# Patient Record
Sex: FEMALE | Race: ASIAN | Employment: OTHER | URBAN - METROPOLITAN AREA
[De-identification: names, ages, dates, MRNs, and addresses within clinical notes are randomized per-mention and may not be internally consistent; named-entity substitution may affect disease eponyms.]

---

## 2018-01-02 ENCOUNTER — TELEPHONE (OUTPATIENT)
Dept: RHEUMATOLOGY | Facility: CLINIC | Age: 68
End: 2018-01-02

## 2018-01-02 DIAGNOSIS — D72.819 LEUKOPENIA, UNSPECIFIED TYPE: Primary | ICD-10-CM

## 2020-01-10 ENCOUNTER — OFFICE VISIT (OUTPATIENT)
Dept: INTERNAL MEDICINE CLINIC | Facility: CLINIC | Age: 70
End: 2020-01-10
Payer: MEDICARE

## 2020-01-10 VITALS
DIASTOLIC BLOOD PRESSURE: 84 MMHG | HEIGHT: 63 IN | HEART RATE: 87 BPM | OXYGEN SATURATION: 98 % | BODY MASS INDEX: 29.91 KG/M2 | WEIGHT: 168.81 LBS | SYSTOLIC BLOOD PRESSURE: 134 MMHG

## 2020-01-10 DIAGNOSIS — K62.5 BRIGHT RED RECTAL BLEEDING: Primary | ICD-10-CM

## 2020-01-10 DIAGNOSIS — R73.03 PREDIABETES: ICD-10-CM

## 2020-01-10 DIAGNOSIS — E78.2 MIXED HYPERLIPIDEMIA: ICD-10-CM

## 2020-01-10 DIAGNOSIS — Z12.31 SCREENING MAMMOGRAM, ENCOUNTER FOR: ICD-10-CM

## 2020-01-10 PROCEDURE — 99204 OFFICE O/P NEW MOD 45 MIN: CPT | Performed by: INTERNAL MEDICINE

## 2020-01-10 NOTE — PROGRESS NOTES
HPI:    Patient ID: Courtney Carlos is a 71year old female. HPI pt here to get established as a new patient. Has no chronic health issues except for some elevated chol;esterol treated transiently treated with atorvasatin and had borderline blood sugar. (primary encounter diagnosis) gastro referral  Re colonoscopy  Prediabetes check labs  Mixed hyperlipidemia off of statin     No orders of the defined types were placed in this encounter.       Meds This Visit:  Requested Prescriptions      No prescriptions

## 2020-01-11 LAB
ALBUMIN/GLOBULIN RATIO: 1.6 (CALC) (ref 1–2.5)
ALBUMIN: 4.4 G/DL (ref 3.6–5.1)
ALKALINE PHOSPHATASE: 64 U/L (ref 33–130)
ALT: 11 U/L (ref 6–29)
AST: 15 U/L (ref 10–35)
BILIRUBIN, TOTAL: 0.6 MG/DL (ref 0.2–1.2)
BUN: 16 MG/DL (ref 7–25)
CALCIUM: 9.7 MG/DL (ref 8.6–10.4)
CARBON DIOXIDE: 29 MMOL/L (ref 20–32)
CHLORIDE: 104 MMOL/L (ref 98–110)
CHOL/HDLC RATIO: 3.5 (CALC)
CHOLESTEROL, TOTAL: 259 MG/DL
CREATININE: 0.68 MG/DL (ref 0.5–0.99)
EGFR IF AFRICN AM: 103 ML/MIN/1.73M2
EGFR IF NONAFRICN AM: 89 ML/MIN/1.73M2
GLOBULIN: 2.7 G/DL (CALC) (ref 1.9–3.7)
GLUCOSE: 102 MG/DL (ref 65–99)
HDL CHOLESTEROL: 73 MG/DL
HEMOGLOBIN A1C: 6 % OF TOTAL HGB
LDL-CHOLESTEROL: 161 MG/DL (CALC)
NON-HDL CHOLESTEROL: 186 MG/DL (CALC)
POTASSIUM: 4.8 MMOL/L (ref 3.5–5.3)
PROTEIN, TOTAL: 7.1 G/DL (ref 6.1–8.1)
SODIUM: 141 MMOL/L (ref 135–146)
TRIGLYCERIDES: 125 MG/DL

## 2020-01-14 ENCOUNTER — TELEPHONE (OUTPATIENT)
Dept: INTERNAL MEDICINE CLINIC | Facility: CLINIC | Age: 70
End: 2020-01-14

## 2020-01-14 DIAGNOSIS — Z12.11 COLON CANCER SCREENING: ICD-10-CM

## 2020-01-14 DIAGNOSIS — Z01.419 VISIT FOR GYNECOLOGIC EXAMINATION: Primary | ICD-10-CM

## 2020-01-14 NOTE — TELEPHONE ENCOUNTER
Pt walked into office requesting a referral to see Dr. Yael Lowe because her insurance told her that Dr. Prasad Torres is not in network. Pt also needs referral for  OB/ GYN-Dr. Dalia Bullock @ [address] [place] [address], [address] in [facility].  Ph: [phone]

## 2020-01-21 ENCOUNTER — HOSPITAL ENCOUNTER (OUTPATIENT)
Dept: MAMMOGRAPHY | Facility: HOSPITAL | Age: 70
Discharge: HOME | End: 2020-01-21
Attending: INTERNAL MEDICINE
Payer: MEDICARE

## 2020-01-21 DIAGNOSIS — Z12.31 SCREENING MAMMOGRAM, ENCOUNTER FOR: ICD-10-CM

## 2020-01-21 PROCEDURE — 77067 SCR MAMMO BI INCL CAD: CPT | Performed by: INTERNAL MEDICINE

## 2020-01-21 PROCEDURE — 77063 BREAST TOMOSYNTHESIS BI: CPT | Performed by: INTERNAL MEDICINE

## 2020-02-06 ENCOUNTER — HOSPITAL ENCOUNTER (EMERGENCY)
Facility: HOSPITAL | Age: 70
Discharge: HOME | End: 2020-02-06
Attending: EMERGENCY MEDICINE
Payer: MEDICARE

## 2020-02-06 VITALS
HEART RATE: 82 BPM | SYSTOLIC BLOOD PRESSURE: 122 MMHG | RESPIRATION RATE: 16 BRPM | BODY MASS INDEX: 30.12 KG/M2 | TEMPERATURE: 98 F | WEIGHT: 170 LBS | HEIGHT: 63 IN | DIASTOLIC BLOOD PRESSURE: 81 MMHG | OXYGEN SATURATION: 99 %

## 2020-02-06 DIAGNOSIS — K92.1 BLOODY STOOL: Primary | ICD-10-CM

## 2020-02-06 LAB
ANION GAP SERPL CALC-SCNC: 2 MMOL/L (ref 0–18)
ANTIBODY SCREEN: NEGATIVE
BASOPHILS # BLD AUTO: 0.03 X10(3) UL (ref 0–0.2)
BASOPHILS NFR BLD AUTO: 0.8 %
BUN BLD-MCNC: 18 MG/DL (ref 7–18)
BUN/CREAT SERPL: 19.8 (ref 10–20)
CALCIUM BLD-MCNC: 9.3 MG/DL (ref 8.5–10.1)
CHLORIDE SERPL-SCNC: 111 MMOL/L (ref 98–112)
CO2 SERPL-SCNC: 29 MMOL/L (ref 21–32)
CREAT BLD-MCNC: 0.91 MG/DL (ref 0.55–1.02)
CRP SERPL-MCNC: <0.29 MG/DL (ref ?–0.3)
DEPRECATED RDW RBC AUTO: 42.5 FL (ref 35.1–46.3)
EOSINOPHIL # BLD AUTO: 0.13 X10(3) UL (ref 0–0.7)
EOSINOPHIL NFR BLD AUTO: 3.3 %
ERYTHROCYTE [DISTWIDTH] IN BLOOD BY AUTOMATED COUNT: 12.5 % (ref 11–15)
GLUCOSE BLD-MCNC: 113 MG/DL (ref 70–99)
HCT VFR BLD AUTO: 44.5 % (ref 35–48)
HGB BLD-MCNC: 14.4 G/DL (ref 12–16)
IMM GRANULOCYTES # BLD AUTO: 0 X10(3) UL (ref 0–1)
IMM GRANULOCYTES NFR BLD: 0 %
LYMPHOCYTES # BLD AUTO: 1.62 X10(3) UL (ref 1–4)
LYMPHOCYTES NFR BLD AUTO: 40.5 %
MCH RBC QN AUTO: 30.1 PG (ref 26–34)
MCHC RBC AUTO-ENTMCNC: 32.4 G/DL (ref 31–37)
MCV RBC AUTO: 93.1 FL (ref 80–100)
MONOCYTES # BLD AUTO: 0.28 X10(3) UL (ref 0.1–1)
MONOCYTES NFR BLD AUTO: 7 %
NEUTROPHILS # BLD AUTO: 1.94 X10 (3) UL (ref 1.5–7.7)
NEUTROPHILS # BLD AUTO: 1.94 X10(3) UL (ref 1.5–7.7)
NEUTROPHILS NFR BLD AUTO: 48.4 %
OSMOLALITY SERPL CALC.SUM OF ELEC: 297 MOSM/KG (ref 275–295)
PLATELET # BLD AUTO: 161 10(3)UL (ref 150–450)
POTASSIUM SERPL-SCNC: 4.9 MMOL/L (ref 3.5–5.1)
RBC # BLD AUTO: 4.78 X10(6)UL (ref 3.8–5.3)
RH BLOOD TYPE: POSITIVE
SODIUM SERPL-SCNC: 142 MMOL/L (ref 136–145)
WBC # BLD AUTO: 4 X10(3) UL (ref 4–11)

## 2020-02-06 PROCEDURE — 99283 EMERGENCY DEPT VISIT LOW MDM: CPT

## 2020-02-06 PROCEDURE — 80048 BASIC METABOLIC PNL TOTAL CA: CPT

## 2020-02-06 PROCEDURE — 86901 BLOOD TYPING SEROLOGIC RH(D): CPT

## 2020-02-06 PROCEDURE — 86140 C-REACTIVE PROTEIN: CPT | Performed by: EMERGENCY MEDICINE

## 2020-02-06 PROCEDURE — 86900 BLOOD TYPING SEROLOGIC ABO: CPT | Performed by: EMERGENCY MEDICINE

## 2020-02-06 PROCEDURE — 80048 BASIC METABOLIC PNL TOTAL CA: CPT | Performed by: EMERGENCY MEDICINE

## 2020-02-06 PROCEDURE — 82272 OCCULT BLD FECES 1-3 TESTS: CPT

## 2020-02-06 PROCEDURE — 85025 COMPLETE CBC W/AUTO DIFF WBC: CPT | Performed by: EMERGENCY MEDICINE

## 2020-02-06 PROCEDURE — 86901 BLOOD TYPING SEROLOGIC RH(D): CPT | Performed by: EMERGENCY MEDICINE

## 2020-02-06 PROCEDURE — 36415 COLL VENOUS BLD VENIPUNCTURE: CPT

## 2020-02-06 PROCEDURE — 86850 RBC ANTIBODY SCREEN: CPT | Performed by: EMERGENCY MEDICINE

## 2020-02-06 PROCEDURE — 85025 COMPLETE CBC W/AUTO DIFF WBC: CPT

## 2020-02-06 PROCEDURE — 86900 BLOOD TYPING SEROLOGIC ABO: CPT

## 2020-02-06 PROCEDURE — 86850 RBC ANTIBODY SCREEN: CPT

## 2020-02-06 RX ORDER — MELATONIN
325 ONCE
Status: COMPLETED | OUTPATIENT
Start: 2020-02-06 | End: 2020-02-06

## 2020-02-06 RX ORDER — FERROUS SULFATE 325(65) MG
325 TABLET ORAL
Qty: 30 TABLET | Refills: 0 | Status: SHIPPED | OUTPATIENT
Start: 2020-02-06 | End: 2020-03-07

## 2020-02-06 NOTE — ED INITIAL ASSESSMENT (HPI)
Patient presents to ED with c/o of rectal bleeding since September/October, saw PCP in January. Patient states she is supposed to see a GI MD on 2/27/20, however she states she has seen blood in her stool moer frequently, especially in the morning.  Patient

## 2020-02-07 ENCOUNTER — TELEPHONE (OUTPATIENT)
Dept: INTERNAL MEDICINE CLINIC | Facility: CLINIC | Age: 70
End: 2020-02-07

## 2020-02-07 NOTE — CM/SW NOTE
Called Dr. Bertram Mcgrath office at Ext: [phone] to attempt to get an earlier appt for patient.   Original appt 2/26/20 at 1pm.    JANUARYM spoke with Fermin Sumner at Dr. Heidi Rey office, and she was able to get an earlier appt with Dc SUTHERLAND for 2/20/20 at 1130am at

## 2020-02-07 NOTE — TELEPHONE ENCOUNTER
Pt called would like to speak to Dr. Soham Berumen, did not want to give any other information.      Please call [phone]

## 2020-02-07 NOTE — ED PROVIDER NOTES
Patient Seen in: [facility] AND [facility] Emergency Department    History   Patient presents with:  GI Bleeding    Stated Complaint: rectal bleeding since october HPI    59-year-old female with past medical history dyslipidemia presenting for evaluation of se noted above. PSFH elements reviewed from today and agreed except as otherwise stated in HPI.     Physical Exam     ED Triage Vitals [02/06/20 1542]   /87   Pulse 77   Resp 18   Temp 98.2 °F (36.8 °C)   Temp src Oral   SpO2 97 %   O2 Device None (Ro Final result               ANTIBODY SCREEN[[id number]]                                  Final result                 Please view results for these tests on the individual orders.    ABORH (BLOOD TYPE)   ANTIBODY SCREEN   RAINBOW DRAW BLUE   RAINBOW DRAW LAV

## 2020-02-07 NOTE — CM/SW NOTE
Pt called and made aware that her new appointment is on Feb 20th at 1130am. She is also aware that she is on a wait list for a sooner appt as well.

## 2020-02-13 NOTE — H&P (VIEW-ONLY)
[address] [facility] [address] Gastroenterology                                                                                                  Clinic History and Physical     Pa Procedure Laterality Date   • COLONOSCOPY     • D & C  3 years ago      Family Hx:   Family History   Problem Relation Age of Onset   • Hypertension Father [de-identified]   • Diabetes Mother [de-identified]   • Other (carpal tunnel) Sister       Social History: Social History extremities are warm and well perfused   Lung: effort normal and breath sounds normal, no respiratory distress, wheezes or rales  GI: soft, non-tender exam in all quadrants without rigidity or guarding, non-distended, no abnormal bowel sounds noted, no mas r/t no sedation per pt preference  -Prep: Split dose Colyte/TriLyte or equivalent  -Anti-platelets and anti-coagulants: None  -Diabetes meds: None  -Hold iron 5 days prior to procedure    ** If MAC @ [facility]:    - HOLD ACE/ARBs the night before and/or the d

## 2020-02-13 NOTE — H&P
[address] [facility] [address] Gastroenterology                                                                                                  Clinic History and Physical     Pa Procedure Laterality Date   • COLONOSCOPY     • D & C  3 years ago      Family Hx:   Family History   Problem Relation Age of Onset   • Hypertension Father [de-identified]   • Diabetes Mother [de-identified]   • Other (carpal tunnel) Sister       Social History: Social History extremities are warm and well perfused   Lung: effort normal and breath sounds normal, no respiratory distress, wheezes or rales  GI: soft, non-tender exam in all quadrants without rigidity or guarding, non-distended, no abnormal bowel sounds noted, no mas r/t no sedation per pt preference  -Prep: Split dose Colyte/TriLyte or equivalent  -Anti-platelets and anti-coagulants: None  -Diabetes meds: None  -Hold iron 5 days prior to procedure    ** If MAC @ [facility]:    - HOLD ACE/ARBs the night before and/or the d

## 2020-02-20 ENCOUNTER — OFFICE VISIT (OUTPATIENT)
Dept: GASTROENTEROLOGY | Facility: CLINIC | Age: 70
End: 2020-02-20
Payer: MEDICARE

## 2020-02-20 ENCOUNTER — TELEPHONE (OUTPATIENT)
Dept: GASTROENTEROLOGY | Facility: CLINIC | Age: 70
End: 2020-02-20

## 2020-02-20 VITALS
HEART RATE: 76 BPM | WEIGHT: 170 LBS | HEIGHT: 63 IN | BODY MASS INDEX: 30.12 KG/M2 | SYSTOLIC BLOOD PRESSURE: 124 MMHG | DIASTOLIC BLOOD PRESSURE: 79 MMHG

## 2020-02-20 DIAGNOSIS — K92.1 HEMATOCHEZIA: ICD-10-CM

## 2020-02-20 DIAGNOSIS — Z86.010 HISTORY OF COLON POLYPS: Primary | ICD-10-CM

## 2020-02-20 PROCEDURE — 99203 OFFICE O/P NEW LOW 30 MIN: CPT | Performed by: NURSE PRACTITIONER

## 2020-02-20 NOTE — PATIENT INSTRUCTIONS
Recommend:  -Schedule colonoscopy w/ Dr. Juliocesar Dunn w/o sedation per pt preference  Dx: hx colon polyps, hematochezia   -Eligible for NE: No r/t no sedation per pt preference  -Prep: Split dose Colyte/TriLyte or equivalent  -Anti-platelets and anti-coagulants:

## 2020-02-20 NOTE — TELEPHONE ENCOUNTER
Scheduled for:  Colonoscopy 88311  Provider Name:  Dr. Luis Leos  Date:  5/1/2020  Location:  [facility]  Sedation:  No sedation per patient  Time:  7:30 am, arrival 6:30 am  Prep: Colyte  Meds/Allergies Reconciled?:  Carol Ann/APN reviewed.   Diagnosis with codes:  Hx o

## 2020-02-26 ENCOUNTER — TELEPHONE (OUTPATIENT)
Dept: INTERNAL MEDICINE CLINIC | Facility: CLINIC | Age: 70
End: 2020-02-26

## 2020-02-26 NOTE — TELEPHONE ENCOUNTER
Patient called PCP and stated that she is having blood in the stool daily and it is very concerning. She cannot get a Colonoscopy until May and she would like to know if there is anything that she can do to be scheduled sooner.  I informed patient that due

## 2020-02-27 ENCOUNTER — TELEPHONE (OUTPATIENT)
Dept: GASTROENTEROLOGY | Facility: CLINIC | Age: 70
End: 2020-02-27

## 2020-02-27 DIAGNOSIS — K92.1 HEMATOCHEZIA: ICD-10-CM

## 2020-02-27 DIAGNOSIS — Z86.010 HISTORY OF COLON POLYPS: Primary | ICD-10-CM

## 2020-02-27 NOTE — TELEPHONE ENCOUNTER
Closing this TE and opening a new TE under Dr. Tere Lawrence    Please see TE 2/27/20 under Dr. Teer Lawrence for rescheduling information    Closing TE

## 2020-02-27 NOTE — TELEPHONE ENCOUNTER
Rescheduled for:  Colonoscopy 15090  Provider Name: Dr. Kermit Goldberg  Date:    From-5/1/20  To-4/8/20  [facility]  Sedation:  No sedation  Time:   0730 (pt is aware to arrive at 0630)   Prep:  Jude, mailed 2/28/20  Meds/Allergies Reconciled?:

## 2020-02-27 NOTE — TELEPHONE ENCOUNTER
Dr. Holly Yeboah--    I rescheduled this patient to an earlier date at [facility] since she is still having off and on rectal bleeding and is very concerned, but Emmy in Endo stated that a no sedation can not be scheduled at [facility] only MAC.      Please advise if you would

## 2020-02-27 NOTE — TELEPHONE ENCOUNTER
Curtis Holloway, [facility]           12:03 PM   Note      Patient called PCP and stated that she is having blood in the stool daily and it is very concerning.  She cannot get a Colonoscopy until May and she would like to know if there is anything that she can do t

## 2020-02-28 NOTE — TELEPHONE ENCOUNTER
Dr Cheng Ferguson - Per Sayra Pereira 3/19/2020 at 7:30am might only be IV, is it on for patient to have IV sedation? Or can I schedule for 3/19/2020 at 12:30pm with MAC.

## 2020-02-28 NOTE — TELEPHONE ENCOUNTER
Left message to call back and schedule. If patient calls back please forward call to GI Schedulers.  Hosea Grounds today until 4:30pm.

## 2020-03-02 NOTE — TELEPHONE ENCOUNTER
Dr Sue Carolina 3/19/2020 at 7:30am might only be IV, is it on for patient to have IV sedation? Or can I schedule for 3/19/2020 at 12:30pm with MAC.

## 2020-03-03 NOTE — TELEPHONE ENCOUNTER
Dr Holly Yeboah - Spoke to patient, she stated her rectal bleeding has been getting worse, is it ok if we add patient on for 3/5/20 or 3/6/20? GI RNs - Please triage this patient as she called today regarding her rectal bleeding getting worse by day.      Thank

## 2020-03-03 NOTE — TELEPHONE ENCOUNTER
Please find out why I do not have MAC on Thurs. I would like to do with MAC sedation. Prefer 730AM but if not available then 1230pm ok (but please let me know why I do not have MAC)-- it is my MAC morning.      Yessica Cottrell MD

## 2020-03-03 NOTE — TELEPHONE ENCOUNTER
I spoke to the pt. She states she notices bright red bleeding every morning when she wipes. This does not continue throughout the day. She only notices it in the morning and when she passes a stool.     I let her know the surgery scheduler will be calling h

## 2020-03-03 NOTE — TELEPHONE ENCOUNTER
Scheduled for:  Colonoscopy 42048  Provider Name: Dr. Patricia Darden  Date:  03/06/2020  Location:      [facility]  Sedation:  No sedation  Time:  0815   Prep:  Colyte  Meds/Allergies Reconciled?:  Physician reviewed   Diagnosis with codes:  History of colon polyps Z86.01

## 2020-03-06 ENCOUNTER — TELEPHONE (OUTPATIENT)
Dept: GASTROENTEROLOGY | Facility: CLINIC | Age: 70
End: 2020-03-06

## 2020-03-06 ENCOUNTER — HOSPITAL ENCOUNTER (OUTPATIENT)
Dept: CT IMAGING | Facility: HOSPITAL | Age: 70
Discharge: HOME | End: 2020-03-06
Attending: INTERNAL MEDICINE
Payer: MEDICARE

## 2020-03-06 ENCOUNTER — HOSPITAL ENCOUNTER (OUTPATIENT)
Facility: HOSPITAL | Age: 70
Setting detail: HOSPITAL OUTPATIENT SURGERY
Discharge: HOME | End: 2020-03-06
Attending: INTERNAL MEDICINE | Admitting: INTERNAL MEDICINE
Payer: MEDICARE

## 2020-03-06 VITALS
DIASTOLIC BLOOD PRESSURE: 86 MMHG | OXYGEN SATURATION: 100 % | HEIGHT: 62 IN | WEIGHT: 168 LBS | HEART RATE: 75 BPM | SYSTOLIC BLOOD PRESSURE: 140 MMHG | RESPIRATION RATE: 14 BRPM | BODY MASS INDEX: 30.91 KG/M2

## 2020-03-06 DIAGNOSIS — K92.1 HEMATOCHEZIA: ICD-10-CM

## 2020-03-06 DIAGNOSIS — C18.7 CANCER OF SIGMOID COLON (HCC): Primary | ICD-10-CM

## 2020-03-06 DIAGNOSIS — C18.7 CANCER OF SIGMOID COLON (HCC): ICD-10-CM

## 2020-03-06 DIAGNOSIS — Z86.010 HISTORY OF COLON POLYPS: ICD-10-CM

## 2020-03-06 PROCEDURE — 45381 COLONOSCOPY SUBMUCOUS NJX: CPT | Performed by: INTERNAL MEDICINE

## 2020-03-06 PROCEDURE — 0DBN8ZX EXCISION OF SIGMOID COLON, VIA NATURAL OR ARTIFICIAL OPENING ENDOSCOPIC, DIAGNOSTIC: ICD-10-PCS | Performed by: INTERNAL MEDICINE

## 2020-03-06 PROCEDURE — 74177 CT ABD & PELVIS W/CONTRAST: CPT | Performed by: INTERNAL MEDICINE

## 2020-03-06 PROCEDURE — 45380 COLONOSCOPY AND BIOPSY: CPT | Performed by: INTERNAL MEDICINE

## 2020-03-06 PROCEDURE — 0DBK8ZX EXCISION OF ASCENDING COLON, VIA NATURAL OR ARTIFICIAL OPENING ENDOSCOPIC, DIAGNOSTIC: ICD-10-PCS | Performed by: INTERNAL MEDICINE

## 2020-03-06 PROCEDURE — 0DBH8ZX EXCISION OF CECUM, VIA NATURAL OR ARTIFICIAL OPENING ENDOSCOPIC, DIAGNOSTIC: ICD-10-PCS | Performed by: INTERNAL MEDICINE

## 2020-03-06 PROCEDURE — 45385 COLONOSCOPY W/LESION REMOVAL: CPT | Performed by: INTERNAL MEDICINE

## 2020-03-06 PROCEDURE — 71260 CT THORAX DX C+: CPT | Performed by: INTERNAL MEDICINE

## 2020-03-06 RX ORDER — SODIUM CHLORIDE 0.9 % (FLUSH) 0.9 %
10 SYRINGE (ML) INJECTION AS NEEDED
Status: DISCONTINUED | OUTPATIENT
Start: 2020-03-06 | End: 2020-03-06

## 2020-03-06 RX ORDER — SODIUM CHLORIDE, SODIUM LACTATE, POTASSIUM CHLORIDE, CALCIUM CHLORIDE 600; 310; 30; 20 MG/100ML; MG/100ML; MG/100ML; MG/100ML
INJECTION, SOLUTION INTRAVENOUS CONTINUOUS
Status: DISCONTINUED | OUTPATIENT
Start: 2020-03-06 | End: 2020-03-06

## 2020-03-06 NOTE — TELEPHONE ENCOUNTER
Spoke with Richard Alston at [facility] [employer] and got approval for cpt code for 04921 and 05192. Authorization numbers are [id number] and [id number]. She will fax us a confirmation. 54

## 2020-03-06 NOTE — TELEPHONE ENCOUNTER
Patient with sigmoid cancer on colonoscopy today  Tattooed distal and proximal aspect  Ordered CEA, ct chest/abd/pelvis  Consult placed for Dr. Mavis Wells and Dr. Merrick Olszewski    Thank you

## 2020-03-06 NOTE — TELEPHONE ENCOUNTER
Need prior auth for CT Chest, abdomen and Pelvis, proc codes 65520 and 63060, which is sched right now. Pt. is getting the test done now, need STAT Auth. Please call [employer] - phone # [phone].

## 2020-03-06 NOTE — TELEPHONE ENCOUNTER
Called by radiologist, focus of air in sigmoid likely from biopsy site (contained perforation). No free air. No metastatic disease. Called patient and told to do CLD today and then if no pain can eat normal tomorrow.    MILAGROS Cole and Dr. Luisa Wisdom

## 2020-03-06 NOTE — INTERVAL H&P NOTE
Pre-op Diagnosis: Hematochezia, History of colon polyps    The above referenced H&P was reviewed by London Chowdhury MD on 3/6/2020, the patient was examined and no significant changes have occurred in the patient's condition since the H&P was performed.   I di

## 2020-03-06 NOTE — OPERATIVE REPORT
COLONOSCOPY REPORT    Romi Ghotraenoc     1950 Age 71year old   PCP Lolis Juarez MD Endoscopist Jeffry Roche MD     Date of procedure: 20    Procedure: Colonoscopy w/ cold biopsy polypectomy, cold snare polypectomy and mass biopsy.  Tattoe C. 6 mm polyp in the sigmoid colon; flat morphology; cold snare polypectomy and retrieved. 2. Terminal ileum: the visualized mucosa appeared normal.    3. A retroflexed view of the rectum revealed hemorrhoids.     4. There was a 5 cm mass from 15 cm to

## 2020-03-06 NOTE — TELEPHONE ENCOUNTER
Spoke with Estephanie Saavedra at [facility] [employer] and got approval for cpt code for 27122 and 77657. Authorization numbers are [id number]. She will fax us a confirmation.

## 2020-03-09 ENCOUNTER — OFFICE VISIT (OUTPATIENT)
Dept: SURGERY | Facility: CLINIC | Age: 70
End: 2020-03-09
Payer: MEDICARE

## 2020-03-09 VITALS — BODY MASS INDEX: 30.91 KG/M2 | WEIGHT: 168 LBS | HEIGHT: 62 IN

## 2020-03-09 DIAGNOSIS — C18.7 MALIGNANT NEOPLASM OF SIGMOID COLON (HCC): Primary | ICD-10-CM

## 2020-03-09 PROCEDURE — 99204 OFFICE O/P NEW MOD 45 MIN: CPT | Performed by: SURGERY

## 2020-03-09 NOTE — TELEPHONE ENCOUNTER
Future Appointments   Date Time Provider Carol Ferrera   3/10/2020 10:00 AM Laci Waite  [facility] HEM ONC EMO

## 2020-03-10 ENCOUNTER — TELEPHONE (OUTPATIENT)
Dept: SURGERY | Facility: CLINIC | Age: 70
End: 2020-03-10

## 2020-03-10 ENCOUNTER — OFFICE VISIT (OUTPATIENT)
Dept: HEMATOLOGY/ONCOLOGY | Facility: HOSPITAL | Age: 70
End: 2020-03-10
Attending: INTERNAL MEDICINE
Payer: MEDICARE

## 2020-03-10 VITALS
BODY MASS INDEX: 30.73 KG/M2 | HEIGHT: 62 IN | OXYGEN SATURATION: 98 % | SYSTOLIC BLOOD PRESSURE: 135 MMHG | WEIGHT: 167 LBS | RESPIRATION RATE: 16 BRPM | TEMPERATURE: 98 F | HEART RATE: 75 BPM | DIASTOLIC BLOOD PRESSURE: 73 MMHG

## 2020-03-10 DIAGNOSIS — K63.1 PERFORATED SIGMOID COLON (HCC): ICD-10-CM

## 2020-03-10 DIAGNOSIS — R73.9 HYPERGLYCEMIA: ICD-10-CM

## 2020-03-10 DIAGNOSIS — C18.7 CANCER OF SIGMOID COLON (HCC): Primary | ICD-10-CM

## 2020-03-10 PROCEDURE — 99204 OFFICE O/P NEW MOD 45 MIN: CPT | Performed by: INTERNAL MEDICINE

## 2020-03-10 RX ORDER — METRONIDAZOLE 500 MG/1
TABLET ORAL
Qty: 6 TABLET | Refills: 0 | Status: SHIPPED | OUTPATIENT
Start: 2020-03-15 | End: 2020-03-15

## 2020-03-10 RX ORDER — NEOMYCIN SULFATE 500 MG/1
TABLET ORAL
Qty: 6 TABLET | Refills: 0 | Status: SHIPPED | OUTPATIENT
Start: 2020-03-15 | End: 2020-03-15

## 2020-03-10 NOTE — CONSULTS
[facility] History and Physical    Patient Name: Giselle Gonzalez   YOB: 1950   Medical Record Number: [id number]   CSN: [id number]   Attending Physician:  Shivani Miranda MD       Date of Visit: 3/10/2020     Chief Complaint:  Colon cancer other associated modifying symptoms.     Past Medical History:  Past Medical History:   Diagnosis Date   • High cholesterol    • Other and unspecified hyperlipidemia     borderline       Past Surgical History:  Past Surgical History:   Procedure Laterality intact. Oropharynx is clear. Neck:  No palpable lymphadenopathy. Neck is supple. Chest: Clear to auscultation. Symmetric expansion. No wheezing. Heart: Regular rate and rhythm. No audible murmur  Abdomen: Soft, non tender with good bowel sounds.  No re scheduled for sigmoid resection on 3/16/2020. Instructed the patient to return and see us 2 weeks after surgery to discuss final staging as well as rationale for chemotherapy in the setting of high risk stage II hours or stage III disease.   All her questi

## 2020-03-10 NOTE — TELEPHONE ENCOUNTER
[facility]. Had to leave a voicemail. Waiting for a call back.

## 2020-03-11 ENCOUNTER — LAB ENCOUNTER (OUTPATIENT)
Dept: LAB | Facility: HOSPITAL | Age: 70
End: 2020-03-11
Attending: SURGERY
Payer: MEDICARE

## 2020-03-11 DIAGNOSIS — Z01.818 PRE-OP TESTING: ICD-10-CM

## 2020-03-11 LAB
ANTIBODY SCREEN: NEGATIVE
RH BLOOD TYPE: POSITIVE

## 2020-03-11 PROCEDURE — 86901 BLOOD TYPING SEROLOGIC RH(D): CPT

## 2020-03-11 PROCEDURE — 86850 RBC ANTIBODY SCREEN: CPT

## 2020-03-11 PROCEDURE — 86900 BLOOD TYPING SEROLOGIC ABO: CPT

## 2020-03-11 PROCEDURE — 36415 COLL VENOUS BLD VENIPUNCTURE: CPT

## 2020-03-11 RX ORDER — FERROUS SULFATE 325(65) MG
325 TABLET ORAL
COMMUNITY
End: 2021-01-05 | Stop reason: ALTCHOICE

## 2020-03-12 ENCOUNTER — TELEPHONE (OUTPATIENT)
Dept: SURGERY | Facility: CLINIC | Age: 70
End: 2020-03-12

## 2020-03-12 NOTE — TELEPHONE ENCOUNTER
Paper work was fax'ed to office to be complete. Sent it to Forms and placed in  [facility]

## 2020-03-15 ENCOUNTER — ANESTHESIA EVENT (OUTPATIENT)
Dept: SURGERY | Facility: HOSPITAL | Age: 70
DRG: 331 | End: 2020-03-15
Payer: MEDICARE

## 2020-03-16 ENCOUNTER — ANESTHESIA (OUTPATIENT)
Dept: SURGERY | Facility: HOSPITAL | Age: 70
DRG: 331 | End: 2020-03-16
Payer: MEDICARE

## 2020-03-16 ENCOUNTER — HOSPITAL ENCOUNTER (INPATIENT)
Facility: HOSPITAL | Age: 70
LOS: 4 days | Discharge: HOME | DRG: 331 | End: 2020-03-20
Attending: SURGERY | Admitting: SURGERY
Payer: MEDICARE

## 2020-03-16 DIAGNOSIS — Z01.818 PRE-OP TESTING: Primary | ICD-10-CM

## 2020-03-16 DIAGNOSIS — C18.7 MALIGNANT NEOPLASM OF SIGMOID COLON (HCC): ICD-10-CM

## 2020-03-16 LAB
GLUCOSE BLDC GLUCOMTR-MCNC: 123 MG/DL (ref 70–99)
GLUCOSE BLDC GLUCOMTR-MCNC: 178 MG/DL (ref 70–99)

## 2020-03-16 PROCEDURE — 99232 SBSQ HOSP IP/OBS MODERATE 35: CPT | Performed by: HOSPITALIST

## 2020-03-16 PROCEDURE — 44204 LAPARO PARTIAL COLECTOMY: CPT | Performed by: SURGERY

## 2020-03-16 PROCEDURE — 44213 LAP MOBIL SPLENIC FL ADD-ON: CPT | Performed by: SURGERY

## 2020-03-16 PROCEDURE — 0DTN4ZZ RESECTION OF SIGMOID COLON, PERCUTANEOUS ENDOSCOPIC APPROACH: ICD-10-PCS | Performed by: SURGERY

## 2020-03-16 RX ORDER — DOCUSATE SODIUM 100 MG/1
100 CAPSULE, LIQUID FILLED ORAL 2 TIMES DAILY
Status: DISCONTINUED | OUTPATIENT
Start: 2020-03-16 | End: 2020-03-20

## 2020-03-16 RX ORDER — ACETAMINOPHEN 500 MG
1000 TABLET ORAL ONCE
Status: COMPLETED | OUTPATIENT
Start: 2020-03-16 | End: 2020-03-16

## 2020-03-16 RX ORDER — ACETAMINOPHEN 500 MG
1000 TABLET ORAL EVERY 8 HOURS
Status: DISCONTINUED | OUTPATIENT
Start: 2020-03-16 | End: 2020-03-20

## 2020-03-16 RX ORDER — OXYCODONE HYDROCHLORIDE 5 MG/1
10 TABLET ORAL EVERY 4 HOURS PRN
Status: DISCONTINUED | OUTPATIENT
Start: 2020-03-16 | End: 2020-03-20

## 2020-03-16 RX ORDER — CEFAZOLIN SODIUM/WATER 2 G/20 ML
2 SYRINGE (ML) INTRAVENOUS EVERY 8 HOURS
Status: DISCONTINUED | OUTPATIENT
Start: 2020-03-16 | End: 2020-03-16

## 2020-03-16 RX ORDER — GABAPENTIN 100 MG/1
200 CAPSULE ORAL NIGHTLY
Status: DISCONTINUED | OUTPATIENT
Start: 2020-03-16 | End: 2020-03-16

## 2020-03-16 RX ORDER — ALVIMOPAN 12 MG/1
12 CAPSULE ORAL 2 TIMES DAILY
Status: DISCONTINUED | OUTPATIENT
Start: 2020-03-16 | End: 2020-03-20

## 2020-03-16 RX ORDER — OXYCODONE HYDROCHLORIDE 5 MG/1
5 TABLET ORAL EVERY 4 HOURS PRN
Status: DISCONTINUED | OUTPATIENT
Start: 2020-03-16 | End: 2020-03-20

## 2020-03-16 RX ORDER — BUPIVACAINE HYDROCHLORIDE 2.5 MG/ML
INJECTION, SOLUTION EPIDURAL; INFILTRATION; INTRACAUDAL AS NEEDED
Status: DISCONTINUED | OUTPATIENT
Start: 2020-03-16 | End: 2020-03-16 | Stop reason: HOSPADM

## 2020-03-16 RX ORDER — ACETAMINOPHEN 500 MG
1000 TABLET ORAL EVERY 8 HOURS
Status: DISCONTINUED | OUTPATIENT
Start: 2020-03-16 | End: 2020-03-16

## 2020-03-16 RX ORDER — METRONIDAZOLE 500 MG/100ML
500 INJECTION, SOLUTION INTRAVENOUS EVERY 8 HOURS
Status: DISCONTINUED | OUTPATIENT
Start: 2020-03-16 | End: 2020-03-16

## 2020-03-16 RX ORDER — ONDANSETRON 2 MG/ML
4 INJECTION INTRAMUSCULAR; INTRAVENOUS ONCE AS NEEDED
Status: DISCONTINUED | OUTPATIENT
Start: 2020-03-16 | End: 2020-03-16 | Stop reason: HOSPADM

## 2020-03-16 RX ORDER — METRONIDAZOLE 500 MG/100ML
500 INJECTION, SOLUTION INTRAVENOUS EVERY 8 HOURS
Status: COMPLETED | OUTPATIENT
Start: 2020-03-16 | End: 2020-03-17

## 2020-03-16 RX ORDER — ONDANSETRON 2 MG/ML
INJECTION INTRAMUSCULAR; INTRAVENOUS AS NEEDED
Status: DISCONTINUED | OUTPATIENT
Start: 2020-03-16 | End: 2020-03-16 | Stop reason: SURG

## 2020-03-16 RX ORDER — LIDOCAINE HYDROCHLORIDE 10 MG/ML
INJECTION, SOLUTION EPIDURAL; INFILTRATION; INTRACAUDAL; PERINEURAL AS NEEDED
Status: DISCONTINUED | OUTPATIENT
Start: 2020-03-16 | End: 2020-03-16 | Stop reason: SURG

## 2020-03-16 RX ORDER — SODIUM CHLORIDE, SODIUM LACTATE, POTASSIUM CHLORIDE, CALCIUM CHLORIDE 600; 310; 30; 20 MG/100ML; MG/100ML; MG/100ML; MG/100ML
2 INJECTION, SOLUTION INTRAVENOUS CONTINUOUS
Status: DISCONTINUED | OUTPATIENT
Start: 2020-03-16 | End: 2020-03-20

## 2020-03-16 RX ORDER — HYDROMORPHONE HYDROCHLORIDE 1 MG/ML
0.2 INJECTION, SOLUTION INTRAMUSCULAR; INTRAVENOUS; SUBCUTANEOUS EVERY 5 MIN PRN
Status: DISCONTINUED | OUTPATIENT
Start: 2020-03-16 | End: 2020-03-16 | Stop reason: HOSPADM

## 2020-03-16 RX ORDER — SODIUM CHLORIDE, SODIUM LACTATE, POTASSIUM CHLORIDE, CALCIUM CHLORIDE 600; 310; 30; 20 MG/100ML; MG/100ML; MG/100ML; MG/100ML
INJECTION, SOLUTION INTRAVENOUS CONTINUOUS
Status: DISCONTINUED | OUTPATIENT
Start: 2020-03-16 | End: 2020-03-20

## 2020-03-16 RX ORDER — MORPHINE SULFATE 4 MG/ML
2 INJECTION, SOLUTION INTRAMUSCULAR; INTRAVENOUS EVERY 10 MIN PRN
Status: DISCONTINUED | OUTPATIENT
Start: 2020-03-16 | End: 2020-03-16 | Stop reason: HOSPADM

## 2020-03-16 RX ORDER — NALOXONE HYDROCHLORIDE 0.4 MG/ML
80 INJECTION, SOLUTION INTRAMUSCULAR; INTRAVENOUS; SUBCUTANEOUS AS NEEDED
Status: DISCONTINUED | OUTPATIENT
Start: 2020-03-16 | End: 2020-03-16 | Stop reason: HOSPADM

## 2020-03-16 RX ORDER — HYDROMORPHONE HYDROCHLORIDE 1 MG/ML
0.4 INJECTION, SOLUTION INTRAMUSCULAR; INTRAVENOUS; SUBCUTANEOUS EVERY 2 HOUR PRN
Status: DISCONTINUED | OUTPATIENT
Start: 2020-03-16 | End: 2020-03-20

## 2020-03-16 RX ORDER — METOCLOPRAMIDE 10 MG/1
10 TABLET ORAL ONCE
Status: DISCONTINUED | OUTPATIENT
Start: 2020-03-16 | End: 2020-03-16 | Stop reason: HOSPADM

## 2020-03-16 RX ORDER — HYDROCODONE BITARTRATE AND ACETAMINOPHEN 5; 325 MG/1; MG/1
2 TABLET ORAL AS NEEDED
Status: DISCONTINUED | OUTPATIENT
Start: 2020-03-16 | End: 2020-03-16 | Stop reason: HOSPADM

## 2020-03-16 RX ORDER — FAMOTIDINE 20 MG/1
20 TABLET ORAL ONCE
Status: DISCONTINUED | OUTPATIENT
Start: 2020-03-16 | End: 2020-03-16 | Stop reason: HOSPADM

## 2020-03-16 RX ORDER — SODIUM CHLORIDE, SODIUM LACTATE, POTASSIUM CHLORIDE, CALCIUM CHLORIDE 600; 310; 30; 20 MG/100ML; MG/100ML; MG/100ML; MG/100ML
INJECTION, SOLUTION INTRAVENOUS CONTINUOUS
Status: DISCONTINUED | OUTPATIENT
Start: 2020-03-16 | End: 2020-03-16 | Stop reason: HOSPADM

## 2020-03-16 RX ORDER — MORPHINE SULFATE 10 MG/ML
6 INJECTION, SOLUTION INTRAMUSCULAR; INTRAVENOUS EVERY 10 MIN PRN
Status: DISCONTINUED | OUTPATIENT
Start: 2020-03-16 | End: 2020-03-16 | Stop reason: HOSPADM

## 2020-03-16 RX ORDER — CEFAZOLIN SODIUM/WATER 2 G/20 ML
2 SYRINGE (ML) INTRAVENOUS EVERY 8 HOURS
Status: COMPLETED | OUTPATIENT
Start: 2020-03-16 | End: 2020-03-17

## 2020-03-16 RX ORDER — PHENYLEPHRINE HCL 10 MG/ML
VIAL (ML) INJECTION AS NEEDED
Status: DISCONTINUED | OUTPATIENT
Start: 2020-03-16 | End: 2020-03-16 | Stop reason: SURG

## 2020-03-16 RX ORDER — MIDAZOLAM HYDROCHLORIDE 1 MG/ML
INJECTION INTRAMUSCULAR; INTRAVENOUS AS NEEDED
Status: DISCONTINUED | OUTPATIENT
Start: 2020-03-16 | End: 2020-03-16 | Stop reason: SURG

## 2020-03-16 RX ORDER — POLYETHYLENE GLYCOL 3350 17 G/17G
17 POWDER, FOR SOLUTION ORAL DAILY PRN
Status: DISCONTINUED | OUTPATIENT
Start: 2020-03-16 | End: 2020-03-20

## 2020-03-16 RX ORDER — HALOPERIDOL 5 MG/ML
0.25 INJECTION INTRAMUSCULAR ONCE AS NEEDED
Status: DISCONTINUED | OUTPATIENT
Start: 2020-03-16 | End: 2020-03-16 | Stop reason: HOSPADM

## 2020-03-16 RX ORDER — HYDROMORPHONE HYDROCHLORIDE 1 MG/ML
0.6 INJECTION, SOLUTION INTRAMUSCULAR; INTRAVENOUS; SUBCUTANEOUS EVERY 5 MIN PRN
Status: DISCONTINUED | OUTPATIENT
Start: 2020-03-16 | End: 2020-03-16 | Stop reason: HOSPADM

## 2020-03-16 RX ORDER — HYDROMORPHONE HYDROCHLORIDE 1 MG/ML
0.2 INJECTION, SOLUTION INTRAMUSCULAR; INTRAVENOUS; SUBCUTANEOUS EVERY 2 HOUR PRN
Status: DISCONTINUED | OUTPATIENT
Start: 2020-03-16 | End: 2020-03-20

## 2020-03-16 RX ORDER — DEXAMETHASONE SODIUM PHOSPHATE 4 MG/ML
VIAL (ML) INJECTION AS NEEDED
Status: DISCONTINUED | OUTPATIENT
Start: 2020-03-16 | End: 2020-03-16 | Stop reason: SURG

## 2020-03-16 RX ORDER — ROCURONIUM BROMIDE 10 MG/ML
INJECTION, SOLUTION INTRAVENOUS AS NEEDED
Status: DISCONTINUED | OUTPATIENT
Start: 2020-03-16 | End: 2020-03-16 | Stop reason: SURG

## 2020-03-16 RX ORDER — NEOSTIGMINE METHYLSULFATE 1 MG/ML
INJECTION INTRAVENOUS AS NEEDED
Status: DISCONTINUED | OUTPATIENT
Start: 2020-03-16 | End: 2020-03-16 | Stop reason: SURG

## 2020-03-16 RX ORDER — HYDROCODONE BITARTRATE AND ACETAMINOPHEN 5; 325 MG/1; MG/1
1 TABLET ORAL AS NEEDED
Status: DISCONTINUED | OUTPATIENT
Start: 2020-03-16 | End: 2020-03-16 | Stop reason: HOSPADM

## 2020-03-16 RX ORDER — HYDROMORPHONE HYDROCHLORIDE 1 MG/ML
0.4 INJECTION, SOLUTION INTRAMUSCULAR; INTRAVENOUS; SUBCUTANEOUS EVERY 5 MIN PRN
Status: DISCONTINUED | OUTPATIENT
Start: 2020-03-16 | End: 2020-03-16 | Stop reason: HOSPADM

## 2020-03-16 RX ORDER — ONDANSETRON 2 MG/ML
4 INJECTION INTRAMUSCULAR; INTRAVENOUS EVERY 4 HOURS PRN
Status: DISCONTINUED | OUTPATIENT
Start: 2020-03-16 | End: 2020-03-20

## 2020-03-16 RX ORDER — GLYCOPYRROLATE 0.2 MG/ML
INJECTION, SOLUTION INTRAMUSCULAR; INTRAVENOUS AS NEEDED
Status: DISCONTINUED | OUTPATIENT
Start: 2020-03-16 | End: 2020-03-16 | Stop reason: SURG

## 2020-03-16 RX ORDER — MORPHINE SULFATE 4 MG/ML
4 INJECTION, SOLUTION INTRAMUSCULAR; INTRAVENOUS EVERY 10 MIN PRN
Status: DISCONTINUED | OUTPATIENT
Start: 2020-03-16 | End: 2020-03-16 | Stop reason: HOSPADM

## 2020-03-16 RX ORDER — CEFAZOLIN SODIUM/WATER 2 G/20 ML
2 SYRINGE (ML) INTRAVENOUS ONCE
Status: COMPLETED | OUTPATIENT
Start: 2020-03-16 | End: 2020-03-16

## 2020-03-16 RX ORDER — SODIUM CHLORIDE 0.9 % (FLUSH) 0.9 %
10 SYRINGE (ML) INJECTION AS NEEDED
Status: DISCONTINUED | OUTPATIENT
Start: 2020-03-16 | End: 2020-03-20

## 2020-03-16 RX ORDER — METRONIDAZOLE 500 MG/100ML
500 INJECTION, SOLUTION INTRAVENOUS ONCE
Status: COMPLETED | OUTPATIENT
Start: 2020-03-16 | End: 2020-03-16

## 2020-03-16 RX ORDER — MAGNESIUM OXIDE 400 MG (241.3 MG MAGNESIUM) TABLET
400 TABLET DAILY
Status: DISCONTINUED | OUTPATIENT
Start: 2020-03-16 | End: 2020-03-20

## 2020-03-16 RX ORDER — OXYCODONE HYDROCHLORIDE 5 MG/1
15 TABLET ORAL EVERY 4 HOURS PRN
Status: DISCONTINUED | OUTPATIENT
Start: 2020-03-16 | End: 2020-03-20

## 2020-03-16 RX ORDER — HEPARIN SODIUM 5000 [USP'U]/ML
5000 INJECTION, SOLUTION INTRAVENOUS; SUBCUTANEOUS EVERY 8 HOURS SCHEDULED
Status: DISCONTINUED | OUTPATIENT
Start: 2020-03-17 | End: 2020-03-20

## 2020-03-16 RX ORDER — PROCHLORPERAZINE EDISYLATE 5 MG/ML
5 INJECTION INTRAMUSCULAR; INTRAVENOUS ONCE AS NEEDED
Status: DISCONTINUED | OUTPATIENT
Start: 2020-03-16 | End: 2020-03-16 | Stop reason: HOSPADM

## 2020-03-16 RX ORDER — HYDROMORPHONE HYDROCHLORIDE 1 MG/ML
0.8 INJECTION, SOLUTION INTRAMUSCULAR; INTRAVENOUS; SUBCUTANEOUS EVERY 2 HOUR PRN
Status: DISCONTINUED | OUTPATIENT
Start: 2020-03-16 | End: 2020-03-20

## 2020-03-16 RX ADMIN — MIDAZOLAM HYDROCHLORIDE 2 MG: 1 INJECTION INTRAMUSCULAR; INTRAVENOUS at 10:05:00

## 2020-03-16 RX ADMIN — DEXAMETHASONE SODIUM PHOSPHATE 4 MG: 4 MG/ML VIAL (ML) INJECTION at 10:05:00

## 2020-03-16 RX ADMIN — LIDOCAINE HYDROCHLORIDE 50 MG: 10 INJECTION, SOLUTION EPIDURAL; INFILTRATION; INTRACAUDAL; PERINEURAL at 10:05:00

## 2020-03-16 RX ADMIN — METRONIDAZOLE 500 MG: 500 INJECTION, SOLUTION INTRAVENOUS at 10:20:00

## 2020-03-16 RX ADMIN — SODIUM CHLORIDE, SODIUM LACTATE, POTASSIUM CHLORIDE, CALCIUM CHLORIDE: 600; 310; 30; 20 INJECTION, SOLUTION INTRAVENOUS at 10:41:00

## 2020-03-16 RX ADMIN — NEOSTIGMINE METHYLSULFATE 4 MG: 1 INJECTION INTRAVENOUS at 12:35:00

## 2020-03-16 RX ADMIN — ROCURONIUM BROMIDE 50 MG: 10 INJECTION, SOLUTION INTRAVENOUS at 10:05:00

## 2020-03-16 RX ADMIN — ONDANSETRON 4 MG: 2 INJECTION INTRAMUSCULAR; INTRAVENOUS at 10:05:00

## 2020-03-16 RX ADMIN — PHENYLEPHRINE HCL 100 MCG: 10 MG/ML VIAL (ML) INJECTION at 10:29:00

## 2020-03-16 RX ADMIN — ROCURONIUM BROMIDE 20 MG: 10 INJECTION, SOLUTION INTRAVENOUS at 11:20:00

## 2020-03-16 RX ADMIN — CEFAZOLIN SODIUM/WATER 2 G: 2 G/20 ML SYRINGE (ML) INTRAVENOUS at 10:20:00

## 2020-03-16 RX ADMIN — GLYCOPYRROLATE 0.6 MG: 0.2 INJECTION, SOLUTION INTRAMUSCULAR; INTRAVENOUS at 12:35:00

## 2020-03-16 RX ADMIN — PHENYLEPHRINE HCL 100 MCG: 10 MG/ML VIAL (ML) INJECTION at 10:22:00

## 2020-03-16 NOTE — ANESTHESIA PREPROCEDURE EVALUATION
Anesthesia PreOp Note    HPI:     Isis Shook is a 71year old female who presents for preoperative consultation requested by: Reina Pate MD    Date of Surgery: 3/16/2020    Procedure(s):  LAPAROSCOPIC COLON RESECTION - LEFT  Indication: Andre Juarez (CO Q 10 OR), Take  by mouth daily. , Disp: , Rfl: , 3/2/2020      lactated ringers infusion, , Intravenous, Continuous, Brandy Fan MD  acetaminophen (TYLENOL EXTRA STRENGTH) tab 1,000 mg, 1,000 mg, Oral, Once, Devora Lieberman MD  famoTIDine (PEP meetings of clubs or organizations: Not on file        Relationship status: Not on file      Intimate partner violence:        Fear of current or ex partner: Not on file        Emotionally abused: Not on file        Physically abused: Not on file        Fo Adenocarcinoma sigmoid colon    Endo/Other    (+) blood dyscrasia,     Comments: anemia  Abdominal  - normal exam             Anesthesia Plan:   ASA:  2  Plan:   General  Airway:  ETT  Post-op Pain Management: IV analgesics  Informed Consent Plan and Risks

## 2020-03-16 NOTE — INTERVAL H&P NOTE
Pre-op Diagnosis: Malignant neoplasm of sigmoid colon ([facility] [address] [C18.7]    The above referenced H&P was reviewed by Merry Beltran MD on 3/16/2020, the patient was examined and no significant changes have occurred in the patient's condition since the H&P was

## 2020-03-16 NOTE — ANESTHESIA POSTPROCEDURE EVALUATION
Patient: Ondina Fitzegrald    Procedure Summary     Date:  03/16/20 Room / Location:  [address] [place] [address] MAIN OR 03 / [address] [facility] MAIN OR    Anesthesia Start:  1004 Anesthesia Stop:  1594    Procedure:  LAPAROSCOPIC COLON RESECTION - LEFT (Left ) Diagnosis:       Malignant neoplas

## 2020-03-16 NOTE — PROGRESS NOTES
[facility] HOSP - [facility]    Progress Note    Taniya Donnelly Patient Status:  Surgery Admit - Inpt    1950 MRN [id number]   Location [address] [facility] Attending Tyson Decker MD   Hosp Day # 0 PCP Martine Emerson MD Results   Component Value Date    WBC 4.0 02/06/2020    HGB 14.4 02/06/2020    HCT 44.5 02/06/2020    .0 02/06/2020    CREATSERUM 0.91 02/06/2020    BUN 18 02/06/2020     02/06/2020    K 4.9 02/06/2020     02/06/2020    CO2 29.0 02/06/20

## 2020-03-16 NOTE — PLAN OF CARE
Received patient from PACU- patient is drowsy but easy to arouse. VSS. C/o abdominal cramping. 2L NC for comfort. IVF infusing. Abdominal incision c/d/I. ERAS protocol, IV dilaudid and PO oxy given for pain management. Tolerating clears.  Quiroz draining gail Modify environment to reduce risk of injury  - Provide assistive devices as appropriate  - Consider OT/PT consult to assist with strengthening/mobility  - Encourage toileting schedule  Outcome: Progressing     Problem: DISCHARGE PLANNING  Goal: Discharge t

## 2020-03-16 NOTE — BRIEF OP NOTE
Pre-Operative Diagnosis: Malignant neoplasm of sigmoid colon (HCC) [C18.7]     Post-Operative Diagnosis: Malignant neoplasm of sigmoid colon ([address] [C18.7]      Procedure Performed:   Procedure(s):  hand assisted laparoscopic sigmoid colon resection, possib intestine. Will over 30 minutes was spent lysing adhesions in order to mobilize the tumor and the bowel. The liver was inspected and appeared normal without signs of metastasis. The MICHELLE staplers were utilized to divide the colon proximally and distally.

## 2020-03-16 NOTE — ANESTHESIA PROCEDURE NOTES
Airway  Date/Time: 3/16/2020 10:07 AM  Urgency: Elective    Airway not difficult    General Information and Staff    Patient location during procedure: OR  Anesthesiologist: Tom Corley DO  Performed: anesthesiologist     Indications and Patient Co

## 2020-03-17 LAB
BASOPHILS # BLD AUTO: 0.02 X10(3) UL (ref 0–0.2)
BASOPHILS NFR BLD AUTO: 0.3 %
DEPRECATED RDW RBC AUTO: 41.1 FL (ref 35.1–46.3)
EOSINOPHIL # BLD AUTO: 0 X10(3) UL (ref 0–0.7)
EOSINOPHIL NFR BLD AUTO: 0 %
ERYTHROCYTE [DISTWIDTH] IN BLOOD BY AUTOMATED COUNT: 12.4 % (ref 11–15)
HAV IGM SER QL: 1.9 MG/DL (ref 1.6–2.6)
HCT VFR BLD AUTO: 35.3 % (ref 35–48)
HGB BLD-MCNC: 12.1 G/DL (ref 12–16)
IMM GRANULOCYTES # BLD AUTO: 0.03 X10(3) UL (ref 0–1)
IMM GRANULOCYTES NFR BLD: 0.4 %
LYMPHOCYTES # BLD AUTO: 1.67 X10(3) UL (ref 1–4)
LYMPHOCYTES NFR BLD AUTO: 21 %
MCH RBC QN AUTO: 31.4 PG (ref 26–34)
MCHC RBC AUTO-ENTMCNC: 34.3 G/DL (ref 31–37)
MCV RBC AUTO: 91.7 FL (ref 80–100)
MONOCYTES # BLD AUTO: 0.56 X10(3) UL (ref 0.1–1)
MONOCYTES NFR BLD AUTO: 7 %
NEUTROPHILS # BLD AUTO: 5.67 X10 (3) UL (ref 1.5–7.7)
NEUTROPHILS # BLD AUTO: 5.67 X10(3) UL (ref 1.5–7.7)
NEUTROPHILS NFR BLD AUTO: 71.3 %
PHOSPHATE SERPL-MCNC: 2.1 MG/DL (ref 2.5–4.9)
PLATELET # BLD AUTO: 125 10(3)UL (ref 150–450)
RBC # BLD AUTO: 3.85 X10(6)UL (ref 3.8–5.3)
WBC # BLD AUTO: 8 X10(3) UL (ref 4–11)

## 2020-03-17 PROCEDURE — 99024 POSTOP FOLLOW-UP VISIT: CPT | Performed by: SURGERY

## 2020-03-17 PROCEDURE — 99232 SBSQ HOSP IP/OBS MODERATE 35: CPT | Performed by: HOSPITALIST

## 2020-03-17 RX ORDER — SIMETHICONE 80 MG
80 TABLET,CHEWABLE ORAL 4 TIMES DAILY PRN
Status: DISCONTINUED | OUTPATIENT
Start: 2020-03-17 | End: 2020-03-20

## 2020-03-17 RX ORDER — SIMETHICONE 80 MG
80 TABLET,CHEWABLE ORAL
Status: DISCONTINUED | OUTPATIENT
Start: 2020-03-17 | End: 2020-03-17

## 2020-03-17 NOTE — PROGRESS NOTES
[facility]    Progress Note    Ita Juares Patient Status:  Inpatient    1950 MRN [id number]   Location [facility] 4W/SW/SE Attending Lissette Larios MD   Hosp Day # 1 PCP Monae Marcos MD       Subjective:   Caitlin Zhang HYDROmorphone HCl **OR** HYDROmorphone HCl **OR** HYDROmorphone HCl, PEG 3350, magnesium hydroxide, ondansetron HCl    Results:     Recent Labs   Lab 03/17/20  0527   RBC 3.85   HGB 12.1   HCT 35.3   MCV 91.7   MCH 31.4   MCHC 34.3   RDW 12.4   NEPRELIM 5.

## 2020-03-17 NOTE — PLAN OF CARE
Problem: Patient/Family Goals  Goal: Patient/Family Long Term Goal  Description  Patient's Long Term Goal:     Interventions:  -   - See additional Care Plan goals for specific interventions  Outcome: Progressing  Goal: Patient/Family Short Term Goal  Kisha Hernandez guidelines  Outcome: Progressing     Problem: SAFETY ADULT - FALL  Goal: Free from fall injury  Description  INTERVENTIONS:  - Assess pt frequently for physical needs  - Identify cognitive and physical deficits and behaviors that affect risk of falls.   - I monitor.

## 2020-03-17 NOTE — OCCUPATIONAL THERAPY NOTE
OCCUPATIONAL THERAPY EVALUATION - INPATIENT     Room Number: 453/453-A  Evaluation Date: 3/17/2020  Type of Evaluation: Initial       Physician Order: IP Consult to Occupational Therapy  Reason for Therapy: ADL/IADL Dysfunction and Discharge Planning    OC LHAE. Will continue to assess. Dr. Paris Leggett present at the end of session to speak with pt who was left seated up in chair with call light in reach.      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

## 2020-03-17 NOTE — PHYSICAL THERAPY NOTE
PHYSICAL THERAPY EVALUATION - INPATIENT     Room Number: 453/453-A  Evaluation Date: 3/17/2020  Type of Evaluation: Initial   Physician Order: PT Eval and Treat    Presenting Problem: laparoscopic sigmoid resection 3/16/2020  Reason for Therapy: Mobility dtr.    Patient will benefit from continued IP PT services to address these deficits in preparation for discharge. DISCHARGE RECOMMENDATIONS  PT Discharge Recommendations: Home    PLAN  PT Treatment Plan: Bed mobility; Body mechanics; Endurance; Patient ed mechanics;Repositioning    COGNITION  · Overall Cognitive Status:  WFL - within functional limits    RANGE OF MOTION AND STRENGTH ASSESSMENT  Upper extremity ROM and strength are within functional limits    Lower extremity ROM is within functional limits Patient is able to demonstrate transfers Sit to/from Stand at assistance level: independent with none     Goal #2  Current Status    Goal #3 Patient is able to ambulate 300 feet with assist device: none at assistance level: supervision   Goal #3   Current

## 2020-03-17 NOTE — PROGRESS NOTES
[facility]    Progress Note    Taniya Donnelly Patient Status:  Inpatient    1950 MRN [id number]   Location [facility] 4W/SW/SE Attending Carlos Arreola MD   Hosp Day # 1 PCP Martine Emerson MD       Subjective:   Annika Chni 01/10/2020    ALKPHO 64 01/10/2020    BILT 0.6 01/10/2020    TP 7.1 01/10/2020    AST 15 01/10/2020    ALT 11 01/10/2020    TSH 1.18 08/06/2015    CRP <0.29 02/06/2020    MG 1.9 03/17/2020    PHOS 2.1 (L) 03/17/2020                         Jany Duran,

## 2020-03-18 LAB
ANION GAP SERPL CALC-SCNC: 4 MMOL/L (ref 0–18)
BUN BLD-MCNC: 11 MG/DL (ref 7–18)
BUN/CREAT SERPL: 17.5 (ref 10–20)
CALCIUM BLD-MCNC: 7.9 MG/DL (ref 8.5–10.1)
CHLORIDE SERPL-SCNC: 111 MMOL/L (ref 98–112)
CO2 SERPL-SCNC: 29 MMOL/L (ref 21–32)
CREAT BLD-MCNC: 0.63 MG/DL (ref 0.55–1.02)
DEPRECATED RDW RBC AUTO: 43.4 FL (ref 35.1–46.3)
ERYTHROCYTE [DISTWIDTH] IN BLOOD BY AUTOMATED COUNT: 12.3 % (ref 11–15)
GLUCOSE BLD-MCNC: 86 MG/DL (ref 70–99)
HCT VFR BLD AUTO: 36 % (ref 35–48)
HGB BLD-MCNC: 11.8 G/DL (ref 12–16)
MCH RBC QN AUTO: 31.1 PG (ref 26–34)
MCHC RBC AUTO-ENTMCNC: 32.8 G/DL (ref 31–37)
MCV RBC AUTO: 94.7 FL (ref 80–100)
OSMOLALITY SERPL CALC.SUM OF ELEC: 297 MOSM/KG (ref 275–295)
PLATELET # BLD AUTO: 124 10(3)UL (ref 150–450)
POTASSIUM SERPL-SCNC: 3.5 MMOL/L (ref 3.5–5.1)
POTASSIUM SERPL-SCNC: 4 MMOL/L (ref 3.5–5.1)
RBC # BLD AUTO: 3.8 X10(6)UL (ref 3.8–5.3)
SODIUM SERPL-SCNC: 144 MMOL/L (ref 136–145)
WBC # BLD AUTO: 4.7 X10(3) UL (ref 4–11)

## 2020-03-18 PROCEDURE — 99233 SBSQ HOSP IP/OBS HIGH 50: CPT | Performed by: HOSPITALIST

## 2020-03-18 PROCEDURE — 99024 POSTOP FOLLOW-UP VISIT: CPT | Performed by: SURGERY

## 2020-03-18 RX ORDER — SODIUM CHLORIDE, SODIUM LACTATE, POTASSIUM CHLORIDE, CALCIUM CHLORIDE 600; 310; 30; 20 MG/100ML; MG/100ML; MG/100ML; MG/100ML
INJECTION, SOLUTION INTRAVENOUS CONTINUOUS
Status: DISCONTINUED | OUTPATIENT
Start: 2020-03-18 | End: 2020-03-19

## 2020-03-18 RX ORDER — POTASSIUM CHLORIDE 1.5 G/1.77G
40 POWDER, FOR SOLUTION ORAL EVERY 4 HOURS
Status: DISCONTINUED | OUTPATIENT
Start: 2020-03-18 | End: 2020-03-18

## 2020-03-18 NOTE — PROGRESS NOTES
[facility]    Progress Note    Isis Shook Patient Status:  Inpatient    1950 MRN [id number]   Location [facility] 4W/SW/SE Attending Rey Glover MD   Hosp Day # 2 PCP Ramona Paul MD       Subjective:   Karyna Roberts 03/18/2020    ALB 4.4 01/10/2020    ALKPHO 64 01/10/2020    BILT 0.6 01/10/2020    TP 7.1 01/10/2020    AST 15 01/10/2020    ALT 11 01/10/2020    TSH 1.18 08/06/2015    CRP <0.29 02/06/2020    MG 1.9 03/17/2020    PHOS 2.1 (L) 03/17/2020

## 2020-03-18 NOTE — PHYSICAL THERAPY NOTE
PHYSICAL THERAPY TREATMENT NOTE - INPATIENT     Room Number: 453/453-A       Presenting Problem: laparoscopic sigmoid resection 3/16/2020    Problem List  Principal Problem:    Cancer of sigmoid colon ([facility] [address]      PHYSICAL THERAPY ASSESSMENT     Patient rece Static Sitting: Normal  Dynamic Sitting: Good           Static Standing: Fair +  Dynamic Standing: Fair      AM-PAC '6-Clicks' INPATIENT SHORT FORM - BASIC MOBILITY  How much difficul Goal #4   Current Status CG 4 stairs with HR   Goal #5 Patient to demonstrate independence with home activity/exercise instructions provided to patient in preparation for discharge.    Goal #5   Current Status  ongoing

## 2020-03-18 NOTE — PROGRESS NOTES
[facility]    Progress Note    Patrickanaycarole Vieira Patient Status:  Inpatient    1950 MRN [id number]   Location [facility] 4W/SW/SE Attending Jarrell Peabody, MD   Hosp Day # 2 PCP Anyi Betts MD       Subjective:   Shay Martinez oxyCODONE HCl **OR** oxyCODONE HCl, HYDROmorphone HCl **OR** HYDROmorphone HCl **OR** HYDROmorphone HCl, PEG 3350, magnesium hydroxide, ondansetron HCl    Results:     Recent Labs   Lab 03/17/20  0527 03/18/20  0313   RBC 3.85 3.80   HGB 12.1 11.8*   HCT 3

## 2020-03-18 NOTE — PLAN OF CARE
Plan of care reviewed with patient. She is ambulating in the hallway. Reports passing gas \"one more time\" throughout shift. Patient tolerating sips of clears on floor stock. Denies belching. Safety measures in place and call light within reach.    Problem activity and pre-medicate as appropriate  Outcome: Progressing     Problem: RISK FOR INFECTION - ADULT  Goal: Absence of fever/infection during anticipated neutropenic period  Description  INTERVENTIONS  - Monitor WBC  - Administer growth factors as ordere

## 2020-03-18 NOTE — PLAN OF CARE
Problem: Patient/Family Goals  Goal: Patient/Family Long Term Goal  Description  Patient's Long Term Goal:     Interventions:  - See additional Care Plan goals for specific interventions  Outcome: Progressing  Goal: Patient/Family Short Term Goal  Descri guidelines  Outcome: Progressing     Problem: SAFETY ADULT - FALL  Goal: Free from fall injury  Description  INTERVENTIONS:  - Assess pt frequently for physical needs  - Identify cognitive and physical deficits and behaviors that affect risk of falls.   - I

## 2020-03-18 NOTE — OCCUPATIONAL THERAPY NOTE
OCCUPATIONAL THERAPY TREATMENT NOTE - INPATIENT        Room Number: 453/453-A                Problem List  Principal Problem:    [facility]      OCCUPATIONAL THERAPY ASSESSMENT     RN cleared pt for participation in occupational therapy precautions)    WEIGHT BEARING RESTRICTION  Weight Bearing Restriction: None                PAIN ASSESSMENT  Ratin  Location: abdomen   Management Techniques: Activity promotion; Body mechanics;Repositioning     ACTIVITY TOLERANCE  Room air  Denied dizz 5x/wk

## 2020-03-18 NOTE — PLAN OF CARE
Patient asleep through night. Denies need for PRN pain medications, pain managed with scheduled tylenol. No flatus per patient. Ambulating in halls independently.   Problem: Patient/Family Goals  Goal: Patient/Family Long Term Goal  Description  Patient' ADULT - FALL  Goal: Free from fall injury  Description  INTERVENTIONS:  - Assess pt frequently for physical needs  - Identify cognitive and physical deficits and behaviors that affect risk of falls. - Simon fall precautions as indicated by assessment.

## 2020-03-19 LAB
ANION GAP SERPL CALC-SCNC: 5 MMOL/L (ref 0–18)
BASOPHILS # BLD AUTO: 0.03 X10(3) UL (ref 0–0.2)
BASOPHILS NFR BLD AUTO: 0.7 %
BUN BLD-MCNC: 7 MG/DL (ref 7–18)
BUN/CREAT SERPL: 13.5 (ref 10–20)
CALCIUM BLD-MCNC: 8.5 MG/DL (ref 8.5–10.1)
CHLORIDE SERPL-SCNC: 109 MMOL/L (ref 98–112)
CO2 SERPL-SCNC: 28 MMOL/L (ref 21–32)
CREAT BLD-MCNC: 0.52 MG/DL (ref 0.55–1.02)
DEPRECATED RDW RBC AUTO: 40.1 FL (ref 35.1–46.3)
EOSINOPHIL # BLD AUTO: 0.09 X10(3) UL (ref 0–0.7)
EOSINOPHIL NFR BLD AUTO: 2.1 %
ERYTHROCYTE [DISTWIDTH] IN BLOOD BY AUTOMATED COUNT: 11.8 % (ref 11–15)
GLUCOSE BLD-MCNC: 75 MG/DL (ref 70–99)
HCT VFR BLD AUTO: 37.3 % (ref 35–48)
HGB BLD-MCNC: 12.3 G/DL (ref 12–16)
IMM GRANULOCYTES # BLD AUTO: 0.01 X10(3) UL (ref 0–1)
IMM GRANULOCYTES NFR BLD: 0.2 %
LYMPHOCYTES # BLD AUTO: 1.22 X10(3) UL (ref 1–4)
LYMPHOCYTES NFR BLD AUTO: 29 %
MCH RBC QN AUTO: 30.5 PG (ref 26–34)
MCHC RBC AUTO-ENTMCNC: 33 G/DL (ref 31–37)
MCV RBC AUTO: 92.6 FL (ref 80–100)
MONOCYTES # BLD AUTO: 0.34 X10(3) UL (ref 0.1–1)
MONOCYTES NFR BLD AUTO: 8.1 %
NEUTROPHILS # BLD AUTO: 2.51 X10 (3) UL (ref 1.5–7.7)
NEUTROPHILS # BLD AUTO: 2.51 X10(3) UL (ref 1.5–7.7)
NEUTROPHILS NFR BLD AUTO: 59.9 %
OSMOLALITY SERPL CALC.SUM OF ELEC: 291 MOSM/KG (ref 275–295)
PLATELET # BLD AUTO: 126 10(3)UL (ref 150–450)
POTASSIUM SERPL-SCNC: 4.2 MMOL/L (ref 3.5–5.1)
RBC # BLD AUTO: 4.03 X10(6)UL (ref 3.8–5.3)
SODIUM SERPL-SCNC: 142 MMOL/L (ref 136–145)
WBC # BLD AUTO: 4.2 X10(3) UL (ref 4–11)

## 2020-03-19 PROCEDURE — 99233 SBSQ HOSP IP/OBS HIGH 50: CPT | Performed by: HOSPITALIST

## 2020-03-19 PROCEDURE — 99024 POSTOP FOLLOW-UP VISIT: CPT | Performed by: SURGERY

## 2020-03-19 NOTE — PROGRESS NOTES
[facility]    Progress Note    Ondina Fitzgerald Patient Status:  Inpatient    1950 MRN [id number]   Location [facility] 4W/SW/SE Attending Yassine Dominguez MD   Hosp Day # 3 PCP Stephanie Laureano MD       Subjective:   Deandra Pandya HCl, HYDROmorphone HCl **OR** HYDROmorphone HCl **OR** HYDROmorphone HCl, PEG 3350, magnesium hydroxide, ondansetron HCl    Results:     Recent Labs   Lab 03/17/20  0527 03/18/20  0313 03/19/20  0336   RBC 3.85 3.80 4.03   HGB 12.1 11.8* 12.3   HCT 35.3 36

## 2020-03-19 NOTE — PROGRESS NOTES
[facility]    Progress Note    Valeria Shape Patient Status:  Inpatient    1950 MRN [id number]   Location [facility] 4W/SW/SE Attending Romain Bullock MD   Hosp Day # 3 PCP Paulette Salgado MD       Subjective:   Demi Davila ALKPHO 64 01/10/2020    BILT 0.6 01/10/2020    TP 7.1 01/10/2020    AST 15 01/10/2020    ALT 11 01/10/2020    TSH 1.18 08/06/2015    CRP <0.29 02/06/2020    MG 1.9 03/17/2020    PHOS 2.1 (L) 03/17/2020                         Niki Spaulding MD  3/19/20

## 2020-03-19 NOTE — PLAN OF CARE
Patient denies pain and nausea. Asleep through night. IVF continues as ordered. Patient reports flatus, but no bowel movement. Tolerating sips of clears. Ambulating independently.   Problem: Patient/Family Goals  Goal: Patient/Family Long Term Goal  Prasad Diamond Problem: RISK FOR INFECTION - ADULT  Goal: Absence of fever/infection during anticipated neutropenic period  Description  INTERVENTIONS  - Monitor WBC  - Administer growth factors as ordered  - Implement neutropenic guidelines  Outcome: Progressing     P

## 2020-03-19 NOTE — PLAN OF CARE
Plan of care reviewed with patient. Patient continues to ambulate in the hallway. Passing gas and had a bowel movement. Diet advanced to full liquid and tolerating well. IVF d/jesse. Safety measures in place and call light within reach.    Problem: Patient/Fa pre-medicate as appropriate  Outcome: Progressing     Problem: RISK FOR INFECTION - ADULT  Goal: Absence of fever/infection during anticipated neutropenic period  Description  INTERVENTIONS  - Monitor WBC  - Administer growth factors as ordered  - Arianna

## 2020-03-20 VITALS
DIASTOLIC BLOOD PRESSURE: 75 MMHG | WEIGHT: 163 LBS | BODY MASS INDEX: 29.25 KG/M2 | SYSTOLIC BLOOD PRESSURE: 135 MMHG | TEMPERATURE: 98 F | OXYGEN SATURATION: 99 % | HEART RATE: 91 BPM | HEIGHT: 62.5 IN | RESPIRATION RATE: 16 BRPM

## 2020-03-20 LAB
ANION GAP SERPL CALC-SCNC: 6 MMOL/L (ref 0–18)
BASOPHILS # BLD AUTO: 0.03 X10(3) UL (ref 0–0.2)
BASOPHILS NFR BLD AUTO: 0.8 %
BUN BLD-MCNC: 8 MG/DL (ref 7–18)
BUN/CREAT SERPL: 13.8 (ref 10–20)
CALCIUM BLD-MCNC: 8.9 MG/DL (ref 8.5–10.1)
CHLORIDE SERPL-SCNC: 110 MMOL/L (ref 98–112)
CO2 SERPL-SCNC: 28 MMOL/L (ref 21–32)
CREAT BLD-MCNC: 0.58 MG/DL (ref 0.55–1.02)
DEPRECATED RDW RBC AUTO: 39.3 FL (ref 35.1–46.3)
EOSINOPHIL # BLD AUTO: 0.14 X10(3) UL (ref 0–0.7)
EOSINOPHIL NFR BLD AUTO: 3.6 %
ERYTHROCYTE [DISTWIDTH] IN BLOOD BY AUTOMATED COUNT: 11.8 % (ref 11–15)
GLUCOSE BLD-MCNC: 90 MG/DL (ref 70–99)
HCT VFR BLD AUTO: 36.2 % (ref 35–48)
HGB BLD-MCNC: 12.2 G/DL (ref 12–16)
IMM GRANULOCYTES # BLD AUTO: 0.01 X10(3) UL (ref 0–1)
IMM GRANULOCYTES NFR BLD: 0.3 %
LYMPHOCYTES # BLD AUTO: 1.59 X10(3) UL (ref 1–4)
LYMPHOCYTES NFR BLD AUTO: 41.1 %
MCH RBC QN AUTO: 30.6 PG (ref 26–34)
MCHC RBC AUTO-ENTMCNC: 33.7 G/DL (ref 31–37)
MCV RBC AUTO: 90.7 FL (ref 80–100)
MONOCYTES # BLD AUTO: 0.31 X10(3) UL (ref 0.1–1)
MONOCYTES NFR BLD AUTO: 8 %
NEUTROPHILS # BLD AUTO: 1.79 X10 (3) UL (ref 1.5–7.7)
NEUTROPHILS # BLD AUTO: 1.79 X10(3) UL (ref 1.5–7.7)
NEUTROPHILS NFR BLD AUTO: 46.2 %
OSMOLALITY SERPL CALC.SUM OF ELEC: 296 MOSM/KG (ref 275–295)
PLATELET # BLD AUTO: 147 10(3)UL (ref 150–450)
POTASSIUM SERPL-SCNC: 3.7 MMOL/L (ref 3.5–5.1)
RBC # BLD AUTO: 3.99 X10(6)UL (ref 3.8–5.3)
SODIUM SERPL-SCNC: 144 MMOL/L (ref 136–145)
WBC # BLD AUTO: 3.9 X10(3) UL (ref 4–11)

## 2020-03-20 PROCEDURE — 99024 POSTOP FOLLOW-UP VISIT: CPT | Performed by: SURGERY

## 2020-03-20 PROCEDURE — 99239 HOSP IP/OBS DSCHRG MGMT >30: CPT | Performed by: HOSPITALIST

## 2020-03-20 RX ORDER — POTASSIUM CHLORIDE 20 MEQ/1
40 TABLET, EXTENDED RELEASE ORAL ONCE
Status: DISCONTINUED | OUTPATIENT
Start: 2020-03-20 | End: 2020-03-20

## 2020-03-20 RX ORDER — POTASSIUM CHLORIDE 20 MEQ/1
40 TABLET, EXTENDED RELEASE ORAL ONCE
Status: COMPLETED | OUTPATIENT
Start: 2020-03-20 | End: 2020-03-20

## 2020-03-20 NOTE — PLAN OF CARE
Pt is alert and oriented x4. Complains of very minimal pain to abdomen. ERAS protocol maintained. Tolerating diet. Having bowel movements. Ambulating the halls often. Call light within reach.      Problem: Patient/Family Goals  Goal: Patient/Family Long Ter Progressing     Problem: RISK FOR INFECTION - ADULT  Goal: Absence of fever/infection during anticipated neutropenic period  Description  INTERVENTIONS  - Monitor WBC  - Administer growth factors as ordered  - Implement neutropenic guidelines  Outcome: Pro

## 2020-03-20 NOTE — PROGRESS NOTES
[facility]    Progress Note    Oz Kirk Patient Status:  Inpatient    1950 MRN [id number]   Location [facility] 4W/SW/SE Attending Emily Coulter MD   Hosp Day # 4 PCP Sharon Paul MD       Subjective:   [place] 01/10/2020    BILT 0.6 01/10/2020    TP 7.1 01/10/2020    AST 15 01/10/2020    ALT 11 01/10/2020    TSH 1.18 08/06/2015    CRP <0.29 02/06/2020    MG 1.9 03/17/2020    PHOS 2.1 (L) 03/17/2020                         Nick Villalobos MD  3/20/2020

## 2020-03-20 NOTE — PHYSICAL THERAPY NOTE
Attempted PT treatment- patient seen ambulating in hallway and texting on her phone independently. Based on observed safe functional mobility, no further IP PT required at this time. Will discontinue patient from schedule- RN made aware- pt agreeable.    Anna Kenyon

## 2020-03-20 NOTE — OCCUPATIONAL THERAPY NOTE
OCCUPATIONAL THERAPY TREATMENT NOTE - INPATIENT        Room Number: 453/453-A                Problem List  Principal Problem:    Can[facility]      OCCUPATIONAL THERAPY ASSESSMENT     RN cleared pt for participation in occupational therapy TOLERANCE  Room air  Denied dizziness    ACTIVITIES OF DAILY LIVING ASSESSMENT  AM-PAC ‘6-Clicks’ Inpatient Daily Activity Short Form  How much help from another person does the patient currently need…  -   Putting on and taking off regular lower body clot

## 2020-03-20 NOTE — PLAN OF CARE
Pt is tolerating diet well. D/c instructions given to pt. Pt verbalizes understanding. IV removed. Going home with family.        Problem: Patient/Family Goals  Goal: Patient/Family Long Term Goal  Description  Patient's Long Term Goal: cancer free    Inter RISK FOR INFECTION - ADULT  Goal: Absence of fever/infection during anticipated neutropenic period  Description  INTERVENTIONS  - Monitor WBC  - Administer growth factors as ordered  - Implement neutropenic guidelines  Outcome: Adequate for Discharge     P

## 2020-03-20 NOTE — DISCHARGE SUMMARY
[facility]    Discharge Summary    Keeley Driscoll Patient Status:  Inpatient    1950 MRN [id number]   Location [facility] 4W/SW/SE Attending Rosangela Keane MD   Hosp Day # 4 PCP Dary Mckeon MD     Date of Admission: 3/1 Location Status    [id number] 3/16/20 LAPAROSCOPIC COLON RESECTION - LEFT Es Kothari  [facility]            Discharge Plan:   Discharge Condition: Stable    Current Discharge Medication List    Home Meds - Unchanged    Ferrous Sulfate 325 (65 Fe

## 2020-03-23 ENCOUNTER — PATIENT OUTREACH (OUTPATIENT)
Dept: CASE MANAGEMENT | Age: 70
End: 2020-03-23

## 2020-03-23 DIAGNOSIS — Z02.9 ENCOUNTERS FOR UNSPECIFIED ADMINISTRATIVE PURPOSE: ICD-10-CM

## 2020-03-23 DIAGNOSIS — C18.7 CANCER OF SIGMOID COLON (HCC): ICD-10-CM

## 2020-03-23 PROCEDURE — 1111F DSCHRG MED/CURRENT MED MERGE: CPT

## 2020-03-24 ENCOUNTER — TELEPHONE (OUTPATIENT)
Dept: INTERNAL MEDICINE CLINIC | Facility: CLINIC | Age: 70
End: 2020-03-24

## 2020-03-24 ENCOUNTER — TELEPHONE (OUTPATIENT)
Dept: SURGERY | Facility: CLINIC | Age: 70
End: 2020-03-24

## 2020-03-24 RX ORDER — DOCUSATE SODIUM 100 MG/1
100 CAPSULE, LIQUID FILLED ORAL 2 TIMES DAILY
Qty: 60 CAPSULE | Refills: 0 | Status: SHIPPED | OUTPATIENT
Start: 2020-03-24 | End: 2020-04-23

## 2020-03-24 NOTE — PROGRESS NOTES
Initial Post Discharge Follow Up   Discharge Date: 3/20/20  Contact Date: 3/24/2020    Consent Verification:  Assessment Completed With: Patient  HIPAA Verified?   Yes    Discharge Dx:     Cancer of sigmoid colon   S/P hand assisted laparoscopic sigmoid of 1-5   1- Very Poor and 5- Very well   o Very well  • Do you have any questions about your discharge instructions? No  • Before leaving the hospital was your diagnoses explained to you? Yes  • Do you have any questions about your diagnoses?  No  • Are yo See below. NCM to call Dr. Ramya Jeffries office. Referrals/orders at D/C:  Home Health/Services ordered at D/C? No- Pt feels she does not need HHC at this time    DME ordered at D/C? No    DX: specifics:      Needs post D/C:   Now that you are home, are ther update regarding pt's request for Colace. Delfina Donnelyl took a message and stated an RN will call the pt back to discuss further. Advised pt to call NCM back if she had any other concerns.      CCM referral placed:  No    [x]  Discharge Summary, Discharge medic

## 2020-03-24 NOTE — TELEPHONE ENCOUNTER
Spoke to pt for TCM today. Pt does not have HFU appt scheduled at this time. NCM attempted to offer an appt however the pt declined at this time stating she would like to only come in only if necessary.  Pt would like to avoid possible exposure to the viru

## 2020-03-24 NOTE — TELEPHONE ENCOUNTER
Paper work was sent to Conor Dahl.  Sent to [facility] and placed in [facility]

## 2020-03-24 NOTE — TELEPHONE ENCOUNTER
Called Leslee back. She is doing well, and would like a script for colace instead of OTC due to cost. Script for colace sent to above pharmacy.

## 2020-03-24 NOTE — TELEPHONE ENCOUNTER
juli, nurse , [facility].  Called to see how pt is doing. Pt stated pt did not get the rx. Colace, stool softener. Pt also having problem with gas. Please send rx.   Call pt at [phone]

## 2020-03-27 NOTE — OPERATIVE REPORT
Operative Report  2020    Juan Jean Patient Status:  Inpatient    1950 MRN [id number]   Location [facility] 4W/SW/SE Attending No att. providers found   Hosp Day # 1 PCP Mary Sotelo MD     Pre-Operative Diagnosis: Malignant n portion of the descending colon, the sigmoid colon, and the splenic flexure was mobilized as well. There were extensive adhesions but there was not gross tumor invasion of the peritoneum, omentum, or adjacent small intestine.   Will over 30 minutes was spe

## 2020-03-30 ENCOUNTER — TELEPHONE (OUTPATIENT)
Dept: SURGERY | Facility: CLINIC | Age: 70
End: 2020-03-30

## 2020-03-30 ENCOUNTER — TELEPHONE (OUTPATIENT)
Dept: HEMATOLOGY/ONCOLOGY | Facility: HOSPITAL | Age: 70
End: 2020-03-30

## 2020-03-30 NOTE — TELEPHONE ENCOUNTER
Giana Valadez calling to see if she should make a follow up visit with Dr. Luca Solomon. She castellanos only seen her once but with virus she doesn't know if she needs to come in.

## 2020-04-02 ENCOUNTER — VIRTUAL PHONE E/M (OUTPATIENT)
Dept: SURGERY | Facility: CLINIC | Age: 70
End: 2020-04-02
Payer: MEDICARE

## 2020-04-02 DIAGNOSIS — Z98.890 POST-OPERATIVE STATE: Primary | ICD-10-CM

## 2020-04-02 PROCEDURE — 99024 POSTOP FOLLOW-UP VISIT: CPT | Performed by: SURGERY

## 2020-04-03 ENCOUNTER — TELEPHONE (OUTPATIENT)
Dept: HEMATOLOGY/ONCOLOGY | Facility: HOSPITAL | Age: 70
End: 2020-04-03

## 2020-04-06 ENCOUNTER — VIRTUAL PHONE E/M (OUTPATIENT)
Dept: HEMATOLOGY/ONCOLOGY | Facility: HOSPITAL | Age: 70
End: 2020-04-06
Attending: INTERNAL MEDICINE
Payer: MEDICARE

## 2020-04-06 DIAGNOSIS — C18.7 CANCER OF SIGMOID COLON (HCC): Primary | ICD-10-CM

## 2020-04-06 PROCEDURE — 99213 OFFICE O/P EST LOW 20 MIN: CPT | Performed by: INTERNAL MEDICINE

## 2020-04-06 NOTE — PROGRESS NOTES
Virtual/Telephone Check-In    Ita Juares verbally consents to a Virtual/Telephone Check-In service on 04/06/20. Patient understands and accepts financial responsibility for any deductible, co-insurance and/or co-pays associated with this service.

## 2020-04-09 ENCOUNTER — TELEPHONE (OUTPATIENT)
Dept: SURGERY | Facility: CLINIC | Age: 70
End: 2020-04-09

## 2020-04-09 NOTE — TELEPHONE ENCOUNTER
Pt state she needs a statement or not with any restrictions to go back to work.  States doesn't want to go back until May 2nd and also because of the virus  please advise

## 2020-04-09 NOTE — TELEPHONE ENCOUNTER
Dr. Tomi Brown - Is it alright to send a work letter keeping patient off until May 2, 2020? Please advise. Thank you.

## 2020-04-13 NOTE — TELEPHONE ENCOUNTER
Paper's were Fax'ed to General Surgery to be completed. Sent to forms and placed in [facility] .

## 2020-04-21 ENCOUNTER — TELEPHONE (OUTPATIENT)
Dept: GASTROENTEROLOGY | Facility: CLINIC | Age: 70
End: 2020-04-21

## 2020-04-27 NOTE — TELEPHONE ENCOUNTER
Pt states she received a letter telling her she needed a CBC no DIFF and a CEA, She had the CBC w diff on March 20th but no CEA.  She would like clarification, Please advise

## 2020-04-27 NOTE — TELEPHONE ENCOUNTER
Patient contacted. Dr Lindsey Thomas had ordered CBC and CEA after 3/6 colonoscopy. I see that patient had a lot of labs done per other MD, including CBC. Instructed to just get the CEA done now. Forwarded to Dr Lindsey Thomas as Kianna Dixon, may close encounter. Thanks.

## 2020-04-28 ENCOUNTER — APPOINTMENT (OUTPATIENT)
Dept: LAB | Facility: HOSPITAL | Age: 70
End: 2020-04-28
Attending: INTERNAL MEDICINE
Payer: MEDICARE

## 2020-04-28 PROCEDURE — 82378 CARCINOEMBRYONIC ANTIGEN: CPT | Performed by: INTERNAL MEDICINE

## 2020-04-28 PROCEDURE — 36415 COLL VENOUS BLD VENIPUNCTURE: CPT | Performed by: INTERNAL MEDICINE

## 2020-04-29 NOTE — TELEPHONE ENCOUNTER
Received additional forms to be comppleted.  Sent to Forms and placed in [facility]

## 2020-04-29 NOTE — TELEPHONE ENCOUNTER
Pt gave verbal consent over the phone. Krysten Curz with paperwork. Pt asked the HIPAA/FCR to be emailed to [email or web address]. Pt knows to have her dtr check spam folder.    1st day off: 3/9 - 5/2

## 2020-04-29 NOTE — TELEPHONE ENCOUNTER
Last four PHQ 2/9 Test Results         PHQ9 SCREENING FLOWSHEET 2/28/2020 2/14/2020 1/29/2020 1/28/2020   Adult PHQ2 Score 2 3 2 2   Adult PHQ9 Score 5 10 5 5   Little interest or pleasure in activity 1 1 1 1   Feeling down, depressed or hopeless 1 2 1 1   Trouble falling or staying asleep or sleeping all the time 0 0 0 0   Feeling tired or having little energy 1 1 0 1   Poor appetite or overeating 0 0 0 0   Feeling bad about yourself or that you are a failure or have let yourself or family down 1 3 1 1   Trouble concentrating on things such as reading hte newspaper or watching TV 1 1 1 1   Moving or speaking slowly that other people have noticed or the opposite - being so fidgety or restless that you have been moving around a lot more than usual 0 0 0 0   Thoughts that you would be better off dead or of hurting yourself in some way 0 2 1 0     Last four GAD7 Assessments       GAD7 2/28/2020 2/14/2020 1/29/2020 1/28/2020   GAD7 Score 4 8 7 6   Feeling nervous, anxious or on edge Several days Several days Several days Several days   Not being able to stop or control worrying Several days Several days Several days Several days   Worrying too much about different things Several days Several days Several days Several days   Trouble relaxing Not at all Several days Several days Several days   Being so restless that it's hard to sit still Not at all Several days Several days Several days   Becoming easily annoyed or irritable Not at all Several days Several days Not at all   Feeling afraid as if something awful might happen Several days More than half the days Several days Several days   Ability to handle work, home and other people Not difficult at all Somewhat difficult Somewhat difficult -      Restrictions and limitations received in forms dept. Logged for processing.

## 2020-04-29 NOTE — TELEPHONE ENCOUNTER
Dr. Alex Valentino,    3 Forms Overdue    Please sign off on form:  -Highlight the patient and hit \"Chart\" button. -In Chart Review, w/in the Encounter tab - click 1 time on the Telephone call encounter for 3/12/2020.  Scroll down the telephone encounter.  -Click

## 2020-05-05 NOTE — TELEPHONE ENCOUNTER
Dr. Chaparro Sage, disregard my last message. I just found your signatures.     Thank you,  [facility]

## 2020-05-05 NOTE — TELEPHONE ENCOUNTER
Dr. Lizzie Rendon,    3 forms overdue. Please sign off as soon as possible.     Thank you,  [facility]

## 2020-05-05 NOTE — TELEPHONE ENCOUNTER
Faxed FMLA to [facility] - [phone]. Faxed 2 disab to [facility] - [phone]. Mailed copies to pt. Notified pt.

## 2020-05-14 NOTE — TELEPHONE ENCOUNTER
Request for Statue's was fax'ed to General Surgery to be completed .  Sent to forms and placed in  [facility]

## 2020-07-14 ENCOUNTER — TELEPHONE (OUTPATIENT)
Dept: INTERNAL MEDICINE CLINIC | Facility: CLINIC | Age: 70
End: 2020-07-14

## 2020-07-29 ENCOUNTER — TELEPHONE (OUTPATIENT)
Dept: CASE MANAGEMENT | Age: 70
End: 2020-07-29

## 2020-08-20 ENCOUNTER — TELEPHONE (OUTPATIENT)
Dept: CASE MANAGEMENT | Age: 70
End: 2020-08-20

## 2020-10-05 ENCOUNTER — LAB ENCOUNTER (OUTPATIENT)
Dept: LAB | Facility: HOSPITAL | Age: 70
End: 2020-10-05
Attending: INTERNAL MEDICINE
Payer: MEDICARE

## 2020-10-05 DIAGNOSIS — C18.7 CANCER OF SIGMOID COLON (HCC): ICD-10-CM

## 2020-10-05 PROCEDURE — 82378 CARCINOEMBRYONIC ANTIGEN: CPT

## 2020-10-05 PROCEDURE — 80053 COMPREHEN METABOLIC PANEL: CPT

## 2020-10-05 PROCEDURE — 36415 COLL VENOUS BLD VENIPUNCTURE: CPT

## 2020-10-05 PROCEDURE — 85025 COMPLETE CBC W/AUTO DIFF WBC: CPT

## 2020-10-06 ENCOUNTER — OFFICE VISIT (OUTPATIENT)
Dept: INTERNAL MEDICINE CLINIC | Facility: CLINIC | Age: 70
End: 2020-10-06
Payer: MEDICARE

## 2020-10-06 VITALS
WEIGHT: 167 LBS | BODY MASS INDEX: 29.96 KG/M2 | OXYGEN SATURATION: 97 % | HEART RATE: 82 BPM | SYSTOLIC BLOOD PRESSURE: 114 MMHG | HEIGHT: 62.5 IN | DIASTOLIC BLOOD PRESSURE: 80 MMHG

## 2020-10-06 DIAGNOSIS — C18.9 ADENOCARCINOMA OF COLON (HCC): ICD-10-CM

## 2020-10-06 DIAGNOSIS — Z00.00 MEDICARE ANNUAL WELLNESS VISIT, SUBSEQUENT: Primary | ICD-10-CM

## 2020-10-06 DIAGNOSIS — Z12.31 SCREENING MAMMOGRAM, ENCOUNTER FOR: ICD-10-CM

## 2020-10-06 DIAGNOSIS — Z23 NEED FOR INFLUENZA VACCINATION: ICD-10-CM

## 2020-10-06 PROCEDURE — 99397 PER PM REEVAL EST PAT 65+ YR: CPT | Performed by: INTERNAL MEDICINE

## 2020-10-06 PROCEDURE — 3074F SYST BP LT 130 MM HG: CPT | Performed by: INTERNAL MEDICINE

## 2020-10-06 PROCEDURE — 90662 IIV NO PRSV INCREASED AG IM: CPT | Performed by: INTERNAL MEDICINE

## 2020-10-06 PROCEDURE — 3079F DIAST BP 80-89 MM HG: CPT | Performed by: INTERNAL MEDICINE

## 2020-10-06 PROCEDURE — G0008 ADMIN INFLUENZA VIRUS VAC: HCPCS | Performed by: INTERNAL MEDICINE

## 2020-10-06 PROCEDURE — 3008F BODY MASS INDEX DOCD: CPT | Performed by: INTERNAL MEDICINE

## 2020-10-06 PROCEDURE — G0439 PPPS, SUBSEQ VISIT: HCPCS | Performed by: INTERNAL MEDICINE

## 2020-10-06 PROCEDURE — 96160 PT-FOCUSED HLTH RISK ASSMT: CPT | Performed by: INTERNAL MEDICINE

## 2020-10-06 NOTE — PROGRESS NOTES
HPI:    Patient ID: Sanjay Sampson is a 79year old female. HPI pt here for a MA supervisit and hx of colon ca. Is doing well. Had blood work up. Has a rise in [address]. HPI:   Sanjay Sampson is a 79year old female who presents for a MA Supervisit. interest or pleasure in doing things (over the last two weeks)?: Not at all    Feeling down, depressed, or hopeless (over the last two weeks)?: Not at all    PHQ-2 SCORE: 0        Advance Directives     Do you have a healthcare power of ?: Yes    D 3/6/2020    Performed by Mateus Patel MD at [facility] ENDOSCOPY   • D & C  3 years ago   • DILATION/CURETTAGE,DIAGNOSTIC     • LAPAROSCOPIC COLON RESECTION - LEFT Left 3/16/2020    Performed by Micaela Campos MD at [facility] MAIN OR   • TOTAL HIP REPLACEMENT [facility] NECK: supple, no adenopathy, no bruits  CHEST: no chest tenderness  BREAST: no masses, no discharge or no axillary dense breasts lymphadenopathy   LUNGS: clear to auscultation  CARDIO: RRR without murmur  GI: good BS's, no masses, HSM or tendern reminders to display for this patient. Update Health Maintenance if applicable    Pap  Every two years There are no preventive care reminders to display for this patient.  Update Health Maintenance if applicable    Chlamydia  Annually if high risk No result COPD      Spirometry Testing Annually No results found for this or any previous visit. No flowsheet data found.       SCREENING SCHEDULE - FEMALE      SCREEN COVERAGE SCHEDULE  (If Indicated) LAST DONE   Dexa If at Risk q2 years    Lipids All Patients q5 Allergies   PHYSICAL EXAM:   Physical Exam           ASSESSMENT/PLAN:   Medicare annual wellness visit, subsequent  (primary up to date on labs encounter diagnosis)  Adenocarcinoma of colon ([facility]) fu with oncology tommorow -currently active -rise in cea is

## 2020-10-07 ENCOUNTER — OFFICE VISIT (OUTPATIENT)
Dept: HEMATOLOGY/ONCOLOGY | Facility: HOSPITAL | Age: 70
End: 2020-10-07
Attending: INTERNAL MEDICINE
Payer: MEDICARE

## 2020-10-07 VITALS
TEMPERATURE: 99 F | RESPIRATION RATE: 16 BRPM | HEART RATE: 82 BPM | HEIGHT: 63.5 IN | DIASTOLIC BLOOD PRESSURE: 84 MMHG | SYSTOLIC BLOOD PRESSURE: 138 MMHG | OXYGEN SATURATION: 96 % | WEIGHT: 164 LBS | BODY MASS INDEX: 28.7 KG/M2

## 2020-10-07 DIAGNOSIS — R97.0 ELEVATED CEA: ICD-10-CM

## 2020-10-07 DIAGNOSIS — C18.7 CANCER OF SIGMOID COLON (HCC): Primary | ICD-10-CM

## 2020-10-07 DIAGNOSIS — D34 THYROID ADENOMA: ICD-10-CM

## 2020-10-07 DIAGNOSIS — R10.30 LOWER ABDOMINAL PAIN: ICD-10-CM

## 2020-10-07 PROCEDURE — 99214 OFFICE O/P EST MOD 30 MIN: CPT | Performed by: INTERNAL MEDICINE

## 2020-10-07 NOTE — PROGRESS NOTES
Cancer Center Progress Note    Patient Name: Theresa Berry   YOB: 1950   Medical Record Number: [id number]   Attending Physician: Tequila Martinez M.D.      Chief Complaint:  Colon cancer    Oncology History:  3/6/2020: colonoscopy showed 5cm si every 6 (six) hours as needed for Pain., Disp: , Rfl:   •  Ferrous Sulfate 325 (65 Fe) MG Oral Tab, Take 325 mg by mouth daily with breakfast., Disp: , Rfl:   •  Multiple Vitamins-Minerals (MULTI-VITAMIN/MINERALS) Oral Tab, Take 1 tablet by mouth daily. , D 10/05/2020    ALT 18 10/05/2020    ALKPHOS 72 08/06/2015    BILT 0.5 10/05/2020    TP 7.4 10/05/2020    ALB 3.7 10/05/2020    GLOBULT 2.7 01/10/2020    GLOBULIN 3.7 10/05/2020    AGRATIO 1.5 08/06/2015    ALBGLOBRAT 1.6 01/10/2020     10/05/2020    K

## 2020-11-02 ENCOUNTER — LAB ENCOUNTER (OUTPATIENT)
Dept: LAB | Facility: HOSPITAL | Age: 70
End: 2020-11-02
Attending: INTERNAL MEDICINE
Payer: MEDICARE

## 2020-11-02 ENCOUNTER — HOSPITAL ENCOUNTER (OUTPATIENT)
Dept: ULTRASOUND IMAGING | Facility: HOSPITAL | Age: 70
Discharge: HOME | End: 2020-11-02
Attending: INTERNAL MEDICINE
Payer: MEDICARE

## 2020-11-02 ENCOUNTER — HOSPITAL ENCOUNTER (OUTPATIENT)
Dept: CT IMAGING | Facility: HOSPITAL | Age: 70
Discharge: HOME | End: 2020-11-02
Attending: INTERNAL MEDICINE
Payer: MEDICARE

## 2020-11-02 DIAGNOSIS — C18.7 CANCER OF SIGMOID COLON (HCC): ICD-10-CM

## 2020-11-02 DIAGNOSIS — D34 THYROID ADENOMA: ICD-10-CM

## 2020-11-02 DIAGNOSIS — R10.30 LOWER ABDOMINAL PAIN: ICD-10-CM

## 2020-11-02 PROCEDURE — 85025 COMPLETE CBC W/AUTO DIFF WBC: CPT

## 2020-11-02 PROCEDURE — 80061 LIPID PANEL: CPT

## 2020-11-02 PROCEDURE — 74177 CT ABD & PELVIS W/CONTRAST: CPT | Performed by: INTERNAL MEDICINE

## 2020-11-02 PROCEDURE — 36415 COLL VENOUS BLD VENIPUNCTURE: CPT

## 2020-11-02 PROCEDURE — 84443 ASSAY THYROID STIM HORMONE: CPT

## 2020-11-02 PROCEDURE — 76536 US EXAM OF HEAD AND NECK: CPT | Performed by: INTERNAL MEDICINE

## 2020-11-02 PROCEDURE — 71260 CT THORAX DX C+: CPT | Performed by: INTERNAL MEDICINE

## 2020-11-02 PROCEDURE — 80053 COMPREHEN METABOLIC PANEL: CPT

## 2020-11-02 PROCEDURE — 82378 CARCINOEMBRYONIC ANTIGEN: CPT

## 2020-11-03 ENCOUNTER — TELEPHONE (OUTPATIENT)
Dept: INTERNAL MEDICINE CLINIC | Facility: CLINIC | Age: 70
End: 2020-11-03

## 2020-11-04 ENCOUNTER — OFFICE VISIT (OUTPATIENT)
Dept: HEMATOLOGY/ONCOLOGY | Facility: HOSPITAL | Age: 70
End: 2020-11-04
Attending: INTERNAL MEDICINE
Payer: MEDICARE

## 2020-11-04 ENCOUNTER — SOCIAL WORK SERVICES (OUTPATIENT)
Dept: HEMATOLOGY/ONCOLOGY | Facility: HOSPITAL | Age: 70
End: 2020-11-04

## 2020-11-04 ENCOUNTER — TELEPHONE (OUTPATIENT)
Dept: HEMATOLOGY/ONCOLOGY | Facility: HOSPITAL | Age: 70
End: 2020-11-04

## 2020-11-04 VITALS
WEIGHT: 162 LBS | OXYGEN SATURATION: 96 % | SYSTOLIC BLOOD PRESSURE: 124 MMHG | BODY MASS INDEX: 28.35 KG/M2 | TEMPERATURE: 98 F | RESPIRATION RATE: 16 BRPM | HEART RATE: 87 BPM | DIASTOLIC BLOOD PRESSURE: 71 MMHG | HEIGHT: 63.5 IN

## 2020-11-04 DIAGNOSIS — R30.9 PAIN WITH URINATION: ICD-10-CM

## 2020-11-04 DIAGNOSIS — R21 RASH AND NONSPECIFIC SKIN ERUPTION: ICD-10-CM

## 2020-11-04 DIAGNOSIS — R97.0 ELEVATED CEA: ICD-10-CM

## 2020-11-04 DIAGNOSIS — C18.7 CANCER OF SIGMOID COLON (HCC): Primary | ICD-10-CM

## 2020-11-04 DIAGNOSIS — R39.15 URINARY URGENCY: ICD-10-CM

## 2020-11-04 PROCEDURE — 99215 OFFICE O/P EST HI 40 MIN: CPT | Performed by: INTERNAL MEDICINE

## 2020-11-04 NOTE — TELEPHONE ENCOUNTER
Left message asking for a call back in regards to the UA and to schedule the chemo education for Friday.

## 2020-11-04 NOTE — PROGRESS NOTES
Cancer Center Progress Note    Patient Name: Linda Vieira   YOB: 1950   Medical Record Number: [id number]   Attending Physician: Carol Bullard M.D.      Chief Complaint:  Colon cancer    Oncology History:  3/6/2020 (Shae Abdullahi) colonoscopy showed acetaminophen 325 MG Oral Tab, Take 325 mg by mouth every 6 (six) hours as needed for Pain., Disp: , Rfl:   •  Ferrous Sulfate 325 (65 Fe) MG Oral Tab, Take 325 mg by mouth daily with breakfast., Disp: , Rfl:   •  Multiple Vitamins-Minerals (MULTI-VITAMIN/ worse in anterior thigh with excoriation present      Laboratory assessed and reviewed:  Lab Results   Component Value Date     (H) 11/02/2020    BUN 19 (H) 11/02/2020    BUNCREA 22.9 (H) 11/02/2020    CREATSERUM 0.83 11/02/2020    ANIONGAP 3 11/02/

## 2020-11-04 NOTE — PROGRESS NOTES
SW met with the pt and her daughter, per their request. Pasquale Lindsey provided handoff with indication of the pt's change in cancer which is now metastatic. Pt provided approval for dtbj Andrews [phone] to be present for the discussion.  Pt and daughter are these next steps. Support and encouragement provided.     KARON Banks, LCSW  Outpatient Oncology Social Worker  [address] for [address] [facility]  [phone]

## 2020-11-05 ENCOUNTER — TELEPHONE (OUTPATIENT)
Dept: HEMATOLOGY/ONCOLOGY | Facility: HOSPITAL | Age: 70
End: 2020-11-05

## 2020-11-05 DIAGNOSIS — N39.0 UTI (URINARY TRACT INFECTION): Primary | ICD-10-CM

## 2020-11-05 RX ORDER — CIPROFLOXACIN 500 MG/1
500 TABLET, FILM COATED ORAL 2 TIMES DAILY
Qty: 6 TABLET | Refills: 0 | Status: SHIPPED | OUTPATIENT
Start: 2020-11-05 | End: 2020-11-08

## 2020-11-05 NOTE — TELEPHONE ENCOUNTER
Left another message from Flakito Hart in regards to her UA. I asked that she please call me back as soon as she gets the message. Please transfer to me when she calls back.   Thanks

## 2020-11-05 NOTE — TELEPHONE ENCOUNTER
Spoke with Gladys Myles. I let her know that there was some bacteria in her urine. Dr Beatrice Naranjo has sent an antibiotic to her pharmacy. I also scheduled a chemo education with Jonny on Friday after her Covid test at the [facility]. Gladys Myles verbalized understanding.

## 2020-11-06 ENCOUNTER — OFFICE VISIT (OUTPATIENT)
Dept: HEMATOLOGY/ONCOLOGY | Facility: HOSPITAL | Age: 70
End: 2020-11-06
Attending: INTERNAL MEDICINE
Payer: MEDICARE

## 2020-11-06 ENCOUNTER — APPOINTMENT (OUTPATIENT)
Dept: LAB | Facility: HOSPITAL | Age: 70
End: 2020-11-06
Attending: RADIOLOGY
Payer: MEDICARE

## 2020-11-06 ENCOUNTER — SOCIAL WORK SERVICES (OUTPATIENT)
Dept: HEMATOLOGY/ONCOLOGY | Facility: HOSPITAL | Age: 70
End: 2020-11-06

## 2020-11-06 DIAGNOSIS — Z01.818 PRE-OP TESTING: ICD-10-CM

## 2020-11-06 DIAGNOSIS — C18.7 CANCER OF SIGMOID COLON (HCC): Primary | ICD-10-CM

## 2020-11-06 PROCEDURE — 99215 OFFICE O/P EST HI 40 MIN: CPT | Performed by: PHYSICIAN ASSISTANT

## 2020-11-06 RX ORDER — PROCHLORPERAZINE MALEATE 10 MG
10 TABLET ORAL EVERY 6 HOURS PRN
Qty: 60 TABLET | Refills: 3 | Status: SHIPPED | OUTPATIENT
Start: 2020-11-06

## 2020-11-06 RX ORDER — ONDANSETRON HYDROCHLORIDE 8 MG/1
8 TABLET, FILM COATED ORAL EVERY 8 HOURS PRN
Qty: 30 TABLET | Refills: 3 | Status: SHIPPED | OUTPATIENT
Start: 2020-11-06 | End: 2021-07-14 | Stop reason: ALTCHOICE

## 2020-11-06 NOTE — PROGRESS NOTES
Medication Education Record: IV Therapy    Learner:  Patient and Family Member    Barriers / Limitations:  None    Psychosocial Assessment:  patient psychosocial response appropriate    Diagnosis:   Colon cancer    IV Cancer Treatment Name(s): Irinotecan, occurring.     Recommended Anti-nausea medications (as directed by your provider):  Prochloperazine (Compazine) 10 mg every 6 hours and Ondansetron (Zofran) 8 mg every 8 hours  Take as needed    Helpful hints during cancer treatment:    Diet:  o Avoid greas constipation, please contact the triage nurse for further instructions. Skin Care  o Avoid direct sunlight.  o Wear a broad-spectrum sunscreen with an SPF of 30 or higher on any skin exposed to the sun.   Re-apply every 2 hours if in the sun and after bath yourself or family members: http://www.barclay.info/. html        Safety and Handling of Chemotherapy  While you or your family member is receiving chemotherapy, whether in the clinic or at home, the following precautions must be taken t partner or family members. You can visit, sit with, hug and kiss the children in your life. 3. You can be around pregnant women, though (if possible) they should not clean up any of your body fluids after you have treatment.    4. Sexual activity is saf

## 2020-11-06 NOTE — PROGRESS NOTES
11/6:SW received paperwork for the pt's request of FMLA as she begins cancer treatment. Packet reviewed and discussed with Onc RN. Plan for port placement on Mon 11/9, with chemotherapy to likely be 3x/wk every 2 weeks for 6 months.  Pt will be immunocompro

## 2020-11-09 ENCOUNTER — TELEPHONE (OUTPATIENT)
Dept: HEMATOLOGY/ONCOLOGY | Facility: HOSPITAL | Age: 70
End: 2020-11-09

## 2020-11-09 ENCOUNTER — HOSPITAL ENCOUNTER (OUTPATIENT)
Dept: INTERVENTIONAL RADIOLOGY/VASCULAR | Facility: HOSPITAL | Age: 70
Discharge: HOME | End: 2020-11-09
Attending: INTERNAL MEDICINE | Admitting: RADIOLOGY
Payer: MEDICARE

## 2020-11-09 VITALS
DIASTOLIC BLOOD PRESSURE: 76 MMHG | HEART RATE: 76 BPM | WEIGHT: 164 LBS | SYSTOLIC BLOOD PRESSURE: 120 MMHG | BODY MASS INDEX: 29 KG/M2 | RESPIRATION RATE: 23 BRPM | OXYGEN SATURATION: 95 % | TEMPERATURE: 98 F

## 2020-11-09 DIAGNOSIS — Z01.818 PRE-OP TESTING: Primary | ICD-10-CM

## 2020-11-09 DIAGNOSIS — C18.7 CANCER OF SIGMOID COLON (HCC): ICD-10-CM

## 2020-11-09 PROCEDURE — 77001 FLUOROGUIDE FOR VEIN DEVICE: CPT

## 2020-11-09 PROCEDURE — 99153 MOD SED SAME PHYS/QHP EA: CPT

## 2020-11-09 PROCEDURE — 36561 INSERT TUNNELED CV CATH: CPT

## 2020-11-09 PROCEDURE — 99152 MOD SED SAME PHYS/QHP 5/>YRS: CPT

## 2020-11-09 PROCEDURE — 0JH63XZ INSERTION OF TUNNELED VASCULAR ACCESS DEVICE INTO CHEST SUBCUTANEOUS TISSUE AND FASCIA, PERCUTANEOUS APPROACH: ICD-10-PCS | Performed by: RADIOLOGY

## 2020-11-09 PROCEDURE — 36415 COLL VENOUS BLD VENIPUNCTURE: CPT

## 2020-11-09 PROCEDURE — 02H633Z INSERTION OF INFUSION DEVICE INTO RIGHT ATRIUM, PERCUTANEOUS APPROACH: ICD-10-PCS | Performed by: RADIOLOGY

## 2020-11-09 RX ORDER — LIDOCAINE HYDROCHLORIDE 20 MG/ML
INJECTION, SOLUTION EPIDURAL; INFILTRATION; INTRACAUDAL; PERINEURAL
Status: COMPLETED
Start: 2020-11-09 | End: 2020-11-09

## 2020-11-09 RX ORDER — CEFAZOLIN SODIUM/WATER 2 G/20 ML
SYRINGE (ML) INTRAVENOUS
Status: COMPLETED
Start: 2020-11-09 | End: 2020-11-09

## 2020-11-09 RX ORDER — HEPARIN SODIUM (PORCINE) LOCK FLUSH IV SOLN 100 UNIT/ML 100 UNIT/ML
SOLUTION INTRAVENOUS
Status: COMPLETED
Start: 2020-11-09 | End: 2020-11-09

## 2020-11-09 RX ORDER — SODIUM CHLORIDE 9 MG/ML
INJECTION, SOLUTION INTRAVENOUS CONTINUOUS
Status: DISCONTINUED | OUTPATIENT
Start: 2020-11-09 | End: 2020-11-09

## 2020-11-09 RX ORDER — MIDAZOLAM HYDROCHLORIDE 1 MG/ML
INJECTION INTRAMUSCULAR; INTRAVENOUS
Status: COMPLETED
Start: 2020-11-09 | End: 2020-11-09

## 2020-11-09 RX ORDER — HEPARIN SODIUM (PORCINE) LOCK FLUSH IV SOLN 100 UNIT/ML 100 UNIT/ML
1.5 SOLUTION INTRAVENOUS ONCE
Status: DISCONTINUED | OUTPATIENT
Start: 2020-11-09 | End: 2020-11-09

## 2020-11-09 NOTE — PRE-SEDATION ASSESSMENT
[facility]  IR Pre-Procedure Sedation Assessment    History of snoring or sleep or apnea?    No    History of previous problems with anesthesia or sedation  No    Physical Findings:  Neck: nl ROM  CV: RRR  PULM: normal respiratory rate/effor

## 2020-11-09 NOTE — PROCEDURES
[facility]  Procedure Note    Julien Whitfield Patient Status:  Outpatient in a Bed    1950 MRN [id number]   Location [facility] Attending Rafael Mccall MD   Hosp Day # 0 PCP Danika Pimentel MD     Procedu

## 2020-11-09 NOTE — INTERVAL H&P NOTE
The above referenced H&P was reviewed by Stacey Douglas MD on 11/9/2020, the patient was examined and no significant changes have occurred in the patient's condition since the H&P was performed.   Risks, benefits, alternative treatments and consequences

## 2020-11-12 ENCOUNTER — NURSE ONLY (OUTPATIENT)
Dept: HEMATOLOGY/ONCOLOGY | Facility: HOSPITAL | Age: 70
End: 2020-11-12
Attending: INTERNAL MEDICINE
Payer: MEDICARE

## 2020-11-12 ENCOUNTER — SOCIAL WORK SERVICES (OUTPATIENT)
Dept: HEMATOLOGY/ONCOLOGY | Facility: HOSPITAL | Age: 70
End: 2020-11-12

## 2020-11-12 VITALS
OXYGEN SATURATION: 99 % | WEIGHT: 161 LBS | HEART RATE: 75 BPM | SYSTOLIC BLOOD PRESSURE: 140 MMHG | RESPIRATION RATE: 16 BRPM | TEMPERATURE: 98 F | HEIGHT: 63.5 IN | DIASTOLIC BLOOD PRESSURE: 74 MMHG | BODY MASS INDEX: 28.17 KG/M2

## 2020-11-12 VITALS
OXYGEN SATURATION: 99 % | RESPIRATION RATE: 16 BRPM | DIASTOLIC BLOOD PRESSURE: 74 MMHG | WEIGHT: 161 LBS | HEIGHT: 63.5 IN | BODY MASS INDEX: 28.17 KG/M2 | HEART RATE: 75 BPM | SYSTOLIC BLOOD PRESSURE: 140 MMHG | TEMPERATURE: 98 F

## 2020-11-12 DIAGNOSIS — C18.7 CANCER OF SIGMOID COLON (HCC): Primary | ICD-10-CM

## 2020-11-12 DIAGNOSIS — Z45.2 ENCOUNTER FOR CENTRAL LINE CARE: ICD-10-CM

## 2020-11-12 DIAGNOSIS — Z51.11 CHEMOTHERAPY MANAGEMENT, ENCOUNTER FOR: ICD-10-CM

## 2020-11-12 DIAGNOSIS — Z79.899 ENCOUNTER FOR MEDICATION MANAGEMENT: ICD-10-CM

## 2020-11-12 PROBLEM — R97.0 ELEVATED CEA: Status: RESOLVED | Noted: 2020-10-07 | Resolved: 2020-11-12

## 2020-11-12 PROBLEM — K63.1 PERFORATED SIGMOID COLON (HCC): Status: RESOLVED | Noted: 2020-03-10 | Resolved: 2020-11-12

## 2020-11-12 PROBLEM — Z01.818 PRE-OP TESTING: Status: RESOLVED | Noted: 2020-03-16 | Resolved: 2020-11-12

## 2020-11-12 PROCEDURE — 96375 TX/PRO/DX INJ NEW DRUG ADDON: CPT

## 2020-11-12 PROCEDURE — 96368 THER/DIAG CONCURRENT INF: CPT

## 2020-11-12 PROCEDURE — 96417 CHEMO IV INFUS EACH ADDL SEQ: CPT

## 2020-11-12 PROCEDURE — 96411 CHEMO IV PUSH ADDL DRUG: CPT

## 2020-11-12 PROCEDURE — 96413 CHEMO IV INFUSION 1 HR: CPT

## 2020-11-12 PROCEDURE — 99215 OFFICE O/P EST HI 40 MIN: CPT | Performed by: INTERNAL MEDICINE

## 2020-11-12 RX ORDER — FLUOROURACIL 50 MG/ML
2400 INJECTION, SOLUTION INTRAVENOUS CONTINUOUS
Status: DISCONTINUED | OUTPATIENT
Start: 2020-11-12 | End: 2020-11-12

## 2020-11-12 RX ORDER — ATROPINE SULFATE 0.4 MG/ML
0.2 AMPUL (ML) INJECTION ONCE
Status: COMPLETED | OUTPATIENT
Start: 2020-11-12 | End: 2020-11-12

## 2020-11-12 RX ORDER — LIDOCAINE AND PRILOCAINE 25; 25 MG/G; MG/G
CREAM TOPICAL
Qty: 1 TUBE | Refills: 3 | Status: SHIPPED | OUTPATIENT
Start: 2020-11-12

## 2020-11-12 RX ORDER — FLUOROURACIL 50 MG/ML
400 INJECTION, SOLUTION INTRAVENOUS ONCE
Status: CANCELLED | OUTPATIENT
Start: 2020-11-12

## 2020-11-12 RX ORDER — HEPARIN SODIUM (PORCINE) LOCK FLUSH IV SOLN 100 UNIT/ML 100 UNIT/ML
500 SOLUTION INTRAVENOUS ONCE
Status: CANCELLED | OUTPATIENT
Start: 2020-11-12

## 2020-11-12 RX ORDER — FLUOROURACIL 50 MG/ML
400 INJECTION, SOLUTION INTRAVENOUS ONCE
Status: COMPLETED | OUTPATIENT
Start: 2020-11-12 | End: 2020-11-12

## 2020-11-12 RX ORDER — FLUOROURACIL 50 MG/ML
2400 INJECTION, SOLUTION INTRAVENOUS CONTINUOUS
Status: CANCELLED | OUTPATIENT
Start: 2020-11-12

## 2020-11-12 RX ORDER — ATROPINE SULFATE 0.4 MG/ML
AMPUL (ML) INJECTION
Status: COMPLETED
Start: 2020-11-12 | End: 2020-11-12

## 2020-11-12 RX ORDER — SODIUM CHLORIDE 9 MG/ML
INJECTION INTRAVENOUS
Status: DISCONTINUED
Start: 2020-11-12 | End: 2020-11-12

## 2020-11-12 RX ORDER — ATROPINE SULFATE 0.4 MG/ML
0.2 AMPUL (ML) INJECTION ONCE
Status: CANCELLED | OUTPATIENT
Start: 2020-11-12

## 2020-11-12 RX ADMIN — ATROPINE SULFATE 0.2 MG: 0.4 MG/ML AMPUL (ML) INJECTION at 11:50:00

## 2020-11-12 RX ADMIN — FLUOROURACIL 4250 MG: 50 INJECTION, SOLUTION INTRAVENOUS at 13:57:00

## 2020-11-12 RX ADMIN — FLUOROURACIL 700 MG: 50 INJECTION, SOLUTION INTRAVENOUS at 13:51:00

## 2020-11-12 NOTE — PATIENT INSTRUCTIONS
Recommended Anti-nausea medications (as directed by your provider):  Prochloperazine (Compazine) 10 mg every 6 hours and Ondansetron (Zofran) 8 mg every 8 hours  Take as needed     Helpful hints during cancer treatment:                  Diet:  ?  Avoid grea · If you have persistent diarrhea or constipation, please contact the triage nurse for further instructions. Skin Care  · Avoid direct sunlight. · Wear a broad-spectrum sunscreen with an SPF of 30 or higher on any skin exposed to the sun.   Re-apply every Please refer to the following link if you are interested in additional information about chemotherapy for yourself or family members: http://www.barclay.info/. html           Safety and Handling of Chemotherapy  While you or your family 1. Due to safety concerns and the nature of this treatment environment, children are not permitted in the infusion center. Please make appropriate arrangements. 2. Hugging and kissing is safe for you and your partner or family members.   You can visit, s

## 2020-11-12 NOTE — PROGRESS NOTES
Cancer Center Progress Note    Patient Name: Ondina Fitzgerald   YOB: 1950   Medical Record Number: [id number]   Attending Physician: Nnamdi Tao M.D.      Chief Complaint:  Colon cancer    Oncology History:  3/6/2020 (Alison Dejuan) colonoscopy showed (10 mg total) by mouth every 6 (six) hours as needed for Nausea., Disp: 60 tablet, Rfl: 3  •  Ondansetron HCl (ZOFRAN) 8 MG tablet, Take 1 tablet (8 mg total) by mouth every 8 (eight) hours as needed for Nausea., Disp: 30 tablet, Rfl: 3  •  acetaminophen 3 BUNCREA 22.9 (H) 11/02/2020    CREATSERUM 0.83 11/02/2020    ANIONGAP 3 11/02/2020    GFRNAA 72 11/02/2020    GFRAA 83 11/02/2020    CA 9.5 11/02/2020    OSMOCALC 293 11/02/2020    ALKPHO 78 11/02/2020    AST 18 11/02/2020    ALT 18 11/02/2020    ALKPHOS 7

## 2020-11-12 NOTE — PROGRESS NOTES
Pt here for C1 Dala Hides with Avastin. Arrives Ambulating independently, accompanied by Self           Patient reports possible pregnancy since last therapy cycle: Not Applicable    Modifications in dose or schedule: No  Patient here from MD visit today.

## 2020-11-12 NOTE — PROGRESS NOTES
Voicemail received from Nelson/Bernie [phone] [id number] who was requesting more details on the pt's requested leave. SW placed callback to [facility], spoke with Milvia Cherry who relayed their questions about the pt's surgery (catheter port was placed 11/9).  Serena Youngblood

## 2020-11-13 ENCOUNTER — TELEPHONE (OUTPATIENT)
Dept: HEMATOLOGY/ONCOLOGY | Facility: HOSPITAL | Age: 70
End: 2020-11-13

## 2020-11-13 NOTE — TELEPHONE ENCOUNTER
Toxicities: C1 D1 Fluorouracil/Leucovorin/Irinotecan/Bevacizumab on 11/12/2020    Renee Rojas is feeling good. No side effects at this time. I encouraged her to please call the office if she is not feeling well or has any questions or concerns.  She thanked m

## 2020-11-24 ENCOUNTER — OFFICE VISIT (OUTPATIENT)
Dept: HEMATOLOGY/ONCOLOGY | Facility: HOSPITAL | Age: 70
End: 2020-11-24
Attending: INTERNAL MEDICINE
Payer: MEDICARE

## 2020-11-24 VITALS
BODY MASS INDEX: 28 KG/M2 | OXYGEN SATURATION: 97 % | TEMPERATURE: 98 F | RESPIRATION RATE: 16 BRPM | WEIGHT: 160.63 LBS | DIASTOLIC BLOOD PRESSURE: 59 MMHG | SYSTOLIC BLOOD PRESSURE: 122 MMHG | HEART RATE: 75 BPM

## 2020-11-24 VITALS
OXYGEN SATURATION: 97 % | DIASTOLIC BLOOD PRESSURE: 59 MMHG | HEIGHT: 63.5 IN | SYSTOLIC BLOOD PRESSURE: 122 MMHG | WEIGHT: 160.63 LBS | TEMPERATURE: 98 F | RESPIRATION RATE: 16 BRPM | BODY MASS INDEX: 28.11 KG/M2 | HEART RATE: 75 BPM

## 2020-11-24 DIAGNOSIS — Z51.11 CHEMOTHERAPY MANAGEMENT, ENCOUNTER FOR: ICD-10-CM

## 2020-11-24 DIAGNOSIS — D70.1 CHEMOTHERAPY INDUCED NEUTROPENIA (HCC): ICD-10-CM

## 2020-11-24 DIAGNOSIS — C18.7 CANCER OF SIGMOID COLON (HCC): Primary | ICD-10-CM

## 2020-11-24 DIAGNOSIS — T45.1X5A CHEMOTHERAPY INDUCED NEUTROPENIA (HCC): ICD-10-CM

## 2020-11-24 PROCEDURE — 96417 CHEMO IV INFUS EACH ADDL SEQ: CPT

## 2020-11-24 PROCEDURE — 96367 TX/PROPH/DG ADDL SEQ IV INF: CPT

## 2020-11-24 PROCEDURE — 96411 CHEMO IV PUSH ADDL DRUG: CPT

## 2020-11-24 PROCEDURE — 80053 COMPREHEN METABOLIC PANEL: CPT

## 2020-11-24 PROCEDURE — 99215 OFFICE O/P EST HI 40 MIN: CPT | Performed by: INTERNAL MEDICINE

## 2020-11-24 PROCEDURE — 96413 CHEMO IV INFUSION 1 HR: CPT

## 2020-11-24 PROCEDURE — 85025 COMPLETE CBC W/AUTO DIFF WBC: CPT

## 2020-11-24 PROCEDURE — 96375 TX/PRO/DX INJ NEW DRUG ADDON: CPT

## 2020-11-24 RX ORDER — ATROPINE SULFATE 0.4 MG/ML
0.2 AMPUL (ML) INJECTION ONCE
Status: CANCELLED | OUTPATIENT
Start: 2020-11-24

## 2020-11-24 RX ORDER — ATROPINE SULFATE 0.4 MG/ML
0.2 AMPUL (ML) INJECTION ONCE
Status: COMPLETED | OUTPATIENT
Start: 2020-11-24 | End: 2020-11-24

## 2020-11-24 RX ORDER — FLUOROURACIL 50 MG/ML
200 INJECTION, SOLUTION INTRAVENOUS ONCE
Status: COMPLETED | OUTPATIENT
Start: 2020-11-24 | End: 2020-11-24

## 2020-11-24 RX ORDER — SODIUM CHLORIDE 9 MG/ML
INJECTION INTRAVENOUS
Status: DISCONTINUED
Start: 2020-11-24 | End: 2020-11-24

## 2020-11-24 RX ORDER — ATROPINE SULFATE 0.4 MG/ML
AMPUL (ML) INJECTION
Status: COMPLETED
Start: 2020-11-24 | End: 2020-11-24

## 2020-11-24 RX ORDER — FLUOROURACIL 50 MG/ML
2400 INJECTION, SOLUTION INTRAVENOUS CONTINUOUS
Status: CANCELLED | OUTPATIENT
Start: 2020-11-24

## 2020-11-24 RX ORDER — FLUOROURACIL 50 MG/ML
2400 INJECTION, SOLUTION INTRAVENOUS CONTINUOUS
Status: DISCONTINUED | OUTPATIENT
Start: 2020-11-24 | End: 2020-11-25

## 2020-11-24 RX ORDER — FLUOROURACIL 50 MG/ML
200 INJECTION, SOLUTION INTRAVENOUS ONCE
Status: CANCELLED | OUTPATIENT
Start: 2020-11-24

## 2020-11-24 RX ADMIN — ATROPINE SULFATE 0.2 MG: 0.4 MG/ML AMPUL (ML) INJECTION at 10:45:00

## 2020-11-24 RX ADMIN — FLUOROURACIL 350 MG: 50 INJECTION, SOLUTION INTRAVENOUS at 13:18:00

## 2020-11-24 RX ADMIN — FLUOROURACIL 4250 MG: 50 INJECTION, SOLUTION INTRAVENOUS at 13:24:00

## 2020-11-24 NOTE — PROGRESS NOTES
Cancer Center Progress Note    Patient Name: Zack Kim   YOB: 1950   Medical Record Number: [id number]   Attending Physician: Brian Mendez M.D.      Chief Complaint:  Colon cancer    Oncology History:  3/6/2020 (Ashley Cook) colonoscopy showed port a cath needle insertion (Patient not taking: Reported on 11/24/2020 ), Disp: 1 Tube, Rfl: 3  •  Prochlorperazine Maleate (COMPAZINE) 10 mg tablet, Take 1 tablet (10 mg total) by mouth every 6 (six) hours as needed for Nausea.  (Patient not taking: Repo Abdomen: Soft, unchanged.    Extremities: No edema, cyanosis, or bruising  Neurological: motor strength grossly intact, MA4E  Psych: appropriate mood and affect    Skin: rash with less excoriation    Laboratory assessed and reviewed:  Lab Results   Compon

## 2020-11-24 NOTE — PROGRESS NOTES
Pt here for C2 D 1  Folfiri with Avastin.       Arrives Ambulating independently, accompanied by Self           Patient reports possible pregnancy since last therapy cycle: Not Applicable    Modifications in dose or schedule: Yes  treatment 2 days earlier d

## 2020-11-24 NOTE — PROGRESS NOTES
Patient disconnected Cadd pump at home, brought pump back today. Sent back to pharmacy. Patient getting Cycle 2 today. Patient reported she needed daughter help to disconnect pump. Daughter is a  Nurse.

## 2020-11-25 ENCOUNTER — SOCIAL WORK SERVICES (OUTPATIENT)
Dept: HEMATOLOGY/ONCOLOGY | Facility: HOSPITAL | Age: 70
End: 2020-11-25

## 2020-11-25 NOTE — PROGRESS NOTES
SW received message indicating the pt's request to extend the leave of absence. SW sent updated office notes via fax to [phone].  Call placed to [employer], spoke with Representative Betty to alert of the incoming paperwork and to request any oth

## 2020-12-08 ENCOUNTER — OFFICE VISIT (OUTPATIENT)
Dept: HEMATOLOGY/ONCOLOGY | Facility: HOSPITAL | Age: 70
End: 2020-12-08
Attending: INTERNAL MEDICINE
Payer: MEDICARE

## 2020-12-08 VITALS
HEART RATE: 81 BPM | OXYGEN SATURATION: 98 % | BODY MASS INDEX: 28 KG/M2 | DIASTOLIC BLOOD PRESSURE: 59 MMHG | RESPIRATION RATE: 16 BRPM | WEIGHT: 159 LBS | SYSTOLIC BLOOD PRESSURE: 119 MMHG | TEMPERATURE: 98 F

## 2020-12-08 VITALS
DIASTOLIC BLOOD PRESSURE: 59 MMHG | OXYGEN SATURATION: 98 % | HEART RATE: 81 BPM | HEIGHT: 63.5 IN | BODY MASS INDEX: 27.82 KG/M2 | TEMPERATURE: 98 F | WEIGHT: 159 LBS | SYSTOLIC BLOOD PRESSURE: 119 MMHG | RESPIRATION RATE: 16 BRPM

## 2020-12-08 DIAGNOSIS — Z51.11 CHEMOTHERAPY MANAGEMENT, ENCOUNTER FOR: ICD-10-CM

## 2020-12-08 DIAGNOSIS — C18.7 CANCER OF SIGMOID COLON (HCC): Primary | ICD-10-CM

## 2020-12-08 DIAGNOSIS — T45.1X5A CHEMOTHERAPY INDUCED NEUTROPENIA (HCC): ICD-10-CM

## 2020-12-08 DIAGNOSIS — D70.1 CHEMOTHERAPY INDUCED NEUTROPENIA (HCC): ICD-10-CM

## 2020-12-08 PROCEDURE — 85025 COMPLETE CBC W/AUTO DIFF WBC: CPT

## 2020-12-08 PROCEDURE — 96368 THER/DIAG CONCURRENT INF: CPT

## 2020-12-08 PROCEDURE — 82378 CARCINOEMBRYONIC ANTIGEN: CPT

## 2020-12-08 PROCEDURE — 99215 OFFICE O/P EST HI 40 MIN: CPT | Performed by: INTERNAL MEDICINE

## 2020-12-08 PROCEDURE — 96417 CHEMO IV INFUS EACH ADDL SEQ: CPT

## 2020-12-08 PROCEDURE — 96413 CHEMO IV INFUSION 1 HR: CPT

## 2020-12-08 PROCEDURE — 80053 COMPREHEN METABOLIC PANEL: CPT

## 2020-12-08 PROCEDURE — 96375 TX/PRO/DX INJ NEW DRUG ADDON: CPT

## 2020-12-08 RX ORDER — ATROPINE SULFATE 0.4 MG/ML
AMPUL (ML) INJECTION
Status: DISCONTINUED
Start: 2020-12-08 | End: 2020-12-08

## 2020-12-08 RX ORDER — ATROPINE SULFATE 0.4 MG/ML
0.2 AMPUL (ML) INJECTION ONCE
Status: CANCELLED | OUTPATIENT
Start: 2020-12-08

## 2020-12-08 RX ORDER — SODIUM CHLORIDE 9 MG/ML
INJECTION INTRAVENOUS
Status: DISCONTINUED
Start: 2020-12-08 | End: 2020-12-08

## 2020-12-08 RX ORDER — FLUOROURACIL 50 MG/ML
2400 INJECTION, SOLUTION INTRAVENOUS CONTINUOUS
Status: CANCELLED | OUTPATIENT
Start: 2020-12-08

## 2020-12-08 RX ORDER — ATROPINE SULFATE 0.4 MG/ML
0.2 AMPUL (ML) INJECTION ONCE
Status: COMPLETED | OUTPATIENT
Start: 2020-12-08 | End: 2020-12-08

## 2020-12-08 RX ORDER — FLUOROURACIL 50 MG/ML
2400 INJECTION, SOLUTION INTRAVENOUS CONTINUOUS
Status: DISCONTINUED | OUTPATIENT
Start: 2020-12-08 | End: 2020-12-08

## 2020-12-08 RX ADMIN — ATROPINE SULFATE 0.2 MG: 0.4 MG/ML AMPUL (ML) INJECTION at 11:18:00

## 2020-12-08 RX ADMIN — FLUOROURACIL 4250 MG: 50 INJECTION, SOLUTION INTRAVENOUS at 13:56:00

## 2020-12-08 NOTE — PROGRESS NOTES
Cancer Center Progress Note    Patient Name: Frank Ponce   YOB: 1950   Medical Record Number: [id number]   Attending Physician: Betsy Simon M.D.      Chief Complaint:  Colon cancer    Oncology History:  3/6/2020 (Holli Pires) colonoscopy showed Q 10 OR), Take  by mouth daily. , Disp: , Rfl:   •  lidocaine-prilocaine 2.5-2.5 % External Cream, Apply to site 1 hour prior to port a cath needle insertion (Patient not taking: Reported on 11/24/2020 ), Disp: 1 Tube, Rfl: 3  •  Prochlorperazine Maleate (C cyanosis, or bruising  Neurological: motor strength grossly intact, MA4E  Psych: appropriate mood and affect    Skin: rash with less excoriation    Laboratory assessed and reviewed:  Lab Results   Component Value Date     (H) 12/08/2020    BUN 12 12

## 2020-12-08 NOTE — PROGRESS NOTES
Pt here for C3 D1  Folfiri with Avastin.       Arrives Ambulating independently, accompanied by Self           Patient reports possible pregnancy since last therapy cycle: Not Applicable    Modifications in dose or schedule: 5fu push removed due to persiste

## 2020-12-10 ENCOUNTER — NURSE ONLY (OUTPATIENT)
Dept: HEMATOLOGY/ONCOLOGY | Facility: HOSPITAL | Age: 70
End: 2020-12-10
Attending: INTERNAL MEDICINE
Payer: MEDICARE

## 2020-12-10 DIAGNOSIS — Z51.11 CHEMOTHERAPY MANAGEMENT, ENCOUNTER FOR: Primary | ICD-10-CM

## 2020-12-10 DIAGNOSIS — Z45.2 ENCOUNTER FOR CENTRAL LINE CARE: ICD-10-CM

## 2020-12-10 DIAGNOSIS — C18.7 CANCER OF SIGMOID COLON (HCC): ICD-10-CM

## 2020-12-10 DIAGNOSIS — Z79.899 ENCOUNTER FOR MEDICATION MANAGEMENT: ICD-10-CM

## 2020-12-10 PROCEDURE — 96523 IRRIG DRUG DELIVERY DEVICE: CPT

## 2020-12-10 RX ORDER — HEPARIN SODIUM (PORCINE) LOCK FLUSH IV SOLN 100 UNIT/ML 100 UNIT/ML
500 SOLUTION INTRAVENOUS ONCE
Status: CANCELLED | OUTPATIENT
Start: 2020-12-10

## 2020-12-10 RX ORDER — HEPARIN SODIUM (PORCINE) LOCK FLUSH IV SOLN 100 UNIT/ML 100 UNIT/ML
500 SOLUTION INTRAVENOUS ONCE
Status: COMPLETED | OUTPATIENT
Start: 2020-12-10 | End: 2020-12-10

## 2020-12-10 RX ADMIN — HEPARIN SODIUM (PORCINE) LOCK FLUSH IV SOLN 100 UNIT/ML 500 UNITS: 100 SOLUTION INTRAVENOUS at 11:30:00

## 2020-12-16 ENCOUNTER — TELEPHONE (OUTPATIENT)
Dept: HEMATOLOGY/ONCOLOGY | Facility: HOSPITAL | Age: 70
End: 2020-12-16

## 2020-12-16 NOTE — TELEPHONE ENCOUNTER
Patient has a dental form she needs signed by Dr. Claudia Frank, She wants to bring the form to the hospital some time today. I told her I needed to ask the nurse if that was ok. Thank you.  Kathy

## 2020-12-17 ENCOUNTER — TELEPHONE (OUTPATIENT)
Dept: HEMATOLOGY/ONCOLOGY | Facility: HOSPITAL | Age: 70
End: 2020-12-17

## 2020-12-17 NOTE — TELEPHONE ENCOUNTER
Called patient regarding telephone message today. She reports a purplish-reddish discoloration to her distal fingers since her last chemotherapy cycle on 12/8/20. She notices the discoloration gets more intense when her arms are down at her side.   She de

## 2020-12-17 NOTE — TELEPHONE ENCOUNTER
Toxicities: C3 D1 Irinotecan/Leucovorin/Bevacizumab-awwb on 12/8/2020    Red/Purple Discoloration off the Finger Tips: Shirlene Benson woke up this morning and noticed all 10 fingers are a dark red/purple color from the finger tips to the end of the first knuck

## 2020-12-22 ENCOUNTER — OFFICE VISIT (OUTPATIENT)
Dept: HEMATOLOGY/ONCOLOGY | Facility: HOSPITAL | Age: 70
End: 2020-12-22
Attending: INTERNAL MEDICINE
Payer: MEDICARE

## 2020-12-22 VITALS
WEIGHT: 160 LBS | DIASTOLIC BLOOD PRESSURE: 76 MMHG | BODY MASS INDEX: 28 KG/M2 | HEART RATE: 78 BPM | RESPIRATION RATE: 16 BRPM | HEIGHT: 63.5 IN | TEMPERATURE: 98 F | SYSTOLIC BLOOD PRESSURE: 136 MMHG | OXYGEN SATURATION: 96 %

## 2020-12-22 VITALS
HEIGHT: 63.5 IN | DIASTOLIC BLOOD PRESSURE: 76 MMHG | TEMPERATURE: 98 F | HEART RATE: 78 BPM | RESPIRATION RATE: 16 BRPM | BODY MASS INDEX: 28 KG/M2 | SYSTOLIC BLOOD PRESSURE: 136 MMHG | WEIGHT: 160 LBS | OXYGEN SATURATION: 96 %

## 2020-12-22 DIAGNOSIS — C18.7 CANCER OF SIGMOID COLON (HCC): Primary | ICD-10-CM

## 2020-12-22 DIAGNOSIS — L81.9 HYPERPIGMENTATION: ICD-10-CM

## 2020-12-22 DIAGNOSIS — D70.1 CHEMOTHERAPY INDUCED NEUTROPENIA (HCC): ICD-10-CM

## 2020-12-22 DIAGNOSIS — Z51.11 CHEMOTHERAPY MANAGEMENT, ENCOUNTER FOR: ICD-10-CM

## 2020-12-22 DIAGNOSIS — T45.1X5A CHEMOTHERAPY INDUCED NEUTROPENIA (HCC): ICD-10-CM

## 2020-12-22 PROCEDURE — 96368 THER/DIAG CONCURRENT INF: CPT

## 2020-12-22 PROCEDURE — 85025 COMPLETE CBC W/AUTO DIFF WBC: CPT

## 2020-12-22 PROCEDURE — 96417 CHEMO IV INFUS EACH ADDL SEQ: CPT

## 2020-12-22 PROCEDURE — 80053 COMPREHEN METABOLIC PANEL: CPT

## 2020-12-22 PROCEDURE — 96375 TX/PRO/DX INJ NEW DRUG ADDON: CPT

## 2020-12-22 PROCEDURE — 96413 CHEMO IV INFUSION 1 HR: CPT

## 2020-12-22 PROCEDURE — 82378 CARCINOEMBRYONIC ANTIGEN: CPT

## 2020-12-22 PROCEDURE — 96415 CHEMO IV INFUSION ADDL HR: CPT

## 2020-12-22 PROCEDURE — 81003 URINALYSIS AUTO W/O SCOPE: CPT

## 2020-12-22 PROCEDURE — 99215 OFFICE O/P EST HI 40 MIN: CPT | Performed by: INTERNAL MEDICINE

## 2020-12-22 RX ORDER — FLUOROURACIL 50 MG/ML
2400 INJECTION, SOLUTION INTRAVENOUS CONTINUOUS
Status: DISCONTINUED | OUTPATIENT
Start: 2020-12-22 | End: 2020-12-22

## 2020-12-22 RX ORDER — ATROPINE SULFATE 0.4 MG/ML
0.2 AMPUL (ML) INJECTION ONCE
Status: COMPLETED | OUTPATIENT
Start: 2020-12-22 | End: 2020-12-22

## 2020-12-22 RX ORDER — FLUOROURACIL 50 MG/ML
2400 INJECTION, SOLUTION INTRAVENOUS CONTINUOUS
Status: CANCELLED | OUTPATIENT
Start: 2020-12-22

## 2020-12-22 RX ORDER — ATROPINE SULFATE 0.4 MG/ML
0.2 AMPUL (ML) INJECTION ONCE
Status: CANCELLED | OUTPATIENT
Start: 2020-12-22

## 2020-12-22 RX ORDER — ATROPINE SULFATE 0.4 MG/ML
AMPUL (ML) INJECTION
Status: COMPLETED
Start: 2020-12-22 | End: 2020-12-22

## 2020-12-22 RX ORDER — SODIUM CHLORIDE 9 MG/ML
INJECTION INTRAVENOUS
Status: DISPENSED
Start: 2020-12-22 | End: 2020-12-22

## 2020-12-22 RX ADMIN — ATROPINE SULFATE 0.2 MG: 0.4 MG/ML AMPUL (ML) INJECTION at 11:56:00

## 2020-12-22 RX ADMIN — FLUOROURACIL 4250 MG: 50 INJECTION, SOLUTION INTRAVENOUS at 13:43:00

## 2020-12-22 NOTE — PROGRESS NOTES
Pt here for C4 D1  Folfiri with Avastin.       Arrives Ambulating independently, accompanied by Self           Patient reports possible pregnancy since last therapy cycle: Not Applicable    Modifications in dose or schedule: 5fu push removed due to persiste

## 2020-12-22 NOTE — PROGRESS NOTES
Cancer Center Progress Note    Patient Name: Chelsy Aguilar   YOB: 1950   Medical Record Number: [id number]   Attending Physician: Quinten Ibrahim M.D.      Chief Complaint:  Colon cancer    Oncology History:  3/6/2020 (Garrison Essex) colonoscopy showed Ob/gyn)    Current Medications:    Current Outpatient Medications:   •  lidocaine-prilocaine 2.5-2.5 % External Cream, Apply to site 1 hour prior to port a cath needle insertion, Disp: 1 Tube, Rfl: 3  •  acetaminophen 325 MG Oral Tab, Take 325 mg by mouth adenopathy. Chest: Symmetric expansion, nonlabored breathing  Cardiovascular: Regular with palpable distal pulses   Abdomen: Soft, unchanged. Extremities: No edema, cyanosis, or bruising.  Bilateral hands noted for distal hyperpigmentation, no cyanosis,

## 2020-12-24 ENCOUNTER — NURSE ONLY (OUTPATIENT)
Dept: HEMATOLOGY/ONCOLOGY | Facility: HOSPITAL | Age: 70
End: 2020-12-24
Attending: INTERNAL MEDICINE
Payer: MEDICARE

## 2020-12-24 DIAGNOSIS — Z51.11 CHEMOTHERAPY MANAGEMENT, ENCOUNTER FOR: Primary | ICD-10-CM

## 2020-12-24 DIAGNOSIS — Z45.2 ENCOUNTER FOR CENTRAL LINE CARE: ICD-10-CM

## 2020-12-24 DIAGNOSIS — Z79.899 ENCOUNTER FOR MEDICATION MANAGEMENT: ICD-10-CM

## 2020-12-24 DIAGNOSIS — C18.7 CANCER OF SIGMOID COLON (HCC): ICD-10-CM

## 2020-12-24 PROCEDURE — 96523 IRRIG DRUG DELIVERY DEVICE: CPT

## 2020-12-24 RX ORDER — HEPARIN SODIUM (PORCINE) LOCK FLUSH IV SOLN 100 UNIT/ML 100 UNIT/ML
500 SOLUTION INTRAVENOUS ONCE
Status: CANCELLED | OUTPATIENT
Start: 2020-12-24

## 2020-12-24 RX ORDER — HEPARIN SODIUM (PORCINE) LOCK FLUSH IV SOLN 100 UNIT/ML 100 UNIT/ML
500 SOLUTION INTRAVENOUS ONCE
Status: COMPLETED | OUTPATIENT
Start: 2020-12-24 | End: 2020-12-24

## 2020-12-24 RX ADMIN — HEPARIN SODIUM (PORCINE) LOCK FLUSH IV SOLN 100 UNIT/ML 500 UNITS: 100 SOLUTION INTRAVENOUS at 11:09:00

## 2021-01-05 ENCOUNTER — OFFICE VISIT (OUTPATIENT)
Dept: HEMATOLOGY/ONCOLOGY | Facility: HOSPITAL | Age: 71
End: 2021-01-05
Attending: INTERNAL MEDICINE
Payer: MEDICARE

## 2021-01-05 VITALS
HEIGHT: 63.5 IN | WEIGHT: 162 LBS | TEMPERATURE: 98 F | DIASTOLIC BLOOD PRESSURE: 75 MMHG | HEART RATE: 75 BPM | OXYGEN SATURATION: 99 % | SYSTOLIC BLOOD PRESSURE: 131 MMHG | RESPIRATION RATE: 16 BRPM | BODY MASS INDEX: 28.35 KG/M2

## 2021-01-05 VITALS
OXYGEN SATURATION: 99 % | DIASTOLIC BLOOD PRESSURE: 75 MMHG | SYSTOLIC BLOOD PRESSURE: 131 MMHG | BODY MASS INDEX: 28 KG/M2 | HEART RATE: 75 BPM | RESPIRATION RATE: 16 BRPM | WEIGHT: 162 LBS | TEMPERATURE: 98 F

## 2021-01-05 DIAGNOSIS — T45.1X5A CHEMOTHERAPY INDUCED NEUTROPENIA (HCC): ICD-10-CM

## 2021-01-05 DIAGNOSIS — L81.9 HYPERPIGMENTATION: ICD-10-CM

## 2021-01-05 DIAGNOSIS — D70.1 CHEMOTHERAPY INDUCED NEUTROPENIA (HCC): ICD-10-CM

## 2021-01-05 DIAGNOSIS — C18.7 CANCER OF SIGMOID COLON (HCC): Primary | ICD-10-CM

## 2021-01-05 DIAGNOSIS — C18.7 CANCER OF SIGMOID COLON (HCC): ICD-10-CM

## 2021-01-05 DIAGNOSIS — Z51.11 CHEMOTHERAPY MANAGEMENT, ENCOUNTER FOR: Primary | ICD-10-CM

## 2021-01-05 LAB
ALBUMIN SERPL-MCNC: 3.7 G/DL (ref 3.4–5)
ALBUMIN/GLOB SERPL: 1 {RATIO} (ref 1–2)
ALP LIVER SERPL-CCNC: 71 U/L
ALT SERPL-CCNC: 21 U/L
ANION GAP SERPL CALC-SCNC: 4 MMOL/L (ref 0–18)
AST SERPL-CCNC: 15 U/L (ref 15–37)
BASOPHILS # BLD AUTO: 0.05 X10(3) UL (ref 0–0.2)
BASOPHILS NFR BLD AUTO: 1.7 %
BILIRUB SERPL-MCNC: 0.5 MG/DL (ref 0.1–2)
BUN BLD-MCNC: 11 MG/DL (ref 7–18)
BUN/CREAT SERPL: 11.7 (ref 10–20)
CALCIUM BLD-MCNC: 9.6 MG/DL (ref 8.5–10.1)
CEA SERPL-MCNC: 5.8 NG/ML (ref ?–5)
CHLORIDE SERPL-SCNC: 109 MMOL/L (ref 98–112)
CO2 SERPL-SCNC: 29 MMOL/L (ref 21–32)
CREAT BLD-MCNC: 0.94 MG/DL
DEPRECATED RDW RBC AUTO: 46.1 FL (ref 35.1–46.3)
EOSINOPHIL # BLD AUTO: 0.13 X10(3) UL (ref 0–0.7)
EOSINOPHIL NFR BLD AUTO: 4.3 %
ERYTHROCYTE [DISTWIDTH] IN BLOOD BY AUTOMATED COUNT: 13.7 % (ref 11–15)
GLOBULIN PLAS-MCNC: 3.7 G/DL (ref 2.8–4.4)
GLUCOSE BLD-MCNC: 102 MG/DL (ref 70–99)
HCT VFR BLD AUTO: 41.9 %
HGB BLD-MCNC: 13.9 G/DL
IMM GRANULOCYTES # BLD AUTO: 0 X10(3) UL (ref 0–1)
IMM GRANULOCYTES NFR BLD: 0 %
LYMPHOCYTES # BLD AUTO: 1.5 X10(3) UL (ref 1–4)
LYMPHOCYTES NFR BLD AUTO: 50.2 %
M PROTEIN MFR SERPL ELPH: 7.4 G/DL (ref 6.4–8.2)
MCH RBC QN AUTO: 31.1 PG (ref 26–34)
MCHC RBC AUTO-ENTMCNC: 33.2 G/DL (ref 31–37)
MCV RBC AUTO: 93.7 FL
MONOCYTES # BLD AUTO: 0.27 X10(3) UL (ref 0.1–1)
MONOCYTES NFR BLD AUTO: 9 %
NEUTROPHILS # BLD AUTO: 1.04 X10 (3) UL (ref 1.5–7.7)
NEUTROPHILS # BLD AUTO: 1.04 X10(3) UL (ref 1.5–7.7)
NEUTROPHILS NFR BLD AUTO: 34.8 %
OSMOLALITY SERPL CALC.SUM OF ELEC: 294 MOSM/KG (ref 275–295)
PLATELET # BLD AUTO: 139 10(3)UL (ref 150–450)
POTASSIUM SERPL-SCNC: 4 MMOL/L (ref 3.5–5.1)
RBC # BLD AUTO: 4.47 X10(6)UL
SODIUM SERPL-SCNC: 142 MMOL/L (ref 136–145)
WBC # BLD AUTO: 3 X10(3) UL (ref 4–11)

## 2021-01-05 PROCEDURE — 96368 THER/DIAG CONCURRENT INF: CPT

## 2021-01-05 PROCEDURE — 85025 COMPLETE CBC W/AUTO DIFF WBC: CPT

## 2021-01-05 PROCEDURE — 99215 OFFICE O/P EST HI 40 MIN: CPT | Performed by: INTERNAL MEDICINE

## 2021-01-05 PROCEDURE — 96417 CHEMO IV INFUS EACH ADDL SEQ: CPT

## 2021-01-05 PROCEDURE — 96413 CHEMO IV INFUSION 1 HR: CPT

## 2021-01-05 PROCEDURE — 96375 TX/PRO/DX INJ NEW DRUG ADDON: CPT

## 2021-01-05 PROCEDURE — 82378 CARCINOEMBRYONIC ANTIGEN: CPT

## 2021-01-05 PROCEDURE — 80053 COMPREHEN METABOLIC PANEL: CPT

## 2021-01-05 PROCEDURE — 96415 CHEMO IV INFUSION ADDL HR: CPT

## 2021-01-05 RX ORDER — ATROPINE SULFATE 0.4 MG/ML
0.2 AMPUL (ML) INJECTION ONCE
Status: CANCELLED | OUTPATIENT
Start: 2021-01-05

## 2021-01-05 RX ORDER — ATROPINE SULFATE 0.4 MG/ML
0.2 AMPUL (ML) INJECTION ONCE
Status: COMPLETED | OUTPATIENT
Start: 2021-01-05 | End: 2021-01-05

## 2021-01-05 RX ORDER — ATROPINE SULFATE 0.4 MG/ML
AMPUL (ML) INJECTION
Status: COMPLETED
Start: 2021-01-05 | End: 2021-01-05

## 2021-01-05 RX ORDER — FLUOROURACIL 50 MG/ML
2400 INJECTION, SOLUTION INTRAVENOUS CONTINUOUS
Status: DISCONTINUED | OUTPATIENT
Start: 2021-01-05 | End: 2021-01-05

## 2021-01-05 RX ORDER — NYSTATIN 100000 U/G
CREAM TOPICAL
Qty: 15 G | Refills: 0 | Status: SHIPPED | OUTPATIENT
Start: 2021-01-05

## 2021-01-05 RX ORDER — SODIUM CHLORIDE 9 MG/ML
INJECTION INTRAVENOUS
Status: DISCONTINUED
Start: 2021-01-05 | End: 2021-01-05

## 2021-01-05 RX ORDER — FLUOROURACIL 50 MG/ML
2400 INJECTION, SOLUTION INTRAVENOUS CONTINUOUS
Status: CANCELLED | OUTPATIENT
Start: 2021-01-05

## 2021-01-05 RX ORDER — ACETAMINOPHEN 160 MG
2000 TABLET,DISINTEGRATING ORAL 2 TIMES DAILY
COMMUNITY

## 2021-01-05 RX ADMIN — ATROPINE SULFATE 0.2 MG: 0.4 MG/ML AMPUL (ML) INJECTION at 11:13:00

## 2021-01-05 RX ADMIN — FLUOROURACIL 4250 MG: 50 INJECTION, SOLUTION INTRAVENOUS at 12:57:00

## 2021-01-05 NOTE — PROGRESS NOTES
Pt here for C5D1 Folfiri with Avastin.   Ok to treat by Dr. Loreta Wharton for [address] of 1.04    Arrives Ambulating independently, accompanied by Self           Patient reports possible pregnancy since last therapy cycle: Not Applicable    Modifications in dose or sched

## 2021-01-05 NOTE — PROGRESS NOTES
Cancer Center Progress Note    Patient Name: Ita Juares   YOB: 1950   Medical Record Number: [id number]   Attending Physician: Sergio Meyer M.D.      Chief Complaint:  Colon cancer    Oncology History:  3/6/2020 (Patricia Darden) colonoscopy showed Ob/gyn)    Current Medications:    Current Outpatient Medications:   •  Vitamin D3 50 MCG (2000 UT) Oral Cap, Take 2,000 Units by mouth 2 (two) times a day., Disp: , Rfl:   •  lidocaine-prilocaine 2.5-2.5 % External Cream, Apply to site 1 hour prior to por for distal hyperpigmentation slightly worse, now involveing the nails  Neurological: motor strength grossly intact, MA4E  Psych: appropriate mood and affect      Laboratory assessed and reviewed:  Lab Results   Component Value Date    GLU 98 12/22/2020

## 2021-01-07 ENCOUNTER — NURSE ONLY (OUTPATIENT)
Dept: HEMATOLOGY/ONCOLOGY | Facility: HOSPITAL | Age: 71
End: 2021-01-07
Attending: INTERNAL MEDICINE
Payer: MEDICARE

## 2021-01-07 DIAGNOSIS — Z51.11 CHEMOTHERAPY MANAGEMENT, ENCOUNTER FOR: Primary | ICD-10-CM

## 2021-01-07 DIAGNOSIS — Z45.2 ENCOUNTER FOR CENTRAL LINE CARE: ICD-10-CM

## 2021-01-07 DIAGNOSIS — Z79.899 ENCOUNTER FOR MEDICATION MANAGEMENT: ICD-10-CM

## 2021-01-07 DIAGNOSIS — C18.7 CANCER OF SIGMOID COLON (HCC): ICD-10-CM

## 2021-01-07 PROCEDURE — 96523 IRRIG DRUG DELIVERY DEVICE: CPT

## 2021-01-07 RX ORDER — HEPARIN SODIUM (PORCINE) LOCK FLUSH IV SOLN 100 UNIT/ML 100 UNIT/ML
500 SOLUTION INTRAVENOUS ONCE
Status: CANCELLED | OUTPATIENT
Start: 2021-01-07

## 2021-01-07 RX ORDER — HEPARIN SODIUM (PORCINE) LOCK FLUSH IV SOLN 100 UNIT/ML 100 UNIT/ML
500 SOLUTION INTRAVENOUS ONCE
Status: COMPLETED | OUTPATIENT
Start: 2021-01-07 | End: 2021-01-07

## 2021-01-07 RX ADMIN — HEPARIN SODIUM (PORCINE) LOCK FLUSH IV SOLN 100 UNIT/ML 500 UNITS: 100 SOLUTION INTRAVENOUS at 10:43:00

## 2021-01-19 ENCOUNTER — OFFICE VISIT (OUTPATIENT)
Dept: HEMATOLOGY/ONCOLOGY | Facility: HOSPITAL | Age: 71
End: 2021-01-19
Attending: INTERNAL MEDICINE
Payer: MEDICARE

## 2021-01-19 VITALS
OXYGEN SATURATION: 95 % | RESPIRATION RATE: 16 BRPM | HEART RATE: 77 BPM | BODY MASS INDEX: 28.53 KG/M2 | HEIGHT: 63.5 IN | TEMPERATURE: 98 F | SYSTOLIC BLOOD PRESSURE: 134 MMHG | DIASTOLIC BLOOD PRESSURE: 85 MMHG | WEIGHT: 163 LBS

## 2021-01-19 VITALS
SYSTOLIC BLOOD PRESSURE: 134 MMHG | HEIGHT: 63.5 IN | TEMPERATURE: 98 F | BODY MASS INDEX: 28.53 KG/M2 | RESPIRATION RATE: 16 BRPM | HEART RATE: 77 BPM | WEIGHT: 163 LBS | OXYGEN SATURATION: 95 % | DIASTOLIC BLOOD PRESSURE: 85 MMHG

## 2021-01-19 DIAGNOSIS — C18.7 CANCER OF SIGMOID COLON (HCC): Primary | ICD-10-CM

## 2021-01-19 DIAGNOSIS — L81.9 HYPERPIGMENTATION: ICD-10-CM

## 2021-01-19 DIAGNOSIS — Z51.11 CHEMOTHERAPY MANAGEMENT, ENCOUNTER FOR: Primary | ICD-10-CM

## 2021-01-19 DIAGNOSIS — C18.7 CANCER OF SIGMOID COLON (HCC): ICD-10-CM

## 2021-01-19 DIAGNOSIS — T45.1X5A CHEMOTHERAPY INDUCED NEUTROPENIA (HCC): ICD-10-CM

## 2021-01-19 DIAGNOSIS — D70.1 CHEMOTHERAPY INDUCED NEUTROPENIA (HCC): ICD-10-CM

## 2021-01-19 LAB
ALBUMIN SERPL-MCNC: 3.7 G/DL (ref 3.4–5)
ALBUMIN/GLOB SERPL: 1 {RATIO} (ref 1–2)
ALP LIVER SERPL-CCNC: 69 U/L
ALT SERPL-CCNC: 22 U/L
ANION GAP SERPL CALC-SCNC: 5 MMOL/L (ref 0–18)
AST SERPL-CCNC: 18 U/L (ref 15–37)
BASOPHILS # BLD AUTO: 0.03 X10(3) UL (ref 0–0.2)
BASOPHILS NFR BLD AUTO: 1 %
BILIRUB SERPL-MCNC: 0.5 MG/DL (ref 0.1–2)
BUN BLD-MCNC: 20 MG/DL (ref 7–18)
BUN/CREAT SERPL: 23 (ref 10–20)
CALCIUM BLD-MCNC: 9.6 MG/DL (ref 8.5–10.1)
CEA SERPL-MCNC: 4.3 NG/ML (ref ?–5)
CHLORIDE SERPL-SCNC: 106 MMOL/L (ref 98–112)
CO2 SERPL-SCNC: 29 MMOL/L (ref 21–32)
CREAT BLD-MCNC: 0.87 MG/DL
DEPRECATED RDW RBC AUTO: 47.3 FL (ref 35.1–46.3)
EOSINOPHIL # BLD AUTO: 0.13 X10(3) UL (ref 0–0.7)
EOSINOPHIL NFR BLD AUTO: 4.1 %
ERYTHROCYTE [DISTWIDTH] IN BLOOD BY AUTOMATED COUNT: 14.1 % (ref 11–15)
GLOBULIN PLAS-MCNC: 3.6 G/DL (ref 2.8–4.4)
GLUCOSE BLD-MCNC: 107 MG/DL (ref 70–99)
HCT VFR BLD AUTO: 40.8 %
HGB BLD-MCNC: 13.4 G/DL
IMM GRANULOCYTES # BLD AUTO: 0 X10(3) UL (ref 0–1)
IMM GRANULOCYTES NFR BLD: 0 %
LYMPHOCYTES # BLD AUTO: 1.5 X10(3) UL (ref 1–4)
LYMPHOCYTES NFR BLD AUTO: 47.6 %
M PROTEIN MFR SERPL ELPH: 7.3 G/DL (ref 6.4–8.2)
MCH RBC QN AUTO: 30.9 PG (ref 26–34)
MCHC RBC AUTO-ENTMCNC: 32.8 G/DL (ref 31–37)
MCV RBC AUTO: 94 FL
MONOCYTES # BLD AUTO: 0.29 X10(3) UL (ref 0.1–1)
MONOCYTES NFR BLD AUTO: 9.2 %
NEUTROPHILS # BLD AUTO: 1.2 X10 (3) UL (ref 1.5–7.7)
NEUTROPHILS # BLD AUTO: 1.2 X10(3) UL (ref 1.5–7.7)
NEUTROPHILS NFR BLD AUTO: 38.1 %
OSMOLALITY SERPL CALC.SUM OF ELEC: 293 MOSM/KG (ref 275–295)
PLATELET # BLD AUTO: 136 10(3)UL (ref 150–450)
POTASSIUM SERPL-SCNC: 3.9 MMOL/L (ref 3.5–5.1)
RBC # BLD AUTO: 4.34 X10(6)UL
SODIUM SERPL-SCNC: 140 MMOL/L (ref 136–145)
WBC # BLD AUTO: 3.2 X10(3) UL (ref 4–11)

## 2021-01-19 PROCEDURE — 85025 COMPLETE CBC W/AUTO DIFF WBC: CPT

## 2021-01-19 PROCEDURE — 96375 TX/PRO/DX INJ NEW DRUG ADDON: CPT

## 2021-01-19 PROCEDURE — 99215 OFFICE O/P EST HI 40 MIN: CPT | Performed by: INTERNAL MEDICINE

## 2021-01-19 PROCEDURE — 96368 THER/DIAG CONCURRENT INF: CPT

## 2021-01-19 PROCEDURE — 82378 CARCINOEMBRYONIC ANTIGEN: CPT

## 2021-01-19 PROCEDURE — 96413 CHEMO IV INFUSION 1 HR: CPT

## 2021-01-19 PROCEDURE — 96417 CHEMO IV INFUS EACH ADDL SEQ: CPT

## 2021-01-19 PROCEDURE — 96415 CHEMO IV INFUSION ADDL HR: CPT

## 2021-01-19 PROCEDURE — 80053 COMPREHEN METABOLIC PANEL: CPT

## 2021-01-19 RX ORDER — SODIUM CHLORIDE 9 MG/ML
INJECTION INTRAVENOUS
Status: DISCONTINUED
Start: 2021-01-19 | End: 2021-01-19

## 2021-01-19 RX ORDER — ATROPINE SULFATE 0.4 MG/ML
0.2 AMPUL (ML) INJECTION ONCE
Status: CANCELLED | OUTPATIENT
Start: 2021-01-19

## 2021-01-19 RX ORDER — ATROPINE SULFATE 0.4 MG/ML
0.2 AMPUL (ML) INJECTION ONCE
Status: COMPLETED | OUTPATIENT
Start: 2021-01-19 | End: 2021-01-19

## 2021-01-19 RX ORDER — FLUOROURACIL 50 MG/ML
2400 INJECTION, SOLUTION INTRAVENOUS CONTINUOUS
Status: DISCONTINUED | OUTPATIENT
Start: 2021-01-19 | End: 2021-01-19

## 2021-01-19 RX ORDER — FLUOROURACIL 50 MG/ML
2400 INJECTION, SOLUTION INTRAVENOUS CONTINUOUS
Status: CANCELLED | OUTPATIENT
Start: 2021-01-19

## 2021-01-19 RX ORDER — ATROPINE SULFATE 0.4 MG/ML
AMPUL (ML) INJECTION
Status: DISCONTINUED
Start: 2021-01-19 | End: 2021-01-19

## 2021-01-19 RX ADMIN — ATROPINE SULFATE 0.2 MG: 0.4 MG/ML AMPUL (ML) INJECTION at 10:45:00

## 2021-01-19 RX ADMIN — FLUOROURACIL 4250 MG: 50 INJECTION, SOLUTION INTRAVENOUS at 12:39:00

## 2021-01-19 NOTE — PROGRESS NOTES
Cancer Center Progress Note    Patient Name: Raul Chaves   YOB: 1950   Medical Record Number: [id number]   Attending Physician: Mikie Solomon M.D.      Chief Complaint:  Colon cancer    Oncology History:  3/6/2020 (Sue Fong) colonoscopy showed Medications:   •  Vitamin D3 50 MCG (2000 UT) Oral Cap, Take 2,000 Units by mouth 2 (two) times a day., Disp: , Rfl:   •  nystatin 079797 UNIT/GM External Cream, aply peasize to affected area twice, Disp: 15 g, Rfl: 0  •  lidocaine-prilocaine 2.5-2.5 % Ext bruising.  Bilateral hands noted for distal hyperpigmentation slightly worse, now involving the nails  Neurological: motor strength grossly intact, MA4E  Psych: appropriate mood and affect      Laboratory assessed and reviewed:  Lab Results   Component Valu

## 2021-01-19 NOTE — PROGRESS NOTES
Pt here for C6 D1 Folfiri with Avastin.   Ok to treat by Dr. Prince Dubin for [address] of 1.2    Arrives Ambulating independently, accompanied by Self           Patient reports possible pregnancy since last therapy cycle: Not Applicable    Modifications in dose or sched

## 2021-01-21 ENCOUNTER — NURSE ONLY (OUTPATIENT)
Dept: HEMATOLOGY/ONCOLOGY | Facility: HOSPITAL | Age: 71
End: 2021-01-21
Attending: INTERNAL MEDICINE
Payer: MEDICARE

## 2021-01-21 DIAGNOSIS — Z45.2 ENCOUNTER FOR CENTRAL LINE CARE: ICD-10-CM

## 2021-01-21 DIAGNOSIS — Z51.11 CHEMOTHERAPY MANAGEMENT, ENCOUNTER FOR: Primary | ICD-10-CM

## 2021-01-21 DIAGNOSIS — Z79.899 ENCOUNTER FOR MEDICATION MANAGEMENT: ICD-10-CM

## 2021-01-21 DIAGNOSIS — C18.7 CANCER OF SIGMOID COLON (HCC): ICD-10-CM

## 2021-01-21 PROCEDURE — 96523 IRRIG DRUG DELIVERY DEVICE: CPT

## 2021-01-21 RX ORDER — HEPARIN SODIUM (PORCINE) LOCK FLUSH IV SOLN 100 UNIT/ML 100 UNIT/ML
500 SOLUTION INTRAVENOUS ONCE
Status: COMPLETED | OUTPATIENT
Start: 2021-01-21 | End: 2021-01-21

## 2021-01-21 RX ORDER — HEPARIN SODIUM (PORCINE) LOCK FLUSH IV SOLN 100 UNIT/ML 100 UNIT/ML
500 SOLUTION INTRAVENOUS ONCE
Status: CANCELLED | OUTPATIENT
Start: 2021-01-21

## 2021-01-21 RX ADMIN — HEPARIN SODIUM (PORCINE) LOCK FLUSH IV SOLN 100 UNIT/ML 500 UNITS: 100 SOLUTION INTRAVENOUS at 10:34:00

## 2021-01-30 ENCOUNTER — HOSPITAL ENCOUNTER (OUTPATIENT)
Dept: CT IMAGING | Facility: HOSPITAL | Age: 71
Discharge: HOME | End: 2021-01-30
Attending: INTERNAL MEDICINE
Payer: MEDICARE

## 2021-01-30 DIAGNOSIS — Z51.11 CHEMOTHERAPY MANAGEMENT, ENCOUNTER FOR: ICD-10-CM

## 2021-01-30 DIAGNOSIS — C18.7 CANCER OF SIGMOID COLON (HCC): ICD-10-CM

## 2021-01-30 PROCEDURE — 74177 CT ABD & PELVIS W/CONTRAST: CPT | Performed by: INTERNAL MEDICINE

## 2021-01-30 PROCEDURE — 71260 CT THORAX DX C+: CPT | Performed by: INTERNAL MEDICINE

## 2021-02-01 ENCOUNTER — TELEPHONE (OUTPATIENT)
Dept: HEMATOLOGY/ONCOLOGY | Facility: HOSPITAL | Age: 71
End: 2021-02-01

## 2021-02-01 NOTE — TELEPHONE ENCOUNTER
Left message for kayla letting her know that the CT looks very good. We will see her in clinic tomorrow.

## 2021-02-02 ENCOUNTER — NURSE ONLY (OUTPATIENT)
Dept: HEMATOLOGY/ONCOLOGY | Facility: HOSPITAL | Age: 71
End: 2021-02-02
Attending: INTERNAL MEDICINE
Payer: MEDICARE

## 2021-02-02 VITALS
RESPIRATION RATE: 16 BRPM | OXYGEN SATURATION: 96 % | WEIGHT: 165 LBS | SYSTOLIC BLOOD PRESSURE: 127 MMHG | BODY MASS INDEX: 29 KG/M2 | HEART RATE: 82 BPM | TEMPERATURE: 98 F | DIASTOLIC BLOOD PRESSURE: 80 MMHG

## 2021-02-02 VITALS
DIASTOLIC BLOOD PRESSURE: 80 MMHG | TEMPERATURE: 98 F | OXYGEN SATURATION: 96 % | BODY MASS INDEX: 28.87 KG/M2 | RESPIRATION RATE: 16 BRPM | HEIGHT: 63.5 IN | WEIGHT: 165 LBS | HEART RATE: 82 BPM | SYSTOLIC BLOOD PRESSURE: 127 MMHG

## 2021-02-02 DIAGNOSIS — C18.7 CANCER OF SIGMOID COLON (HCC): Primary | ICD-10-CM

## 2021-02-02 DIAGNOSIS — Z51.11 CHEMOTHERAPY MANAGEMENT, ENCOUNTER FOR: ICD-10-CM

## 2021-02-02 DIAGNOSIS — Z51.11 CHEMOTHERAPY MANAGEMENT, ENCOUNTER FOR: Primary | ICD-10-CM

## 2021-02-02 DIAGNOSIS — Z79.899 ENCOUNTER FOR MEDICATION MANAGEMENT: ICD-10-CM

## 2021-02-02 DIAGNOSIS — Z45.2 ENCOUNTER FOR CENTRAL LINE CARE: ICD-10-CM

## 2021-02-02 DIAGNOSIS — C18.7 CANCER OF SIGMOID COLON (HCC): ICD-10-CM

## 2021-02-02 LAB
ALBUMIN SERPL-MCNC: 3.6 G/DL (ref 3.4–5)
ALBUMIN/GLOB SERPL: 1 {RATIO} (ref 1–2)
ALP LIVER SERPL-CCNC: 67 U/L
ALT SERPL-CCNC: 20 U/L
ANION GAP SERPL CALC-SCNC: 4 MMOL/L (ref 0–18)
AST SERPL-CCNC: 15 U/L (ref 15–37)
BASOPHILS # BLD AUTO: 0.05 X10(3) UL (ref 0–0.2)
BASOPHILS NFR BLD AUTO: 1.3 %
BILIRUB SERPL-MCNC: 0.5 MG/DL (ref 0.1–2)
BILIRUB UR QL: NEGATIVE
BUN BLD-MCNC: 19 MG/DL (ref 7–18)
BUN/CREAT SERPL: 22.1 (ref 10–20)
CALCIUM BLD-MCNC: 9.3 MG/DL (ref 8.5–10.1)
CEA SERPL-MCNC: 3.8 NG/ML (ref ?–5)
CHLORIDE SERPL-SCNC: 109 MMOL/L (ref 98–112)
CLARITY UR: CLEAR
CO2 SERPL-SCNC: 29 MMOL/L (ref 21–32)
COLOR UR: YELLOW
CREAT BLD-MCNC: 0.86 MG/DL
DEPRECATED RDW RBC AUTO: 47.9 FL (ref 35.1–46.3)
EOSINOPHIL # BLD AUTO: 0.21 X10(3) UL (ref 0–0.7)
EOSINOPHIL NFR BLD AUTO: 5.5 %
ERYTHROCYTE [DISTWIDTH] IN BLOOD BY AUTOMATED COUNT: 13.8 % (ref 11–15)
GLOBULIN PLAS-MCNC: 3.6 G/DL (ref 2.8–4.4)
GLUCOSE BLD-MCNC: 106 MG/DL (ref 70–99)
GLUCOSE UR-MCNC: NEGATIVE MG/DL
HCT VFR BLD AUTO: 41.1 %
HGB BLD-MCNC: 13.5 G/DL
HGB UR QL STRIP.AUTO: NEGATIVE
IMM GRANULOCYTES # BLD AUTO: 0 X10(3) UL (ref 0–1)
IMM GRANULOCYTES NFR BLD: 0 %
KETONES UR-MCNC: NEGATIVE MG/DL
LEUKOCYTE ESTERASE UR QL STRIP.AUTO: NEGATIVE
LYMPHOCYTES # BLD AUTO: 2.22 X10(3) UL (ref 1–4)
LYMPHOCYTES NFR BLD AUTO: 57.7 %
M PROTEIN MFR SERPL ELPH: 7.2 G/DL (ref 6.4–8.2)
MCH RBC QN AUTO: 31.2 PG (ref 26–34)
MCHC RBC AUTO-ENTMCNC: 32.8 G/DL (ref 31–37)
MCV RBC AUTO: 94.9 FL
MONOCYTES # BLD AUTO: 0.33 X10(3) UL (ref 0.1–1)
MONOCYTES NFR BLD AUTO: 8.6 %
NEUTROPHILS # BLD AUTO: 1.04 X10 (3) UL (ref 1.5–7.7)
NEUTROPHILS # BLD AUTO: 1.04 X10(3) UL (ref 1.5–7.7)
NEUTROPHILS NFR BLD AUTO: 26.9 %
NITRITE UR QL STRIP.AUTO: NEGATIVE
OSMOLALITY SERPL CALC.SUM OF ELEC: 297 MOSM/KG (ref 275–295)
PH UR: 5 [PH] (ref 5–8)
PLATELET # BLD AUTO: 148 10(3)UL (ref 150–450)
POTASSIUM SERPL-SCNC: 3.9 MMOL/L (ref 3.5–5.1)
PROT UR-MCNC: NEGATIVE MG/DL
RBC # BLD AUTO: 4.33 X10(6)UL
SODIUM SERPL-SCNC: 142 MMOL/L (ref 136–145)
SP GR UR STRIP: 1.02 (ref 1–1.03)
UROBILINOGEN UR STRIP-ACNC: <2
WBC # BLD AUTO: 3.9 X10(3) UL (ref 4–11)

## 2021-02-02 PROCEDURE — 96417 CHEMO IV INFUS EACH ADDL SEQ: CPT

## 2021-02-02 PROCEDURE — 81003 URINALYSIS AUTO W/O SCOPE: CPT

## 2021-02-02 PROCEDURE — 96368 THER/DIAG CONCURRENT INF: CPT

## 2021-02-02 PROCEDURE — 80053 COMPREHEN METABOLIC PANEL: CPT

## 2021-02-02 PROCEDURE — 96413 CHEMO IV INFUSION 1 HR: CPT

## 2021-02-02 PROCEDURE — 96375 TX/PRO/DX INJ NEW DRUG ADDON: CPT

## 2021-02-02 PROCEDURE — 82378 CARCINOEMBRYONIC ANTIGEN: CPT

## 2021-02-02 PROCEDURE — 85025 COMPLETE CBC W/AUTO DIFF WBC: CPT

## 2021-02-02 PROCEDURE — 99215 OFFICE O/P EST HI 40 MIN: CPT | Performed by: INTERNAL MEDICINE

## 2021-02-02 RX ORDER — HEPARIN SODIUM (PORCINE) LOCK FLUSH IV SOLN 100 UNIT/ML 100 UNIT/ML
500 SOLUTION INTRAVENOUS ONCE
Status: CANCELLED | OUTPATIENT
Start: 2021-02-02

## 2021-02-02 RX ORDER — FLUOROURACIL 50 MG/ML
2400 INJECTION, SOLUTION INTRAVENOUS CONTINUOUS
Status: DISCONTINUED | OUTPATIENT
Start: 2021-02-02 | End: 2021-02-02

## 2021-02-02 RX ORDER — SODIUM CHLORIDE 9 MG/ML
INJECTION INTRAVENOUS
Status: DISCONTINUED
Start: 2021-02-02 | End: 2021-02-02

## 2021-02-02 RX ORDER — FLUOROURACIL 50 MG/ML
2400 INJECTION, SOLUTION INTRAVENOUS CONTINUOUS
Status: CANCELLED | OUTPATIENT
Start: 2021-02-02

## 2021-02-02 RX ORDER — ATROPINE SULFATE 0.4 MG/ML
AMPUL (ML) INJECTION
Status: COMPLETED
Start: 2021-02-02 | End: 2021-02-02

## 2021-02-02 RX ORDER — ATROPINE SULFATE 0.4 MG/ML
0.2 AMPUL (ML) INJECTION ONCE
Status: COMPLETED | OUTPATIENT
Start: 2021-02-02 | End: 2021-02-02

## 2021-02-02 RX ORDER — ATROPINE SULFATE 0.4 MG/ML
0.2 AMPUL (ML) INJECTION ONCE
Status: CANCELLED | OUTPATIENT
Start: 2021-02-02

## 2021-02-02 RX ORDER — HEPARIN SODIUM (PORCINE) LOCK FLUSH IV SOLN 100 UNIT/ML 100 UNIT/ML
500 SOLUTION INTRAVENOUS ONCE
Status: DISCONTINUED | OUTPATIENT
Start: 2021-02-02 | End: 2021-02-02

## 2021-02-02 RX ADMIN — ATROPINE SULFATE 0.2 MG: 0.4 MG/ML AMPUL (ML) INJECTION at 11:03:00

## 2021-02-02 RX ADMIN — FLUOROURACIL 4250 MG: 50 INJECTION, SOLUTION INTRAVENOUS at 13:01:00

## 2021-02-02 NOTE — PROGRESS NOTES
Cancer Center Progress Note    Patient Name: Claudia Robb   YOB: 1950   Medical Record Number: [id number]   Attending Physician: Cody Kirkpatrick M.D.      Chief Complaint:  Colon cancer    Oncology History:  3/6/2020 (Sol Yun) colonoscopy showed affected area twice, Disp: 15 g, Rfl: 0  •  lidocaine-prilocaine 2.5-2.5 % External Cream, Apply to site 1 hour prior to port a cath needle insertion, Disp: 1 Tube, Rfl: 3  •  acetaminophen 325 MG Oral Tab, Take 325 mg by mouth every 6 (six) hours as neede assessed and reviewed:  Lab Results   Component Value Date     (H) 01/19/2021    BUN 20 (H) 01/19/2021    BUNCREA 23.0 (H) 01/19/2021    CREATSERUM 0.87 01/19/2021    ANIONGAP 5 01/19/2021    GFRNAA 68 01/19/2021    GFRAA 78 01/19/2021    CA 9.6 01/

## 2021-02-02 NOTE — PROGRESS NOTES
Pt here for C7 D1 Folfiri with Avastin.   Ok to treat by Dr. Ilan Cross for [address] of 1.04    Arrives Ambulating independently, accompanied by Self           Patient reports possible pregnancy since last therapy cycle: Not Applicable    Modifications in dose or sche

## 2021-02-04 ENCOUNTER — NURSE ONLY (OUTPATIENT)
Dept: HEMATOLOGY/ONCOLOGY | Facility: HOSPITAL | Age: 71
End: 2021-02-04
Attending: INTERNAL MEDICINE
Payer: MEDICARE

## 2021-02-04 ENCOUNTER — SOCIAL WORK SERVICES (OUTPATIENT)
Dept: HEMATOLOGY/ONCOLOGY | Facility: HOSPITAL | Age: 71
End: 2021-02-04

## 2021-02-04 DIAGNOSIS — Z45.2 ENCOUNTER FOR CENTRAL LINE CARE: ICD-10-CM

## 2021-02-04 DIAGNOSIS — Z51.11 CHEMOTHERAPY MANAGEMENT, ENCOUNTER FOR: Primary | ICD-10-CM

## 2021-02-04 DIAGNOSIS — Z79.899 ENCOUNTER FOR MEDICATION MANAGEMENT: ICD-10-CM

## 2021-02-04 DIAGNOSIS — C18.7 CANCER OF SIGMOID COLON (HCC): ICD-10-CM

## 2021-02-04 PROCEDURE — 96523 IRRIG DRUG DELIVERY DEVICE: CPT

## 2021-02-04 RX ORDER — HEPARIN SODIUM (PORCINE) LOCK FLUSH IV SOLN 100 UNIT/ML 100 UNIT/ML
500 SOLUTION INTRAVENOUS ONCE
Status: CANCELLED | OUTPATIENT
Start: 2021-02-04

## 2021-02-04 RX ORDER — HEPARIN SODIUM (PORCINE) LOCK FLUSH IV SOLN 100 UNIT/ML 100 UNIT/ML
500 SOLUTION INTRAVENOUS ONCE
Status: COMPLETED | OUTPATIENT
Start: 2021-02-04 | End: 2021-02-04

## 2021-02-04 RX ADMIN — HEPARIN SODIUM (PORCINE) LOCK FLUSH IV SOLN 100 UNIT/ML 500 UNITS: 100 SOLUTION INTRAVENOUS at 10:52:00

## 2021-02-04 NOTE — PROGRESS NOTES
02/04: SW received paperwork from [facility] for the pt's Leave of Absence request. Last office visit 02/02, next office visit 02/16. Pt attends chemotherapy Q2 weeks with additional visits for port disconnect.  These are currently scheduled into April 2021, but

## 2021-02-13 DIAGNOSIS — Z23 NEED FOR VACCINATION: ICD-10-CM

## 2021-02-16 ENCOUNTER — NURSE ONLY (OUTPATIENT)
Dept: HEMATOLOGY/ONCOLOGY | Facility: HOSPITAL | Age: 71
End: 2021-02-16
Attending: INTERNAL MEDICINE
Payer: MEDICARE

## 2021-02-16 VITALS
SYSTOLIC BLOOD PRESSURE: 135 MMHG | DIASTOLIC BLOOD PRESSURE: 81 MMHG | OXYGEN SATURATION: 96 % | WEIGHT: 165 LBS | HEIGHT: 63.62 IN | BODY MASS INDEX: 28.52 KG/M2 | HEART RATE: 83 BPM | RESPIRATION RATE: 16 BRPM | TEMPERATURE: 97 F

## 2021-02-16 VITALS
WEIGHT: 165 LBS | OXYGEN SATURATION: 96 % | TEMPERATURE: 97 F | SYSTOLIC BLOOD PRESSURE: 135 MMHG | RESPIRATION RATE: 16 BRPM | DIASTOLIC BLOOD PRESSURE: 81 MMHG | HEART RATE: 83 BPM | BODY MASS INDEX: 29.23 KG/M2 | HEIGHT: 63 IN

## 2021-02-16 DIAGNOSIS — C18.7 CANCER OF SIGMOID COLON (HCC): Primary | ICD-10-CM

## 2021-02-16 DIAGNOSIS — C18.7 CANCER OF SIGMOID COLON (HCC): ICD-10-CM

## 2021-02-16 DIAGNOSIS — Z51.11 CHEMOTHERAPY MANAGEMENT, ENCOUNTER FOR: Primary | ICD-10-CM

## 2021-02-16 LAB
ALBUMIN SERPL-MCNC: 3.7 G/DL (ref 3.4–5)
ALBUMIN/GLOB SERPL: 1 {RATIO} (ref 1–2)
ALP LIVER SERPL-CCNC: 68 U/L
ALT SERPL-CCNC: 21 U/L
ANION GAP SERPL CALC-SCNC: 5 MMOL/L (ref 0–18)
AST SERPL-CCNC: 16 U/L (ref 15–37)
BASOPHILS # BLD AUTO: 0.04 X10(3) UL (ref 0–0.2)
BASOPHILS NFR BLD AUTO: 1.3 %
BILIRUB SERPL-MCNC: 0.6 MG/DL (ref 0.1–2)
BUN BLD-MCNC: 14 MG/DL (ref 7–18)
BUN/CREAT SERPL: 17.3 (ref 10–20)
CALCIUM BLD-MCNC: 9.7 MG/DL (ref 8.5–10.1)
CEA SERPL-MCNC: 3.8 NG/ML (ref ?–5)
CHLORIDE SERPL-SCNC: 107 MMOL/L (ref 98–112)
CO2 SERPL-SCNC: 30 MMOL/L (ref 21–32)
CREAT BLD-MCNC: 0.81 MG/DL
DEPRECATED RDW RBC AUTO: 47 FL (ref 35.1–46.3)
EOSINOPHIL # BLD AUTO: 0.13 X10(3) UL (ref 0–0.7)
EOSINOPHIL NFR BLD AUTO: 4.2 %
ERYTHROCYTE [DISTWIDTH] IN BLOOD BY AUTOMATED COUNT: 13.7 % (ref 11–15)
GLOBULIN PLAS-MCNC: 3.6 G/DL (ref 2.8–4.4)
GLUCOSE BLD-MCNC: 114 MG/DL (ref 70–99)
HCT VFR BLD AUTO: 40.8 %
HGB BLD-MCNC: 13.5 G/DL
IMM GRANULOCYTES # BLD AUTO: 0 X10(3) UL (ref 0–1)
IMM GRANULOCYTES NFR BLD: 0 %
LYMPHOCYTES # BLD AUTO: 1.6 X10(3) UL (ref 1–4)
LYMPHOCYTES NFR BLD AUTO: 51.6 %
M PROTEIN MFR SERPL ELPH: 7.3 G/DL (ref 6.4–8.2)
MCH RBC QN AUTO: 31.5 PG (ref 26–34)
MCHC RBC AUTO-ENTMCNC: 33.1 G/DL (ref 31–37)
MCV RBC AUTO: 95.3 FL
MONOCYTES # BLD AUTO: 0.3 X10(3) UL (ref 0.1–1)
MONOCYTES NFR BLD AUTO: 9.7 %
NEUTROPHILS # BLD AUTO: 1.03 X10 (3) UL (ref 1.5–7.7)
NEUTROPHILS # BLD AUTO: 1.03 X10(3) UL (ref 1.5–7.7)
NEUTROPHILS NFR BLD AUTO: 33.2 %
OSMOLALITY SERPL CALC.SUM OF ELEC: 295 MOSM/KG (ref 275–295)
PLATELET # BLD AUTO: 147 10(3)UL (ref 150–450)
POTASSIUM SERPL-SCNC: 4 MMOL/L (ref 3.5–5.1)
RBC # BLD AUTO: 4.28 X10(6)UL
SODIUM SERPL-SCNC: 142 MMOL/L (ref 136–145)
WBC # BLD AUTO: 3.1 X10(3) UL (ref 4–11)

## 2021-02-16 PROCEDURE — 82378 CARCINOEMBRYONIC ANTIGEN: CPT

## 2021-02-16 PROCEDURE — 96417 CHEMO IV INFUS EACH ADDL SEQ: CPT

## 2021-02-16 PROCEDURE — 96368 THER/DIAG CONCURRENT INF: CPT

## 2021-02-16 PROCEDURE — 99215 OFFICE O/P EST HI 40 MIN: CPT | Performed by: INTERNAL MEDICINE

## 2021-02-16 PROCEDURE — 85025 COMPLETE CBC W/AUTO DIFF WBC: CPT

## 2021-02-16 PROCEDURE — 96375 TX/PRO/DX INJ NEW DRUG ADDON: CPT

## 2021-02-16 PROCEDURE — 80053 COMPREHEN METABOLIC PANEL: CPT

## 2021-02-16 PROCEDURE — 96413 CHEMO IV INFUSION 1 HR: CPT

## 2021-02-16 RX ORDER — ATROPINE SULFATE 0.4 MG/ML
0.2 AMPUL (ML) INJECTION ONCE
Status: COMPLETED | OUTPATIENT
Start: 2021-02-16 | End: 2021-02-16

## 2021-02-16 RX ORDER — FLUOROURACIL 50 MG/ML
2400 INJECTION, SOLUTION INTRAVENOUS CONTINUOUS
Status: CANCELLED | OUTPATIENT
Start: 2021-02-16

## 2021-02-16 RX ORDER — ATROPINE SULFATE 0.4 MG/ML
0.2 AMPUL (ML) INJECTION ONCE
Status: CANCELLED | OUTPATIENT
Start: 2021-02-16

## 2021-02-16 RX ORDER — FLUOROURACIL 50 MG/ML
2400 INJECTION, SOLUTION INTRAVENOUS CONTINUOUS
Status: DISCONTINUED | OUTPATIENT
Start: 2021-02-16 | End: 2021-02-16

## 2021-02-16 RX ORDER — ATROPINE SULFATE 0.4 MG/ML
AMPUL (ML) INJECTION
Status: DISPENSED
Start: 2021-02-16 | End: 2021-02-16

## 2021-02-16 RX ADMIN — FLUOROURACIL 4250 MG: 50 INJECTION, SOLUTION INTRAVENOUS at 13:10:00

## 2021-02-16 RX ADMIN — ATROPINE SULFATE 0.2 MG: 0.4 MG/ML AMPUL (ML) INJECTION at 11:23:00

## 2021-02-16 NOTE — PROGRESS NOTES
Cancer Center Progress Note    Patient Name: Sheila Woodruff   YOB: 1950   Medical Record Number: [id number]   Attending Physician: Neha Chavez M.D.      Chief Complaint:  Colon cancer    Oncology History:  3/6/2020 (Tahmina Ferguson) colonoscopy showed to affected area twice (Patient not taking: Reported on 2/16/2021 ), Disp: 15 g, Rfl: 0  •  lidocaine-prilocaine 2.5-2.5 % External Cream, Apply to site 1 hour prior to port a cath needle insertion (Patient not taking: Reported on 2/16/2021 ), Disp: 1 Tube motor strength grossly intact, MA4E  Psych: appropriate mood and affect      Laboratory assessed and reviewed:  Lab Results   Component Value Date     (H) 02/16/2021    BUN 14 02/16/2021    BUNCREA 17.3 02/16/2021    CREATSERUM 0.81 02/16/2021    AN

## 2021-02-16 NOTE — PROGRESS NOTES
Pt here for C8 D1 Folfiri with Avastin. Arrives Ambulating independently, accompanied by Self           Patient reports possible pregnancy since last therapy cycle: Not Applicable    Modifications in dose or schedule: OK to proceed with ANC 1.03.  Ferny Frye

## 2021-02-18 ENCOUNTER — NURSE ONLY (OUTPATIENT)
Dept: HEMATOLOGY/ONCOLOGY | Facility: HOSPITAL | Age: 71
End: 2021-02-18
Attending: INTERNAL MEDICINE
Payer: MEDICARE

## 2021-02-18 DIAGNOSIS — C18.7 CANCER OF SIGMOID COLON (HCC): ICD-10-CM

## 2021-02-18 DIAGNOSIS — Z51.11 CHEMOTHERAPY MANAGEMENT, ENCOUNTER FOR: Primary | ICD-10-CM

## 2021-02-18 DIAGNOSIS — Z79.899 ENCOUNTER FOR MEDICATION MANAGEMENT: ICD-10-CM

## 2021-02-18 DIAGNOSIS — Z45.2 ENCOUNTER FOR CENTRAL LINE CARE: ICD-10-CM

## 2021-02-18 PROCEDURE — 96523 IRRIG DRUG DELIVERY DEVICE: CPT

## 2021-02-18 RX ORDER — HEPARIN SODIUM (PORCINE) LOCK FLUSH IV SOLN 100 UNIT/ML 100 UNIT/ML
500 SOLUTION INTRAVENOUS ONCE
Status: CANCELLED | OUTPATIENT
Start: 2021-02-18

## 2021-02-18 RX ORDER — HEPARIN SODIUM (PORCINE) LOCK FLUSH IV SOLN 100 UNIT/ML 100 UNIT/ML
500 SOLUTION INTRAVENOUS ONCE
Status: COMPLETED | OUTPATIENT
Start: 2021-02-18 | End: 2021-02-18

## 2021-02-18 RX ADMIN — HEPARIN SODIUM (PORCINE) LOCK FLUSH IV SOLN 100 UNIT/ML 500 UNITS: 100 SOLUTION INTRAVENOUS at 10:59:00

## 2021-03-02 ENCOUNTER — OFFICE VISIT (OUTPATIENT)
Dept: HEMATOLOGY/ONCOLOGY | Facility: HOSPITAL | Age: 71
End: 2021-03-02
Attending: INTERNAL MEDICINE
Payer: MEDICARE

## 2021-03-02 VITALS
HEART RATE: 92 BPM | RESPIRATION RATE: 16 BRPM | OXYGEN SATURATION: 96 % | WEIGHT: 164 LBS | TEMPERATURE: 99 F | DIASTOLIC BLOOD PRESSURE: 78 MMHG | BODY MASS INDEX: 29 KG/M2 | SYSTOLIC BLOOD PRESSURE: 132 MMHG

## 2021-03-02 VITALS
SYSTOLIC BLOOD PRESSURE: 132 MMHG | WEIGHT: 164 LBS | OXYGEN SATURATION: 96 % | HEIGHT: 62.99 IN | HEART RATE: 92 BPM | RESPIRATION RATE: 16 BRPM | TEMPERATURE: 98 F | DIASTOLIC BLOOD PRESSURE: 78 MMHG | BODY MASS INDEX: 29.06 KG/M2

## 2021-03-02 DIAGNOSIS — C18.7 CANCER OF SIGMOID COLON (HCC): Primary | ICD-10-CM

## 2021-03-02 DIAGNOSIS — L81.9 HYPERPIGMENTATION: ICD-10-CM

## 2021-03-02 DIAGNOSIS — Z51.11 CHEMOTHERAPY MANAGEMENT, ENCOUNTER FOR: Primary | ICD-10-CM

## 2021-03-02 DIAGNOSIS — C18.7 CANCER OF SIGMOID COLON (HCC): ICD-10-CM

## 2021-03-02 LAB
ALBUMIN SERPL-MCNC: 4.2 G/DL (ref 3.4–5)
ALBUMIN/GLOB SERPL: 1.4 {RATIO} (ref 1–2)
ALP LIVER SERPL-CCNC: 69 U/L
ALT SERPL-CCNC: 20 U/L
ANION GAP SERPL CALC-SCNC: 5 MMOL/L (ref 0–18)
AST SERPL-CCNC: 18 U/L (ref 15–37)
BASOPHILS # BLD AUTO: 0.04 X10(3) UL (ref 0–0.2)
BASOPHILS NFR BLD AUTO: 1.2 %
BILIRUB SERPL-MCNC: 0.5 MG/DL (ref 0.1–2)
BUN BLD-MCNC: 18 MG/DL (ref 7–18)
BUN/CREAT SERPL: 22.2 (ref 10–20)
CALCIUM BLD-MCNC: 10.4 MG/DL (ref 8.5–10.1)
CEA SERPL-MCNC: 4.1 NG/ML (ref ?–5)
CHLORIDE SERPL-SCNC: 107 MMOL/L (ref 98–112)
CO2 SERPL-SCNC: 30 MMOL/L (ref 21–32)
CREAT BLD-MCNC: 0.81 MG/DL
DEPRECATED RDW RBC AUTO: 47.8 FL (ref 35.1–46.3)
EOSINOPHIL # BLD AUTO: 0.11 X10(3) UL (ref 0–0.7)
EOSINOPHIL NFR BLD AUTO: 3.4 %
ERYTHROCYTE [DISTWIDTH] IN BLOOD BY AUTOMATED COUNT: 13.7 % (ref 11–15)
GLOBULIN PLAS-MCNC: 3 G/DL (ref 2.8–4.4)
GLUCOSE BLD-MCNC: 123 MG/DL (ref 70–99)
HCT VFR BLD AUTO: 40 %
HGB BLD-MCNC: 13.2 G/DL
IMM GRANULOCYTES # BLD AUTO: 0 X10(3) UL (ref 0–1)
IMM GRANULOCYTES NFR BLD: 0 %
LYMPHOCYTES # BLD AUTO: 1.7 X10(3) UL (ref 1–4)
LYMPHOCYTES NFR BLD AUTO: 52.1 %
M PROTEIN MFR SERPL ELPH: 7.2 G/DL (ref 6.4–8.2)
MCH RBC QN AUTO: 31.9 PG (ref 26–34)
MCHC RBC AUTO-ENTMCNC: 33 G/DL (ref 31–37)
MCV RBC AUTO: 96.6 FL
MONOCYTES # BLD AUTO: 0.34 X10(3) UL (ref 0.1–1)
MONOCYTES NFR BLD AUTO: 10.4 %
NEUTROPHILS # BLD AUTO: 1.07 X10 (3) UL (ref 1.5–7.7)
NEUTROPHILS # BLD AUTO: 1.07 X10(3) UL (ref 1.5–7.7)
NEUTROPHILS NFR BLD AUTO: 32.9 %
OSMOLALITY SERPL CALC.SUM OF ELEC: 297 MOSM/KG (ref 275–295)
PLATELET # BLD AUTO: 154 10(3)UL (ref 150–450)
POTASSIUM SERPL-SCNC: 3.8 MMOL/L (ref 3.5–5.1)
RBC # BLD AUTO: 4.14 X10(6)UL
SODIUM SERPL-SCNC: 142 MMOL/L (ref 136–145)
WBC # BLD AUTO: 3.3 X10(3) UL (ref 4–11)

## 2021-03-02 PROCEDURE — 96417 CHEMO IV INFUS EACH ADDL SEQ: CPT

## 2021-03-02 PROCEDURE — 99215 OFFICE O/P EST HI 40 MIN: CPT | Performed by: INTERNAL MEDICINE

## 2021-03-02 PROCEDURE — 96375 TX/PRO/DX INJ NEW DRUG ADDON: CPT

## 2021-03-02 PROCEDURE — 96368 THER/DIAG CONCURRENT INF: CPT

## 2021-03-02 PROCEDURE — 96413 CHEMO IV INFUSION 1 HR: CPT

## 2021-03-02 PROCEDURE — 82378 CARCINOEMBRYONIC ANTIGEN: CPT

## 2021-03-02 PROCEDURE — 80053 COMPREHEN METABOLIC PANEL: CPT

## 2021-03-02 PROCEDURE — 85025 COMPLETE CBC W/AUTO DIFF WBC: CPT

## 2021-03-02 PROCEDURE — 96415 CHEMO IV INFUSION ADDL HR: CPT

## 2021-03-02 RX ORDER — SODIUM CHLORIDE 9 MG/ML
INJECTION INTRAVENOUS
Status: DISCONTINUED
Start: 2021-03-02 | End: 2021-03-02

## 2021-03-02 RX ORDER — ATROPINE SULFATE 0.4 MG/ML
0.2 AMPUL (ML) INJECTION ONCE
Status: CANCELLED | OUTPATIENT
Start: 2021-03-02

## 2021-03-02 RX ORDER — ATROPINE SULFATE 0.4 MG/ML
AMPUL (ML) INJECTION
Status: DISCONTINUED
Start: 2021-03-02 | End: 2021-03-02

## 2021-03-02 RX ORDER — FLUOROURACIL 50 MG/ML
2400 INJECTION, SOLUTION INTRAVENOUS CONTINUOUS
Status: CANCELLED | OUTPATIENT
Start: 2021-03-02

## 2021-03-02 RX ORDER — ATROPINE SULFATE 0.4 MG/ML
0.2 AMPUL (ML) INJECTION ONCE
Status: COMPLETED | OUTPATIENT
Start: 2021-03-02 | End: 2021-03-02

## 2021-03-02 RX ORDER — FLUOROURACIL 50 MG/ML
2400 INJECTION, SOLUTION INTRAVENOUS CONTINUOUS
Status: DISCONTINUED | OUTPATIENT
Start: 2021-03-02 | End: 2021-03-02

## 2021-03-02 RX ADMIN — FLUOROURACIL 4250 MG: 50 INJECTION, SOLUTION INTRAVENOUS at 13:30:00

## 2021-03-02 RX ADMIN — ATROPINE SULFATE 0.2 MG: 0.4 MG/ML AMPUL (ML) INJECTION at 11:35:00

## 2021-03-02 NOTE — PROGRESS NOTES
Cancer Center Progress Note    Patient Name: Giselle Gonzalez   YOB: 1950   Medical Record Number: [id number]   Attending Physician: Shivani Miranda M.D.      Chief Complaint:  Colon cancer    Oncology History:  3/6/2020 (Sandra Agudelo) colonoscopy showed •  lidocaine-prilocaine 2.5-2.5 % External Cream, Apply to site 1 hour prior to port a cath needle insertion, Disp: 1 Tube, Rfl: 3  •  nystatin [id number] UNIT/GM External Cream, aply peasize to affected area twice (Patient not taking: Reported on 2/16/2021 today  Neurological: motor strength grossly intact, MA4E  Psych: appropriate mood and affect      Laboratory assessed and reviewed:  Lab Results   Component Value Date     (H) 02/16/2021    BUN 14 02/16/2021    BUNCREA 17.3 02/16/2021    CREATSERUM

## 2021-03-02 NOTE — PROGRESS NOTES
Pt here for C9 D1 Folfiri with Avastin. Arrives Ambulating independently, accompanied by Self           Patient reports possible pregnancy since last therapy cycle: Not Applicable    Modifications in dose or schedule: OK to proceed with ANC 1.07.  Lenora Monteiro

## 2021-03-04 ENCOUNTER — NURSE ONLY (OUTPATIENT)
Dept: HEMATOLOGY/ONCOLOGY | Facility: HOSPITAL | Age: 71
End: 2021-03-04
Attending: INTERNAL MEDICINE
Payer: MEDICARE

## 2021-03-04 DIAGNOSIS — Z45.2 ENCOUNTER FOR CENTRAL LINE CARE: ICD-10-CM

## 2021-03-04 DIAGNOSIS — C18.7 CANCER OF SIGMOID COLON (HCC): ICD-10-CM

## 2021-03-04 DIAGNOSIS — Z51.11 CHEMOTHERAPY MANAGEMENT, ENCOUNTER FOR: Primary | ICD-10-CM

## 2021-03-04 DIAGNOSIS — Z79.899 ENCOUNTER FOR MEDICATION MANAGEMENT: ICD-10-CM

## 2021-03-04 PROCEDURE — 96523 IRRIG DRUG DELIVERY DEVICE: CPT

## 2021-03-04 RX ORDER — HEPARIN SODIUM (PORCINE) LOCK FLUSH IV SOLN 100 UNIT/ML 100 UNIT/ML
500 SOLUTION INTRAVENOUS ONCE
Status: COMPLETED | OUTPATIENT
Start: 2021-03-04 | End: 2021-03-04

## 2021-03-04 RX ORDER — HEPARIN SODIUM (PORCINE) LOCK FLUSH IV SOLN 100 UNIT/ML 100 UNIT/ML
500 SOLUTION INTRAVENOUS ONCE
Status: CANCELLED | OUTPATIENT
Start: 2021-03-04

## 2021-03-04 RX ADMIN — HEPARIN SODIUM (PORCINE) LOCK FLUSH IV SOLN 100 UNIT/ML 500 UNITS: 100 SOLUTION INTRAVENOUS at 10:34:00

## 2021-03-15 ENCOUNTER — HOSPITAL ENCOUNTER (OUTPATIENT)
Dept: MAMMOGRAPHY | Facility: HOSPITAL | Age: 71
Discharge: HOME | End: 2021-03-15
Attending: INTERNAL MEDICINE
Payer: MEDICARE

## 2021-03-15 DIAGNOSIS — Z12.31 SCREENING MAMMOGRAM, ENCOUNTER FOR: ICD-10-CM

## 2021-03-15 PROCEDURE — 77063 BREAST TOMOSYNTHESIS BI: CPT | Performed by: INTERNAL MEDICINE

## 2021-03-15 PROCEDURE — 77067 SCR MAMMO BI INCL CAD: CPT | Performed by: INTERNAL MEDICINE

## 2021-03-16 ENCOUNTER — NURSE ONLY (OUTPATIENT)
Dept: HEMATOLOGY/ONCOLOGY | Facility: HOSPITAL | Age: 71
End: 2021-03-16
Attending: INTERNAL MEDICINE
Payer: MEDICARE

## 2021-03-16 VITALS
HEIGHT: 62 IN | TEMPERATURE: 97 F | WEIGHT: 166 LBS | RESPIRATION RATE: 16 BRPM | DIASTOLIC BLOOD PRESSURE: 80 MMHG | SYSTOLIC BLOOD PRESSURE: 137 MMHG | HEART RATE: 88 BPM | OXYGEN SATURATION: 95 % | BODY MASS INDEX: 30.55 KG/M2

## 2021-03-16 VITALS
RESPIRATION RATE: 16 BRPM | HEART RATE: 88 BPM | OXYGEN SATURATION: 95 % | SYSTOLIC BLOOD PRESSURE: 137 MMHG | TEMPERATURE: 97 F | DIASTOLIC BLOOD PRESSURE: 80 MMHG | BODY MASS INDEX: 30 KG/M2 | WEIGHT: 166 LBS

## 2021-03-16 DIAGNOSIS — C18.7 CANCER OF SIGMOID COLON (HCC): Primary | ICD-10-CM

## 2021-03-16 DIAGNOSIS — D70.1 CHEMOTHERAPY INDUCED NEUTROPENIA (HCC): ICD-10-CM

## 2021-03-16 DIAGNOSIS — T45.1X5A CHEMOTHERAPY INDUCED NEUTROPENIA (HCC): ICD-10-CM

## 2021-03-16 DIAGNOSIS — Z51.11 CHEMOTHERAPY MANAGEMENT, ENCOUNTER FOR: ICD-10-CM

## 2021-03-16 LAB
ALBUMIN SERPL-MCNC: 3.9 G/DL (ref 3.4–5)
ALBUMIN/GLOB SERPL: 1.2 {RATIO} (ref 1–2)
ALP LIVER SERPL-CCNC: 67 U/L
ALT SERPL-CCNC: 19 U/L
ANION GAP SERPL CALC-SCNC: 3 MMOL/L (ref 0–18)
AST SERPL-CCNC: 17 U/L (ref 15–37)
BASOPHILS # BLD AUTO: 0.04 X10(3) UL (ref 0–0.2)
BASOPHILS NFR BLD AUTO: 1.3 %
BILIRUB SERPL-MCNC: 0.5 MG/DL (ref 0.1–2)
BILIRUB UR QL: NEGATIVE
BUN BLD-MCNC: 17 MG/DL (ref 7–18)
BUN/CREAT SERPL: 20.7 (ref 10–20)
CALCIUM BLD-MCNC: 9.7 MG/DL (ref 8.5–10.1)
CEA SERPL-MCNC: 4.3 NG/ML (ref ?–5)
CHLORIDE SERPL-SCNC: 108 MMOL/L (ref 98–112)
CLARITY UR: CLEAR
CO2 SERPL-SCNC: 29 MMOL/L (ref 21–32)
COLOR UR: YELLOW
CREAT BLD-MCNC: 0.82 MG/DL
DEPRECATED RDW RBC AUTO: 47.1 FL (ref 35.1–46.3)
EOSINOPHIL # BLD AUTO: 0.1 X10(3) UL (ref 0–0.7)
EOSINOPHIL NFR BLD AUTO: 3.2 %
ERYTHROCYTE [DISTWIDTH] IN BLOOD BY AUTOMATED COUNT: 13.3 % (ref 11–15)
GLOBULIN PLAS-MCNC: 3.3 G/DL (ref 2.8–4.4)
GLUCOSE BLD-MCNC: 107 MG/DL (ref 70–99)
GLUCOSE UR-MCNC: NEGATIVE MG/DL
HCT VFR BLD AUTO: 39.3 %
HGB BLD-MCNC: 13 G/DL
HGB UR QL STRIP.AUTO: NEGATIVE
IMM GRANULOCYTES # BLD AUTO: 0.01 X10(3) UL (ref 0–1)
IMM GRANULOCYTES NFR BLD: 0.3 %
KETONES UR-MCNC: NEGATIVE MG/DL
LEUKOCYTE ESTERASE UR QL STRIP.AUTO: NEGATIVE
LYMPHOCYTES # BLD AUTO: 1.55 X10(3) UL (ref 1–4)
LYMPHOCYTES NFR BLD AUTO: 50.3 %
M PROTEIN MFR SERPL ELPH: 7.2 G/DL (ref 6.4–8.2)
MCH RBC QN AUTO: 32 PG (ref 26–34)
MCHC RBC AUTO-ENTMCNC: 33.1 G/DL (ref 31–37)
MCV RBC AUTO: 96.8 FL
MONOCYTES # BLD AUTO: 0.31 X10(3) UL (ref 0.1–1)
MONOCYTES NFR BLD AUTO: 10.1 %
NEUTROPHILS # BLD AUTO: 1.07 X10 (3) UL (ref 1.5–7.7)
NEUTROPHILS # BLD AUTO: 1.07 X10(3) UL (ref 1.5–7.7)
NEUTROPHILS NFR BLD AUTO: 34.8 %
NITRITE UR QL STRIP.AUTO: NEGATIVE
OSMOLALITY SERPL CALC.SUM OF ELEC: 292 MOSM/KG (ref 275–295)
PH UR: 6 [PH] (ref 5–8)
PLATELET # BLD AUTO: 143 10(3)UL (ref 150–450)
POTASSIUM SERPL-SCNC: 3.9 MMOL/L (ref 3.5–5.1)
PROT UR-MCNC: NEGATIVE MG/DL
RBC # BLD AUTO: 4.06 X10(6)UL
SODIUM SERPL-SCNC: 140 MMOL/L (ref 136–145)
SP GR UR STRIP: 1.01 (ref 1–1.03)
UROBILINOGEN UR STRIP-ACNC: <2
WBC # BLD AUTO: 3.1 X10(3) UL (ref 4–11)

## 2021-03-16 PROCEDURE — 96375 TX/PRO/DX INJ NEW DRUG ADDON: CPT

## 2021-03-16 PROCEDURE — 96413 CHEMO IV INFUSION 1 HR: CPT

## 2021-03-16 PROCEDURE — 96417 CHEMO IV INFUS EACH ADDL SEQ: CPT

## 2021-03-16 PROCEDURE — 82378 CARCINOEMBRYONIC ANTIGEN: CPT

## 2021-03-16 PROCEDURE — 99215 OFFICE O/P EST HI 40 MIN: CPT | Performed by: INTERNAL MEDICINE

## 2021-03-16 PROCEDURE — 85025 COMPLETE CBC W/AUTO DIFF WBC: CPT

## 2021-03-16 PROCEDURE — 80053 COMPREHEN METABOLIC PANEL: CPT

## 2021-03-16 PROCEDURE — 96368 THER/DIAG CONCURRENT INF: CPT

## 2021-03-16 PROCEDURE — 81003 URINALYSIS AUTO W/O SCOPE: CPT

## 2021-03-16 RX ORDER — ATROPINE SULFATE 0.4 MG/ML
0.2 AMPUL (ML) INJECTION ONCE
Status: CANCELLED | OUTPATIENT
Start: 2021-03-16

## 2021-03-16 RX ORDER — SODIUM CHLORIDE 9 MG/ML
INJECTION INTRAVENOUS
Status: DISCONTINUED
Start: 2021-03-16 | End: 2021-03-16

## 2021-03-16 RX ORDER — ATROPINE SULFATE 0.4 MG/ML
AMPUL (ML) INJECTION
Status: DISCONTINUED
Start: 2021-03-16 | End: 2021-03-16

## 2021-03-16 RX ORDER — ATROPINE SULFATE 0.4 MG/ML
0.2 AMPUL (ML) INJECTION ONCE
Status: COMPLETED | OUTPATIENT
Start: 2021-03-16 | End: 2021-03-16

## 2021-03-16 RX ORDER — FLUOROURACIL 50 MG/ML
2400 INJECTION, SOLUTION INTRAVENOUS CONTINUOUS
Status: DISCONTINUED | OUTPATIENT
Start: 2021-03-16 | End: 2021-03-16

## 2021-03-16 RX ORDER — FLUOROURACIL 50 MG/ML
2400 INJECTION, SOLUTION INTRAVENOUS CONTINUOUS
Status: CANCELLED | OUTPATIENT
Start: 2021-03-16

## 2021-03-16 RX ADMIN — ATROPINE SULFATE 0.2 MG: 0.4 MG/ML AMPUL (ML) INJECTION at 10:36:00

## 2021-03-16 RX ADMIN — FLUOROURACIL 4250 MG: 50 INJECTION, SOLUTION INTRAVENOUS at 13:36:00

## 2021-03-16 NOTE — PROGRESS NOTES
Cancer Center Progress Note    Patient Name: Sanjay Sampson   YOB: 1950   Medical Record Number: [id number]   Attending Physician: Isabella Maza M.D.      Chief Complaint:  Colon cancer    Oncology History:  3/6/2020 (Manisha Jackson) colonoscopy showed lidocaine-prilocaine 2.5-2.5 % External Cream, Apply to site 1 hour prior to port a cath needle insertion, Disp: 1 Tube, Rfl: 3  •  acetaminophen 325 MG Oral Tab, Take 325 mg by mouth every 6 (six) hours as needed for Pain., Disp: , Rfl:   •  Omega-3 Fatty today  Neurological: motor strength grossly intact, MA4E  Psych: appropriate mood and affect      Laboratory assessed and reviewed:  Lab Results   Component Value Date     (H) 03/16/2021    BUN 17 03/16/2021    BUNCREA 20.7 (H) 03/16/2021    [place]

## 2021-03-16 NOTE — PROGRESS NOTES
Pt here for C10 D1 Folfiri with Avastin. Arrives Ambulating independently, accompanied by Self           Patient reports possible pregnancy since last therapy cycle: Not Applicable    Modifications in dose or schedule: OK to proceed with ANC 1.07.  Tamiko Fee

## 2021-03-18 ENCOUNTER — NURSE ONLY (OUTPATIENT)
Dept: HEMATOLOGY/ONCOLOGY | Facility: HOSPITAL | Age: 71
End: 2021-03-18
Attending: INTERNAL MEDICINE
Payer: MEDICARE

## 2021-03-18 DIAGNOSIS — Z79.899 ENCOUNTER FOR MEDICATION MANAGEMENT: ICD-10-CM

## 2021-03-18 DIAGNOSIS — Z51.11 CHEMOTHERAPY MANAGEMENT, ENCOUNTER FOR: Primary | ICD-10-CM

## 2021-03-18 DIAGNOSIS — Z45.2 ENCOUNTER FOR CENTRAL LINE CARE: ICD-10-CM

## 2021-03-18 DIAGNOSIS — C18.7 CANCER OF SIGMOID COLON (HCC): ICD-10-CM

## 2021-03-18 PROCEDURE — 96523 IRRIG DRUG DELIVERY DEVICE: CPT

## 2021-03-18 RX ORDER — HEPARIN SODIUM (PORCINE) LOCK FLUSH IV SOLN 100 UNIT/ML 100 UNIT/ML
500 SOLUTION INTRAVENOUS ONCE
Status: COMPLETED | OUTPATIENT
Start: 2021-03-18 | End: 2021-03-18

## 2021-03-18 RX ORDER — HEPARIN SODIUM (PORCINE) LOCK FLUSH IV SOLN 100 UNIT/ML 100 UNIT/ML
500 SOLUTION INTRAVENOUS ONCE
Status: CANCELLED | OUTPATIENT
Start: 2021-03-18

## 2021-03-18 RX ADMIN — HEPARIN SODIUM (PORCINE) LOCK FLUSH IV SOLN 100 UNIT/ML 500 UNITS: 100 SOLUTION INTRAVENOUS at 08:56:00

## 2021-03-25 ENCOUNTER — TELEPHONE (OUTPATIENT)
Dept: HEMATOLOGY/ONCOLOGY | Facility: HOSPITAL | Age: 71
End: 2021-03-25

## 2021-03-25 NOTE — TELEPHONE ENCOUNTER
SW received call from the pt who expressed distress about the bills she has received. She states she had already spoken with billing and requested the explanation of benefits, which she hasn't yet received.  Pt states she has an appointment next week and wi

## 2021-03-30 ENCOUNTER — OFFICE VISIT (OUTPATIENT)
Dept: HEMATOLOGY/ONCOLOGY | Facility: HOSPITAL | Age: 71
End: 2021-03-30
Attending: INTERNAL MEDICINE
Payer: MEDICARE

## 2021-03-30 ENCOUNTER — APPOINTMENT (OUTPATIENT)
Dept: HEMATOLOGY/ONCOLOGY | Facility: HOSPITAL | Age: 71
End: 2021-03-30
Payer: MEDICARE

## 2021-03-30 VITALS
OXYGEN SATURATION: 93 % | DIASTOLIC BLOOD PRESSURE: 81 MMHG | RESPIRATION RATE: 16 BRPM | BODY MASS INDEX: 31 KG/M2 | HEART RATE: 80 BPM | WEIGHT: 167 LBS | SYSTOLIC BLOOD PRESSURE: 136 MMHG | TEMPERATURE: 98 F

## 2021-03-30 VITALS
DIASTOLIC BLOOD PRESSURE: 81 MMHG | HEIGHT: 62.01 IN | OXYGEN SATURATION: 93 % | RESPIRATION RATE: 16 BRPM | HEART RATE: 80 BPM | TEMPERATURE: 98 F | BODY MASS INDEX: 30.34 KG/M2 | WEIGHT: 167 LBS | SYSTOLIC BLOOD PRESSURE: 136 MMHG

## 2021-03-30 DIAGNOSIS — Z51.11 CHEMOTHERAPY MANAGEMENT, ENCOUNTER FOR: Primary | ICD-10-CM

## 2021-03-30 DIAGNOSIS — C18.7 CANCER OF SIGMOID COLON (HCC): Primary | ICD-10-CM

## 2021-03-30 DIAGNOSIS — C18.7 CANCER OF SIGMOID COLON (HCC): ICD-10-CM

## 2021-03-30 DIAGNOSIS — D70.1 CHEMOTHERAPY INDUCED NEUTROPENIA (HCC): ICD-10-CM

## 2021-03-30 DIAGNOSIS — T45.1X5A CHEMOTHERAPY INDUCED NEUTROPENIA (HCC): ICD-10-CM

## 2021-03-30 LAB
ALBUMIN SERPL-MCNC: 3.7 G/DL (ref 3.4–5)
ALBUMIN/GLOB SERPL: 1.2 {RATIO} (ref 1–2)
ALP LIVER SERPL-CCNC: 65 U/L
ALT SERPL-CCNC: 19 U/L
ANION GAP SERPL CALC-SCNC: 7 MMOL/L (ref 0–18)
AST SERPL-CCNC: 17 U/L (ref 15–37)
BASOPHILS # BLD AUTO: 0.04 X10(3) UL (ref 0–0.2)
BASOPHILS NFR BLD AUTO: 1.3 %
BILIRUB SERPL-MCNC: 0.5 MG/DL (ref 0.1–2)
BUN BLD-MCNC: 15 MG/DL (ref 7–18)
BUN/CREAT SERPL: 19.7 (ref 10–20)
CALCIUM BLD-MCNC: 9.6 MG/DL (ref 8.5–10.1)
CEA SERPL-MCNC: 4.7 NG/ML (ref ?–5)
CHLORIDE SERPL-SCNC: 107 MMOL/L (ref 98–112)
CO2 SERPL-SCNC: 28 MMOL/L (ref 21–32)
CREAT BLD-MCNC: 0.76 MG/DL
DEPRECATED RDW RBC AUTO: 46.3 FL (ref 35.1–46.3)
EOSINOPHIL # BLD AUTO: 0.1 X10(3) UL (ref 0–0.7)
EOSINOPHIL NFR BLD AUTO: 3.2 %
ERYTHROCYTE [DISTWIDTH] IN BLOOD BY AUTOMATED COUNT: 13.2 % (ref 11–15)
GLOBULIN PLAS-MCNC: 3.2 G/DL (ref 2.8–4.4)
GLUCOSE BLD-MCNC: 104 MG/DL (ref 70–99)
HCT VFR BLD AUTO: 39.1 %
HGB BLD-MCNC: 12.7 G/DL
IMM GRANULOCYTES # BLD AUTO: 0 X10(3) UL (ref 0–1)
IMM GRANULOCYTES NFR BLD: 0 %
LYMPHOCYTES # BLD AUTO: 1.52 X10(3) UL (ref 1–4)
LYMPHOCYTES NFR BLD AUTO: 47.9 %
M PROTEIN MFR SERPL ELPH: 6.9 G/DL (ref 6.4–8.2)
MCH RBC QN AUTO: 31.3 PG (ref 26–34)
MCHC RBC AUTO-ENTMCNC: 32.5 G/DL (ref 31–37)
MCV RBC AUTO: 96.3 FL
MONOCYTES # BLD AUTO: 0.36 X10(3) UL (ref 0.1–1)
MONOCYTES NFR BLD AUTO: 11.4 %
NEUTROPHILS # BLD AUTO: 1.15 X10 (3) UL (ref 1.5–7.7)
NEUTROPHILS # BLD AUTO: 1.15 X10(3) UL (ref 1.5–7.7)
NEUTROPHILS NFR BLD AUTO: 36.2 %
OSMOLALITY SERPL CALC.SUM OF ELEC: 295 MOSM/KG (ref 275–295)
PLATELET # BLD AUTO: 143 10(3)UL (ref 150–450)
POTASSIUM SERPL-SCNC: 3.9 MMOL/L (ref 3.5–5.1)
RBC # BLD AUTO: 4.06 X10(6)UL
SODIUM SERPL-SCNC: 142 MMOL/L (ref 136–145)
WBC # BLD AUTO: 3.2 X10(3) UL (ref 4–11)

## 2021-03-30 PROCEDURE — 80053 COMPREHEN METABOLIC PANEL: CPT

## 2021-03-30 PROCEDURE — 96375 TX/PRO/DX INJ NEW DRUG ADDON: CPT

## 2021-03-30 PROCEDURE — 96413 CHEMO IV INFUSION 1 HR: CPT

## 2021-03-30 PROCEDURE — 99215 OFFICE O/P EST HI 40 MIN: CPT | Performed by: INTERNAL MEDICINE

## 2021-03-30 PROCEDURE — 82378 CARCINOEMBRYONIC ANTIGEN: CPT

## 2021-03-30 PROCEDURE — 96368 THER/DIAG CONCURRENT INF: CPT

## 2021-03-30 PROCEDURE — 96417 CHEMO IV INFUS EACH ADDL SEQ: CPT

## 2021-03-30 PROCEDURE — 85025 COMPLETE CBC W/AUTO DIFF WBC: CPT

## 2021-03-30 RX ORDER — ATROPINE SULFATE 0.4 MG/ML
0.2 AMPUL (ML) INJECTION ONCE
Status: CANCELLED | OUTPATIENT
Start: 2021-03-30

## 2021-03-30 RX ORDER — FLUOROURACIL 50 MG/ML
2400 INJECTION, SOLUTION INTRAVENOUS CONTINUOUS
Status: DISCONTINUED | OUTPATIENT
Start: 2021-03-30 | End: 2021-03-30

## 2021-03-30 RX ORDER — SODIUM CHLORIDE 9 MG/ML
INJECTION INTRAVENOUS
Status: DISCONTINUED
Start: 2021-03-30 | End: 2021-03-30

## 2021-03-30 RX ORDER — ATROPINE SULFATE 0.4 MG/ML
0.2 AMPUL (ML) INJECTION ONCE
Status: COMPLETED | OUTPATIENT
Start: 2021-03-30 | End: 2021-03-30

## 2021-03-30 RX ORDER — FLUOROURACIL 50 MG/ML
2400 INJECTION, SOLUTION INTRAVENOUS CONTINUOUS
Status: CANCELLED | OUTPATIENT
Start: 2021-03-30

## 2021-03-30 RX ORDER — ATROPINE SULFATE 0.4 MG/ML
AMPUL (ML) INJECTION
Status: COMPLETED
Start: 2021-03-30 | End: 2021-03-30

## 2021-03-30 RX ADMIN — FLUOROURACIL 4250 MG: 50 INJECTION, SOLUTION INTRAVENOUS at 13:17:00

## 2021-03-30 RX ADMIN — ATROPINE SULFATE 0.2 MG: 0.4 MG/ML AMPUL (ML) INJECTION at 11:33:00

## 2021-03-30 NOTE — PROGRESS NOTES
Cancer Center Progress Note    Patient Name: Theresa Berry   YOB: 1950   Medical Record Number: [id number]   Attending Physician: Tequila Martinez M.D.      Chief Complaint:  Colon cancer    Oncology History:  3/6/2020 (Reynaldo Bill) colonoscopy showed External Cream, Apply to site 1 hour prior to port a cath needle insertion, Disp: 1 Tube, Rfl: 3  •  nystatin [id number] UNIT/GM External Cream, aply peasize to affected area twice (Patient not taking: Reported on 2/16/2021 ), Disp: 15 g, Rfl: 0  •  Prochlorpe No edema, cyanosis, or bruising.  Bilateral hands noted for distal hyperpigmentation stable today  Neurological: motor strength grossly intact, MA4E  Psych: appropriate mood and affect      Laboratory assessed and reviewed:  Lab Results   Component Value Da

## 2021-03-30 NOTE — PROGRESS NOTES
Pt here for C11 D1 Folfiri with Avastin. Arrives Ambulating independently, accompanied by Self           Patient reports possible pregnancy since last therapy cycle: Not Applicable    Modifications in dose or schedule: OK to proceed with ANC 1. 15.  Amanda Antunez

## 2021-04-01 ENCOUNTER — NURSE ONLY (OUTPATIENT)
Dept: HEMATOLOGY/ONCOLOGY | Facility: HOSPITAL | Age: 71
End: 2021-04-01
Attending: INTERNAL MEDICINE
Payer: MEDICARE

## 2021-04-01 DIAGNOSIS — Z45.2 ENCOUNTER FOR CENTRAL LINE CARE: ICD-10-CM

## 2021-04-01 DIAGNOSIS — C18.7 CANCER OF SIGMOID COLON (HCC): ICD-10-CM

## 2021-04-01 DIAGNOSIS — Z79.899 ENCOUNTER FOR MEDICATION MANAGEMENT: ICD-10-CM

## 2021-04-01 DIAGNOSIS — Z51.11 CHEMOTHERAPY MANAGEMENT, ENCOUNTER FOR: Primary | ICD-10-CM

## 2021-04-01 PROCEDURE — 96523 IRRIG DRUG DELIVERY DEVICE: CPT

## 2021-04-01 RX ORDER — HEPARIN SODIUM (PORCINE) LOCK FLUSH IV SOLN 100 UNIT/ML 100 UNIT/ML
500 SOLUTION INTRAVENOUS ONCE
Status: CANCELLED | OUTPATIENT
Start: 2021-04-01

## 2021-04-01 RX ORDER — HEPARIN SODIUM (PORCINE) LOCK FLUSH IV SOLN 100 UNIT/ML 100 UNIT/ML
500 SOLUTION INTRAVENOUS ONCE
Status: COMPLETED | OUTPATIENT
Start: 2021-04-01 | End: 2021-04-01

## 2021-04-01 RX ADMIN — HEPARIN SODIUM (PORCINE) LOCK FLUSH IV SOLN 100 UNIT/ML 500 UNITS: 100 SOLUTION INTRAVENOUS at 10:56:00

## 2021-04-12 ENCOUNTER — TELEPHONE (OUTPATIENT)
Dept: HEMATOLOGY/ONCOLOGY | Facility: HOSPITAL | Age: 71
End: 2021-04-12

## 2021-04-12 NOTE — TELEPHONE ENCOUNTER
Tomeka Heard from [employer] called asking if 's office received the ADA paperwork she faxed over 4/8.   Reference number [id number] No complaints

## 2021-04-13 ENCOUNTER — OFFICE VISIT (OUTPATIENT)
Dept: HEMATOLOGY/ONCOLOGY | Facility: HOSPITAL | Age: 71
End: 2021-04-13
Attending: INTERNAL MEDICINE
Payer: MEDICARE

## 2021-04-13 ENCOUNTER — TELEPHONE (OUTPATIENT)
Dept: HEMATOLOGY/ONCOLOGY | Facility: HOSPITAL | Age: 71
End: 2021-04-13

## 2021-04-13 VITALS
HEART RATE: 83 BPM | TEMPERATURE: 98 F | OXYGEN SATURATION: 97 % | BODY MASS INDEX: 30.16 KG/M2 | HEIGHT: 62.01 IN | WEIGHT: 166 LBS | SYSTOLIC BLOOD PRESSURE: 123 MMHG | RESPIRATION RATE: 16 BRPM | DIASTOLIC BLOOD PRESSURE: 79 MMHG

## 2021-04-13 DIAGNOSIS — T45.1X5A CHEMOTHERAPY INDUCED NEUTROPENIA (HCC): ICD-10-CM

## 2021-04-13 DIAGNOSIS — C18.7 CANCER OF SIGMOID COLON (HCC): Primary | ICD-10-CM

## 2021-04-13 DIAGNOSIS — Z51.11 CHEMOTHERAPY MANAGEMENT, ENCOUNTER FOR: ICD-10-CM

## 2021-04-13 DIAGNOSIS — D70.1 CHEMOTHERAPY INDUCED NEUTROPENIA (HCC): ICD-10-CM

## 2021-04-13 PROCEDURE — 99215 OFFICE O/P EST HI 40 MIN: CPT | Performed by: INTERNAL MEDICINE

## 2021-04-13 PROCEDURE — 36591 DRAW BLOOD OFF VENOUS DEVICE: CPT

## 2021-04-13 PROCEDURE — 82378 CARCINOEMBRYONIC ANTIGEN: CPT

## 2021-04-13 PROCEDURE — 85060 BLOOD SMEAR INTERPRETATION: CPT

## 2021-04-13 PROCEDURE — 85025 COMPLETE CBC W/AUTO DIFF WBC: CPT

## 2021-04-13 PROCEDURE — 80053 COMPREHEN METABOLIC PANEL: CPT

## 2021-04-13 RX ORDER — HEPARIN SODIUM (PORCINE) LOCK FLUSH IV SOLN 100 UNIT/ML 100 UNIT/ML
SOLUTION INTRAVENOUS
Status: DISPENSED
Start: 2021-04-13 | End: 2021-04-13

## 2021-04-13 RX ORDER — SODIUM CHLORIDE 9 MG/ML
INJECTION INTRAVENOUS
Status: DISPENSED
Start: 2021-04-13 | End: 2021-04-13

## 2021-04-13 NOTE — TELEPHONE ENCOUNTER
I left a message for Dangelo Angelita letting her know that I got the PET scan done for her on 4/19/20. Reviewed instructions again. Asked for a call back with questions.

## 2021-04-13 NOTE — PROGRESS NOTES
Received patient from clinic to de access port   Parameters not met and will be deferred 1 week  Patient feels well, offers no complaints  Port flushed, de accessed 2 x 2 and tape to site  Discharged stable, aware of future appointments

## 2021-04-13 NOTE — PROGRESS NOTES
Cancer Center Progress Note    Patient Name: Valeria Ponce   YOB: 1950   Medical Record Number: [id number]   Attending Physician: Yue Blum M.D.      Chief Complaint:  Colon cancer    Oncology History:  3/6/2020 (Ria Avilez) colonoscopy showed 0  •  lidocaine-prilocaine 2.5-2.5 % External Cream, Apply to site 1 hour prior to port a cath needle insertion, Disp: 1 Tube, Rfl: 3  •  Prochlorperazine Maleate (COMPAZINE) 10 mg tablet, Take 1 tablet (10 mg total) by mouth every 6 (six) hours as needed Results   Component Value Date     (H) 03/30/2021    BUN 15 03/30/2021    BUNCREA 19.7 03/30/2021    CREATSERUM 0.76 03/30/2021    ANIONGAP 7 03/30/2021    GFRNAA 80 03/30/2021    GFRAA 92 03/30/2021    CA 9.6 03/30/2021    OSMOCALC 295 03/30/2021

## 2021-04-13 NOTE — TELEPHONE ENCOUNTER
Brenda Beasley returned my call. I let her know that the CEA was slightly elevated. Dr Dee Adame wanted to know if she could get her PET scan done sooner, possibly before she returns for chemo next week. She will call and see if they can do it.

## 2021-04-14 ENCOUNTER — TELEPHONE (OUTPATIENT)
Dept: HEMATOLOGY/ONCOLOGY | Facility: HOSPITAL | Age: 71
End: 2021-04-14

## 2021-04-14 NOTE — TELEPHONE ENCOUNTER
Cancer of Sigmoid Colon - 3/30/21 - C11 - Folfiri with Bevacizumab  Tx held 4/13/21    Renee Rojas called, last night she had sharp pain in left upper back below her shoulder, she took Aleve(her Tylenol was out of date).  Today the pain in same area is dull (

## 2021-04-15 ENCOUNTER — APPOINTMENT (OUTPATIENT)
Dept: HEMATOLOGY/ONCOLOGY | Facility: HOSPITAL | Age: 71
End: 2021-04-15
Payer: MEDICARE

## 2021-04-16 ENCOUNTER — TELEPHONE (OUTPATIENT)
Dept: HEMATOLOGY/ONCOLOGY | Facility: HOSPITAL | Age: 71
End: 2021-04-16

## 2021-04-16 NOTE — TELEPHONE ENCOUNTER
Left message for Kong bravo. I let her know that the PET scan was canceled d/t insurance issues. Asked for a call back with any questions. We will see her on Tuesday.

## 2021-04-19 ENCOUNTER — TELEPHONE (OUTPATIENT)
Dept: HEMATOLOGY/ONCOLOGY | Facility: HOSPITAL | Age: 71
End: 2021-04-19

## 2021-04-19 NOTE — TELEPHONE ENCOUNTER
Patient is calling because she would like to discuss her PET SCAN being cancelled ASAP, please call back. Message from the after hour answering service.

## 2021-04-19 NOTE — TELEPHONE ENCOUNTER
I spoke with Roxanna Valencia. I let her know that we were able to get her PET scan approved and it is scheduled for tomorrow at 0945. She will come in at 0815, have labs and see Dr Kylah Scruggs then go for her PET.   She repeated back the appointments and verbalized un

## 2021-04-20 ENCOUNTER — HOSPITAL ENCOUNTER (OUTPATIENT)
Dept: NUCLEAR MEDICINE | Facility: HOSPITAL | Age: 71
Discharge: HOME | End: 2021-04-20
Attending: INTERNAL MEDICINE
Payer: MEDICARE

## 2021-04-20 ENCOUNTER — NURSE ONLY (OUTPATIENT)
Dept: HEMATOLOGY/ONCOLOGY | Facility: HOSPITAL | Age: 71
End: 2021-04-20
Attending: INTERNAL MEDICINE
Payer: MEDICARE

## 2021-04-20 ENCOUNTER — TELEPHONE (OUTPATIENT)
Dept: HEMATOLOGY/ONCOLOGY | Facility: HOSPITAL | Age: 71
End: 2021-04-20

## 2021-04-20 VITALS
TEMPERATURE: 98 F | OXYGEN SATURATION: 97 % | RESPIRATION RATE: 16 BRPM | SYSTOLIC BLOOD PRESSURE: 148 MMHG | WEIGHT: 166 LBS | HEIGHT: 62 IN | DIASTOLIC BLOOD PRESSURE: 84 MMHG | BODY MASS INDEX: 30.55 KG/M2 | HEART RATE: 78 BPM

## 2021-04-20 DIAGNOSIS — Z51.11 CHEMOTHERAPY MANAGEMENT, ENCOUNTER FOR: ICD-10-CM

## 2021-04-20 DIAGNOSIS — C18.7 CANCER OF SIGMOID COLON (HCC): ICD-10-CM

## 2021-04-20 DIAGNOSIS — C18.7 CANCER OF SIGMOID COLON (HCC): Primary | ICD-10-CM

## 2021-04-20 DIAGNOSIS — T45.1X5A CHEMOTHERAPY INDUCED NEUTROPENIA (HCC): ICD-10-CM

## 2021-04-20 DIAGNOSIS — M79.10 MUSCLE PAIN: ICD-10-CM

## 2021-04-20 DIAGNOSIS — D70.1 CHEMOTHERAPY INDUCED NEUTROPENIA (HCC): ICD-10-CM

## 2021-04-20 PROCEDURE — 85060 BLOOD SMEAR INTERPRETATION: CPT

## 2021-04-20 PROCEDURE — 82962 GLUCOSE BLOOD TEST: CPT

## 2021-04-20 PROCEDURE — 85025 COMPLETE CBC W/AUTO DIFF WBC: CPT

## 2021-04-20 PROCEDURE — 78815 PET IMAGE W/CT SKULL-THIGH: CPT | Performed by: INTERNAL MEDICINE

## 2021-04-20 PROCEDURE — 36415 COLL VENOUS BLD VENIPUNCTURE: CPT

## 2021-04-20 PROCEDURE — 99215 OFFICE O/P EST HI 40 MIN: CPT | Performed by: INTERNAL MEDICINE

## 2021-04-20 PROCEDURE — 80053 COMPREHEN METABOLIC PANEL: CPT

## 2021-04-20 PROCEDURE — 82378 CARCINOEMBRYONIC ANTIGEN: CPT

## 2021-04-20 RX ORDER — CYCLOBENZAPRINE HCL 5 MG
5 TABLET ORAL 3 TIMES DAILY PRN
Qty: 20 TABLET | Refills: 1 | Status: SHIPPED | OUTPATIENT
Start: 2021-04-20 | End: 2021-06-16 | Stop reason: ALTCHOICE

## 2021-04-20 RX ORDER — LIDOCAINE 4 G/G
1 PATCH TOPICAL EVERY 24 HOURS
Qty: 5 PATCH | Refills: 0 | Status: SHIPPED | OUTPATIENT
Start: 2021-04-20 | End: 2021-06-11

## 2021-04-20 NOTE — TELEPHONE ENCOUNTER
I spoke with Tip Morley and gave her the good news that her PET was negative. Pt was elated. Will be in tomorrow for chemotherapy.

## 2021-04-20 NOTE — PROGRESS NOTES
Cancer Center Progress Note    Patient Name: Theresa Berry   YOB: 1950   Medical Record Number: [id number]   Attending Physician: Tequila Martinez M.D.      Chief Complaint:  Colon cancer    Oncology History:  3/6/2020 (Reynaldo Bill) colonoscopy showed Ob/gyn)    Current Medications:    Current Outpatient Medications:   •  lidocaine (HM LIDOCAINE PATCH) 4 % External Patch, Place 1 patch onto the skin daily. , Disp: 5 patch, Rfl: 0  •  cyclobenzaprine 5 MG Oral Tab, Take 1 tablet (5 mg total) by mouth 3 (t in acute distress. HEENT: EOMs intact. Oropharynx is clear. Mask  Neck: no jvd, no adenopathy. Chest: Symmetric expansion, nonlabored breathing  Abdomen: Soft, nontender.   No obvious fluid shift  MSK: Right-sided upper back pain with tenderness to deep neutropenia-slight improvement, further dose reduction needed. FU induced hyperpigmentation- lubrication of the skin advised. Weight gain  Discussed Lasix based on her imaging.     MDM: , kevin Bullard MD

## 2021-04-21 ENCOUNTER — OFFICE VISIT (OUTPATIENT)
Dept: HEMATOLOGY/ONCOLOGY | Facility: HOSPITAL | Age: 71
End: 2021-04-21
Attending: INTERNAL MEDICINE
Payer: MEDICARE

## 2021-04-21 ENCOUNTER — TELEPHONE (OUTPATIENT)
Dept: INTERNAL MEDICINE CLINIC | Facility: CLINIC | Age: 71
End: 2021-04-21

## 2021-04-21 VITALS
TEMPERATURE: 99 F | SYSTOLIC BLOOD PRESSURE: 149 MMHG | HEART RATE: 88 BPM | RESPIRATION RATE: 16 BRPM | OXYGEN SATURATION: 97 % | DIASTOLIC BLOOD PRESSURE: 77 MMHG

## 2021-04-21 DIAGNOSIS — C18.7 CANCER OF SIGMOID COLON (HCC): Primary | ICD-10-CM

## 2021-04-21 PROCEDURE — 96413 CHEMO IV INFUSION 1 HR: CPT

## 2021-04-21 PROCEDURE — 96417 CHEMO IV INFUS EACH ADDL SEQ: CPT

## 2021-04-21 PROCEDURE — 90471 IMMUNIZATION ADMIN: CPT

## 2021-04-21 PROCEDURE — 90472 IMMUNIZATION ADMIN EACH ADD: CPT | Performed by: INTERNAL MEDICINE

## 2021-04-21 PROCEDURE — 96368 THER/DIAG CONCURRENT INF: CPT

## 2021-04-21 PROCEDURE — 90471 IMMUNIZATION ADMIN: CPT | Performed by: INTERNAL MEDICINE

## 2021-04-21 PROCEDURE — 90472 IMMUNIZATION ADMIN EACH ADD: CPT

## 2021-04-21 PROCEDURE — 96375 TX/PRO/DX INJ NEW DRUG ADDON: CPT

## 2021-04-21 RX ORDER — SODIUM CHLORIDE 9 MG/ML
INJECTION INTRAVENOUS
Status: DISCONTINUED
Start: 2021-04-21 | End: 2021-04-21

## 2021-04-21 RX ORDER — ATROPINE SULFATE 0.4 MG/ML
0.2 AMPUL (ML) INJECTION ONCE
Status: COMPLETED | OUTPATIENT
Start: 2021-04-21 | End: 2021-04-21

## 2021-04-21 RX ORDER — ATROPINE SULFATE 0.4 MG/ML
AMPUL (ML) INJECTION
Status: COMPLETED
Start: 2021-04-21 | End: 2021-04-21

## 2021-04-21 RX ORDER — FLUOROURACIL 50 MG/ML
2000 INJECTION, SOLUTION INTRAVENOUS CONTINUOUS
Status: DISCONTINUED | OUTPATIENT
Start: 2021-04-21 | End: 2021-04-21

## 2021-04-21 RX ADMIN — FLUOROURACIL 3550 MG: 50 INJECTION, SOLUTION INTRAVENOUS at 15:57:00

## 2021-04-21 RX ADMIN — ATROPINE SULFATE 0.2 MG: 0.4 MG/ML AMPUL (ML) INJECTION at 14:12:00

## 2021-04-21 NOTE — PROGRESS NOTES
Pt here for C12 D1 Folfiri with Avastin. Arrives Ambulating independently, accompanied by Self           Patient reports possible pregnancy since last therapy cycle: Not Applicable    Modifications in dose or schedule: Yes.  Last treatment delayed for

## 2021-04-21 NOTE — TELEPHONE ENCOUNTER
Office visit/consult. Monday, 4/26/21  Appt. With Dr. Axel Caro, onc surgeon. Office Can view [facility]. Referral entered as Urgent.

## 2021-04-22 ENCOUNTER — APPOINTMENT (OUTPATIENT)
Dept: HEMATOLOGY/ONCOLOGY | Facility: HOSPITAL | Age: 71
End: 2021-04-22
Attending: INTERNAL MEDICINE
Payer: MEDICARE

## 2021-04-23 ENCOUNTER — NURSE ONLY (OUTPATIENT)
Dept: HEMATOLOGY/ONCOLOGY | Facility: HOSPITAL | Age: 71
End: 2021-04-23
Attending: INTERNAL MEDICINE
Payer: MEDICARE

## 2021-04-23 DIAGNOSIS — C18.7 CANCER OF SIGMOID COLON (HCC): ICD-10-CM

## 2021-04-23 DIAGNOSIS — Z51.11 CHEMOTHERAPY MANAGEMENT, ENCOUNTER FOR: Primary | ICD-10-CM

## 2021-04-23 DIAGNOSIS — Z79.899 ENCOUNTER FOR MEDICATION MANAGEMENT: ICD-10-CM

## 2021-04-23 DIAGNOSIS — Z45.2 ENCOUNTER FOR CENTRAL LINE CARE: ICD-10-CM

## 2021-04-23 PROCEDURE — 96523 IRRIG DRUG DELIVERY DEVICE: CPT

## 2021-04-23 RX ORDER — HEPARIN SODIUM (PORCINE) LOCK FLUSH IV SOLN 100 UNIT/ML 100 UNIT/ML
500 SOLUTION INTRAVENOUS ONCE
Status: COMPLETED | OUTPATIENT
Start: 2021-04-23 | End: 2021-04-23

## 2021-04-23 RX ORDER — HEPARIN SODIUM (PORCINE) LOCK FLUSH IV SOLN 100 UNIT/ML 100 UNIT/ML
500 SOLUTION INTRAVENOUS ONCE
Status: CANCELLED | OUTPATIENT
Start: 2021-04-23

## 2021-04-23 RX ADMIN — HEPARIN SODIUM (PORCINE) LOCK FLUSH IV SOLN 100 UNIT/ML 500 UNITS: 100 SOLUTION INTRAVENOUS at 15:35:00

## 2021-04-27 ENCOUNTER — APPOINTMENT (OUTPATIENT)
Dept: HEMATOLOGY/ONCOLOGY | Facility: HOSPITAL | Age: 71
End: 2021-04-27
Attending: INTERNAL MEDICINE
Payer: MEDICARE

## 2021-04-27 NOTE — CONSULTS
[facility]    Patient Name:  Katelynn Myles   YOB: 1950   Gender:  Female   Appt Date:  4/28/2021   Provider:  Nidhi Craig MD   Insurance:  [employer]     PATIENT PROVIDERS  Referring Provide 4/28/2020: CEA 1.4  10/5/2020: CEA 14.2  11/2/2020: CT chest/abdomen/pelvis with interval development of extensive peritoneal metastatic disease. Subtle hypoenhancing hepatic lesions were seen.    11/12/2020-4/21/2021: Patient completed 12 cycles of FOLFIRI for Pain.  , Disp: , Rfl:   •  Omega-3 Fatty Acids (FISH OIL CONCENTRATE OR), Take by mouth daily. , Disp: , Rfl:   •  Coenzyme Q10 (CO Q 10 OR), Take by mouth daily.   , Disp: , Rfl:      Allergies Reviewed:  No Known Allergies     History:  Reviewed:  Pa palpitations. Gastrointestinal: Negative for abdominal distention, abdominal pain, diarrhea, nausea and vomiting. Genitourinary: Negative for dysuria. Musculoskeletal: Negative for myalgias. Skin: Negative for rash and wound.    Neurological: Rosangela Velázquez report for further details. · pT3, pN0     B. Anastomosis:  · Segment of benign colon.   · No evidence of malignancy.     Comment: The majority of the pericolonic fat has been submitted for additional lymph node capture, with no additional lymph nodes iden 3+ (Positive): Moderate/strong complete or basolateral/lateral membranous reactivity in >10% of tumor cells.   (Biopsy specimen: Any single cluster of tumor cells [>5                                                cells] demonstrating 3+ solid masses. There is redemonstration of a hypoattenuating partially exophytic right renal lesion measuring 1.4 x 1.7 cm, unchanged. GI/MESENTERY: A small hiatal hernia is evident.  Reflux or retention of enteric contrast is seen throughout the majority is a small to moderate quantity of free pelvic fluid. VASCULATURE:   No aneurysm is detected. 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    RETROPERITONEUM: No mass or lymphadenopathy is apparent.     BONES:   Slight anterolisthesis of L4 on L which could reflect portosystemic collateral vessel formation.       6. There is a small hiatal hernia with gastroesophageal reflux or dysmotility.       7. Proximal colonic diverticulosis without CT evidence of acute complication.       8.  Right hip arthr Small fat containing umbilical hernia. URINARY BLADDER: Collapsed. ASCITES:                        None. PELVIC ORGANS: No suspect pelvic mass.    OTHER: Negative.       BONES: No suspect bone lesion or fracture.  Stable Grade 1 degenerative spondylol response to treatment. Findings discussed with the patient at length. Her case was discussed in our multidisciplinary tumor board. Recommend proceeding with diagnostic laparoscopy, possible biopsies.  Depending on intraoperative findings/biopsies, ma

## 2021-04-28 ENCOUNTER — OFFICE VISIT (OUTPATIENT)
Dept: SURGERY | Facility: CLINIC | Age: 71
End: 2021-04-28
Payer: MEDICARE

## 2021-04-28 VITALS
SYSTOLIC BLOOD PRESSURE: 124 MMHG | RESPIRATION RATE: 20 BRPM | OXYGEN SATURATION: 98 % | HEIGHT: 63 IN | WEIGHT: 165 LBS | HEART RATE: 84 BPM | BODY MASS INDEX: 29.23 KG/M2 | DIASTOLIC BLOOD PRESSURE: 81 MMHG

## 2021-04-28 DIAGNOSIS — C18.7 CANCER OF SIGMOID COLON (HCC): Primary | ICD-10-CM

## 2021-04-28 DIAGNOSIS — C78.6 METASTASIS TO PERITONEAL CAVITY (HCC): ICD-10-CM

## 2021-04-28 PROCEDURE — 3074F SYST BP LT 130 MM HG: CPT | Performed by: SURGERY

## 2021-04-28 PROCEDURE — 3008F BODY MASS INDEX DOCD: CPT | Performed by: SURGERY

## 2021-04-28 PROCEDURE — 3079F DIAST BP 80-89 MM HG: CPT | Performed by: SURGERY

## 2021-04-28 PROCEDURE — 99205 OFFICE O/P NEW HI 60 MIN: CPT | Performed by: SURGERY

## 2021-04-28 NOTE — PATIENT INSTRUCTIONS
Surgery: Diagnostic Laparoscopy    Date of Surgery: [facility]    Hospital:    [address] [facility]   Phone: [phone]    · This is an outpatient procedure.   · Use the provided Chlorhexadine surgical soap(instructions Dee's nurse direct line:  [phone]  Monday through Friday 8:30 am to 4:30 pm    For Dr. Skyla Guthrie office: [phone]/ Fax: [phone]  After hours you will reach the answering service     Central [employer] [place] [address], [phone]

## 2021-04-29 ENCOUNTER — APPOINTMENT (OUTPATIENT)
Dept: HEMATOLOGY/ONCOLOGY | Facility: HOSPITAL | Age: 71
End: 2021-04-29
Attending: INTERNAL MEDICINE
Payer: MEDICARE

## 2021-05-04 ENCOUNTER — OFFICE VISIT (OUTPATIENT)
Dept: HEMATOLOGY/ONCOLOGY | Facility: HOSPITAL | Age: 71
End: 2021-05-04
Attending: INTERNAL MEDICINE
Payer: MEDICARE

## 2021-05-04 VITALS
DIASTOLIC BLOOD PRESSURE: 79 MMHG | SYSTOLIC BLOOD PRESSURE: 149 MMHG | OXYGEN SATURATION: 97 % | BODY MASS INDEX: 30.55 KG/M2 | WEIGHT: 166 LBS | HEIGHT: 62 IN | TEMPERATURE: 98 F | HEART RATE: 82 BPM | RESPIRATION RATE: 16 BRPM

## 2021-05-04 DIAGNOSIS — D70.1 CHEMOTHERAPY INDUCED NEUTROPENIA (HCC): ICD-10-CM

## 2021-05-04 DIAGNOSIS — C18.7 CANCER OF SIGMOID COLON (HCC): ICD-10-CM

## 2021-05-04 DIAGNOSIS — Z51.11 CHEMOTHERAPY MANAGEMENT, ENCOUNTER FOR: Primary | ICD-10-CM

## 2021-05-04 DIAGNOSIS — T45.1X5A CHEMOTHERAPY INDUCED NEUTROPENIA (HCC): ICD-10-CM

## 2021-05-04 PROCEDURE — 36415 COLL VENOUS BLD VENIPUNCTURE: CPT

## 2021-05-04 PROCEDURE — 80053 COMPREHEN METABOLIC PANEL: CPT

## 2021-05-04 PROCEDURE — 85025 COMPLETE CBC W/AUTO DIFF WBC: CPT

## 2021-05-04 PROCEDURE — 99215 OFFICE O/P EST HI 40 MIN: CPT | Performed by: INTERNAL MEDICINE

## 2021-05-04 PROCEDURE — 82378 CARCINOEMBRYONIC ANTIGEN: CPT

## 2021-05-04 RX ORDER — SODIUM CHLORIDE 9 MG/ML
INJECTION INTRAVENOUS
Status: DISCONTINUED
Start: 2021-05-04 | End: 2021-05-04 | Stop reason: WASHOUT

## 2021-05-04 RX ORDER — ONDANSETRON 2 MG/ML
4 INJECTION INTRAMUSCULAR; INTRAVENOUS ONCE
Status: CANCELLED
Start: 2021-05-05 | End: 2021-05-05

## 2021-05-04 RX ORDER — FLUOROURACIL 50 MG/ML
2000 INJECTION, SOLUTION INTRAVENOUS CONTINUOUS
Status: CANCELLED | OUTPATIENT
Start: 2021-05-05

## 2021-05-04 NOTE — PROGRESS NOTES
Cancer Center Progress Note    Patient Name: Julien Whitfield   YOB: 1950   Medical Record Number: [id number]   Attending Physician: Eliot Glover M.D.      Chief Complaint:  Colon cancer    Oncology History:  3/6/2020 ([place] [facility]) colonoscopy showed 2 (two) times a day. Per pt.  Takes 4000 units daily(at one time) , Disp: , Rfl:   •  nystatin 434701 UNIT/GM External Cream, aply peasize to affected area twice, Disp: 15 g, Rfl: 0  •  lidocaine-prilocaine 2.5-2.5 % External Cream, Apply to site 1 hour jason intact, MA4E  Psych: appropriate mood and affect      Laboratory assessed and reviewed:  Lab Results   Component Value Date     (H) 05/04/2021    BUN 10 05/04/2021    BUNCREA 11.5 05/04/2021    CREATSERUM 0.87 05/04/2021    ANIONGAP 6 05/04/2021

## 2021-05-05 ENCOUNTER — OFFICE VISIT (OUTPATIENT)
Dept: HEMATOLOGY/ONCOLOGY | Facility: HOSPITAL | Age: 71
End: 2021-05-05
Attending: INTERNAL MEDICINE
Payer: MEDICARE

## 2021-05-05 VITALS
HEART RATE: 98 BPM | DIASTOLIC BLOOD PRESSURE: 73 MMHG | RESPIRATION RATE: 16 BRPM | SYSTOLIC BLOOD PRESSURE: 158 MMHG | TEMPERATURE: 98 F | OXYGEN SATURATION: 98 %

## 2021-05-05 DIAGNOSIS — C18.7 CANCER OF SIGMOID COLON (HCC): Primary | ICD-10-CM

## 2021-05-05 PROCEDURE — 96374 THER/PROPH/DIAG INJ IV PUSH: CPT

## 2021-05-05 RX ORDER — ONDANSETRON 2 MG/ML
INJECTION INTRAMUSCULAR; INTRAVENOUS
Status: COMPLETED
Start: 2021-05-05 | End: 2021-05-05

## 2021-05-05 RX ORDER — ONDANSETRON 2 MG/ML
4 INJECTION INTRAMUSCULAR; INTRAVENOUS ONCE
Status: COMPLETED | OUTPATIENT
Start: 2021-05-05 | End: 2021-05-05

## 2021-05-05 RX ORDER — SODIUM CHLORIDE 9 MG/ML
INJECTION INTRAVENOUS
Status: DISCONTINUED
Start: 2021-05-05 | End: 2021-05-05

## 2021-05-05 RX ORDER — FLUOROURACIL 50 MG/ML
2000 INJECTION, SOLUTION INTRAVENOUS CONTINUOUS
Status: DISCONTINUED | OUTPATIENT
Start: 2021-05-05 | End: 2021-05-05

## 2021-05-05 RX ADMIN — FLUOROURACIL 3550 MG: 50 INJECTION, SOLUTION INTRAVENOUS at 10:05:00

## 2021-05-05 RX ADMIN — ONDANSETRON 4 MG: 2 INJECTION INTRAMUSCULAR; INTRAVENOUS at 09:59:00

## 2021-05-05 NOTE — PROGRESS NOTES
Pt here for C13D1 5FU CADD      Arrives Ambulating independently, accompanied by Self           Patient reports possible pregnancy since last therapy cycle: Not Applicable    Modifications in dose or schedule: Yes. 5 FU CADD only. OK to proceed with ANC 1.

## 2021-05-07 ENCOUNTER — TELEPHONE (OUTPATIENT)
Dept: HEMATOLOGY/ONCOLOGY | Facility: HOSPITAL | Age: 71
End: 2021-05-07

## 2021-05-07 ENCOUNTER — NURSE ONLY (OUTPATIENT)
Dept: HEMATOLOGY/ONCOLOGY | Facility: HOSPITAL | Age: 71
End: 2021-05-07
Attending: INTERNAL MEDICINE
Payer: MEDICARE

## 2021-05-07 DIAGNOSIS — C18.7 CANCER OF SIGMOID COLON (HCC): ICD-10-CM

## 2021-05-07 DIAGNOSIS — Z45.2 ENCOUNTER FOR CENTRAL LINE CARE: ICD-10-CM

## 2021-05-07 DIAGNOSIS — Z51.11 CHEMOTHERAPY MANAGEMENT, ENCOUNTER FOR: Primary | ICD-10-CM

## 2021-05-07 DIAGNOSIS — Z79.899 ENCOUNTER FOR MEDICATION MANAGEMENT: ICD-10-CM

## 2021-05-07 PROCEDURE — 96523 IRRIG DRUG DELIVERY DEVICE: CPT

## 2021-05-07 RX ORDER — HEPARIN SODIUM (PORCINE) LOCK FLUSH IV SOLN 100 UNIT/ML 100 UNIT/ML
500 SOLUTION INTRAVENOUS ONCE
Status: CANCELLED | OUTPATIENT
Start: 2021-05-07

## 2021-05-07 RX ORDER — HEPARIN SODIUM (PORCINE) LOCK FLUSH IV SOLN 100 UNIT/ML 100 UNIT/ML
500 SOLUTION INTRAVENOUS ONCE
Status: COMPLETED | OUTPATIENT
Start: 2021-05-07 | End: 2021-05-07

## 2021-05-07 RX ADMIN — HEPARIN SODIUM (PORCINE) LOCK FLUSH IV SOLN 100 UNIT/ML 500 UNITS: 100 SOLUTION INTRAVENOUS at 10:22:00

## 2021-05-07 NOTE — TELEPHONE ENCOUNTER
JUN received disability paperwork from [facility]. JUN and MD had previously spoken with the pt about the details in this same paperwork, as the pt wanted to verify her financial benefits and options before the permanent disability was indicated.  JUN placed call to

## 2021-05-12 ENCOUNTER — TELEPHONE (OUTPATIENT)
Dept: HEMATOLOGY/ONCOLOGY | Facility: HOSPITAL | Age: 71
End: 2021-05-12

## 2021-05-12 NOTE — TELEPHONE ENCOUNTER
05/12: Pt called to discuss her ADA paperwork from [facility].  Pt requests the documentation reflect \"permanent disability\", based on the discussions with Dr. Kylah Scruggs, and from her own discussions with her work or the case workers from her disability program. Pt

## 2021-05-19 ENCOUNTER — OFFICE VISIT (OUTPATIENT)
Dept: HEMATOLOGY/ONCOLOGY | Facility: HOSPITAL | Age: 71
End: 2021-05-19
Attending: INTERNAL MEDICINE
Payer: MEDICARE

## 2021-05-19 VITALS
BODY MASS INDEX: 30.48 KG/M2 | DIASTOLIC BLOOD PRESSURE: 90 MMHG | HEIGHT: 62 IN | SYSTOLIC BLOOD PRESSURE: 136 MMHG | RESPIRATION RATE: 16 BRPM | TEMPERATURE: 98 F | HEART RATE: 90 BPM | WEIGHT: 165.63 LBS | OXYGEN SATURATION: 97 %

## 2021-05-19 VITALS
SYSTOLIC BLOOD PRESSURE: 136 MMHG | RESPIRATION RATE: 16 BRPM | DIASTOLIC BLOOD PRESSURE: 90 MMHG | WEIGHT: 165 LBS | BODY MASS INDEX: 30 KG/M2 | OXYGEN SATURATION: 97 % | HEART RATE: 90 BPM | TEMPERATURE: 98 F

## 2021-05-19 DIAGNOSIS — L81.9 HYPERPIGMENTATION: ICD-10-CM

## 2021-05-19 DIAGNOSIS — C18.7 CANCER OF SIGMOID COLON (HCC): ICD-10-CM

## 2021-05-19 DIAGNOSIS — C18.7 CANCER OF SIGMOID COLON (HCC): Primary | ICD-10-CM

## 2021-05-19 DIAGNOSIS — Z51.11 CHEMOTHERAPY MANAGEMENT, ENCOUNTER FOR: Primary | ICD-10-CM

## 2021-05-19 PROCEDURE — 80053 COMPREHEN METABOLIC PANEL: CPT

## 2021-05-19 PROCEDURE — 85025 COMPLETE CBC W/AUTO DIFF WBC: CPT

## 2021-05-19 PROCEDURE — 82378 CARCINOEMBRYONIC ANTIGEN: CPT

## 2021-05-19 PROCEDURE — 99215 OFFICE O/P EST HI 40 MIN: CPT | Performed by: INTERNAL MEDICINE

## 2021-05-19 RX ORDER — FLUOROURACIL 50 MG/ML
2000 INJECTION, SOLUTION INTRAVENOUS CONTINUOUS
Status: CANCELLED | OUTPATIENT
Start: 2021-05-19

## 2021-05-19 RX ORDER — FLUOROURACIL 50 MG/ML
2000 INJECTION, SOLUTION INTRAVENOUS CONTINUOUS
Status: DISCONTINUED | OUTPATIENT
Start: 2021-05-19 | End: 2021-05-19

## 2021-05-19 RX ORDER — SODIUM CHLORIDE 9 MG/ML
INJECTION INTRAVENOUS
Status: DISCONTINUED
Start: 2021-05-19 | End: 2021-05-19

## 2021-05-19 RX ADMIN — FLUOROURACIL 3550 MG: 50 INJECTION, SOLUTION INTRAVENOUS at 13:48:00

## 2021-05-19 NOTE — PROGRESS NOTES
Pt here for C14D1 5FU CADD       Arrives Ambulating independently, accompanied by Self            Patient reports possible pregnancy since last therapy cycle: Not Applicable     Modifications in dose or schedule: Yes. 5 FU CADD only.  Zofran removed from tx

## 2021-05-19 NOTE — PROGRESS NOTES
Cancer Center Progress Note    Patient Name: Demi Miller   YOB: 1950   Medical Record Number: [id number]   Attending Physician: Xiomy Sorensen M.D.      Chief Complaint:  Colon cancer    Oncology History:  3/6/2020 (Kermit Goldberg) colonoscopy showed 5 MG Oral Tab, Take 1 tablet (5 mg total) by mouth 3 (three) times daily as needed for Muscle spasms. , Disp: 20 tablet, Rfl: 1  •  Vitamin D3 50 MCG (2000 UT) Oral Cap, Take 2,000 Units by mouth 2 (two) times a day. Per pt.  Takes 4000 units daily(at one ti reviewed:  Lab Results   Component Value Date     (H) 05/19/2021    BUN 14 05/19/2021    BUNCREA 20.9 (H) 05/19/2021    CREATSERUM 0.67 05/19/2021    ANIONGAP 7 05/19/2021    GFRNAA 89 05/19/2021    GFRAA 103 05/19/2021    CA 9.4 05/19/2021    [address] [place] [address] Drive

## 2021-05-20 ENCOUNTER — OFFICE VISIT (OUTPATIENT)
Dept: INTERNAL MEDICINE CLINIC | Facility: CLINIC | Age: 71
End: 2021-05-20
Payer: MEDICARE

## 2021-05-20 ENCOUNTER — TELEPHONE (OUTPATIENT)
Dept: INTERNAL MEDICINE CLINIC | Facility: CLINIC | Age: 71
End: 2021-05-20

## 2021-05-20 VITALS
SYSTOLIC BLOOD PRESSURE: 130 MMHG | BODY MASS INDEX: 30.36 KG/M2 | DIASTOLIC BLOOD PRESSURE: 86 MMHG | HEIGHT: 62 IN | WEIGHT: 165 LBS

## 2021-05-20 DIAGNOSIS — C18.9 ADENOCARCINOMA OF COLON (HCC): ICD-10-CM

## 2021-05-20 DIAGNOSIS — Z01.818 PREOPERATIVE CLEARANCE: Primary | ICD-10-CM

## 2021-05-20 PROBLEM — D69.6 THROMBOCYTOPENIA (HCC): Chronic | Status: ACTIVE | Noted: 2021-05-20

## 2021-05-20 PROCEDURE — 3079F DIAST BP 80-89 MM HG: CPT | Performed by: INTERNAL MEDICINE

## 2021-05-20 PROCEDURE — 3075F SYST BP GE 130 - 139MM HG: CPT | Performed by: INTERNAL MEDICINE

## 2021-05-20 PROCEDURE — 3008F BODY MASS INDEX DOCD: CPT | Performed by: INTERNAL MEDICINE

## 2021-05-20 PROCEDURE — 99214 OFFICE O/P EST MOD 30 MIN: CPT | Performed by: INTERNAL MEDICINE

## 2021-05-20 NOTE — TELEPHONE ENCOUNTER
Sayra Sanchez from [employer] calling to see if we received a fax regarding patient's infusion chemo supplies for DME. She states she faxed it a few weeks ago and hasn't received a fax back.

## 2021-05-20 NOTE — PROGRESS NOTES
HPI/Subjective:   Patient ID: Chico Ding is a 79year old female. HPI Patient here for a presurgical clearance for redo colon resection for recurrent disease.     Medical hx -  Surgical - laparoscopic colon resection 2020 right hip replacement    No light. Cardiovascular:      Rate and Rhythm: Normal rate and regular rhythm. Heart sounds: No murmur heard. Pulmonary:      Effort: No respiratory distress. Breath sounds: Normal breath sounds.    Abdominal:      General: There is no distens

## 2021-05-21 ENCOUNTER — NURSE ONLY (OUTPATIENT)
Dept: HEMATOLOGY/ONCOLOGY | Facility: HOSPITAL | Age: 71
End: 2021-05-21
Attending: INTERNAL MEDICINE
Payer: MEDICARE

## 2021-05-21 DIAGNOSIS — Z51.11 CHEMOTHERAPY MANAGEMENT, ENCOUNTER FOR: Primary | ICD-10-CM

## 2021-05-21 DIAGNOSIS — Z45.2 ENCOUNTER FOR CENTRAL LINE CARE: ICD-10-CM

## 2021-05-21 DIAGNOSIS — Z79.899 ENCOUNTER FOR MEDICATION MANAGEMENT: ICD-10-CM

## 2021-05-21 DIAGNOSIS — C18.7 CANCER OF SIGMOID COLON (HCC): ICD-10-CM

## 2021-05-21 PROCEDURE — 96523 IRRIG DRUG DELIVERY DEVICE: CPT

## 2021-05-21 RX ORDER — HEPARIN SODIUM (PORCINE) LOCK FLUSH IV SOLN 100 UNIT/ML 100 UNIT/ML
500 SOLUTION INTRAVENOUS ONCE
Status: COMPLETED | OUTPATIENT
Start: 2021-05-21 | End: 2021-05-21

## 2021-05-21 RX ORDER — HEPARIN SODIUM (PORCINE) LOCK FLUSH IV SOLN 100 UNIT/ML 100 UNIT/ML
500 SOLUTION INTRAVENOUS ONCE
Status: CANCELLED | OUTPATIENT
Start: 2021-05-21

## 2021-05-21 RX ADMIN — HEPARIN SODIUM (PORCINE) LOCK FLUSH IV SOLN 100 UNIT/ML 500 UNITS: 100 SOLUTION INTRAVENOUS at 12:49:00

## 2021-05-24 ENCOUNTER — TELEPHONE (OUTPATIENT)
Dept: HEMATOLOGY/ONCOLOGY | Facility: HOSPITAL | Age: 71
End: 2021-05-24

## 2021-05-24 ENCOUNTER — TELEPHONE (OUTPATIENT)
Dept: SURGERY | Facility: CLINIC | Age: 71
End: 2021-05-24

## 2021-05-24 DIAGNOSIS — C18.7 CANCER OF SIGMOID COLON (HCC): Primary | ICD-10-CM

## 2021-05-24 RX ORDER — BLOOD-GLUCOSE CONTROL, LOW
EACH MISCELLANEOUS
COMMUNITY
End: 2021-05-24

## 2021-05-24 RX ORDER — GLUCOSAM/CHON-MSM1/C/MANG/BOSW 500-416.6
1 TABLET ORAL
Qty: 1 BOX | Refills: 3 | Status: SHIPPED | OUTPATIENT
Start: 2021-05-24 | End: 2021-11-17

## 2021-05-24 RX ORDER — ISOPROPYL ALCOHOL 0.75 G/1
1 SWAB TOPICAL DAILY
Qty: 100 EACH | Refills: 3 | Status: SHIPPED | OUTPATIENT
Start: 2021-05-24 | End: 2021-11-17

## 2021-05-24 RX ORDER — GLUCOSAM/CHON-MSM1/C/MANG/BOSW 500-416.6
TABLET ORAL
COMMUNITY
End: 2021-05-24

## 2021-05-24 RX ORDER — BLOOD-GLUCOSE CONTROL, LOW
1 EACH MISCELLANEOUS
Qty: 1 EACH | Refills: 3 | Status: SHIPPED | OUTPATIENT
Start: 2021-05-24 | End: 2021-11-17

## 2021-05-24 RX ORDER — CALCIUM CITRATE/VITAMIN D3 200MG-6.25
TABLET ORAL
COMMUNITY
End: 2021-05-24

## 2021-05-24 RX ORDER — BLOOD-GLUCOSE METER
EACH MISCELLANEOUS
COMMUNITY
End: 2021-05-24

## 2021-05-24 RX ORDER — BLOOD-GLUCOSE METER
1 EACH MISCELLANEOUS DAILY
Qty: 1 DEVICE | Refills: 0 | Status: SHIPPED | OUTPATIENT
Start: 2021-05-24 | End: 2021-11-17

## 2021-05-24 RX ORDER — ISOPROPYL ALCOHOL 0.75 G/1
SWAB TOPICAL
COMMUNITY
End: 2021-05-24

## 2021-05-24 RX ORDER — CALCIUM CITRATE/VITAMIN D3 200MG-6.25
1 TABLET ORAL DAILY
Qty: 100 STRIP | Refills: 3 | Status: SHIPPED | OUTPATIENT
Start: 2021-05-24 | End: 2021-11-17

## 2021-05-24 NOTE — TELEPHONE ENCOUNTER
Spoke to pt, changing surgery date to 6/14/2021 based on pt's request. Will submit a surgery case change.

## 2021-05-24 NOTE — TELEPHONE ENCOUNTER
Paperwork received from [employer]. SW placed call to the Rep/David Ojeda [phone], left message to inquire about any overlap in the paperwork review from what was sent last week (05/18/21). Awaiting response to proceed.     ERIN Zeng

## 2021-05-24 NOTE — TELEPHONE ENCOUNTER
Requested Prescriptions     Pending Prescriptions Disp Refills   • Blood Glucose Calibration (TRUE METRIX LEVEL 1) Low In Vitro Solution  0     Sig: by In Vitro route.    • Blood Glucose Monitoring Suppl (TRUE METRIX AIR GLUCOSE METER) Does not apply Device

## 2021-06-02 ENCOUNTER — APPOINTMENT (OUTPATIENT)
Dept: HEMATOLOGY/ONCOLOGY | Facility: HOSPITAL | Age: 71
End: 2021-06-02
Attending: INTERNAL MEDICINE
Payer: MEDICARE

## 2021-06-04 ENCOUNTER — TELEPHONE (OUTPATIENT)
Dept: ADMINISTRATIVE | Age: 71
End: 2021-06-04

## 2021-06-04 ENCOUNTER — APPOINTMENT (OUTPATIENT)
Dept: HEMATOLOGY/ONCOLOGY | Facility: HOSPITAL | Age: 71
End: 2021-06-04
Attending: INTERNAL MEDICINE
Payer: MEDICARE

## 2021-06-04 NOTE — TELEPHONE ENCOUNTER
Certificate Information   Certification Number  [id number]  Status  CERTIFIED IN TOTAL  Message  Preauthorization is a determination of medical necessity, not a guarantee of payment. Payment is subject to the member's coverage.  Charges by out of network pro

## 2021-06-04 NOTE — TELEPHONE ENCOUNTER
Fax received but was sent to scanning accidentally. I was waiting for fax to show up in scanning. Fax was loaded but there is nothing for Dr. Ariadne Wheeler to sign off on. Last page of fax states we need to go through the [employer].  Spoke with Chele Martinez and she

## 2021-06-11 ENCOUNTER — LAB ENCOUNTER (OUTPATIENT)
Dept: LAB | Facility: HOSPITAL | Age: 71
End: 2021-06-11
Attending: SURGERY
Payer: MEDICARE

## 2021-06-11 DIAGNOSIS — Z01.818 PREOP TESTING: ICD-10-CM

## 2021-06-14 ENCOUNTER — HOSPITAL ENCOUNTER (OUTPATIENT)
Facility: HOSPITAL | Age: 71
Setting detail: HOSPITAL OUTPATIENT SURGERY
Discharge: HOME | End: 2021-06-14
Attending: SURGERY | Admitting: SURGERY
Payer: MEDICARE

## 2021-06-14 ENCOUNTER — ANESTHESIA (OUTPATIENT)
Dept: SURGERY | Facility: HOSPITAL | Age: 71
End: 2021-06-14
Payer: MEDICARE

## 2021-06-14 ENCOUNTER — ANESTHESIA EVENT (OUTPATIENT)
Dept: SURGERY | Facility: HOSPITAL | Age: 71
End: 2021-06-14
Payer: MEDICARE

## 2021-06-14 VITALS
SYSTOLIC BLOOD PRESSURE: 126 MMHG | TEMPERATURE: 97 F | BODY MASS INDEX: 29.77 KG/M2 | WEIGHT: 168 LBS | DIASTOLIC BLOOD PRESSURE: 75 MMHG | HEART RATE: 76 BPM | OXYGEN SATURATION: 100 % | HEIGHT: 63 IN | RESPIRATION RATE: 16 BRPM

## 2021-06-14 DIAGNOSIS — Z01.818 PREOP TESTING: Primary | ICD-10-CM

## 2021-06-14 DIAGNOSIS — C78.6 METASTASIS TO PERITONEAL CAVITY (HCC): ICD-10-CM

## 2021-06-14 DIAGNOSIS — C18.7 CANCER OF SIGMOID COLON (HCC): ICD-10-CM

## 2021-06-14 DIAGNOSIS — C78.6 PERITONEAL CARCINOMATOSIS (HCC): Primary | ICD-10-CM

## 2021-06-14 PROCEDURE — 0WBH4ZX EXCISION OF RETROPERITONEUM, PERCUTANEOUS ENDOSCOPIC APPROACH, DIAGNOSTIC: ICD-10-PCS | Performed by: SURGERY

## 2021-06-14 PROCEDURE — 88341 IMHCHEM/IMCYTCHM EA ADD ANTB: CPT | Performed by: SURGERY

## 2021-06-14 PROCEDURE — 88342 IMHCHEM/IMCYTCHM 1ST ANTB: CPT | Performed by: SURGERY

## 2021-06-14 PROCEDURE — 82962 GLUCOSE BLOOD TEST: CPT

## 2021-06-14 PROCEDURE — 88305 TISSUE EXAM BY PATHOLOGIST: CPT | Performed by: SURGERY

## 2021-06-14 RX ORDER — TRAMADOL HYDROCHLORIDE 50 MG/1
50 TABLET ORAL EVERY 6 HOURS PRN
Qty: 10 TABLET | Refills: 0 | Status: SHIPPED | OUTPATIENT
Start: 2021-06-14 | End: 2021-06-16 | Stop reason: ALTCHOICE

## 2021-06-14 RX ORDER — HEPARIN SODIUM 5000 [USP'U]/ML
5000 INJECTION, SOLUTION INTRAVENOUS; SUBCUTANEOUS ONCE
Status: COMPLETED | OUTPATIENT
Start: 2021-06-14 | End: 2021-06-14

## 2021-06-14 RX ORDER — HYDROMORPHONE HYDROCHLORIDE 1 MG/ML
0.4 INJECTION, SOLUTION INTRAMUSCULAR; INTRAVENOUS; SUBCUTANEOUS EVERY 5 MIN PRN
Status: DISCONTINUED | OUTPATIENT
Start: 2021-06-14 | End: 2021-06-14

## 2021-06-14 RX ORDER — ROCURONIUM BROMIDE 10 MG/ML
INJECTION, SOLUTION INTRAVENOUS AS NEEDED
Status: DISCONTINUED | OUTPATIENT
Start: 2021-06-14 | End: 2021-06-14 | Stop reason: SURG

## 2021-06-14 RX ORDER — SODIUM CHLORIDE, SODIUM LACTATE, POTASSIUM CHLORIDE, CALCIUM CHLORIDE 600; 310; 30; 20 MG/100ML; MG/100ML; MG/100ML; MG/100ML
INJECTION, SOLUTION INTRAVENOUS CONTINUOUS
Status: DISCONTINUED | OUTPATIENT
Start: 2021-06-14 | End: 2021-06-14

## 2021-06-14 RX ORDER — ONDANSETRON 2 MG/ML
4 INJECTION INTRAMUSCULAR; INTRAVENOUS ONCE AS NEEDED
Status: COMPLETED | OUTPATIENT
Start: 2021-06-14 | End: 2021-06-14

## 2021-06-14 RX ORDER — MORPHINE SULFATE 4 MG/ML
4 INJECTION, SOLUTION INTRAMUSCULAR; INTRAVENOUS EVERY 10 MIN PRN
Status: DISCONTINUED | OUTPATIENT
Start: 2021-06-14 | End: 2021-06-14

## 2021-06-14 RX ORDER — HYDROCODONE BITARTRATE AND ACETAMINOPHEN 5; 325 MG/1; MG/1
1 TABLET ORAL AS NEEDED
Status: DISCONTINUED | OUTPATIENT
Start: 2021-06-14 | End: 2021-06-14

## 2021-06-14 RX ORDER — NALOXONE HYDROCHLORIDE 0.4 MG/ML
80 INJECTION, SOLUTION INTRAMUSCULAR; INTRAVENOUS; SUBCUTANEOUS AS NEEDED
Status: DISCONTINUED | OUTPATIENT
Start: 2021-06-14 | End: 2021-06-14

## 2021-06-14 RX ORDER — LIDOCAINE HYDROCHLORIDE 10 MG/ML
INJECTION, SOLUTION EPIDURAL; INFILTRATION; INTRACAUDAL; PERINEURAL AS NEEDED
Status: DISCONTINUED | OUTPATIENT
Start: 2021-06-14 | End: 2021-06-14 | Stop reason: SURG

## 2021-06-14 RX ORDER — MORPHINE SULFATE 4 MG/ML
2 INJECTION, SOLUTION INTRAMUSCULAR; INTRAVENOUS EVERY 10 MIN PRN
Status: DISCONTINUED | OUTPATIENT
Start: 2021-06-14 | End: 2021-06-14

## 2021-06-14 RX ORDER — PROCHLORPERAZINE EDISYLATE 5 MG/ML
5 INJECTION INTRAMUSCULAR; INTRAVENOUS ONCE AS NEEDED
Status: DISCONTINUED | OUTPATIENT
Start: 2021-06-14 | End: 2021-06-14

## 2021-06-14 RX ORDER — PHENYLEPHRINE HCL 10 MG/ML
VIAL (ML) INJECTION AS NEEDED
Status: DISCONTINUED | OUTPATIENT
Start: 2021-06-14 | End: 2021-06-14 | Stop reason: SURG

## 2021-06-14 RX ORDER — SODIUM CHLORIDE 9 MG/ML
INJECTION, SOLUTION INTRAVENOUS CONTINUOUS PRN
Status: DISCONTINUED | OUTPATIENT
Start: 2021-06-14 | End: 2021-06-14 | Stop reason: SURG

## 2021-06-14 RX ORDER — HYDROCODONE BITARTRATE AND ACETAMINOPHEN 5; 325 MG/1; MG/1
2 TABLET ORAL AS NEEDED
Status: DISCONTINUED | OUTPATIENT
Start: 2021-06-14 | End: 2021-06-14

## 2021-06-14 RX ORDER — MORPHINE SULFATE 10 MG/ML
6 INJECTION, SOLUTION INTRAMUSCULAR; INTRAVENOUS EVERY 10 MIN PRN
Status: DISCONTINUED | OUTPATIENT
Start: 2021-06-14 | End: 2021-06-14

## 2021-06-14 RX ORDER — ACETAMINOPHEN 500 MG
1000 TABLET ORAL ONCE
Status: COMPLETED | OUTPATIENT
Start: 2021-06-14 | End: 2021-06-14

## 2021-06-14 RX ORDER — CEFAZOLIN SODIUM/WATER 2 G/20 ML
2 SYRINGE (ML) INTRAVENOUS ONCE
Status: COMPLETED | OUTPATIENT
Start: 2021-06-14 | End: 2021-06-14

## 2021-06-14 RX ORDER — HYDROMORPHONE HYDROCHLORIDE 1 MG/ML
0.6 INJECTION, SOLUTION INTRAMUSCULAR; INTRAVENOUS; SUBCUTANEOUS EVERY 5 MIN PRN
Status: DISCONTINUED | OUTPATIENT
Start: 2021-06-14 | End: 2021-06-14

## 2021-06-14 RX ORDER — HYDROMORPHONE HYDROCHLORIDE 1 MG/ML
0.2 INJECTION, SOLUTION INTRAMUSCULAR; INTRAVENOUS; SUBCUTANEOUS EVERY 5 MIN PRN
Status: DISCONTINUED | OUTPATIENT
Start: 2021-06-14 | End: 2021-06-14

## 2021-06-14 RX ADMIN — SODIUM CHLORIDE, SODIUM LACTATE, POTASSIUM CHLORIDE, CALCIUM CHLORIDE: 600; 310; 30; 20 INJECTION, SOLUTION INTRAVENOUS at 14:01:00

## 2021-06-14 RX ADMIN — SODIUM CHLORIDE: 9 INJECTION, SOLUTION INTRAVENOUS at 14:01:00

## 2021-06-14 RX ADMIN — PHENYLEPHRINE HCL 100 MCG: 10 MG/ML VIAL (ML) INJECTION at 13:18:00

## 2021-06-14 RX ADMIN — CEFAZOLIN SODIUM/WATER 2 G: 2 G/20 ML SYRINGE (ML) INTRAVENOUS at 13:05:00

## 2021-06-14 RX ADMIN — SODIUM CHLORIDE, SODIUM LACTATE, POTASSIUM CHLORIDE, CALCIUM CHLORIDE: 600; 310; 30; 20 INJECTION, SOLUTION INTRAVENOUS at 13:36:00

## 2021-06-14 RX ADMIN — LIDOCAINE HYDROCHLORIDE 50 MG: 10 INJECTION, SOLUTION EPIDURAL; INFILTRATION; INTRACAUDAL; PERINEURAL at 12:57:00

## 2021-06-14 RX ADMIN — SODIUM CHLORIDE: 9 INJECTION, SOLUTION INTRAVENOUS at 13:05:00

## 2021-06-14 RX ADMIN — PHENYLEPHRINE HCL 100 MCG: 10 MG/ML VIAL (ML) INJECTION at 13:10:00

## 2021-06-14 RX ADMIN — ROCURONIUM BROMIDE 40 MG: 10 INJECTION, SOLUTION INTRAVENOUS at 12:57:00

## 2021-06-14 NOTE — H&P (VIEW-ONLY)
[facility]    Patient Name:  Beatrice Edge   YOB: 1950   Gender:  Female   Appt Date:  4/29/2021   Provider:  Ruth Kim MD   Insurance:  Les Lob MA HMO     PATIENT PROVIDERS  Referring Prov treatment response with resolution of peritoneal/omental tumor nodules, with continued mild residual haziness to greater omentum.    4/20/2021: PET/CT without evidence of pathologic FDG uptake.      She presents for diagnostic laparoscopy today     Vital Si Laterality Date   • COLONOSCOPY     • COLONOSCOPY N/A 3/6/2020    Procedure: COLONOSCOPY;  Surgeon: Jose Elias Block MD;  Location: [address] [place] [address] ENDOSCOPY   • D & C  3 years ago   • DILATION/CURETTAGE,DIAGNOSTIC     • TOTAL HIP REPLACEMENT Right 2015      Reviewed Soc effort is normal. No respiratory distress. Abdominal:      General: There is no distension. Palpations: Abdomen is soft. Tenderness: There is no abdominal tenderness. There is no guarding or rebound. Hernia: No hernia is present.    Musculo

## 2021-06-14 NOTE — ANESTHESIA POSTPROCEDURE EVALUATION
Patient: Sanjay Sampson    Procedure Summary     Date: 06/14/21 Room / Location: [address] [place] [address] MAIN OR 06 / [address] [place] [address] MAIN OR    Anesthesia Start: 8366 Anesthesia Stop:     Procedure: DIAGNOSTIC LAPAROSCOPY WITH POSSIBLE BIOPSY (N/A Abdomen) Diagnosis:       Cancer of si

## 2021-06-14 NOTE — ANESTHESIA PREPROCEDURE EVALUATION
Anesthesia PreOp Note    HPI:     Valeria Ponce is a 79year old female who presents for preoperative consultation requested by: Melanie Lund MD    Date of Surgery: 6/14/2021    Procedure(s):  DIAGNOSTIC LAPAROSCOPY WITH POSSIBLE BIOPSY  Indication: C mouth 3 (three) times daily as needed for Muscle spasms. , Disp: 20 tablet, Rfl: 1, Past Week at Unknown time  Vitamin D3 50 MCG (2000 UT) Oral Cap, Take 2,000 Units by mouth 2 (two) times a day. Per pt.  Takes 4000 units daily(at one time) , Disp: , Rfl: , MD    No current [facility]-ordered outpatient medications on file.       No Known Allergies    Family History   Problem Relation Age of Onset   • Hypertension Father [de-identified]   • Diabetes Mother [de-identified]   • Other (carpal tunnel) Sister    • Colon Cancer Self 79     Social MCV 95.4 05/19/2021    MCH 30.8 05/19/2021    MCHC 32.3 05/19/2021    RDW 13.1 05/19/2021    .0 (L) 05/19/2021     Lab Results   Component Value Date     05/19/2021    K 3.7 05/19/2021     05/19/2021    CO2 28.0 05/19/2021    BUN 14 0

## 2021-06-14 NOTE — H&P
[facility]    Patient Name:  Vivian Durán   YOB: 1950   Gender:  Female   Appt Date:  4/29/2021   Provider:  Star Desir MD   Insurance:  David Brower MA [facility]     PATIENT PROVIDERS  Referring Prov treatment response with resolution of peritoneal/omental tumor nodules, with continued mild residual haziness to greater omentum.    4/20/2021: PET/CT without evidence of pathologic FDG uptake.      She presents for diagnostic laparoscopy today     Vital Si Laterality Date   • COLONOSCOPY     • COLONOSCOPY N/A 3/6/2020    Procedure: COLONOSCOPY;  Surgeon: Blair Medellin MD;  Location: [address] [place] [address] ENDOSCOPY   • D & C  3 years ago   • DILATION/CURETTAGE,DIAGNOSTIC     • TOTAL HIP REPLACEMENT Right 2015      Reviewed Soc effort is normal. No respiratory distress. Abdominal:      General: There is no distension. Palpations: Abdomen is soft. Tenderness: There is no abdominal tenderness. There is no guarding or rebound. Hernia: No hernia is present.    Musculo

## 2021-06-14 NOTE — OPERATIVE REPORT
Date of operation 6/14/2021    Preoperative diagnosis:  1. History of left-sided colon cancer with peritoneal metastasis    Procedure performed:  1. Diagnostic laparoscopy with biopsy    Postoperative diagnosis:  1.   Same      Region Location Lesion size vision, a 5 mm port was introduced into the peritoneal cavity and pneumoperitoneum established. An additional working supraumbilical 5 mm port was placed. Please note the operative findings above.   A biopsy was performed of the right upper quadrant perit

## 2021-06-14 NOTE — BRIEF OP NOTE
Pre-Operative Diagnosis: Cancer of sigmoid colon ([facility] [C18.7]  [facility]ast[facility] [C78.6]     Post-Operative Diagnosis: Cancer of sigmoid colon ([facility] [facility] [C18. 7]Metastasis to peritoneal cavity [facility]) [C78.6]      Procedure Performed:   [facility]

## 2021-06-15 ENCOUNTER — TELEPHONE (OUTPATIENT)
Dept: HEMATOLOGY/ONCOLOGY | Facility: HOSPITAL | Age: 71
End: 2021-06-15

## 2021-06-15 ENCOUNTER — TELEPHONE (OUTPATIENT)
Dept: SURGERY | Facility: CLINIC | Age: 71
End: 2021-06-15

## 2021-06-15 NOTE — TELEPHONE ENCOUNTER
Called pt to see how she is doing after her surgical procedure with Dr. Pati Miller yesterday, pt is feeling well, pain controlled with Tylenol. Pt reports dried blood on dressing with some oozing noted onto clothing.  Asked pt's daughter to reinforce current

## 2021-06-15 NOTE — TELEPHONE ENCOUNTER
I spoke with Caitlin Angulo and let her know that we are not going to be giving her chemo tomorrow d/t her HIPEC surgery on 6/29. I offered her the MD appointment for tomorrow and she declined.   I asked that she please follow up with us after her surgery, she

## 2021-06-16 ENCOUNTER — APPOINTMENT (OUTPATIENT)
Dept: HEMATOLOGY/ONCOLOGY | Facility: HOSPITAL | Age: 71
End: 2021-06-16
Attending: INTERNAL MEDICINE
Payer: MEDICARE

## 2021-06-16 DIAGNOSIS — C78.6 METASTASIS TO PERITONEAL CAVITY (HCC): Primary | ICD-10-CM

## 2021-06-16 RX ORDER — SODIUM CHLORIDE, SODIUM LACTATE, POTASSIUM CHLORIDE, CALCIUM CHLORIDE 600; 310; 30; 20 MG/100ML; MG/100ML; MG/100ML; MG/100ML
INJECTION, SOLUTION INTRAVENOUS CONTINUOUS
Status: CANCELLED | OUTPATIENT
Start: 2021-06-16

## 2021-06-18 ENCOUNTER — APPOINTMENT (OUTPATIENT)
Dept: HEMATOLOGY/ONCOLOGY | Facility: HOSPITAL | Age: 71
End: 2021-06-18
Attending: INTERNAL MEDICINE
Payer: MEDICARE

## 2021-06-22 NOTE — PATIENT INSTRUCTIONS
Surgery:  CYTOREDUCTIVE SURGERY, POSSIBLE MULTI-ORGAN RESECTION, PROCTECTOMY, HYSTERECTOMY, BILATERAL SALPINGO-OOPHORECTOMY, POSSIBLE LIVER ABLATION, HYPERTHEMIC INTRAPERITONEAL CHEMOTHERAPY (HIPEC)    Date of Surgery: 6/29/2021    Hospital:    [facility] at the time of your second Ensure Pre-Surgery drink(4hrs prior to surgery time), unless instructed otherwise by your surgeon. · Bring your picture ID and insurance card with you. · Wear comfortable clothing that can easily be removed.  Preferably, some

## 2021-06-23 ENCOUNTER — NURSE ONLY (OUTPATIENT)
Dept: LAB | Facility: HOSPITAL | Age: 71
End: 2021-06-23
Payer: MEDICARE

## 2021-06-23 ENCOUNTER — OFFICE VISIT (OUTPATIENT)
Dept: SURGERY | Facility: CLINIC | Age: 71
End: 2021-06-23
Payer: MEDICARE

## 2021-06-23 VITALS
SYSTOLIC BLOOD PRESSURE: 121 MMHG | BODY MASS INDEX: 30 KG/M2 | RESPIRATION RATE: 16 BRPM | OXYGEN SATURATION: 100 % | WEIGHT: 167 LBS | DIASTOLIC BLOOD PRESSURE: 84 MMHG | HEART RATE: 82 BPM | TEMPERATURE: 98 F

## 2021-06-23 DIAGNOSIS — Z01.818 PRE-OP TESTING: ICD-10-CM

## 2021-06-23 DIAGNOSIS — C78.6 PERITONEAL CARCINOMATOSIS (HCC): Primary | ICD-10-CM

## 2021-06-23 PROCEDURE — 99024 POSTOP FOLLOW-UP VISIT: CPT | Performed by: SURGERY

## 2021-06-23 PROCEDURE — 86900 BLOOD TYPING SEROLOGIC ABO: CPT

## 2021-06-23 PROCEDURE — 86850 RBC ANTIBODY SCREEN: CPT

## 2021-06-23 PROCEDURE — 86901 BLOOD TYPING SEROLOGIC RH(D): CPT

## 2021-06-23 PROCEDURE — 85027 COMPLETE CBC AUTOMATED: CPT

## 2021-06-23 PROCEDURE — 36415 COLL VENOUS BLD VENIPUNCTURE: CPT

## 2021-06-23 PROCEDURE — 3079F DIAST BP 80-89 MM HG: CPT | Performed by: SURGERY

## 2021-06-23 PROCEDURE — 3074F SYST BP LT 130 MM HG: CPT | Performed by: SURGERY

## 2021-06-23 RX ORDER — METRONIDAZOLE 500 MG/1
TABLET ORAL
Qty: 3 TABLET | Refills: 0 | Status: ON HOLD | OUTPATIENT
Start: 2021-06-23 | End: 2021-07-04

## 2021-06-23 RX ORDER — NEOMYCIN SULFATE 500 MG/1
TABLET ORAL
Qty: 6 TABLET | Refills: 0 | Status: ON HOLD | OUTPATIENT
Start: 2021-06-23 | End: 2021-07-04

## 2021-06-23 NOTE — PROGRESS NOTES
[facility]    Patient Name:  Chinyere Montaño   YOB: 1950   Gender:  Female   Appt Date:  06/23/21   Provider:  Tessa Rand MD   Insurance:  [id number]  Referring Provider metastatic disease. Subtle hypoenhancing hepatic lesions were seen.    11/12/2020-4/21/2021: Patient completed 12 cycles of FOLFIRI + Avastin  1/30/2021: CT c/a/p with significant positive treatment response with resolution of peritoneal/omental tumor nodul Maleate (COMPAZINE) 10 mg tablet, Take 1 tablet (10 mg total) by mouth every 6 (six) hours as needed for Nausea., Disp: 60 tablet, Rfl: 3  •  Ondansetron HCl (ZOFRAN) 8 MG tablet, Take 1 tablet (8 mg total) by mouth every 8 (eight) hours as needed for Naus use: Yes        Alcohol/week: 0.0 standard drinks        Comment: on occ      Drug use: No     Reviewed:  Family History   Problem Relation Age of Onset   • Hypertension Father [de-identified]   • Diabetes Mother [de-identified]   • Other (carpal tunnel) Sister    • Colon Cancer Se pelvis. 2. A polypoid distal sigmoid colon lesion with a contained perforation in the left lateral pericolonic fat likely related to the recent biopsy. 3. Few tiny uncomplicated right-sided colonic diverticula. 4. Small hiatal hernia.    5. Hepatic cy on formalin-fixed, paraffin-embedded tissue (Block A5):      HER-2/mita (Leica, monoclonal, clone c-erbB) status:  0 (Negative)     Consensus Panel Scoring Criteria for Gastroesophageal Adenocarcinoma:  HER-2/mita:  0 (Negative):   No staining/membranous reac measuring 0.6 x 1.0 cm (series 2, image 74). There is  a subcapsular lesion in the right hepatic lobe measuring 1.9 x 1.0 cm (series 2, image 77). A few smaller lesions are suspected, but difficult to assess within the parameters of this study.  Isolated implant measuring up to 1.3 x 0.9 cm (series 2, image 140). A midline   anterior omental implant measures 2.0 x 1.2 cm (series 2, image 155). Numerous smaller nodules are present.    URINARY BLADDER: Assessment is suboptimal secondary to artifactual degrada air or fluid is seen in the abdomen or pelvis.           Impression   CONCLUSION:   1. There has been interval development of extensive peritoneal metastatic disease.       2.  There is nodularity adjacent to the sigmoid colocolonic anastomotic site, presum and smaller perifissural implant are no longer present. BILIARY: No evidence for cholecystitis or biliary dilatation.    SPLEEN: Normal.     PANCREAS: Normal.     ADRENALS: Normal.       KIDNEYS: Normal.   AORTA/VASCULAR: No aneurysm or dissection.     LY pathologic FDG activity.     ABDOMEN/PELVIS: Previously noted peritoneum plants have resolved.  No pathologic FDG activity.  Postop changes of rectosigmoid resection again noted.     MUSCULOSKELETAL: Status post right hip arthroplasty.  No pathologic FDG a

## 2021-06-24 DIAGNOSIS — C78.6 METASTASIS TO PERITONEAL CAVITY (HCC): Primary | ICD-10-CM

## 2021-06-27 ENCOUNTER — LAB ENCOUNTER (OUTPATIENT)
Dept: LAB | Facility: HOSPITAL | Age: 71
End: 2021-06-27
Attending: SURGERY
Payer: MEDICARE

## 2021-06-27 DIAGNOSIS — C78.6 PERITONEAL CARCINOMATOSIS (HCC): ICD-10-CM

## 2021-06-27 DIAGNOSIS — Z01.818 PRE-OP TESTING: ICD-10-CM

## 2021-06-27 LAB
CANCER AG19-9 SERPL-ACNC: <2 U/ML (ref ?–37)
SARS-COV-2 RNA RESP QL NAA+PROBE: NOT DETECTED

## 2021-06-27 PROCEDURE — 36415 COLL VENOUS BLD VENIPUNCTURE: CPT | Performed by: SURGERY

## 2021-06-27 PROCEDURE — 86301 IMMUNOASSAY TUMOR CA 19-9: CPT

## 2021-06-27 PROCEDURE — 86304 IMMUNOASSAY TUMOR CA 125: CPT | Performed by: SURGERY

## 2021-06-28 ENCOUNTER — OFFICE VISIT (OUTPATIENT)
Dept: WOUND CARE | Facility: HOSPITAL | Age: 71
End: 2021-06-28
Attending: SURGERY
Payer: MEDICARE

## 2021-06-28 DIAGNOSIS — C78.6 PERITONEAL CARCINOMATOSIS (HCC): ICD-10-CM

## 2021-06-28 PROCEDURE — 99213 OFFICE O/P EST LOW 20 MIN: CPT

## 2021-06-28 NOTE — PROGRESS NOTES
[facility]  Report of Pre-op Ostomy Nurse Consultation    Medhat Omalley Patient Status:  Wound Series    1950 MRN [id number]   [address] Attending Jasmine Lockhart MD     History of Present Illness:  Tip Morley literature reviewed with patient. Patient watched both educational videos on ileostomy and colostomy care. Stoma site marked per MD order, pt was instructed that these markings are guidelines but surgeon will choose final location during surgery.   Abd

## 2021-06-29 ENCOUNTER — ANESTHESIA (OUTPATIENT)
Dept: SURGERY | Facility: HOSPITAL | Age: 71
DRG: 330 | End: 2021-06-29
Payer: MEDICARE

## 2021-06-29 ENCOUNTER — HOSPITAL ENCOUNTER (INPATIENT)
Facility: HOSPITAL | Age: 71
LOS: 6 days | Discharge: HOME HEALTH | DRG: 330 | End: 2021-07-05
Attending: SURGERY | Admitting: SURGERY
Payer: MEDICARE

## 2021-06-29 ENCOUNTER — ANESTHESIA EVENT (OUTPATIENT)
Dept: SURGERY | Facility: HOSPITAL | Age: 71
DRG: 330 | End: 2021-06-29
Payer: MEDICARE

## 2021-06-29 DIAGNOSIS — Z01.818 PRE-OP TESTING: Primary | ICD-10-CM

## 2021-06-29 DIAGNOSIS — C78.6 PERITONEAL CARCINOMATOSIS (HCC): ICD-10-CM

## 2021-06-29 LAB
ALBUMIN SERPL-MCNC: 2.7 G/DL (ref 3.4–5)
ALBUMIN/GLOB SERPL: 1.2 {RATIO} (ref 1–2)
ALP LIVER SERPL-CCNC: 47 U/L
ALT SERPL-CCNC: 252 U/L
ANION GAP SERPL CALC-SCNC: 7 MMOL/L (ref 0–18)
AST SERPL-CCNC: 479 U/L (ref 15–37)
BILIRUB SERPL-MCNC: 0.5 MG/DL (ref 0.1–2)
BUN BLD-MCNC: 11 MG/DL (ref 7–18)
BUN/CREAT SERPL: 16.4 (ref 10–20)
CALCIUM BLD-MCNC: 7.7 MG/DL (ref 8.5–10.1)
CANCER AG125 SERPL-ACNC: 9.9 U/ML (ref ?–35)
CANCER AG19-9 SERPL-ACNC: <2 U/ML (ref ?–37)
CHLORIDE SERPL-SCNC: 111 MMOL/L (ref 98–112)
CO2 SERPL-SCNC: 23 MMOL/L (ref 21–32)
CREAT BLD-MCNC: 0.67 MG/DL
DEPRECATED RDW RBC AUTO: 46 FL (ref 35.1–46.3)
ERYTHROCYTE [DISTWIDTH] IN BLOOD BY AUTOMATED COUNT: 13.2 % (ref 11–15)
GLOBULIN PLAS-MCNC: 2.2 G/DL (ref 2.8–4.4)
GLUCOSE BLD-MCNC: 139 MG/DL (ref 70–99)
GLUCOSE BLD-MCNC: 165 MG/DL (ref 70–99)
GLUCOSE BLD-MCNC: 190 MG/DL (ref 70–99)
GLUCOSE BLD-MCNC: 195 MG/DL (ref 70–99)
GLUCOSE BLD-MCNC: 197 MG/DL (ref 70–99)
GLUCOSE BLD-MCNC: 87 MG/DL (ref 70–99)
HCT VFR BLD AUTO: 34.8 %
HGB BLD-MCNC: 11.4 G/DL
ISTAT BLOOD GAS BASE EXCESS: -1 MMOL/L
ISTAT BLOOD GAS BASE EXCESS: -5 MMOL/L
ISTAT BLOOD GAS BASE EXCESS: 0 MMOL/L
ISTAT BLOOD GAS HCO3: 19.9 MEQ/L
ISTAT BLOOD GAS HCO3: 23.9 MEQ/L
ISTAT BLOOD GAS HCO3: 24.9 MEQ/L
ISTAT BLOOD GAS O2 SATURATION: 100 %
ISTAT BLOOD GAS PCO2: 34.1 MMHG
ISTAT BLOOD GAS PCO2: 38.4 MMHG
ISTAT BLOOD GAS PCO2: 41 MMHG
ISTAT BLOOD GAS PH: 7.37
ISTAT BLOOD GAS PH: 7.38
ISTAT BLOOD GAS PH: 7.42
ISTAT BLOOD GAS PO2: 238 MMHG
ISTAT BLOOD GAS PO2: 315 MMHG
ISTAT BLOOD GAS PO2: 371 MMHG
ISTAT BLOOD GAS TCO2: 21 MMOL/L (ref 22–32)
ISTAT BLOOD GAS TCO2: 25 MMOL/L (ref 22–32)
ISTAT BLOOD GAS TCO2: 26 MMOL/L (ref 22–32)
ISTAT HEMATOCRIT: 28 %
ISTAT HEMATOCRIT: 29 %
ISTAT HEMATOCRIT: 33 %
ISTAT IONIZED CALCIUM: 1.04 MMOL/L (ref 1.12–1.32)
ISTAT IONIZED CALCIUM: 1.06 MMOL/L (ref 1.12–1.32)
ISTAT IONIZED CALCIUM: 1.1 MMOL/L (ref 1.12–1.32)
ISTAT POTASSIUM: 3.2 MMOL/L (ref 3.6–5.1)
ISTAT POTASSIUM: 3.6 MMOL/L (ref 3.6–5.1)
ISTAT POTASSIUM: 3.8 MMOL/L (ref 3.6–5.1)
ISTAT SODIUM: 141 MMOL/L (ref 136–145)
ISTAT SODIUM: 142 MMOL/L (ref 136–145)
ISTAT SODIUM: 144 MMOL/L (ref 136–145)
M PROTEIN MFR SERPL ELPH: 4.9 G/DL (ref 6.4–8.2)
MCH RBC QN AUTO: 31.1 PG (ref 26–34)
MCHC RBC AUTO-ENTMCNC: 32.8 G/DL (ref 31–37)
MCV RBC AUTO: 95.1 FL
OSMOLALITY SERPL CALC.SUM OF ELEC: 297 MOSM/KG (ref 275–295)
PLATELET # BLD AUTO: 117 10(3)UL (ref 150–450)
POTASSIUM SERPL-SCNC: 4.1 MMOL/L (ref 3.5–5.1)
RBC # BLD AUTO: 3.66 X10(6)UL
SODIUM SERPL-SCNC: 141 MMOL/L (ref 136–145)
WBC # BLD AUTO: 11.1 X10(3) UL (ref 4–11)

## 2021-06-29 PROCEDURE — 3078F DIAST BP <80 MM HG: CPT | Performed by: HOSPITALIST

## 2021-06-29 PROCEDURE — 0UT20ZZ RESECTION OF BILATERAL OVARIES, OPEN APPROACH: ICD-10-PCS | Performed by: SURGERY

## 2021-06-29 PROCEDURE — 0MBH0ZZ EXCISION OF RIGHT ABDOMEN BURSA AND LIGAMENT, OPEN APPROACH: ICD-10-PCS | Performed by: SURGERY

## 2021-06-29 PROCEDURE — 0DBW0ZZ EXCISION OF PERITONEUM, OPEN APPROACH: ICD-10-PCS | Performed by: SURGERY

## 2021-06-29 PROCEDURE — 99222 1ST HOSP IP/OBS MODERATE 55: CPT | Performed by: HOSPITALIST

## 2021-06-29 PROCEDURE — 3074F SYST BP LT 130 MM HG: CPT | Performed by: HOSPITALIST

## 2021-06-29 PROCEDURE — 0DBA0ZZ EXCISION OF JEJUNUM, OPEN APPROACH: ICD-10-PCS | Performed by: SURGERY

## 2021-06-29 PROCEDURE — 76942 ECHO GUIDE FOR BIOPSY: CPT | Performed by: INTERNAL MEDICINE

## 2021-06-29 PROCEDURE — 0DBU0ZZ EXCISION OF OMENTUM, OPEN APPROACH: ICD-10-PCS | Performed by: SURGERY

## 2021-06-29 PROCEDURE — 3008F BODY MASS INDEX DOCD: CPT | Performed by: HOSPITALIST

## 2021-06-29 PROCEDURE — 0UT70ZZ RESECTION OF BILATERAL FALLOPIAN TUBES, OPEN APPROACH: ICD-10-PCS | Performed by: SURGERY

## 2021-06-29 PROCEDURE — 07TP0ZZ RESECTION OF SPLEEN, OPEN APPROACH: ICD-10-PCS | Performed by: SURGERY

## 2021-06-29 PROCEDURE — P9045 ALBUMIN (HUMAN), 5%, 250 ML: HCPCS | Performed by: INTERNAL MEDICINE

## 2021-06-29 PROCEDURE — 05HC33Z INSERTION OF INFUSION DEVICE INTO LEFT BASILIC VEIN, PERCUTANEOUS APPROACH: ICD-10-PCS | Performed by: SURGERY

## 2021-06-29 PROCEDURE — 3E0M30Y INTRODUCTION OF HYPERTHERMIC ANTINEOPLASTIC INTO PERITONEAL CAVITY, PERCUTANEOUS APPROACH: ICD-10-PCS | Performed by: SURGERY

## 2021-06-29 PROCEDURE — 30233J1 TRANSFUSION OF NONAUTOLOGOUS SERUM ALBUMIN INTO PERIPHERAL VEIN, PERCUTANEOUS APPROACH: ICD-10-PCS | Performed by: SURGERY

## 2021-06-29 PROCEDURE — 0D5W0ZZ DESTRUCTION OF PERITONEUM, OPEN APPROACH: ICD-10-PCS | Performed by: SURGERY

## 2021-06-29 PROCEDURE — BF45ZZZ ULTRASONOGRAPHY OF LIVER: ICD-10-PCS | Performed by: SURGERY

## 2021-06-29 PROCEDURE — 3E0T3BZ INTRODUCTION OF ANESTHETIC AGENT INTO PERIPHERAL NERVES AND PLEXI, PERCUTANEOUS APPROACH: ICD-10-PCS | Performed by: INTERNAL MEDICINE

## 2021-06-29 RX ORDER — NALOXONE HYDROCHLORIDE 0.4 MG/ML
80 INJECTION, SOLUTION INTRAMUSCULAR; INTRAVENOUS; SUBCUTANEOUS AS NEEDED
Status: DISCONTINUED | OUTPATIENT
Start: 2021-06-29 | End: 2021-06-29 | Stop reason: HOSPADM

## 2021-06-29 RX ORDER — HEPARIN SODIUM 5000 [USP'U]/ML
5000 INJECTION, SOLUTION INTRAVENOUS; SUBCUTANEOUS ONCE
Status: COMPLETED | OUTPATIENT
Start: 2021-06-29 | End: 2021-06-29

## 2021-06-29 RX ORDER — BUPIVACAINE HYDROCHLORIDE 2.5 MG/ML
INJECTION, SOLUTION EPIDURAL; INFILTRATION; INTRACAUDAL AS NEEDED
Status: DISCONTINUED | OUTPATIENT
Start: 2021-06-29 | End: 2021-06-29 | Stop reason: SURG

## 2021-06-29 RX ORDER — ALBUMIN, HUMAN INJ 5% 5 %
SOLUTION INTRAVENOUS CONTINUOUS PRN
Status: DISCONTINUED | OUTPATIENT
Start: 2021-06-29 | End: 2021-06-29 | Stop reason: SURG

## 2021-06-29 RX ORDER — MEPERIDINE HYDROCHLORIDE 25 MG/ML
12.5 INJECTION INTRAMUSCULAR; INTRAVENOUS; SUBCUTANEOUS AS NEEDED
Status: DISCONTINUED | OUTPATIENT
Start: 2021-06-29 | End: 2021-06-29 | Stop reason: HOSPADM

## 2021-06-29 RX ORDER — MIDAZOLAM HYDROCHLORIDE 1 MG/ML
INJECTION INTRAMUSCULAR; INTRAVENOUS AS NEEDED
Status: DISCONTINUED | OUTPATIENT
Start: 2021-06-29 | End: 2021-06-29 | Stop reason: SURG

## 2021-06-29 RX ORDER — DEXAMETHASONE SODIUM PHOSPHATE 4 MG/ML
VIAL (ML) INJECTION AS NEEDED
Status: DISCONTINUED | OUTPATIENT
Start: 2021-06-29 | End: 2021-06-29 | Stop reason: SURG

## 2021-06-29 RX ORDER — SODIUM CHLORIDE, SODIUM LACTATE, POTASSIUM CHLORIDE, CALCIUM CHLORIDE 600; 310; 30; 20 MG/100ML; MG/100ML; MG/100ML; MG/100ML
INJECTION, SOLUTION INTRAVENOUS CONTINUOUS PRN
Status: DISCONTINUED | OUTPATIENT
Start: 2021-06-29 | End: 2021-06-29 | Stop reason: SURG

## 2021-06-29 RX ORDER — HYDROMORPHONE HYDROCHLORIDE 1 MG/ML
0.4 INJECTION, SOLUTION INTRAMUSCULAR; INTRAVENOUS; SUBCUTANEOUS EVERY 5 MIN PRN
Status: DISCONTINUED | OUTPATIENT
Start: 2021-06-29 | End: 2021-06-29 | Stop reason: HOSPADM

## 2021-06-29 RX ORDER — INSULIN ASPART 100 [IU]/ML
INJECTION, SOLUTION INTRAVENOUS; SUBCUTANEOUS
Status: COMPLETED
Start: 2021-06-29 | End: 2021-06-29

## 2021-06-29 RX ORDER — SODIUM CHLORIDE, SODIUM LACTATE, POTASSIUM CHLORIDE, CALCIUM CHLORIDE 600; 310; 30; 20 MG/100ML; MG/100ML; MG/100ML; MG/100ML
INJECTION, SOLUTION INTRAVENOUS CONTINUOUS
Status: DISCONTINUED | OUTPATIENT
Start: 2021-06-29 | End: 2021-06-30

## 2021-06-29 RX ORDER — ONDANSETRON 2 MG/ML
INJECTION INTRAMUSCULAR; INTRAVENOUS
Status: COMPLETED
Start: 2021-06-29 | End: 2021-06-29

## 2021-06-29 RX ORDER — INSULIN ASPART 100 [IU]/ML
INJECTION, SOLUTION INTRAVENOUS; SUBCUTANEOUS ONCE
Status: COMPLETED | OUTPATIENT
Start: 2021-06-29 | End: 2021-06-29

## 2021-06-29 RX ORDER — SODIUM CHLORIDE 9 MG/ML
INJECTION, SOLUTION INTRAVENOUS CONTINUOUS
Status: DISCONTINUED | OUTPATIENT
Start: 2021-06-29 | End: 2021-07-01

## 2021-06-29 RX ORDER — ONDANSETRON 2 MG/ML
4 INJECTION INTRAMUSCULAR; INTRAVENOUS AS NEEDED
Status: DISCONTINUED | OUTPATIENT
Start: 2021-06-29 | End: 2021-06-29 | Stop reason: HOSPADM

## 2021-06-29 RX ORDER — METRONIDAZOLE 500 MG/100ML
500 INJECTION, SOLUTION INTRAVENOUS ONCE
Status: COMPLETED | OUTPATIENT
Start: 2021-06-29 | End: 2021-06-29

## 2021-06-29 RX ORDER — ONDANSETRON 2 MG/ML
4 INJECTION INTRAMUSCULAR; INTRAVENOUS EVERY 6 HOURS PRN
Status: DISCONTINUED | OUTPATIENT
Start: 2021-06-29 | End: 2021-07-05

## 2021-06-29 RX ORDER — ACETAMINOPHEN 500 MG
1000 TABLET ORAL ONCE
Status: DISCONTINUED | OUTPATIENT
Start: 2021-06-29 | End: 2021-06-29

## 2021-06-29 RX ORDER — HYDROMORPHONE HYDROCHLORIDE 1 MG/ML
INJECTION, SOLUTION INTRAMUSCULAR; INTRAVENOUS; SUBCUTANEOUS AS NEEDED
Status: DISCONTINUED | OUTPATIENT
Start: 2021-06-29 | End: 2021-06-29 | Stop reason: SURG

## 2021-06-29 RX ORDER — EPHEDRINE SULFATE 50 MG/ML
INJECTION INTRAVENOUS AS NEEDED
Status: DISCONTINUED | OUTPATIENT
Start: 2021-06-29 | End: 2021-06-29 | Stop reason: SURG

## 2021-06-29 RX ORDER — ROCURONIUM BROMIDE 10 MG/ML
INJECTION, SOLUTION INTRAVENOUS AS NEEDED
Status: DISCONTINUED | OUTPATIENT
Start: 2021-06-29 | End: 2021-06-29 | Stop reason: SURG

## 2021-06-29 RX ORDER — METOCLOPRAMIDE HYDROCHLORIDE 5 MG/ML
10 INJECTION INTRAMUSCULAR; INTRAVENOUS AS NEEDED
Status: DISCONTINUED | OUTPATIENT
Start: 2021-06-29 | End: 2021-06-29 | Stop reason: HOSPADM

## 2021-06-29 RX ORDER — ACETAMINOPHEN 10 MG/ML
INJECTION, SOLUTION INTRAVENOUS
Status: COMPLETED
Start: 2021-06-29 | End: 2021-06-29

## 2021-06-29 RX ORDER — ALBUTEROL SULFATE 2.5 MG/3ML
2.5 SOLUTION RESPIRATORY (INHALATION) AS NEEDED
Status: DISCONTINUED | OUTPATIENT
Start: 2021-06-29 | End: 2021-06-29 | Stop reason: HOSPADM

## 2021-06-29 RX ORDER — MIDAZOLAM HYDROCHLORIDE 1 MG/ML
1 INJECTION INTRAMUSCULAR; INTRAVENOUS EVERY 5 MIN PRN
Status: DISCONTINUED | OUTPATIENT
Start: 2021-06-29 | End: 2021-06-29 | Stop reason: HOSPADM

## 2021-06-29 RX ORDER — DIPHENHYDRAMINE HYDROCHLORIDE 50 MG/ML
12.5 INJECTION INTRAMUSCULAR; INTRAVENOUS AS NEEDED
Status: DISCONTINUED | OUTPATIENT
Start: 2021-06-29 | End: 2021-06-29 | Stop reason: HOSPADM

## 2021-06-29 RX ORDER — LIDOCAINE HYDROCHLORIDE 10 MG/ML
INJECTION, SOLUTION EPIDURAL; INFILTRATION; INTRACAUDAL; PERINEURAL AS NEEDED
Status: DISCONTINUED | OUTPATIENT
Start: 2021-06-29 | End: 2021-06-29 | Stop reason: SURG

## 2021-06-29 RX ORDER — ACETAMINOPHEN 10 MG/ML
1000 INJECTION, SOLUTION INTRAVENOUS EVERY 6 HOURS
Status: DISCONTINUED | OUTPATIENT
Start: 2021-06-29 | End: 2021-07-01

## 2021-06-29 RX ORDER — HYDROMORPHONE HYDROCHLORIDE 1 MG/ML
INJECTION, SOLUTION INTRAMUSCULAR; INTRAVENOUS; SUBCUTANEOUS
Status: COMPLETED
Start: 2021-06-29 | End: 2021-06-29

## 2021-06-29 RX ORDER — SODIUM CHLORIDE, SODIUM LACTATE, POTASSIUM CHLORIDE, CALCIUM CHLORIDE 600; 310; 30; 20 MG/100ML; MG/100ML; MG/100ML; MG/100ML
INJECTION, SOLUTION INTRAVENOUS CONTINUOUS
Status: DISCONTINUED | OUTPATIENT
Start: 2021-06-29 | End: 2021-06-29 | Stop reason: HOSPADM

## 2021-06-29 RX ORDER — ENOXAPARIN SODIUM 100 MG/ML
40 INJECTION SUBCUTANEOUS DAILY
Status: DISCONTINUED | OUTPATIENT
Start: 2021-06-30 | End: 2021-07-05

## 2021-06-29 RX ORDER — PHENYLEPHRINE HCL 10 MG/ML
VIAL (ML) INJECTION AS NEEDED
Status: DISCONTINUED | OUTPATIENT
Start: 2021-06-29 | End: 2021-06-29 | Stop reason: SURG

## 2021-06-29 RX ADMIN — PHENYLEPHRINE HCL 100 MCG: 10 MG/ML VIAL (ML) INJECTION at 13:47:00

## 2021-06-29 RX ADMIN — METRONIDAZOLE 500 MG: 500 INJECTION, SOLUTION INTRAVENOUS at 07:56:00

## 2021-06-29 RX ADMIN — SODIUM CHLORIDE, SODIUM LACTATE, POTASSIUM CHLORIDE, CALCIUM CHLORIDE: 600; 310; 30; 20 INJECTION, SOLUTION INTRAVENOUS at 07:36:00

## 2021-06-29 RX ADMIN — ROCURONIUM BROMIDE 20 MG: 10 INJECTION, SOLUTION INTRAVENOUS at 12:11:00

## 2021-06-29 RX ADMIN — MIDAZOLAM HYDROCHLORIDE 2 MG: 1 INJECTION INTRAMUSCULAR; INTRAVENOUS at 07:36:00

## 2021-06-29 RX ADMIN — DEXAMETHASONE SODIUM PHOSPHATE 4 MG: 4 MG/ML VIAL (ML) INJECTION at 07:47:00

## 2021-06-29 RX ADMIN — LIDOCAINE HYDROCHLORIDE 50 MG: 10 INJECTION, SOLUTION EPIDURAL; INFILTRATION; INTRACAUDAL; PERINEURAL at 07:41:00

## 2021-06-29 RX ADMIN — ROCURONIUM BROMIDE 10 MG: 10 INJECTION, SOLUTION INTRAVENOUS at 10:12:00

## 2021-06-29 RX ADMIN — SODIUM CHLORIDE: 9 INJECTION, SOLUTION INTRAVENOUS at 07:36:00

## 2021-06-29 RX ADMIN — ROCURONIUM BROMIDE 10 MG: 10 INJECTION, SOLUTION INTRAVENOUS at 08:45:00

## 2021-06-29 RX ADMIN — ROCURONIUM BROMIDE 10 MG: 10 INJECTION, SOLUTION INTRAVENOUS at 13:45:00

## 2021-06-29 RX ADMIN — BUPIVACAINE HYDROCHLORIDE 50 ML: 2.5 INJECTION, SOLUTION EPIDURAL; INFILTRATION; INTRACAUDAL at 15:04:00

## 2021-06-29 RX ADMIN — HYDROMORPHONE HYDROCHLORIDE 0.5 MG: 1 INJECTION, SOLUTION INTRAMUSCULAR; INTRAVENOUS; SUBCUTANEOUS at 13:58:00

## 2021-06-29 RX ADMIN — ROCURONIUM BROMIDE 10 MG: 10 INJECTION, SOLUTION INTRAVENOUS at 09:45:00

## 2021-06-29 RX ADMIN — SODIUM CHLORIDE, SODIUM LACTATE, POTASSIUM CHLORIDE, CALCIUM CHLORIDE: 600; 310; 30; 20 INJECTION, SOLUTION INTRAVENOUS at 10:22:00

## 2021-06-29 RX ADMIN — SODIUM CHLORIDE, SODIUM LACTATE, POTASSIUM CHLORIDE, CALCIUM CHLORIDE: 600; 310; 30; 20 INJECTION, SOLUTION INTRAVENOUS at 11:48:00

## 2021-06-29 RX ADMIN — HYDROMORPHONE HYDROCHLORIDE 0.5 MG: 1 INJECTION, SOLUTION INTRAMUSCULAR; INTRAVENOUS; SUBCUTANEOUS at 14:44:00

## 2021-06-29 RX ADMIN — SODIUM CHLORIDE: 9 INJECTION, SOLUTION INTRAVENOUS at 15:03:00

## 2021-06-29 RX ADMIN — SODIUM CHLORIDE: 9 INJECTION, SOLUTION INTRAVENOUS at 10:34:00

## 2021-06-29 RX ADMIN — EPHEDRINE SULFATE 5 MG: 50 INJECTION INTRAVENOUS at 08:05:00

## 2021-06-29 RX ADMIN — SODIUM CHLORIDE, SODIUM LACTATE, POTASSIUM CHLORIDE, CALCIUM CHLORIDE: 600; 310; 30; 20 INJECTION, SOLUTION INTRAVENOUS at 15:03:00

## 2021-06-29 RX ADMIN — PHENYLEPHRINE HCL 50 MCG: 10 MG/ML VIAL (ML) INJECTION at 10:11:00

## 2021-06-29 RX ADMIN — ROCURONIUM BROMIDE 70 MG: 10 INJECTION, SOLUTION INTRAVENOUS at 07:41:00

## 2021-06-29 RX ADMIN — ALBUMIN, HUMAN INJ 5%: 5 SOLUTION INTRAVENOUS at 11:14:00

## 2021-06-29 NOTE — ANESTHESIA PROCEDURE NOTES
[facility]  Performed by: Risa Perez MD  Authorized by: Risa Perez MD       General Information and Staff    Start Time:  6/29/2021 3:04 PM  End Time:  6/29/2021 3:15 PM  Anesthesiologist:  Risa Perez MD  Performed by:   Anesthesiologist

## 2021-06-29 NOTE — INTERVAL H&P NOTE
Patient seen and examined. No changes to the attached history and physical are noted. 70year old adult presenting today for planned CRS/HIPEC. Star Desir MD  [facility] Surgical Oncology  [facility]  Pager [phone]  [email or web address]

## 2021-06-29 NOTE — BRIEF OP NOTE
Pre-Operative Diagnosis: Peritoneal carcinomatosis ([address] [C78.6]     Post-Operative Diagnosis: Peritoneal carcinomatosis ([address] [C78.6]      Procedure Performed:   EXPLORATORY LAPAROTOMY, CYTOREDUCTIVE SURGERY TO INCLUDE RESECTION RIGHT HEMIDIAGPHRAGMATIC P

## 2021-06-29 NOTE — ANESTHESIA PROCEDURE NOTES
Airway  Date/Time: 6/29/2021 7:45 AM  Urgency: elective    Airway not difficult    General Information and Staff    Patient location during procedure: OR  Anesthesiologist: Stephenie Camacho MD  Performed: anesthesiologist     Indications and Patient Conditi

## 2021-06-29 NOTE — ANESTHESIA PREPROCEDURE EVALUATION
PRE-OP EVALUATION    Patient Name: Giselle Gonzalez    Admit Diagnosis: Peritoneal carcinomatosis ([facility] [C78.6]    Pre-op Diagnosis: Peritoneal carcinomatosis ([facility] [address] [C78.6]    CYTOREDUCTIVE SURGERY, POSSIBLE MULTI-ORGAN RESECTION, PROCTECTOMY, HYSTERECTOMY, Oral Tab, Take 1 tablet at 2 pm, 3 pm and 10 pm the day before surgery. , Disp: 3 tablet, Rfl: 0, 6/28/2021 at Unknown time  Vitamin D3 50 MCG (2000 UT) Oral Cap, Take 2,000 Units by mouth 2 (two) times a day. Per pt.  Takes 4000 units daily(at one time) , D GI/Hepatic/Renal                    (+) colon cancer             Cardiovascular      ECG reviewed.             (+) hypertension   (+) hyperlipidemia                                  Endo/Other               (+) anemia            (+) arthritis       Pulmonar plan discussed with surgeon and patient. Comment: GETA/LMA discussed in detail. Risk of complications discussed including but not limited to sore throat, cough, PONV discussed.  Also, discussed risks including dental injury particularly on any weakened,

## 2021-06-29 NOTE — ANESTHESIA PROCEDURE NOTES
Arterial Line  Performed by: Duarte Castellanos MD  Authorized by: Duarte Castellanos MD     General Information and Staff    Procedure Start:  6/29/2021 8:13 AM  Procedure End:  6/29/2021 8:13 AM  Anesthesiologist:  Duarte Castellanos MD  Performed By:  Britney Anglin

## 2021-06-29 NOTE — ANESTHESIA POSTPROCEDURE EVALUATION
[address] [facility] Patient Status:  Inpatient   Age/Gender 70year old adult MRN [id number]   Location [facility] Attending Manny Wick MD   Hosp Day # 0 PCP Ramona Paul MD       Anesthesia Post-op Note to meaningfully participate in the post-anesthesia evaluation    Nausea/Vomiting: none    Cardiopulmonary/Hydration status: stable euvolemic    Complications: no apparent anesthesia related complications    Postop vital signs: stable    Dental Exam: Michael [place]

## 2021-06-30 LAB
ALBUMIN SERPL-MCNC: 2.6 G/DL (ref 3.4–5)
ALBUMIN/GLOB SERPL: 1.2 {RATIO} (ref 1–2)
ALP LIVER SERPL-CCNC: 44 U/L
ALT SERPL-CCNC: 206 U/L
ANION GAP SERPL CALC-SCNC: 4 MMOL/L (ref 0–18)
AST SERPL-CCNC: 248 U/L (ref 15–37)
BILIRUB SERPL-MCNC: 0.6 MG/DL (ref 0.1–2)
BUN BLD-MCNC: 14 MG/DL (ref 7–18)
BUN/CREAT SERPL: 24.6 (ref 10–20)
CALCIUM BLD-MCNC: 7.8 MG/DL (ref 8.5–10.1)
CHLORIDE SERPL-SCNC: 110 MMOL/L (ref 98–112)
CO2 SERPL-SCNC: 28 MMOL/L (ref 21–32)
CREAT BLD-MCNC: 0.57 MG/DL
DEPRECATED RDW RBC AUTO: 46.3 FL (ref 35.1–46.3)
ERYTHROCYTE [DISTWIDTH] IN BLOOD BY AUTOMATED COUNT: 13 % (ref 11–15)
GLOBULIN PLAS-MCNC: 2.2 G/DL (ref 2.8–4.4)
GLUCOSE BLD-MCNC: 110 MG/DL (ref 70–99)
GLUCOSE BLD-MCNC: 114 MG/DL (ref 70–99)
GLUCOSE BLD-MCNC: 92 MG/DL (ref 70–99)
HAV IGM SER QL: 1.6 MG/DL (ref 1.6–2.6)
HCT VFR BLD AUTO: 34.2 %
HGB BLD-MCNC: 10.5 G/DL
M PROTEIN MFR SERPL ELPH: 4.8 G/DL (ref 6.4–8.2)
MCH RBC QN AUTO: 29.7 PG (ref 26–34)
MCHC RBC AUTO-ENTMCNC: 30.7 G/DL (ref 31–37)
MCV RBC AUTO: 96.9 FL
OSMOLALITY SERPL CALC.SUM OF ELEC: 295 MOSM/KG (ref 275–295)
PHOSPHATE SERPL-MCNC: 3.2 MG/DL (ref 2.5–4.9)
PLATELET # BLD AUTO: 111 10(3)UL (ref 150–450)
POTASSIUM SERPL-SCNC: 4.1 MMOL/L (ref 3.5–5.1)
RBC # BLD AUTO: 3.53 X10(6)UL
SODIUM SERPL-SCNC: 142 MMOL/L (ref 136–145)
WBC # BLD AUTO: 9.6 X10(3) UL (ref 4–11)

## 2021-06-30 PROCEDURE — 99232 SBSQ HOSP IP/OBS MODERATE 35: CPT | Performed by: INTERNAL MEDICINE

## 2021-06-30 RX ORDER — DEXTROSE, SODIUM CHLORIDE, AND POTASSIUM CHLORIDE 5; .45; .15 G/100ML; G/100ML; G/100ML
INJECTION INTRAVENOUS CONTINUOUS
Status: DISCONTINUED | OUTPATIENT
Start: 2021-06-30 | End: 2021-07-02

## 2021-06-30 RX ORDER — TRAMADOL HYDROCHLORIDE 50 MG/1
50 TABLET ORAL EVERY 6 HOURS PRN
Status: DISCONTINUED | OUTPATIENT
Start: 2021-06-30 | End: 2021-07-05

## 2021-06-30 RX ORDER — TRAMADOL HYDROCHLORIDE 50 MG/1
100 TABLET ORAL EVERY 6 HOURS PRN
Status: DISCONTINUED | OUTPATIENT
Start: 2021-06-30 | End: 2021-07-05

## 2021-06-30 NOTE — PLAN OF CARE
Received patient A/Ox4, 2L nasal cannula, NSR on tele at 0700  Advanced to RA  Midline incision with old drainage, per MD to change in AM  x2 GRETEL, serosanguinous output, ABD binder in place  PCA for pain  Reporting no appetite, PO intake encouraged as able function  - Maintain adequate hydration with IV or PO as ordered and tolerated  - Evaluate effectiveness of GI medications  - Encourage mobilization and activity  - Obtain nutritional consult as needed  - Establish a toileting routine/schedule  - Consider activity/tolerance for mobility and gait  - Educate and engage patient/family in tolerated activity level and precautions  - Recommend use of  RW for transfers and ambulation  Outcome: Progressing

## 2021-06-30 NOTE — PLAN OF CARE
pt received from pacu at 1845. States \"seeing double when her eyes are closed\". Feels dizzy. States doesn't want pain meds-pain is a 0. Vss. Oneal Claude Spoke with anesthesia-Ketamine d/c'd. Has pca to use for pain. abd binder in place.  Quiroz and drains intact and

## 2021-06-30 NOTE — PLAN OF CARE
Assumed pt care at [facility] oriented x4.   Tele; NSR.  1L NC  C/o \"moderate\" abdominal \"soreness;\" Relief with scar Tyelnol and PCA  L & R GRETEL drains with serosanguineous/sanguineous output  Midline incision dressing saturated with old drainage  Fole

## 2021-06-30 NOTE — PAYOR COMM NOTE
--------------  CONTINUED STAY REVIEW    [facility]  Subscriber #:  [id number]  Authorization Number: [id number]    Admit date: 6/29/21  Admit time:  5:12 AM    Admitting Physician: Radha Osborn MD  Attending Physician:  Radha Osborn MD  Prim Musculoskeletal: Moves all extremities. Extremities: No edema. ASSESSMENT / PLAN:      1. Peritoneal carcinomatosis s/p explorative laparotomy with HIPEC  1. Pain control  2. Diet per surgery  3. IS  4. Antiemetic   5. Onc surgery following  2.  Elev kg (Dosing Weight) Intravenous Jenny Del Valle RN    6/29/2021 1630 Hi-Risk Rate/Dose Change 0.099 mg/kg/hr × 75.5 kg (Dosing Weight) Intravenous Jenny Del Valle RN      lactated ringers infusion     Date Action Dose Route User    6/29/2021 1900 New Bag (non

## 2021-06-30 NOTE — PHYSICAL THERAPY NOTE
PHYSICAL THERAPY EVALUATION - INPATIENT     Room Number: 8594/4546-O  Evaluation Date: 6/30/2021  Type of Evaluation: Initial  Physician Order: PT Eval and Treat    Presenting Problem: s/p exploratory laparotomy with cytoreductive surgery and HIPEC o HOME SITUATION  Type of Home: House   Home Layout: Able to live on main level  Stairs to Enter : 0     Stairs to [employer]: 0       Lives With: Daughter (granddaughter )  Drives: Yes  Patient Owned Equipment: Rolling walker  Patient Regularly Uses: Gla Turning over in bed (including adjusting bedclothes, sheets and blankets)?: A Lot   -   Sitting down on and standing up from a chair with arms (e.g., wheelchair, bedside commode, etc.): A Little   -   Moving from lying on back to sitting on the side of the 70year old adult admitted on 6/29/2021 for scheduled exploratory laparotomy with cytoreductive surgery and HIPEC. Pertinent comorbidities and personal factors impacting therapy include those listed above.   In this PT evaluation, the patient presents with

## 2021-06-30 NOTE — PROGRESS NOTES
[facility]  Progress Note     Jony Hinders Patient Status:  Inpatient    1950 MRN [id number]   [facility] Attending Inna Hurtado MD   Hosp Day # 1 PCP Vanesa Barillas MD     Chief Complaint: abd pain    S: Patient o Component Value Date    COVID19 Not Detected 06/27/2021    COVID19 Not Detected 06/11/2021    COVID19 Not Detected 11/06/2020       Pro-Calcitonin  No results for input(s): PCT in the last 168 hours.     Cardiac  No results for input(s): TROP, PBNP in the

## 2021-06-30 NOTE — PROGRESS NOTES
Surgical Oncology Inpatient Progress Note    Subjective:  Doing well  VSS    Objective:  Temp:  [97.5 °F (36.4 °C)-98.3 °F (36.8 °C)] 97.9 °F (36.6 °C)  Pulse:  [76-94] 76  Resp:  [11-23] 16  BP: (120-157)/() 136/73    Intake/Output:      Intake/Outp

## 2021-06-30 NOTE — CONSULTS
[facility]  [address] Patient Status:  Inpatient    1950 MRN [id number]   [facility] Attending Jan Collins MD   Hosp Day # 1 PCP Hai Marquis MD     Reason for consult: Medical management    Req smoked. Basim Gianni Alcantarajefferson has never used smokeless tobacco. Basim Coelho reports current alcohol use. Basim Coelho reports that Basim Dale Dc Meliton does not use drugs.     Family History:   Family History   Problem Relation Age of Onset   • Hypertension Nausea., Disp: 30 tablet, Rfl: 3        Review of Systems:   A comprehensive 14 point review of systems was completed. Pertinent positives and negatives noted in the HPI.     Physical Exam:    /75 (BP Location: Left arm)   Pulse 80   Temp 98 °F (36 the last 168 hours. Imaging: Imaging data reviewed in Epic. ASSESSMENT / PLAN:     1. Peritoneal carcinomatosis s/p explorative laparotomy with HIPEC  1. Pain control  2. Diet per surgery  3. Onc surgery following  2. Prediabetes  3. DL  4.  Neuropa

## 2021-06-30 NOTE — OCCUPATIONAL THERAPY NOTE
OCCUPATIONAL THERAPY EVALUATION - INPATIENT     Room Number: 2179/0386-D  Evaluation Date: 6/30/2021  Type of Evaluation: Initial  Presenting Problem:  (s/p elective CRS with HIPEC for colon cancer)    Physician Order: IP Consult to Occupational Therapy  R commode  Shower/Tub and Equipment: Walk-in shower       Occupation/Status:  (retired)     Drives: Yes  Patient Regularly Uses: Glasses    Prior Level of Function:   Lives alone in a house with 5 steps to enter, drives, Does not use device.   Patient reporte or urinal? : A Lot  -   Putting on and taking off regular upper body clothing?: A Lot  -   Taking care of personal grooming such as brushing teeth?: None  -   Eating meals?: None    AM-PAC Score:  Score: 16  Approx Degree of Impairment: 53.32%  Standardize activities of daily living. Research supports that patients with this level of impairment often benefit from home with assistance. Recommend home with assistance during recovery.       Patient Complexity  Occupational Profile/Medical History LOW - Brief

## 2021-07-01 LAB
ALBUMIN SERPL-MCNC: 2.6 G/DL (ref 3.4–5)
ALBUMIN/GLOB SERPL: 1 {RATIO} (ref 1–2)
ALP LIVER SERPL-CCNC: 42 U/L
ALT SERPL-CCNC: 142 U/L
ANION GAP SERPL CALC-SCNC: 2 MMOL/L (ref 0–18)
AST SERPL-CCNC: 113 U/L (ref 15–37)
BILIRUB SERPL-MCNC: 0.4 MG/DL (ref 0.1–2)
BUN BLD-MCNC: 7 MG/DL (ref 7–18)
BUN/CREAT SERPL: 14.3 (ref 10–20)
CALCIUM BLD-MCNC: 7.8 MG/DL (ref 8.5–10.1)
CHLORIDE SERPL-SCNC: 111 MMOL/L (ref 98–112)
CO2 SERPL-SCNC: 29 MMOL/L (ref 21–32)
CREAT BLD-MCNC: 0.49 MG/DL
DEPRECATED RDW RBC AUTO: 46.5 FL (ref 35.1–46.3)
ERYTHROCYTE [DISTWIDTH] IN BLOOD BY AUTOMATED COUNT: 13.2 % (ref 11–15)
GLOBULIN PLAS-MCNC: 2.5 G/DL (ref 2.8–4.4)
GLUCOSE BLD-MCNC: 124 MG/DL (ref 70–99)
HAV IGM SER QL: 1.8 MG/DL (ref 1.6–2.6)
HCT VFR BLD AUTO: 33.8 %
HGB BLD-MCNC: 10.8 G/DL
M PROTEIN MFR SERPL ELPH: 5.1 G/DL (ref 6.4–8.2)
MCH RBC QN AUTO: 30.9 PG (ref 26–34)
MCHC RBC AUTO-ENTMCNC: 32 G/DL (ref 31–37)
MCV RBC AUTO: 96.6 FL
OSMOLALITY SERPL CALC.SUM OF ELEC: 293 MOSM/KG (ref 275–295)
PHOSPHATE SERPL-MCNC: 1.8 MG/DL (ref 2.5–4.9)
PLATELET # BLD AUTO: 121 10(3)UL (ref 150–450)
POTASSIUM SERPL-SCNC: 3.9 MMOL/L (ref 3.5–5.1)
RBC # BLD AUTO: 3.5 X10(6)UL
SODIUM SERPL-SCNC: 142 MMOL/L (ref 136–145)
WBC # BLD AUTO: 9.8 X10(3) UL (ref 4–11)

## 2021-07-01 RX ORDER — ACETAMINOPHEN 500 MG
1000 TABLET ORAL EVERY 6 HOURS
Status: DISCONTINUED | OUTPATIENT
Start: 2021-07-01 | End: 2021-07-05

## 2021-07-01 RX ORDER — HYDRALAZINE HYDROCHLORIDE 20 MG/ML
10 INJECTION INTRAMUSCULAR; INTRAVENOUS EVERY 4 HOURS PRN
Status: DISCONTINUED | OUTPATIENT
Start: 2021-07-01 | End: 2021-07-05

## 2021-07-01 RX ORDER — AMLODIPINE BESYLATE 5 MG/1
5 TABLET ORAL DAILY
Status: DISCONTINUED | OUTPATIENT
Start: 2021-07-01 | End: 2021-07-05

## 2021-07-01 NOTE — PLAN OF CARE
Received patient A/Ox4, 2L nasal cannula, NSR on tele at 0700  Advanced to RA  Midline dressing changed per MD, staples to incsion intact, gauze and ABD binder in place  GRETEL to left abdomen to bulb suction; serosanguinous output  GRETEL to right abdomen to bulb medications  - Provide nonpharmacologic comfort measures as appropriate  - Advance diet as tolerated, if ordered  - Obtain nutritional consult as needed  - Evaluate fluid balance  Outcome: Progressing  Goal: Maintains or returns to baseline bowel function Communicate ordered activity level and limitations with patient/family  Outcome: Progressing

## 2021-07-01 NOTE — PLAN OF CARE
Assumed care at 1930  Pt pain managed with dilaudid pca and tylenol  Quiroz intact, adequate urine output  GRETEL x2 intact, ss output  Abdominal midline dressing intact  Needs met will continue to montior

## 2021-07-01 NOTE — PROGRESS NOTES
POD#2 s/p CRS/HIPEC    Feels well  Pain controlled    Blood pressure 155/87, pulse 100, temperature 98.3 °F (36.8 °C), temperature source Oral, resp. rate 17, height 1.588 m (5' 2.5\"), weight 79.3 kg (174 lb 12.8 oz), SpO2 99 %.       Intake/Output Summary

## 2021-07-01 NOTE — CM/SW NOTE
Pt is a 71 yo female admitted for peritoneal carcinomatosis. Pt had a HIPEC surgery with Dr. Kathe Varma on 6/29. Pt lives alone in a two-story home.   She has not been walking with any assistive device but is concerned about going home with many stairs in h

## 2021-07-01 NOTE — PHYSICAL THERAPY NOTE
PHYSICAL THERAPY TREATMENT NOTE - INPATIENT    Room Number: 5489/9586-U     Session: 1   Number of Visits to Meet Established Goals: 7    Presenting Problem: s/p exploratory laparotomy with cytoreductive surgery and HIPEC on 6/29/2021    Problem List  Belinda Static Sitting: Good  Dynamic Sitting: Fair +           Static Standing: Fair -  Dynamic Standing: Fair -    ACTIVITY TOLERANCE         BP at rest: 159/89                O2 WALK are decrease jeffery but otherwise WFL. Education on the importance of functional mobility through out the day and energy conservation. Pt required one standing break due to fatigue. Pt able to ambulate back to hospital room.  Ended session with pt in chair assistance level: modified independent      Goal #4 Consider stair climbing goal when appropriate as pt has stairs at her home.     Goal #5     Goal #6     Goal Comments: Goals established on 6/30/2021, ongoing 7/1/2021

## 2021-07-01 NOTE — OPERATIVE REPORT
Date of operation 6/29/2021    Preoperative diagnosis:  1. History of left-sided colon cancer with peritoneal metastasis  2. History of adjuvant and preoperative chemotherapy    Operations performed:  1.   Cytoreductive surgery to include the bilateral sa 3 left upper 3   4 left flank 1   5 left lower 2   6 pelvis 3   7 right lower 0   8 right flank 1   9 upper jejunum  0   10 lower jejunum  1   11 upper ileum 1   12 lower ileum  2       PCI score: 19    CC score: 0    Details of the operation:  The patient right side and the left side. Near the liver, care was taken not to injure the IVC. The right hemidiaphragm was then dissected off with surrounding attachments and sent off as a specimen to include a portion of right Pitroda's fascia.   In a similar way, The ovarian vessels were isolated and ligated with a linear 60 millimeter white load stapler. Care was taken not to injure either ureter. The round ligament was clipped and then ligated.   The ovaries and tubes were then  and sent off for final p the pelvis and brought through a separate stab incision through the right hemiabdomen. Another 13 [place] Cresencio drain was placed near the splenic hilum transection site. It should be noted that the pancreas was intact and the staple line was distal to it.

## 2021-07-02 ENCOUNTER — APPOINTMENT (OUTPATIENT)
Dept: ULTRASOUND IMAGING | Facility: HOSPITAL | Age: 71
DRG: 330 | End: 2021-07-02
Attending: INTERNAL MEDICINE
Payer: MEDICARE

## 2021-07-02 ENCOUNTER — APPOINTMENT (OUTPATIENT)
Dept: ULTRASOUND IMAGING | Age: 71
DRG: 330 | End: 2021-07-02
Attending: INTERNAL MEDICINE
Payer: MEDICARE

## 2021-07-02 ENCOUNTER — APPOINTMENT (OUTPATIENT)
Dept: CT IMAGING | Facility: HOSPITAL | Age: 71
DRG: 330 | End: 2021-07-02
Attending: INTERNAL MEDICINE
Payer: MEDICARE

## 2021-07-02 ENCOUNTER — APPOINTMENT (OUTPATIENT)
Dept: GENERAL RADIOLOGY | Facility: HOSPITAL | Age: 71
DRG: 330 | End: 2021-07-02
Attending: INTERNAL MEDICINE
Payer: MEDICARE

## 2021-07-02 LAB
AMYLASE FLD-CCNC: 149 U/L
ANION GAP SERPL CALC-SCNC: 3 MMOL/L (ref 0–18)
ATRIAL RATE: 102 BPM
BUN BLD-MCNC: 7 MG/DL (ref 7–18)
BUN/CREAT SERPL: 18.4 (ref 10–20)
CALCIUM BLD-MCNC: 8.2 MG/DL (ref 8.5–10.1)
CHLORIDE SERPL-SCNC: 110 MMOL/L (ref 98–112)
CO2 SERPL-SCNC: 29 MMOL/L (ref 21–32)
CREAT BLD-MCNC: 0.38 MG/DL
CREAT BLD-MCNC: 0.45 MG/DL
D-DIMER: 3.04 UG/ML FEU (ref ?–0.71)
DEPRECATED RDW RBC AUTO: 46.2 FL (ref 35.1–46.3)
DEPRECATED RDW RBC AUTO: 46.3 FL (ref 35.1–46.3)
ERYTHROCYTE [DISTWIDTH] IN BLOOD BY AUTOMATED COUNT: 12.9 % (ref 11–15)
ERYTHROCYTE [DISTWIDTH] IN BLOOD BY AUTOMATED COUNT: 13.1 % (ref 11–15)
GLUCOSE BLD-MCNC: 109 MG/DL (ref 70–99)
HAV IGM SER QL: 1.9 MG/DL (ref 1.6–2.6)
HCT VFR BLD AUTO: 32.9 %
HCT VFR BLD AUTO: 33.9 %
HGB BLD-MCNC: 10.5 G/DL
HGB BLD-MCNC: 10.9 G/DL
MCH RBC QN AUTO: 30.4 PG (ref 26–34)
MCH RBC QN AUTO: 32 PG (ref 26–34)
MCHC RBC AUTO-ENTMCNC: 31 G/DL (ref 31–37)
MCHC RBC AUTO-ENTMCNC: 33.1 G/DL (ref 31–37)
MCV RBC AUTO: 96.5 FL
MCV RBC AUTO: 98.3 FL
OSMOLALITY SERPL CALC.SUM OF ELEC: 293 MOSM/KG (ref 275–295)
P AXIS: 59 DEGREES
P-R INTERVAL: 136 MS
PHOSPHATE SERPL-MCNC: 2.4 MG/DL (ref 2.5–4.9)
PLATELET # BLD AUTO: 138 10(3)UL (ref 150–450)
PLATELET # BLD AUTO: 155 10(3)UL (ref 150–450)
POTASSIUM SERPL-SCNC: 3.8 MMOL/L (ref 3.5–5.1)
PROCALCITONIN SERPL-MCNC: <0.05 NG/ML (ref ?–0.16)
Q-T INTERVAL: 360 MS
QRS DURATION: 82 MS
QTC CALCULATION (BEZET): 469 MS
R AXIS: 24 DEGREES
RBC # BLD AUTO: 3.41 X10(6)UL
RBC # BLD AUTO: 3.45 X10(6)UL
SODIUM SERPL-SCNC: 142 MMOL/L (ref 136–145)
T AXIS: 25 DEGREES
TROPONIN I SERPL-MCNC: <0.045 NG/ML (ref ?–0.04)
VENTRICULAR RATE: 102 BPM
WBC # BLD AUTO: 7.7 X10(3) UL (ref 4–11)
WBC # BLD AUTO: 7.9 X10(3) UL (ref 4–11)

## 2021-07-02 PROCEDURE — 93970 EXTREMITY STUDY: CPT | Performed by: INTERNAL MEDICINE

## 2021-07-02 PROCEDURE — 71045 X-RAY EXAM CHEST 1 VIEW: CPT | Performed by: INTERNAL MEDICINE

## 2021-07-02 PROCEDURE — 71275 CT ANGIOGRAPHY CHEST: CPT | Performed by: INTERNAL MEDICINE

## 2021-07-02 PROCEDURE — 99232 SBSQ HOSP IP/OBS MODERATE 35: CPT | Performed by: INTERNAL MEDICINE

## 2021-07-02 RX ORDER — FUROSEMIDE 10 MG/ML
20 INJECTION INTRAMUSCULAR; INTRAVENOUS
Status: COMPLETED | OUTPATIENT
Start: 2021-07-02 | End: 2021-07-04

## 2021-07-02 RX ORDER — FUROSEMIDE 10 MG/ML
20 INJECTION INTRAMUSCULAR; INTRAVENOUS ONCE
Status: COMPLETED | OUTPATIENT
Start: 2021-07-02 | End: 2021-07-02

## 2021-07-02 NOTE — PHYSICAL THERAPY NOTE
PHYSICAL THERAPY TREATMENT NOTE - INPATIENT    Room Number: 6663/7502-F     Session: 2  Number of Visits to Meet Established Goals: 7    Presenting Problem: s/p exploratory laparotomy with cytoreductive surgery and HIPEC on 6/29/2021    Problem List  Prin to go to rehab for a few weeks. Patient’s self-stated goal is to go to rehab. OBJECTIVE  Precautions: Limb alert - left;Drain(s); Bed/chair alarm (abdominal binder)    WEIGHT BEARING RESTRICTION  Weight Bearing Restriction: None                PAIN physical assist in flat bed. Sideline to sitting with min assist of one. Cues for technique. Sit to stand to RW with mod ind. Performed scapular retraction in standing. Gait: amb with ' and supervision.  Exhibits guarded posture and very slow speed mobility; Endurance; Energy conservation; Family education;Patient education;Gait training;Strengthening;Stair training;Transfer training;Balance training  Rehab Potential : Good  Frequency (Obs): 3-5x/week    CURRENT GOALS   Goal #1 Patient is able to demons

## 2021-07-02 NOTE — DIETARY NOTE
[facility]    NUTRITION ASSESSMENT    Pt does not meet malnutrition criteria. NUTRITION INTERVENTION:       1. Meal and Snacks - monitor patient po intake. Encourage adequate po of appropriate diet.   2. Medical Food Supplements - RD offered variety Encounter      Low Fiber/Soft diet No carbonated beverages; Is Patient on Accuchecks?  No     Percent Meals Eaten (last 3 days)     Date/Time Percent Meals Eaten (%)    07/01/21 0900  10 %    Percent Meals Eaten (%): oatmeal at 07/01/21 0900    07/01/21 120

## 2021-07-02 NOTE — HOME CARE LIAISON
Received referral from John Zarate. Met with patient at bedside to discuss Residential home health and provide choice. Patient declines [facility] at this time. Patient states she plans to discharge with Respite care.  Patient states she plans stay with her daughter

## 2021-07-02 NOTE — PROGRESS NOTES
[facility]ITAL[facility]  Progress Note     Valeria Shape Patient Status:  Inpatient    1950 MRN [id number]   [facility] 7NE-A Attending Melanie Lund MD   Hosp Day # 3 PCP Paulette Salgado MD     Chief Complaint: SOB    S: Patient with s 248* 113*  --      --  206 142  --    BILT 0.5  --  0.6 0.4  --    TP 4.9*  --  4.8* 5.1*  --     < > = values in this interval not displayed.        Estimated Creatinine Clearance (based on SCr of 0.38 mg/dL)  Female: 110 mL/min  Male: 140.7 mL/min

## 2021-07-02 NOTE — PLAN OF CARE
Assumed care @8475  VSS,   A&Ox4,   RA    NSR ,   Pain controlled with scheduled tylenol,   Up with 1 assist and walker as tolerated. Walked halls with PT and PCT. Tolerated well. Encouraged IS use. Poor appetite. Advanced to soft diet.   Discontinued hydration with IV or PO as ordered and tolerated  - Evaluate effectiveness of GI medications  - Encourage mobilization and activity  - Obtain nutritional consult as needed  - Establish a toileting routine/schedule  - Consider collaborating with pharmacy to gait  - Educate and engage patient/family in tolerated activity level and precautions  Outcome: Progressing

## 2021-07-02 NOTE — PROGRESS NOTES
POD#3 s/p CRS/HIPEC    Afebrile and VSS  Weight up, some chest tightness  Denies  N/V  Adequate urine output    Blood pressure 149/78, pulse 80, temperature 98.6 °F (37 °C), temperature source Oral, resp.  rate 19, height 1.588 m (5' 2.5\"), weight 80.9 kg

## 2021-07-02 NOTE — PLAN OF CARE
Assumed pt care at [address] [place] [address]; NSR  2L NC while sleeping to maintain adequate oxygenation  C/o some abdominal cramping after finishing her soup. Pain controlled with PCA dilaudid & scar Tylenol  Midline incision C/D/I.   Abdominal binder on  R & L GRETEL drains

## 2021-07-02 NOTE — CM/SW NOTE
Myrtle from PT saw pt and said she said pt walked 300 ft and climbed 6 stairs. She is still recommending [facility] for pt. Discussed dc options with pt. She does not feel safe to go home alone.   She does not really want to stay with her dtr because her dtr has

## 2021-07-03 LAB
ALBUMIN SERPL-MCNC: 2.5 G/DL (ref 3.4–5)
ALBUMIN/GLOB SERPL: 0.8 {RATIO} (ref 1–2)
ALP LIVER SERPL-CCNC: 50 U/L
ALT SERPL-CCNC: 75 U/L
ANION GAP SERPL CALC-SCNC: 5 MMOL/L (ref 0–18)
AST SERPL-CCNC: 36 U/L (ref 15–37)
BILIRUB SERPL-MCNC: 0.6 MG/DL (ref 0.1–2)
BLOOD TYPE BARCODE: 6200
BUN BLD-MCNC: 11 MG/DL (ref 7–18)
BUN/CREAT SERPL: 33.3 (ref 10–20)
CALCIUM BLD-MCNC: 8.4 MG/DL (ref 8.5–10.1)
CHLORIDE SERPL-SCNC: 106 MMOL/L (ref 98–112)
CO2 SERPL-SCNC: 29 MMOL/L (ref 21–32)
CREAT BLD-MCNC: 0.33 MG/DL
DEPRECATED RDW RBC AUTO: 45.7 FL (ref 35.1–46.3)
ERYTHROCYTE [DISTWIDTH] IN BLOOD BY AUTOMATED COUNT: 13 % (ref 11–15)
GLOBULIN PLAS-MCNC: 3.1 G/DL (ref 2.8–4.4)
GLUCOSE BLD-MCNC: 95 MG/DL (ref 70–99)
HCT VFR BLD AUTO: 32.9 %
HGB BLD-MCNC: 10.6 G/DL
M PROTEIN MFR SERPL ELPH: 5.6 G/DL (ref 6.4–8.2)
MCH RBC QN AUTO: 31 PG (ref 26–34)
MCHC RBC AUTO-ENTMCNC: 32.2 G/DL (ref 31–37)
MCV RBC AUTO: 96.2 FL
OSMOLALITY SERPL CALC.SUM OF ELEC: 289 MOSM/KG (ref 275–295)
PHOSPHATE SERPL-MCNC: 3.1 MG/DL (ref 2.5–4.9)
PLATELET # BLD AUTO: 193 10(3)UL (ref 150–450)
POTASSIUM SERPL-SCNC: 3.6 MMOL/L (ref 3.5–5.1)
RBC # BLD AUTO: 3.42 X10(6)UL
SODIUM SERPL-SCNC: 140 MMOL/L (ref 136–145)
WBC # BLD AUTO: 6.1 X10(3) UL (ref 4–11)

## 2021-07-03 PROCEDURE — 99232 SBSQ HOSP IP/OBS MODERATE 35: CPT | Performed by: INTERNAL MEDICINE

## 2021-07-03 NOTE — PROGRESS NOTES
[facility]PITAL[facility]  Progress Note     Jose Ovalle Patient Status:  Inpatient    1950 MRN [id number]   [facility] 7NE-A Attending Salvatore Rader MD   Hosp Day # 4 PCP Ashleigh Leal MD     Chief Complaint: feeling better    S: Alex Toussaint 110   < > 111  --  110  --  106   CO2 28.0   < > 29.0  --  29.0  --  29.0   ALKPHO 44  --  42  --   --   --  50   *  --  113*  --   --   --  36     --  142  --   --   --  75   BILT 0.6  --  0.4  --   --   --  0.6   TP 4.8*  --  5.1*  --   --

## 2021-07-03 NOTE — PLAN OF CARE
Assumed care @0730  VSS,   A&Ox4,   RA    NSR ,   Pain controlled with scheduled tylenol,   Up with 1 assist and walker as tolerated. Encouraged IS use. Poor appetite. Advanced to soft diet. Adequate urine output.   Intake and outputs w/d/l.    L and R intact  Description: INTERVENTIONS  - Assess and document risk factors for pressure ulcer development  - Assess and document skin integrity  - Monitor for areas of redness and/or skin breakdown  - Initiate interventions, skin care algorithm/standards of ca

## 2021-07-03 NOTE — PROGRESS NOTES
POD#4 s/p CRS/HIPEC    Feels better but weak    Blood pressure 143/78, pulse 117, temperature 98 °F (36.7 °C), temperature source Oral, resp. rate 18, height 1.588 m (5' 2.5\"), weight 76.2 kg (167 lb 14.4 oz), SpO2 94 %.       Intake/Output Summary (Last 2

## 2021-07-03 NOTE — PLAN OF CARE
Assumed pt care at [address] [place] [address] for the night shift. Pt resting in bed in no apparent distress. POD #3. Mid abd w/ staples,  topafoam and abd binder in place c/d/i. Cramping pain 3/10 and tolerable w/ scheduled Tylenol PO.  Pt reports feeling bloated and loss of a Assess and document risk factors for pressure ulcer development  - Assess and document skin integrity  - Monitor for areas of redness and/or skin breakdown  - Initiate interventions, skin care algorithm/standards of care as needed  Outcome: Progressing  Go

## 2021-07-04 LAB — AMYLASE FLD-CCNC: 51 U/L

## 2021-07-04 PROCEDURE — 99232 SBSQ HOSP IP/OBS MODERATE 35: CPT | Performed by: INTERNAL MEDICINE

## 2021-07-04 RX ORDER — ENOXAPARIN SODIUM 100 MG/ML
40 INJECTION SUBCUTANEOUS DAILY
Qty: 20 EACH | Refills: 0 | Status: SHIPPED | OUTPATIENT
Start: 2021-07-05 | End: 2021-08-10 | Stop reason: ALTCHOICE

## 2021-07-04 RX ORDER — TRAMADOL HYDROCHLORIDE 50 MG/1
50 TABLET ORAL EVERY 6 HOURS PRN
Qty: 20 TABLET | Refills: 0 | Status: SHIPPED | OUTPATIENT
Start: 2021-07-04

## 2021-07-04 NOTE — PLAN OF CARE
Assumed care at 0700  Patient alert, oriented x4  R GRETEL drain removed  L GRETEL Drain amylase sent  Ambulating without difficulty  Tolerating soft diet  Midline incision c/d/i, abd binder in place  Pain control with scheduled tylenol    Plan for dc home tomorro

## 2021-07-04 NOTE — PLAN OF CARE
Assumed pt care at [address] [place] [address] for the night shift. POD #4. Mid abd w/ staples,  topafoam and abdominal binder in place. Bilt GRETEL drains w/ ss output (see I/O flowsheet). Pt reports abd cramping improving. Scheduled Tylenol given as ordered,Abd soft, not distended. ADULT  Goal: Skin integrity remains intact  Description: INTERVENTIONS  - Assess and document risk factors for pressure ulcer development  - Assess and document skin integrity  - Monitor for areas of redness and/or skin breakdown  - Initiate interventions,

## 2021-07-04 NOTE — PROGRESS NOTES
[facility]PITAL[facility]  Progress Note     Vivian Durán Patient Status:  Inpatient    1950 MRN [id number]   [facility] 7NE-A Attending Charito Leonard MD   Hosp Day # 5 PCP Morelia Finch MD     Chief Complaint: craving watermelon    S: 142  --  140   K 4.1   < > 3.9  --  3.8  --  3.6      < > 111  --  110  --  106   CO2 28.0   < > 29.0  --  29.0  --  29.0   ALKPHO 44  --  42  --   --   --  50   *  --  113*  --   --   --  36     --  142  --   --   --  75   BILT 0.6  --

## 2021-07-04 NOTE — PROGRESS NOTES
POD#5 s/p CRS/HIPEC    Afebrile and VSS  Feels better  Pain controlled with tylenol  +BM    Blood pressure 131/68, pulse 98, temperature 98 °F (36.7 °C), temperature source Oral, resp.  rate 16, height 1.588 m (5' 2.5\"), weight 74.7 kg (164 lb 9.6 oz), SpO

## 2021-07-05 VITALS
SYSTOLIC BLOOD PRESSURE: 120 MMHG | TEMPERATURE: 98 F | DIASTOLIC BLOOD PRESSURE: 73 MMHG | BODY MASS INDEX: 29.55 KG/M2 | RESPIRATION RATE: 18 BRPM | HEART RATE: 109 BPM | OXYGEN SATURATION: 95 % | WEIGHT: 164.69 LBS | HEIGHT: 62.5 IN

## 2021-07-05 PROCEDURE — 99231 SBSQ HOSP IP/OBS SF/LOW 25: CPT | Performed by: INTERNAL MEDICINE

## 2021-07-05 NOTE — PLAN OF CARE
Received pt at 1930  Pt AOx4, NSR, RA, VSS  UO adequate. GRETEL ss output  Some c/o abd cramping  D/c Planning: home w/ [facility] [place] 4/5  Call light within reach.  All needs currently met      Problem: Patient/Family Goals  Goal: Patient/Family Long Term Goal  Descript as needed  Outcome: Progressing  Goal: Incision(s), wounds(s) or drain site(s) healing without S/S of infection  Description: INTERVENTIONS:  - Assess and document risk factors for pressure ulcer development  - Assess and document skin integrity  - Assess

## 2021-07-05 NOTE — PROGRESS NOTES
POD#6 s/p CRS/HIPEC    Afebrile and VSS  No acute events     Blood pressure 123/69, pulse 101, temperature 98 °F (36.7 °C), temperature source Oral, resp. rate 18, height 1.588 m (5' 2.5\"), weight 74.7 kg (164 lb 10.9 oz), SpO2 95 %.       Intake/Output Gillis

## 2021-07-05 NOTE — PROGRESS NOTES
[facility]PITAL[facility]  Progress Note     Oz Kirk Patient Status:  Inpatient    1950 MRN [id number]   [facility] 7NE-A Attending Selvin Zhou MD   Hosp Day # 6 PCP Sharon Paul MD     Chief Complaint: feels well    S: Patient --  3.6      < > 111  --  110  --  106   CO2 28.0   < > 29.0  --  29.0  --  29.0   ALKPHO 44  --  42  --   --   --  50   *  --  113*  --   --   --  36     --  142  --   --   --  75   BILT 0.6  --  0.4  --   --   --  0.6   TP 4.8*  --

## 2021-07-05 NOTE — CM/SW NOTE
Spoke with pt who says she has decided to stay at the hotel near her dtr who lives in [place]. She wants HH to come see her at the hotel. Asked Demetria Gilman from [facility] if they would be able to go to the hotel to see pt.   She will let me know once we hav

## 2021-07-05 NOTE — PLAN OF CARE
Assumed care at 0700  Patient alert, oriented x4  L GRETEL drain removed  Pain control with tylenol  Abdominal cramping improved  Given walker to take home  Social work aware of discharge.  Home Health set up  Ambulating in halls     AVS reviewed with patient a

## 2021-07-05 NOTE — HOME CARE LIAISON
Patient and patient's daughter provided with list of [facility] providers from [facility], patient choice is [address]. Agency reserved in [facility] and contact information placed on AVS.   Financial interest disclosure provided to patient. SW updated.     Ad

## 2021-07-05 NOTE — CM/SW NOTE
Cheli Gonzalez from [facility] said they can accept pt and they will see her in the hotel where she is going to stay in [facility]. Pt will dc home today.

## 2021-07-05 NOTE — CM/SW NOTE
07/05/21 1500   Discharge disposition   Expected discharge disposition Home-Health   Name of [facility] services after discharge Skilled home care   Discharge transportation Private car

## 2021-07-06 ENCOUNTER — TELEPHONE (OUTPATIENT)
Dept: HEMATOLOGY/ONCOLOGY | Facility: HOSPITAL | Age: 71
End: 2021-07-06

## 2021-07-06 ENCOUNTER — TELEPHONE (OUTPATIENT)
Dept: SURGERY | Facility: CLINIC | Age: 71
End: 2021-07-06

## 2021-07-06 NOTE — PAYOR COMM NOTE
--------------  DISCHARGE REVIEW    Spenser Malone MA [facility]  Subscriber #:  [id number]  Authorization Number: [id number]    Admit date: 6/29/21  Admit time:   5:12 AM  Discharge Date: 7/5/2021  4:32 PM     Admitting Physician: Yael Hernandez MD  Attending Phys

## 2021-07-06 NOTE — TELEPHONE ENCOUNTER
Called to check on pt after discharge from hospital, pt reports feeling well overall, Pt's pain is controlled with Tylenol. Pt denies fevers, chills, nausea and vomiting. No issues/concerns with surgical incisions. Pt does c/o loss of appetite.  Will check

## 2021-07-06 NOTE — TELEPHONE ENCOUNTER
I spoke with Roxanna Valencia. Let her know that we will see her right after she sees Dr Henri De La Vega on 7/14. She verbalized understanding.

## 2021-07-07 ENCOUNTER — TELEPHONE (OUTPATIENT)
Dept: HEMATOLOGY/ONCOLOGY | Facility: HOSPITAL | Age: 71
End: 2021-07-07

## 2021-07-07 NOTE — TELEPHONE ENCOUNTER
Paperwork received from [facility] for the pt's Longterm Disability [place]#[id number], [facility]#[id number], requesting office notes from 05/05/21 to present. SW sent these notes via Epic Routing/Right Fax to [phone]. Form received also.  Will re

## 2021-07-12 ENCOUNTER — TELEPHONE (OUTPATIENT)
Dept: INTERNAL MEDICINE CLINIC | Facility: CLINIC | Age: 71
End: 2021-07-12

## 2021-07-12 NOTE — TELEPHONE ENCOUNTER
[place] from [facility] Pt only wanted to do 1 nursing visit, so they will terminate the nursing visits but continue the physical therapy visits.

## 2021-07-14 ENCOUNTER — OFFICE VISIT (OUTPATIENT)
Dept: SURGERY | Facility: CLINIC | Age: 71
End: 2021-07-14
Payer: MEDICARE

## 2021-07-14 ENCOUNTER — OFFICE VISIT (OUTPATIENT)
Dept: HEMATOLOGY/ONCOLOGY | Facility: HOSPITAL | Age: 71
End: 2021-07-14
Attending: INTERNAL MEDICINE
Payer: MEDICARE

## 2021-07-14 VITALS
HEART RATE: 101 BPM | HEIGHT: 62.52 IN | BODY MASS INDEX: 27.99 KG/M2 | OXYGEN SATURATION: 96 % | SYSTOLIC BLOOD PRESSURE: 142 MMHG | RESPIRATION RATE: 18 BRPM | TEMPERATURE: 98 F | DIASTOLIC BLOOD PRESSURE: 76 MMHG | WEIGHT: 156 LBS

## 2021-07-14 VITALS
RESPIRATION RATE: 18 BRPM | OXYGEN SATURATION: 96 % | HEIGHT: 62.5 IN | DIASTOLIC BLOOD PRESSURE: 82 MMHG | HEART RATE: 101 BPM | WEIGHT: 157.81 LBS | SYSTOLIC BLOOD PRESSURE: 139 MMHG | BODY MASS INDEX: 28.31 KG/M2

## 2021-07-14 DIAGNOSIS — C18.9 ADENOCARCINOMA, COLON (HCC): Primary | ICD-10-CM

## 2021-07-14 DIAGNOSIS — C18.7 CANCER OF SIGMOID COLON (HCC): Primary | ICD-10-CM

## 2021-07-14 DIAGNOSIS — C18.7 CANCER OF SIGMOID COLON (HCC): ICD-10-CM

## 2021-07-14 DIAGNOSIS — C78.6 PERITONEAL CARCINOMATOSIS (HCC): ICD-10-CM

## 2021-07-14 LAB
ALBUMIN SERPL-MCNC: 3.1 G/DL (ref 3.4–5)
ALBUMIN/GLOB SERPL: 0.7 {RATIO} (ref 1–2)
ALP LIVER SERPL-CCNC: 86 U/L
ALT SERPL-CCNC: 24 U/L
ANION GAP SERPL CALC-SCNC: 8 MMOL/L (ref 0–18)
AST SERPL-CCNC: 22 U/L (ref 15–37)
BILIRUB SERPL-MCNC: 0.2 MG/DL (ref 0.1–2)
BUN BLD-MCNC: 9 MG/DL (ref 7–18)
BUN/CREAT SERPL: 18.4 (ref 10–20)
CALCIUM BLD-MCNC: 10 MG/DL (ref 8.5–10.1)
CHLORIDE SERPL-SCNC: 104 MMOL/L (ref 98–112)
CO2 SERPL-SCNC: 28 MMOL/L (ref 21–32)
CREAT BLD-MCNC: 0.49 MG/DL
DEPRECATED RDW RBC AUTO: 48.8 FL (ref 35.1–46.3)
ERYTHROCYTE [DISTWIDTH] IN BLOOD BY AUTOMATED COUNT: 14.1 % (ref 11–15)
GLOBULIN PLAS-MCNC: 4.2 G/DL (ref 2.8–4.4)
GLUCOSE BLD-MCNC: 98 MG/DL (ref 70–99)
HCT VFR BLD AUTO: 35.2 %
HGB BLD-MCNC: 11 G/DL
M PROTEIN MFR SERPL ELPH: 7.3 G/DL (ref 6.4–8.2)
MCH RBC QN AUTO: 30.4 PG (ref 26–34)
MCHC RBC AUTO-ENTMCNC: 31.3 G/DL (ref 31–37)
MCV RBC AUTO: 97.2 FL
OSMOLALITY SERPL CALC.SUM OF ELEC: 289 MOSM/KG (ref 275–295)
PLATELET # BLD AUTO: 432 10(3)UL (ref 150–450)
POTASSIUM SERPL-SCNC: 4.3 MMOL/L (ref 3.5–5.1)
RBC # BLD AUTO: 3.62 X10(6)UL
SODIUM SERPL-SCNC: 140 MMOL/L (ref 136–145)
WBC # BLD AUTO: 5 X10(3) UL (ref 4–11)

## 2021-07-14 PROCEDURE — 3075F SYST BP GE 130 - 139MM HG: CPT | Performed by: SURGERY

## 2021-07-14 PROCEDURE — 1111F DSCHRG MED/CURRENT MED MERGE: CPT | Performed by: SURGERY

## 2021-07-14 PROCEDURE — 99214 OFFICE O/P EST MOD 30 MIN: CPT | Performed by: INTERNAL MEDICINE

## 2021-07-14 PROCEDURE — 3008F BODY MASS INDEX DOCD: CPT | Performed by: SURGERY

## 2021-07-14 PROCEDURE — 99024 POSTOP FOLLOW-UP VISIT: CPT | Performed by: SURGERY

## 2021-07-14 PROCEDURE — 3079F DIAST BP 80-89 MM HG: CPT | Performed by: SURGERY

## 2021-07-14 RX ORDER — MIRTAZAPINE 7.5 MG/1
7.5 TABLET, FILM COATED ORAL NIGHTLY
Qty: 30 TABLET | Refills: 2 | Status: SHIPPED | OUTPATIENT
Start: 2021-07-14 | End: 2021-08-10

## 2021-07-14 NOTE — PROGRESS NOTES
Cancer Center Progress Note    Patient Name: Goyo Garcia   YOB: 1950   Medical Record Number: [id number]   Attending Physician: Amaya Magana M.D.      Chief Complaint:  Colon cancer    Oncology History:  3/6/2020 ([facility]) colonoscopy showed • Other (carpal tunnel) Sister    • Colon Cancer Self 79       Social History:  She is [place]. Currently, her brother is living with her. She lives close to her daughter, other children are farther away.  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  (Patient not taking: Reported on 7/14/2021 ), Disp: 60 tablet, Rfl: 3    Allergies:  No Known Allergies     Review of Systems oz)  07/05/21 : 74.7 kg (164 lb 10.9 oz)  06/23/21 : 75.8 kg (167 lb)  06/14/21 : 76.2 kg (168 lb)  05/20/21 : 74.8 kg (165 lb)    Impression and Plan:  Cancer Staging  Cancer of sigmoid colon [facility])  Staging form: Colon and Rectum, AJCC 8th Edition  - Clin

## 2021-07-15 ENCOUNTER — TELEPHONE (OUTPATIENT)
Dept: INTERNAL MEDICINE CLINIC | Facility: CLINIC | Age: 71
End: 2021-07-15

## 2021-07-15 ENCOUNTER — OFFICE VISIT (OUTPATIENT)
Dept: INTERNAL MEDICINE CLINIC | Facility: CLINIC | Age: 71
End: 2021-07-15
Payer: MEDICARE

## 2021-07-15 VITALS
OXYGEN SATURATION: 96 % | HEIGHT: 62.5 IN | DIASTOLIC BLOOD PRESSURE: 74 MMHG | SYSTOLIC BLOOD PRESSURE: 112 MMHG | WEIGHT: 156 LBS | BODY MASS INDEX: 27.99 KG/M2 | HEART RATE: 103 BPM

## 2021-07-15 DIAGNOSIS — Z90.81 ASPLENIA AFTER SURGICAL PROCEDURE: ICD-10-CM

## 2021-07-15 DIAGNOSIS — C18.9 ADENOCARCINOMA OF COLON (HCC): Primary | ICD-10-CM

## 2021-07-15 DIAGNOSIS — Z23 IMMUNIZATION DUE: ICD-10-CM

## 2021-07-15 PROCEDURE — 90734 MENACWYD/MENACWYCRM VACC IM: CPT | Performed by: INTERNAL MEDICINE

## 2021-07-15 PROCEDURE — 90670 PCV13 VACCINE IM: CPT | Performed by: INTERNAL MEDICINE

## 2021-07-15 PROCEDURE — 3078F DIAST BP <80 MM HG: CPT | Performed by: INTERNAL MEDICINE

## 2021-07-15 PROCEDURE — 90472 IMMUNIZATION ADMIN EACH ADD: CPT | Performed by: INTERNAL MEDICINE

## 2021-07-15 PROCEDURE — G0009 ADMIN PNEUMOCOCCAL VACCINE: HCPCS | Performed by: INTERNAL MEDICINE

## 2021-07-15 PROCEDURE — 90648 HIB PRP-T VACCINE 4 DOSE IM: CPT | Performed by: INTERNAL MEDICINE

## 2021-07-15 PROCEDURE — 90471 IMMUNIZATION ADMIN: CPT | Performed by: INTERNAL MEDICINE

## 2021-07-15 PROCEDURE — 3074F SYST BP LT 130 MM HG: CPT | Performed by: INTERNAL MEDICINE

## 2021-07-15 PROCEDURE — 3008F BODY MASS INDEX DOCD: CPT | Performed by: INTERNAL MEDICINE

## 2021-07-15 PROCEDURE — 99215 OFFICE O/P EST HI 40 MIN: CPT | Performed by: INTERNAL MEDICINE

## 2021-07-15 NOTE — PROGRESS NOTES
HPI/Subjective:   Patient ID: Claudia Robb is a 70year old female. HPI Patient here for a post operative visit after extensive abdominal surgery for recurrent colon cancer including splenectomy.  Will need pneumococcal and Hib and meningoccal     His External Cream aply peasize to affected area twice (Patient taking differently: as needed. aply peasize to affected area twice ) 15 g 0   • Acetaminophen 500 MG Oral Cap Take by mouth every 6 (six) hours as needed for Pain.        • lidocaine-prilocaine 2.5 Imaging & Referrals:  None

## 2021-07-15 NOTE — TELEPHONE ENCOUNTER
Patient needs Meningococcal B vaccine. We do not have that vaccine in stock and will need to call patient and schedule a nurse visit when it comes in.

## 2021-07-19 ENCOUNTER — TELEPHONE (OUTPATIENT)
Dept: INTERNAL MEDICINE CLINIC | Facility: CLINIC | Age: 71
End: 2021-07-19

## 2021-07-19 NOTE — TELEPHONE ENCOUNTER
Pt called and is inquiring about her medical records being sent to [facility]. They should be sent attention Waldo Rossi at fax number [phone]. I spoke to Johan Rendon up front who says Justina Elizabeth has these papers.  Please give the Patient a call back

## 2021-07-19 NOTE — TELEPHONE ENCOUNTER
Fax failed x 2. Called patient to verify fax number and it was incorrect. Faxed to [phone] and received confirmation that the fax went through.

## 2021-07-19 NOTE — TELEPHONE ENCOUNTER
Patient scheduled 9/15/2021 her 2nd doses.     Please put an order in for Meningococcal & Pneumococcal.

## 2021-07-20 ENCOUNTER — TELEPHONE (OUTPATIENT)
Dept: HEMATOLOGY/ONCOLOGY | Facility: HOSPITAL | Age: 71
End: 2021-07-20

## 2021-07-21 ENCOUNTER — TELEPHONE (OUTPATIENT)
Dept: HEMATOLOGY/ONCOLOGY | Facility: HOSPITAL | Age: 71
End: 2021-07-21

## 2021-07-21 NOTE — TELEPHONE ENCOUNTER
I spoke with [facility]. She asked why she needs to continue with chemo and if this is indefinite. I let her know that Dr Dominik Espinal wants her to continue with the 5FU for the time being and that I am unsure if this is indefinite.   I let her know that we can fu

## 2021-07-28 ENCOUNTER — NURSE ONLY (OUTPATIENT)
Dept: HEMATOLOGY/ONCOLOGY | Facility: HOSPITAL | Age: 71
End: 2021-07-28
Attending: INTERNAL MEDICINE
Payer: MEDICARE

## 2021-07-28 VITALS
DIASTOLIC BLOOD PRESSURE: 75 MMHG | HEIGHT: 62 IN | OXYGEN SATURATION: 95 % | HEART RATE: 107 BPM | TEMPERATURE: 98 F | RESPIRATION RATE: 16 BRPM | SYSTOLIC BLOOD PRESSURE: 126 MMHG | WEIGHT: 149 LBS | BODY MASS INDEX: 27.42 KG/M2

## 2021-07-28 VITALS
HEART RATE: 107 BPM | WEIGHT: 149 LBS | SYSTOLIC BLOOD PRESSURE: 126 MMHG | BODY MASS INDEX: 26.74 KG/M2 | OXYGEN SATURATION: 95 % | RESPIRATION RATE: 16 BRPM | TEMPERATURE: 98 F | DIASTOLIC BLOOD PRESSURE: 75 MMHG | HEIGHT: 62.52 IN

## 2021-07-28 DIAGNOSIS — Z51.11 CHEMOTHERAPY MANAGEMENT, ENCOUNTER FOR: Primary | ICD-10-CM

## 2021-07-28 DIAGNOSIS — Z45.2 ENCOUNTER FOR CENTRAL LINE CARE: ICD-10-CM

## 2021-07-28 DIAGNOSIS — C18.7 CANCER OF SIGMOID COLON (HCC): ICD-10-CM

## 2021-07-28 DIAGNOSIS — C78.6 PERITONEAL CARCINOMATOSIS (HCC): ICD-10-CM

## 2021-07-28 DIAGNOSIS — Z51.11 CHEMOTHERAPY MANAGEMENT, ENCOUNTER FOR: ICD-10-CM

## 2021-07-28 DIAGNOSIS — Z79.899 ENCOUNTER FOR MEDICATION MANAGEMENT: ICD-10-CM

## 2021-07-28 DIAGNOSIS — C18.7 CANCER OF SIGMOID COLON (HCC): Primary | ICD-10-CM

## 2021-07-28 PROBLEM — D69.6 THROMBOCYTOPENIA (HCC): Chronic | Status: RESOLVED | Noted: 2021-05-20 | Resolved: 2021-07-28

## 2021-07-28 PROBLEM — L81.9 HYPERPIGMENTATION: Status: RESOLVED | Noted: 2020-12-22 | Resolved: 2021-07-28

## 2021-07-28 LAB
ALBUMIN SERPL-MCNC: 3.5 G/DL (ref 3.4–5)
ALBUMIN/GLOB SERPL: 0.8 {RATIO} (ref 1–2)
ALP LIVER SERPL-CCNC: 76 U/L
ALT SERPL-CCNC: 22 U/L
ANION GAP SERPL CALC-SCNC: 6 MMOL/L (ref 0–18)
AST SERPL-CCNC: 25 U/L (ref 15–37)
BASOPHILS # BLD AUTO: 0.05 X10(3) UL (ref 0–0.2)
BASOPHILS NFR BLD AUTO: 0.8 %
BILIRUB SERPL-MCNC: 0.3 MG/DL (ref 0.1–2)
BUN BLD-MCNC: 16 MG/DL (ref 7–18)
BUN/CREAT SERPL: 22.9 (ref 10–20)
CALCIUM BLD-MCNC: 10.5 MG/DL (ref 8.5–10.1)
CEA SERPL-MCNC: 1 NG/ML (ref ?–5)
CHLORIDE SERPL-SCNC: 107 MMOL/L (ref 98–112)
CO2 SERPL-SCNC: 28 MMOL/L (ref 21–32)
CREAT BLD-MCNC: 0.7 MG/DL
DEPRECATED RDW RBC AUTO: 51.8 FL (ref 35.1–46.3)
EOSINOPHIL # BLD AUTO: 0.52 X10(3) UL (ref 0–0.7)
EOSINOPHIL NFR BLD AUTO: 7.9 %
ERYTHROCYTE [DISTWIDTH] IN BLOOD BY AUTOMATED COUNT: 14.6 % (ref 11–15)
GLOBULIN PLAS-MCNC: 4.2 G/DL (ref 2.8–4.4)
GLUCOSE BLD-MCNC: 154 MG/DL (ref 70–99)
HAV IGM SER QL: 2.3 MG/DL (ref 1.6–2.6)
HCT VFR BLD AUTO: 39.8 %
HGB BLD-MCNC: 12.3 G/DL
IMM GRANULOCYTES # BLD AUTO: 0.01 X10(3) UL (ref 0–1)
IMM GRANULOCYTES NFR BLD: 0.2 %
LYMPHOCYTES # BLD AUTO: 2.41 X10(3) UL (ref 1–4)
LYMPHOCYTES NFR BLD AUTO: 36.6 %
M PROTEIN MFR SERPL ELPH: 7.7 G/DL (ref 6.4–8.2)
MCH RBC QN AUTO: 30.2 PG (ref 26–34)
MCHC RBC AUTO-ENTMCNC: 30.9 G/DL (ref 31–37)
MCV RBC AUTO: 97.8 FL
MONOCYTES # BLD AUTO: 0.52 X10(3) UL (ref 0.1–1)
MONOCYTES NFR BLD AUTO: 7.9 %
NEUTROPHILS # BLD AUTO: 3.07 X10 (3) UL (ref 1.5–7.7)
NEUTROPHILS # BLD AUTO: 3.07 X10(3) UL (ref 1.5–7.7)
NEUTROPHILS NFR BLD AUTO: 46.6 %
OSMOLALITY SERPL CALC.SUM OF ELEC: 296 MOSM/KG (ref 275–295)
PATIENT FASTING Y/N/NP: NO
PLATELET # BLD AUTO: 248 10(3)UL (ref 150–450)
POTASSIUM SERPL-SCNC: 4 MMOL/L (ref 3.5–5.1)
RBC # BLD AUTO: 4.07 X10(6)UL
SODIUM SERPL-SCNC: 141 MMOL/L (ref 136–145)
WBC # BLD AUTO: 6.6 X10(3) UL (ref 4–11)

## 2021-07-28 PROCEDURE — 80053 COMPREHEN METABOLIC PANEL: CPT

## 2021-07-28 PROCEDURE — 99215 OFFICE O/P EST HI 40 MIN: CPT | Performed by: INTERNAL MEDICINE

## 2021-07-28 PROCEDURE — 85025 COMPLETE CBC W/AUTO DIFF WBC: CPT

## 2021-07-28 PROCEDURE — 96361 HYDRATE IV INFUSION ADD-ON: CPT

## 2021-07-28 PROCEDURE — 96415 CHEMO IV INFUSION ADDL HR: CPT

## 2021-07-28 PROCEDURE — 83735 ASSAY OF MAGNESIUM: CPT

## 2021-07-28 PROCEDURE — 96413 CHEMO IV INFUSION 1 HR: CPT

## 2021-07-28 PROCEDURE — 82378 CARCINOEMBRYONIC ANTIGEN: CPT

## 2021-07-28 RX ORDER — ACETAMINOPHEN 325 MG/1
650 TABLET ORAL ONCE
Status: CANCELLED | OUTPATIENT
Start: 2021-07-28

## 2021-07-28 RX ORDER — HEPARIN SODIUM (PORCINE) LOCK FLUSH IV SOLN 100 UNIT/ML 100 UNIT/ML
SOLUTION INTRAVENOUS
Status: COMPLETED
Start: 2021-07-28 | End: 2021-07-28

## 2021-07-28 RX ORDER — DIPHENHYDRAMINE HCL 50 MG
50 CAPSULE ORAL ONCE
Status: COMPLETED | OUTPATIENT
Start: 2021-07-28 | End: 2021-07-28

## 2021-07-28 RX ORDER — ACETAMINOPHEN 325 MG/1
650 TABLET ORAL ONCE
Status: CANCELLED | OUTPATIENT
Start: 2021-08-11

## 2021-07-28 RX ORDER — MAGNESIUM SULFATE HEPTAHYDRATE 40 MG/ML
2 INJECTION, SOLUTION INTRAVENOUS ONCE
Status: CANCELLED | OUTPATIENT
Start: 2021-07-28

## 2021-07-28 RX ORDER — HEPARIN SODIUM (PORCINE) LOCK FLUSH IV SOLN 100 UNIT/ML 100 UNIT/ML
500 SOLUTION INTRAVENOUS ONCE
Status: CANCELLED | OUTPATIENT
Start: 2021-07-28

## 2021-07-28 RX ORDER — DIPHENHYDRAMINE HCL 50 MG
CAPSULE ORAL
Status: COMPLETED
Start: 2021-07-28 | End: 2021-07-28

## 2021-07-28 RX ORDER — MAGNESIUM SULFATE HEPTAHYDRATE 40 MG/ML
2 INJECTION, SOLUTION INTRAVENOUS ONCE
Status: CANCELLED | OUTPATIENT
Start: 2021-08-11

## 2021-07-28 RX ORDER — DIPHENHYDRAMINE HCL 50 MG
50 CAPSULE ORAL ONCE
Status: CANCELLED | OUTPATIENT
Start: 2021-08-11

## 2021-07-28 RX ORDER — ACETAMINOPHEN 325 MG/1
650 TABLET ORAL ONCE
Status: COMPLETED | OUTPATIENT
Start: 2021-07-28 | End: 2021-07-28

## 2021-07-28 RX ORDER — HEPARIN SODIUM (PORCINE) LOCK FLUSH IV SOLN 100 UNIT/ML 100 UNIT/ML
500 SOLUTION INTRAVENOUS ONCE
Status: COMPLETED | OUTPATIENT
Start: 2021-07-28 | End: 2021-07-28

## 2021-07-28 RX ORDER — ACETAMINOPHEN 325 MG/1
TABLET ORAL
Status: COMPLETED
Start: 2021-07-28 | End: 2021-07-28

## 2021-07-28 RX ORDER — SODIUM CHLORIDE 9 MG/ML
INJECTION INTRAVENOUS
Status: DISCONTINUED
Start: 2021-07-28 | End: 2021-07-28

## 2021-07-28 RX ORDER — DIPHENHYDRAMINE HCL 50 MG
50 CAPSULE ORAL ONCE
Status: CANCELLED | OUTPATIENT
Start: 2021-07-28

## 2021-07-28 RX ADMIN — ACETAMINOPHEN 650 MG: 325 TABLET ORAL at 12:49:00

## 2021-07-28 RX ADMIN — DIPHENHYDRAMINE HCL 50 MG: 50 MG CAPSULE ORAL at 12:49:00

## 2021-07-28 RX ADMIN — HEPARIN SODIUM (PORCINE) LOCK FLUSH IV SOLN 100 UNIT/ML 500 UNITS: 100 SOLUTION INTRAVENOUS at 16:47:00

## 2021-07-28 NOTE — PROGRESS NOTES
Cancer Center Progress Note    Patient Name: Juan Jean   YOB: 1950   Medical Record Number: [id number]   Attending Physician: Lit Villagran M.D.      Chief Complaint:  Colon cancer    Oncology History:  3/6/2020 ([facility]) colonoscopy showed • Other (carpal tunnel) Sister    • Colon Cancer Self 79       Social History:  She is [place]. Currently, her brother is living with her. She lives close to her daughter, other children are farther away.  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 Per pt.  Takes 4000 units daily(at one time)  (Patient not taking: Reported on 7/28/2021 ), Disp: , Rfl:   •  nystatin [id number] UNIT/G ALKPHO 76 07/28/2021    AST 25 07/28/2021    ALT 22 07/28/2021    ALKPHOS 72 08/06/2015    BILT 0.3 07/28/2021    TP 7.7 07/28/2021    ALB 3.5 07/28/2021    GLOBULT 2.7 01/10/2020    GLOBULIN 4.2 07/28/2021    AGRATIO 1.5 08/06/2015    ALBGLOBRAT 1.6 01

## 2021-07-28 NOTE — PROGRESS NOTES
Pt here for C1D1 cetuximab. Arrives Ambulating independently, accompanied by Self. Pt to receive 2nd covid vaccine next week. Confirmed with Dr Ortega Santos that it is ok to receive while on treatment.   [place] given to patient per her request to give to pharma

## 2021-07-29 ENCOUNTER — TELEPHONE (OUTPATIENT)
Dept: HEMATOLOGY/ONCOLOGY | Facility: HOSPITAL | Age: 71
End: 2021-07-29

## 2021-07-29 ENCOUNTER — LAB REQUISITION (OUTPATIENT)
Dept: LAB | Facility: HOSPITAL | Age: 71
End: 2021-07-29
Payer: MEDICARE

## 2021-07-29 DIAGNOSIS — C18.7 MALIGNANT NEOPLASM OF SIGMOID COLON (HCC): ICD-10-CM

## 2021-07-29 PROCEDURE — 88363 XM ARCHIVE TISSUE MOLEC ANAL: CPT | Performed by: PATHOLOGY

## 2021-07-30 ENCOUNTER — APPOINTMENT (OUTPATIENT)
Dept: HEMATOLOGY/ONCOLOGY | Facility: HOSPITAL | Age: 71
End: 2021-07-30
Attending: INTERNAL MEDICINE
Payer: MEDICARE

## 2021-07-30 NOTE — DISCHARGE SUMMARY
[facility]    Discharge Summary    Julien Whitfield Patient Status:  Inpatient    1950 MRN [id number]   [facility] 7NE-A Attending No att. providers found   Hosp Day # 6 PCP Danika Pimentel MD     Date of Admission: 2021 Dis a 5 cm mass from 15 to 20 cm in the sigmoid colon. Biopsies were taken. Pathology consistent with infiltrating moderately differentiated adenocarcinoma.    3/6/2020: CT c/a/p without evidence of metastatic disease  3/16/2020: patient taken to the operating pelvic peritonectomy, splenectomy and destruction of peritoneal tumors along the proximal mid and distal small intestine, greater and lesser omentectomy, resection of falciform ligament, Heated intraperitoneal chemotherapy with mitomycin-C on 7/2/2021. Box  Refills: 3     Vitamin D3 (Cholecalciferol) 50 MCG (2000 UT) Caps      Take 2,000 Units by mouth 2 (two) times a day. Per pt.  Takes 4000 units daily(at one time)   Refills: 0        STOP taking these medications    metRONIDAZOLE 500 MG Tabs  Commonly

## 2021-08-03 ENCOUNTER — TELEPHONE (OUTPATIENT)
Dept: HEMATOLOGY/ONCOLOGY | Facility: HOSPITAL | Age: 71
End: 2021-08-03

## 2021-08-04 ENCOUNTER — APPOINTMENT (OUTPATIENT)
Dept: HEMATOLOGY/ONCOLOGY | Facility: HOSPITAL | Age: 71
End: 2021-08-04
Attending: INTERNAL MEDICINE
Payer: MEDICARE

## 2021-08-10 ENCOUNTER — OFFICE VISIT (OUTPATIENT)
Dept: HEMATOLOGY/ONCOLOGY | Facility: HOSPITAL | Age: 71
End: 2021-08-10
Attending: INTERNAL MEDICINE
Payer: MEDICARE

## 2021-08-10 VITALS
HEART RATE: 91 BPM | DIASTOLIC BLOOD PRESSURE: 75 MMHG | RESPIRATION RATE: 16 BRPM | HEIGHT: 62 IN | BODY MASS INDEX: 26.68 KG/M2 | SYSTOLIC BLOOD PRESSURE: 126 MMHG | WEIGHT: 145 LBS | TEMPERATURE: 98 F | OXYGEN SATURATION: 97 %

## 2021-08-10 DIAGNOSIS — C18.7 CANCER OF SIGMOID COLON (HCC): ICD-10-CM

## 2021-08-10 DIAGNOSIS — Z51.11 CHEMOTHERAPY MANAGEMENT, ENCOUNTER FOR: Primary | ICD-10-CM

## 2021-08-10 DIAGNOSIS — C18.7 CANCER OF SIGMOID COLON (HCC): Primary | ICD-10-CM

## 2021-08-10 DIAGNOSIS — C78.6 PERITONEAL CARCINOMATOSIS (HCC): ICD-10-CM

## 2021-08-10 DIAGNOSIS — Z51.11 CHEMOTHERAPY MANAGEMENT, ENCOUNTER FOR: ICD-10-CM

## 2021-08-10 PROBLEM — D70.1 CHEMOTHERAPY INDUCED NEUTROPENIA (HCC): Status: RESOLVED | Noted: 2020-11-24 | Resolved: 2021-08-10

## 2021-08-10 PROBLEM — T45.1X5A CHEMOTHERAPY INDUCED NEUTROPENIA (HCC): Status: RESOLVED | Noted: 2020-11-24 | Resolved: 2021-08-10

## 2021-08-10 PROBLEM — T45.1X5A CHEMOTHERAPY INDUCED NEUTROPENIA  (HCC): Status: RESOLVED | Noted: 2020-11-24 | Resolved: 2021-08-10

## 2021-08-10 PROBLEM — D70.1 CHEMOTHERAPY INDUCED NEUTROPENIA  (HCC): Status: RESOLVED | Noted: 2020-11-24 | Resolved: 2021-08-10

## 2021-08-10 PROBLEM — T45.1X5A CHEMOTHERAPY INDUCED NEUTROPENIA: Status: RESOLVED | Noted: 2020-11-24 | Resolved: 2021-08-10

## 2021-08-10 PROBLEM — D70.1 CHEMOTHERAPY INDUCED NEUTROPENIA: Status: RESOLVED | Noted: 2020-11-24 | Resolved: 2021-08-10

## 2021-08-10 LAB
ALBUMIN SERPL-MCNC: 3.3 G/DL (ref 3.4–5)
ALBUMIN/GLOB SERPL: 0.8 {RATIO} (ref 1–2)
ALP LIVER SERPL-CCNC: 65 U/L
ALT SERPL-CCNC: 31 U/L
ANION GAP SERPL CALC-SCNC: 9 MMOL/L (ref 0–18)
AST SERPL-CCNC: 30 U/L (ref 15–37)
BASOPHILS # BLD AUTO: 0.04 X10(3) UL (ref 0–0.2)
BASOPHILS NFR BLD AUTO: 0.8 %
BILIRUB SERPL-MCNC: 0.3 MG/DL (ref 0.1–2)
BUN BLD-MCNC: 10 MG/DL (ref 7–18)
BUN/CREAT SERPL: 17.9 (ref 10–20)
CALCIUM BLD-MCNC: 9.4 MG/DL (ref 8.5–10.1)
CHLORIDE SERPL-SCNC: 107 MMOL/L (ref 98–112)
CO2 SERPL-SCNC: 27 MMOL/L (ref 21–32)
CREAT BLD-MCNC: 0.56 MG/DL
DEPRECATED HBV CORE AB SER IA-ACNC: 155 NG/ML
DEPRECATED RDW RBC AUTO: 54.1 FL (ref 35.1–46.3)
EOSINOPHIL # BLD AUTO: 0.27 X10(3) UL (ref 0–0.7)
EOSINOPHIL NFR BLD AUTO: 5.5 %
ERYTHROCYTE [DISTWIDTH] IN BLOOD BY AUTOMATED COUNT: 15.4 % (ref 11–15)
GLOBULIN PLAS-MCNC: 4.1 G/DL (ref 2.8–4.4)
GLUCOSE BLD-MCNC: 99 MG/DL (ref 70–99)
HAV IGM SER QL: 2.3 MG/DL (ref 1.6–2.6)
HCT VFR BLD AUTO: 40.9 %
HGB BLD-MCNC: 12.7 G/DL
IMM GRANULOCYTES # BLD AUTO: 0 X10(3) UL (ref 0–1)
IMM GRANULOCYTES NFR BLD: 0 %
IRON SATURATION: 16 %
IRON SERPL-MCNC: 56 UG/DL
LYMPHOCYTES # BLD AUTO: 2.69 X10(3) UL (ref 1–4)
LYMPHOCYTES NFR BLD AUTO: 55.1 %
M PROTEIN MFR SERPL ELPH: 7.4 G/DL (ref 6.4–8.2)
MCH RBC QN AUTO: 29.6 PG (ref 26–34)
MCHC RBC AUTO-ENTMCNC: 31.1 G/DL (ref 31–37)
MCV RBC AUTO: 95.3 FL
MONOCYTES # BLD AUTO: 0.64 X10(3) UL (ref 0.1–1)
MONOCYTES NFR BLD AUTO: 13.1 %
NEUTROPHILS # BLD AUTO: 1.24 X10 (3) UL (ref 1.5–7.7)
NEUTROPHILS # BLD AUTO: 1.24 X10(3) UL (ref 1.5–7.7)
NEUTROPHILS NFR BLD AUTO: 25.5 %
OSMOLALITY SERPL CALC.SUM OF ELEC: 295 MOSM/KG (ref 275–295)
PLATELET # BLD AUTO: 215 10(3)UL (ref 150–450)
POTASSIUM SERPL-SCNC: 4 MMOL/L (ref 3.5–5.1)
RBC # BLD AUTO: 4.29 X10(6)UL
SODIUM SERPL-SCNC: 143 MMOL/L (ref 136–145)
TOTAL IRON BINDING CAPACITY: 346 UG/DL (ref 240–450)
TRANSFERRIN SERPL-MCNC: 232 MG/DL (ref 200–360)
WBC # BLD AUTO: 4.9 X10(3) UL (ref 4–11)

## 2021-08-10 PROCEDURE — 99215 OFFICE O/P EST HI 40 MIN: CPT | Performed by: INTERNAL MEDICINE

## 2021-08-10 PROCEDURE — 84466 ASSAY OF TRANSFERRIN: CPT

## 2021-08-10 PROCEDURE — 83540 ASSAY OF IRON: CPT

## 2021-08-10 PROCEDURE — 82728 ASSAY OF FERRITIN: CPT

## 2021-08-10 PROCEDURE — 83735 ASSAY OF MAGNESIUM: CPT

## 2021-08-10 PROCEDURE — 36415 COLL VENOUS BLD VENIPUNCTURE: CPT

## 2021-08-10 PROCEDURE — 80053 COMPREHEN METABOLIC PANEL: CPT

## 2021-08-10 PROCEDURE — 85025 COMPLETE CBC W/AUTO DIFF WBC: CPT

## 2021-08-10 NOTE — PROGRESS NOTES
Cancer Center Progress Note    Patient Name: Jose Ovalle   YOB: 1950   Medical Record Number: [id number]   Attending Physician: Raymundo Leone M.D.      Chief Complaint:  Colon cancer    Oncology History:  3/6/2020 ([facility]) colonoscopy showed REPLACEMENT Right 2015       Family History:  Family History   Problem Relation Age of Onset   • Hypertension Father [de-identified]   • Diabetes Mother [de-identified]   • Other (carpal tunnel) Sister    • Colon Cancer Self 79       Social History:  She is [place].  Currently, her Pads, 1 Units by Does not apply route daily.  (Patient not taking: Reported on 7/28/2021 ), Disp: 100 each, Rfl: 3  •  nystatin [id number] UNIT/GM External Cream, aply peasize to affected area twice (Patient not taking: Reported on 7/28/2021 ), Disp: 15 g, Rfl: 07/28/2021     Wt Readings from Last 6 Encounters:  08/10/21 : 65.8 kg (145 lb)  07/28/21 : 67.6 kg (149 lb)  07/28/21 : 67.6 kg (149 lb)  07/15/21 : 70.8 kg (156 lb)  07/14/21 : 70.8 kg (156 lb)  07/14/21 : 71.6 kg (157 lb 12.8 oz)    Impression and Plan:

## 2021-08-11 ENCOUNTER — OFFICE VISIT (OUTPATIENT)
Dept: HEMATOLOGY/ONCOLOGY | Facility: HOSPITAL | Age: 71
End: 2021-08-11
Attending: INTERNAL MEDICINE
Payer: MEDICARE

## 2021-08-11 ENCOUNTER — DIETICIAN VISIT (OUTPATIENT)
Dept: NUTRITION | Facility: HOSPITAL | Age: 71
End: 2021-08-11

## 2021-08-11 VITALS
TEMPERATURE: 98 F | SYSTOLIC BLOOD PRESSURE: 120 MMHG | RESPIRATION RATE: 16 BRPM | DIASTOLIC BLOOD PRESSURE: 68 MMHG | HEART RATE: 97 BPM | OXYGEN SATURATION: 97 %

## 2021-08-11 DIAGNOSIS — Z51.11 CHEMOTHERAPY MANAGEMENT, ENCOUNTER FOR: Primary | ICD-10-CM

## 2021-08-11 DIAGNOSIS — C18.7 CANCER OF SIGMOID COLON (HCC): ICD-10-CM

## 2021-08-11 PROCEDURE — 96415 CHEMO IV INFUSION ADDL HR: CPT

## 2021-08-11 PROCEDURE — 96413 CHEMO IV INFUSION 1 HR: CPT

## 2021-08-11 RX ORDER — ACETAMINOPHEN 325 MG/1
TABLET ORAL
Status: COMPLETED
Start: 2021-08-11 | End: 2021-08-11

## 2021-08-11 RX ORDER — SODIUM CHLORIDE 9 MG/ML
INJECTION INTRAVENOUS
Status: DISCONTINUED
Start: 2021-08-11 | End: 2021-08-11

## 2021-08-11 RX ORDER — DIPHENHYDRAMINE HCL 50 MG
50 CAPSULE ORAL ONCE
Status: COMPLETED | OUTPATIENT
Start: 2021-08-11 | End: 2021-08-11

## 2021-08-11 RX ORDER — ONDANSETRON 4 MG/1
8 TABLET, ORALLY DISINTEGRATING ORAL ONCE
Status: COMPLETED | OUTPATIENT
Start: 2021-08-11 | End: 2021-08-11

## 2021-08-11 RX ORDER — HEPARIN SODIUM (PORCINE) LOCK FLUSH IV SOLN 100 UNIT/ML 100 UNIT/ML
SOLUTION INTRAVENOUS
Status: COMPLETED
Start: 2021-08-11 | End: 2021-08-11

## 2021-08-11 RX ORDER — DIPHENHYDRAMINE HCL 50 MG
CAPSULE ORAL
Status: COMPLETED
Start: 2021-08-11 | End: 2021-08-11

## 2021-08-11 RX ORDER — ONDANSETRON 4 MG/1
TABLET, ORALLY DISINTEGRATING ORAL
Status: COMPLETED
Start: 2021-08-11 | End: 2021-08-11

## 2021-08-11 RX ORDER — ACETAMINOPHEN 325 MG/1
650 TABLET ORAL ONCE
Status: COMPLETED | OUTPATIENT
Start: 2021-08-11 | End: 2021-08-11

## 2021-08-11 RX ADMIN — HEPARIN SODIUM (PORCINE) LOCK FLUSH IV SOLN 100 UNIT/ML 500 UNITS: 100 SOLUTION INTRAVENOUS at 17:55:00

## 2021-08-11 RX ADMIN — ACETAMINOPHEN 650 MG: 325 TABLET ORAL at 14:50:00

## 2021-08-11 RX ADMIN — DIPHENHYDRAMINE HCL 50 MG: 50 MG CAPSULE ORAL at 14:50:00

## 2021-08-11 RX ADMIN — ONDANSETRON 8 MG: 4 TABLET, ORALLY DISINTEGRATING ORAL at 14:53:00

## 2021-08-11 NOTE — PROGRESS NOTES
Pt here for C1D15 Erbitux.   Arrives Ambulating independently, accompanied by Self           Modifications in dose or schedule: No     Frequency of blood return and site check throughout administration: Prior to administration and At completion of therapy

## 2021-08-11 NOTE — PROGRESS NOTES
Oncology Nutrition Assessment    Ht Readings from Last 1 Encounters:  08/10/21 : 157.5 cm (5' 2\")      Wt Readings from Last 1 Encounters:  08/10/21 : 65.8 kg (145 lb)    BMI Calculated:  26.52--overwt  Weight History:   Wt Readings from Last 10 Encounters appetite    Nutritional Goals:  maintain weight within 5% and small frequent meals  Handouts Provided:  None at this time    Weekly Visits:  Nutritional Interventions:  8/11/2021: Visited with pt in cancer center where pt is receiving Erbitux treatment.  I

## 2021-08-18 ENCOUNTER — APPOINTMENT (OUTPATIENT)
Dept: HEMATOLOGY/ONCOLOGY | Facility: HOSPITAL | Age: 71
End: 2021-08-18
Attending: INTERNAL MEDICINE
Payer: MEDICARE

## 2021-08-25 ENCOUNTER — NURSE ONLY (OUTPATIENT)
Dept: HEMATOLOGY/ONCOLOGY | Facility: HOSPITAL | Age: 71
End: 2021-08-25
Attending: INTERNAL MEDICINE
Payer: MEDICARE

## 2021-08-25 ENCOUNTER — OFFICE VISIT (OUTPATIENT)
Dept: SURGERY | Facility: CLINIC | Age: 71
End: 2021-08-25
Payer: MEDICARE

## 2021-08-25 VITALS
RESPIRATION RATE: 16 BRPM | BODY MASS INDEX: 26.46 KG/M2 | OXYGEN SATURATION: 97 % | HEART RATE: 90 BPM | TEMPERATURE: 98 F | HEIGHT: 62 IN | WEIGHT: 143.81 LBS | SYSTOLIC BLOOD PRESSURE: 119 MMHG | DIASTOLIC BLOOD PRESSURE: 71 MMHG

## 2021-08-25 VITALS
HEART RATE: 90 BPM | DIASTOLIC BLOOD PRESSURE: 71 MMHG | WEIGHT: 143.81 LBS | SYSTOLIC BLOOD PRESSURE: 119 MMHG | RESPIRATION RATE: 16 BRPM | BODY MASS INDEX: 26.46 KG/M2 | OXYGEN SATURATION: 97 % | TEMPERATURE: 98 F | HEIGHT: 62 IN

## 2021-08-25 DIAGNOSIS — Z45.2 ENCOUNTER FOR CENTRAL LINE CARE: ICD-10-CM

## 2021-08-25 DIAGNOSIS — Z51.11 CHEMOTHERAPY MANAGEMENT, ENCOUNTER FOR: Primary | ICD-10-CM

## 2021-08-25 DIAGNOSIS — C18.7 CANCER OF SIGMOID COLON (HCC): ICD-10-CM

## 2021-08-25 DIAGNOSIS — Z51.11 CHEMOTHERAPY MANAGEMENT, ENCOUNTER FOR: ICD-10-CM

## 2021-08-25 DIAGNOSIS — C78.6 PERITONEAL CARCINOMATOSIS (HCC): Primary | ICD-10-CM

## 2021-08-25 DIAGNOSIS — Z79.899 ENCOUNTER FOR MEDICATION MANAGEMENT: ICD-10-CM

## 2021-08-25 LAB
ALBUMIN SERPL-MCNC: 3.4 G/DL (ref 3.4–5)
ALBUMIN/GLOB SERPL: 0.9 {RATIO} (ref 1–2)
ALP LIVER SERPL-CCNC: 77 U/L
ALT SERPL-CCNC: 23 U/L
ANION GAP SERPL CALC-SCNC: 5 MMOL/L (ref 0–18)
AST SERPL-CCNC: 24 U/L (ref 15–37)
BASOPHILS # BLD AUTO: 0.06 X10(3) UL (ref 0–0.2)
BASOPHILS NFR BLD AUTO: 0.9 %
BILIRUB SERPL-MCNC: 0.4 MG/DL (ref 0.1–2)
BUN BLD-MCNC: 8 MG/DL (ref 7–18)
BUN/CREAT SERPL: 17.4 (ref 10–20)
CALCIUM BLD-MCNC: 8.8 MG/DL (ref 8.5–10.1)
CEA SERPL-MCNC: 0.8 NG/ML (ref ?–5)
CHLORIDE SERPL-SCNC: 109 MMOL/L (ref 98–112)
CO2 SERPL-SCNC: 28 MMOL/L (ref 21–32)
CREAT BLD-MCNC: 0.46 MG/DL
DEPRECATED RDW RBC AUTO: 54.8 FL (ref 35.1–46.3)
EOSINOPHIL # BLD AUTO: 0.68 X10(3) UL (ref 0–0.7)
EOSINOPHIL NFR BLD AUTO: 10.1 %
ERYTHROCYTE [DISTWIDTH] IN BLOOD BY AUTOMATED COUNT: 15.4 % (ref 11–15)
GLOBULIN PLAS-MCNC: 3.9 G/DL (ref 2.8–4.4)
GLUCOSE BLD-MCNC: 86 MG/DL (ref 70–99)
HAV IGM SER QL: 2.1 MG/DL (ref 1.6–2.6)
HCT VFR BLD AUTO: 40.9 %
HGB BLD-MCNC: 12.8 G/DL
IMM GRANULOCYTES # BLD AUTO: 0.01 X10(3) UL (ref 0–1)
IMM GRANULOCYTES NFR BLD: 0.1 %
LYMPHOCYTES # BLD AUTO: 3.78 X10(3) UL (ref 1–4)
LYMPHOCYTES NFR BLD AUTO: 56.3 %
M PROTEIN MFR SERPL ELPH: 7.3 G/DL (ref 6.4–8.2)
MCH RBC QN AUTO: 30.1 PG (ref 26–34)
MCHC RBC AUTO-ENTMCNC: 31.3 G/DL (ref 31–37)
MCV RBC AUTO: 96.2 FL
MONOCYTES # BLD AUTO: 0.65 X10(3) UL (ref 0.1–1)
MONOCYTES NFR BLD AUTO: 9.7 %
NEUTROPHILS # BLD AUTO: 1.53 X10 (3) UL (ref 1.5–7.7)
NEUTROPHILS # BLD AUTO: 1.53 X10(3) UL (ref 1.5–7.7)
NEUTROPHILS NFR BLD AUTO: 22.9 %
OSMOLALITY SERPL CALC.SUM OF ELEC: 292 MOSM/KG (ref 275–295)
PLATELET # BLD AUTO: 211 10(3)UL (ref 150–450)
POTASSIUM SERPL-SCNC: 3.9 MMOL/L (ref 3.5–5.1)
RBC # BLD AUTO: 4.25 X10(6)UL
SODIUM SERPL-SCNC: 142 MMOL/L (ref 136–145)
WBC # BLD AUTO: 6.7 X10(3) UL (ref 4–11)

## 2021-08-25 PROCEDURE — 99215 OFFICE O/P EST HI 40 MIN: CPT | Performed by: INTERNAL MEDICINE

## 2021-08-25 PROCEDURE — 96415 CHEMO IV INFUSION ADDL HR: CPT

## 2021-08-25 PROCEDURE — 96413 CHEMO IV INFUSION 1 HR: CPT

## 2021-08-25 PROCEDURE — 99024 POSTOP FOLLOW-UP VISIT: CPT | Performed by: SURGERY

## 2021-08-25 PROCEDURE — 3008F BODY MASS INDEX DOCD: CPT | Performed by: SURGERY

## 2021-08-25 PROCEDURE — 3074F SYST BP LT 130 MM HG: CPT | Performed by: INTERNAL MEDICINE

## 2021-08-25 PROCEDURE — 83735 ASSAY OF MAGNESIUM: CPT

## 2021-08-25 PROCEDURE — 80053 COMPREHEN METABOLIC PANEL: CPT

## 2021-08-25 PROCEDURE — 3008F BODY MASS INDEX DOCD: CPT | Performed by: INTERNAL MEDICINE

## 2021-08-25 PROCEDURE — 3074F SYST BP LT 130 MM HG: CPT | Performed by: SURGERY

## 2021-08-25 PROCEDURE — 85025 COMPLETE CBC W/AUTO DIFF WBC: CPT

## 2021-08-25 PROCEDURE — 82378 CARCINOEMBRYONIC ANTIGEN: CPT

## 2021-08-25 PROCEDURE — 3078F DIAST BP <80 MM HG: CPT | Performed by: SURGERY

## 2021-08-25 PROCEDURE — 3078F DIAST BP <80 MM HG: CPT | Performed by: INTERNAL MEDICINE

## 2021-08-25 RX ORDER — ACETAMINOPHEN 325 MG/1
TABLET ORAL
Status: DISCONTINUED
Start: 2021-08-25 | End: 2021-08-25

## 2021-08-25 RX ORDER — DIPHENHYDRAMINE HCL 50 MG
CAPSULE ORAL
Status: DISCONTINUED
Start: 2021-08-25 | End: 2021-08-25

## 2021-08-25 RX ORDER — MAGNESIUM SULFATE HEPTAHYDRATE 40 MG/ML
2 INJECTION, SOLUTION INTRAVENOUS ONCE
Status: CANCELLED | OUTPATIENT
Start: 2021-08-25

## 2021-08-25 RX ORDER — DIPHENHYDRAMINE HCL 50 MG
50 CAPSULE ORAL ONCE
Status: COMPLETED | OUTPATIENT
Start: 2021-08-25 | End: 2021-08-25

## 2021-08-25 RX ORDER — HEPARIN SODIUM (PORCINE) LOCK FLUSH IV SOLN 100 UNIT/ML 100 UNIT/ML
500 SOLUTION INTRAVENOUS ONCE
Status: COMPLETED | OUTPATIENT
Start: 2021-08-25 | End: 2021-08-25

## 2021-08-25 RX ORDER — ONDANSETRON 4 MG/1
TABLET, ORALLY DISINTEGRATING ORAL
Status: DISCONTINUED
Start: 2021-08-25 | End: 2021-08-25

## 2021-08-25 RX ORDER — ACETAMINOPHEN 325 MG/1
650 TABLET ORAL ONCE
Status: CANCELLED | OUTPATIENT
Start: 2021-08-25

## 2021-08-25 RX ORDER — DIPHENHYDRAMINE HCL 50 MG
50 CAPSULE ORAL ONCE
Status: CANCELLED | OUTPATIENT
Start: 2021-09-08

## 2021-08-25 RX ORDER — ONDANSETRON 4 MG/1
8 TABLET, ORALLY DISINTEGRATING ORAL ONCE
Status: CANCELLED
Start: 2021-08-25 | End: 2021-08-25

## 2021-08-25 RX ORDER — DIPHENHYDRAMINE HCL 50 MG
50 CAPSULE ORAL ONCE
Status: CANCELLED | OUTPATIENT
Start: 2021-08-25

## 2021-08-25 RX ORDER — SODIUM CHLORIDE 9 MG/ML
INJECTION INTRAVENOUS
Status: DISCONTINUED
Start: 2021-08-25 | End: 2021-08-25

## 2021-08-25 RX ORDER — ACETAMINOPHEN 325 MG/1
650 TABLET ORAL ONCE
Status: CANCELLED | OUTPATIENT
Start: 2021-09-08

## 2021-08-25 RX ORDER — ONDANSETRON 4 MG/1
8 TABLET, ORALLY DISINTEGRATING ORAL ONCE
Status: COMPLETED | OUTPATIENT
Start: 2021-08-25 | End: 2021-08-25

## 2021-08-25 RX ORDER — MAGNESIUM SULFATE HEPTAHYDRATE 40 MG/ML
2 INJECTION, SOLUTION INTRAVENOUS ONCE
Status: CANCELLED | OUTPATIENT
Start: 2021-09-08

## 2021-08-25 RX ORDER — HEPARIN SODIUM (PORCINE) LOCK FLUSH IV SOLN 100 UNIT/ML 100 UNIT/ML
500 SOLUTION INTRAVENOUS ONCE
Status: CANCELLED | OUTPATIENT
Start: 2021-08-25

## 2021-08-25 RX ORDER — ACETAMINOPHEN 325 MG/1
650 TABLET ORAL ONCE
Status: COMPLETED | OUTPATIENT
Start: 2021-08-25 | End: 2021-08-25

## 2021-08-25 RX ORDER — HEPARIN SODIUM (PORCINE) LOCK FLUSH IV SOLN 100 UNIT/ML 100 UNIT/ML
SOLUTION INTRAVENOUS
Status: DISCONTINUED
Start: 2021-08-25 | End: 2021-08-25

## 2021-08-25 RX ORDER — ONDANSETRON 4 MG/1
8 TABLET, ORALLY DISINTEGRATING ORAL ONCE
Status: CANCELLED
Start: 2021-09-08 | End: 2021-09-08

## 2021-08-25 RX ADMIN — HEPARIN SODIUM (PORCINE) LOCK FLUSH IV SOLN 100 UNIT/ML 500 UNITS: 100 SOLUTION INTRAVENOUS at 17:37:00

## 2021-08-25 RX ADMIN — ONDANSETRON 8 MG: 4 TABLET, ORALLY DISINTEGRATING ORAL at 14:03:00

## 2021-08-25 RX ADMIN — DIPHENHYDRAMINE HCL 50 MG: 50 MG CAPSULE ORAL at 14:02:00

## 2021-08-25 RX ADMIN — ACETAMINOPHEN 650 MG: 325 TABLET ORAL at 14:02:00

## 2021-08-25 NOTE — PROGRESS NOTES
Pt here for C2D1 Erbitux. Arrives Ambulating independently, accompanied by Self            Modifications in dose or schedule: No  Infusion given over 2 hours and tolerated well. Observed for 1 hour post-infusion without s/s of allergic reaction noted.

## 2021-08-25 NOTE — PROGRESS NOTES
[facility] [facility]    Patient Name:  Laurie Olivera   YOB: 1950   Gender:  Female   Appt Date:  08/25/21   Provider:  Ayse Verde MD   Insurance:  [id number]  Referring Provider taken. Pathology consistent with infiltrating moderately differentiated adenocarcinoma.    3/6/2020: CT c/a/p without evidence of metastatic disease  3/16/2020: patient taken to the operating room by Dr. Bernardo Paz for a hand assisted laparoscopic sigmoid colon mouth every 6 (six) hours as needed. , Disp: 20 tablet, Rfl: 0  •  Blood Glucose Calibration (TRUE METRIX LEVEL 1) Low In Vitro Solution, 1 Dose by In Vitro route every 30 (thirty) days. , Disp: 1 each, Rfl: 3  •  Blood Glucose Monitoring Suppl (TRUE METRIX last dose 5-20-21   • PONV (postoperative nausea and vomiting)     felt nausea   • Prediabetes    • Rectal bleeding     dx- with colon cancer   • Renal disorder     cysts on kidney and liver- told pt. not a concern,just watching   • Visual impairment Psychiatric/Behavioral: Negative for behavioral problems. Physical Examination:  Physical Exam  Constitutional:       General: She is not in acute distress. Appearance: She is well-developed. HENT:      Head: Normocephalic and atraumatic. excision:   -Adenocarcinoma with necrosis and calcifications involving the serosa.     K.  Lesser omentum, omentectomy:  -Adenocarcinoma involving the omentum.     L.   Portion of right Gerota's fascia, excision:  -No evidence of malignancy          Assessm

## 2021-08-25 NOTE — PROGRESS NOTES
Cancer Center Progress Note    Patient Name: Chelsy Aguilar   YOB: 1950   Medical Record Number: [id number]   Attending Physician: Quinten Ibrahim M.D.      Chief Complaint:  Colon cancer    Oncology History:  3/6/2020 ([facility]) colonoscopy showed Encounters:  08/25/21 : 65.2 kg (143 lb 12.8 oz)  08/25/21 : 65.2 kg (143 lb 12.8 oz)  08/10/21 : 65.8 kg (145 lb)  07/28/21 : 67.6 kg (149 lb)  07/28/21 : 67.6 kg (149 lb)  07/15/21 : 70.8 kg (156 lb)      Social History:  She is [place].  Currently, her Allergies:  No Known Allergies     Review of Systems:  All other systems reviewed and negative x10 except as listed above    Vital Signs:  /71 (BP Location: Left arm, Patient Position: Sitting, Cuff Size: adult)   Pulse 90   Temp 97.9 °F (36.6 °C cM1) - Signed by Gabriel Shah MD on 11/5/2020  - Clinical stage from 11/12/2020: Stage Unknown (cTX, Bettie Powers) - Signed by Gabriel Shah MD on 11/12/2020  70year old with stage IIA colon adenoca (3/2020) with pertioneal recurrence, MSI-stable, HER2 neg, RA

## 2021-09-01 ENCOUNTER — APPOINTMENT (OUTPATIENT)
Dept: HEMATOLOGY/ONCOLOGY | Facility: HOSPITAL | Age: 71
End: 2021-09-01
Attending: INTERNAL MEDICINE
Payer: MEDICARE

## 2021-09-08 ENCOUNTER — NURSE ONLY (OUTPATIENT)
Dept: HEMATOLOGY/ONCOLOGY | Facility: HOSPITAL | Age: 71
End: 2021-09-08
Attending: INTERNAL MEDICINE
Payer: MEDICARE

## 2021-09-08 VITALS
TEMPERATURE: 98 F | HEART RATE: 87 BPM | SYSTOLIC BLOOD PRESSURE: 128 MMHG | RESPIRATION RATE: 16 BRPM | BODY MASS INDEX: 27 KG/M2 | WEIGHT: 145 LBS | DIASTOLIC BLOOD PRESSURE: 75 MMHG | OXYGEN SATURATION: 96 %

## 2021-09-08 VITALS
BODY MASS INDEX: 26.68 KG/M2 | WEIGHT: 145 LBS | HEART RATE: 87 BPM | TEMPERATURE: 98 F | OXYGEN SATURATION: 96 % | SYSTOLIC BLOOD PRESSURE: 128 MMHG | DIASTOLIC BLOOD PRESSURE: 75 MMHG | HEIGHT: 62 IN | RESPIRATION RATE: 16 BRPM

## 2021-09-08 DIAGNOSIS — Z51.11 CHEMOTHERAPY MANAGEMENT, ENCOUNTER FOR: Primary | ICD-10-CM

## 2021-09-08 DIAGNOSIS — Z79.899 ENCOUNTER FOR MEDICATION MANAGEMENT: ICD-10-CM

## 2021-09-08 DIAGNOSIS — C18.7 CANCER OF SIGMOID COLON (HCC): ICD-10-CM

## 2021-09-08 DIAGNOSIS — Z45.2 ENCOUNTER FOR CENTRAL LINE CARE: ICD-10-CM

## 2021-09-08 LAB
ALBUMIN SERPL-MCNC: 3.5 G/DL (ref 3.4–5)
ALBUMIN/GLOB SERPL: 0.9 {RATIO} (ref 1–2)
ALP LIVER SERPL-CCNC: 75 U/L
ALT SERPL-CCNC: 20 U/L
ANION GAP SERPL CALC-SCNC: 6 MMOL/L (ref 0–18)
AST SERPL-CCNC: 25 U/L (ref 15–37)
BASOPHILS # BLD AUTO: 0.08 X10(3) UL (ref 0–0.2)
BASOPHILS NFR BLD AUTO: 1.2 %
BILIRUB SERPL-MCNC: 0.4 MG/DL (ref 0.1–2)
BUN BLD-MCNC: 8 MG/DL (ref 7–18)
BUN/CREAT SERPL: 16.7 (ref 10–20)
CALCIUM BLD-MCNC: 9 MG/DL (ref 8.5–10.1)
CHLORIDE SERPL-SCNC: 108 MMOL/L (ref 98–112)
CO2 SERPL-SCNC: 28 MMOL/L (ref 21–32)
CREAT BLD-MCNC: 0.48 MG/DL
DEPRECATED RDW RBC AUTO: 55.7 FL (ref 35.1–46.3)
EOSINOPHIL # BLD AUTO: 0.44 X10(3) UL (ref 0–0.7)
EOSINOPHIL NFR BLD AUTO: 6.4 %
ERYTHROCYTE [DISTWIDTH] IN BLOOD BY AUTOMATED COUNT: 15.9 % (ref 11–15)
GLOBULIN PLAS-MCNC: 3.9 G/DL (ref 2.8–4.4)
GLUCOSE BLD-MCNC: 83 MG/DL (ref 70–99)
HCT VFR BLD AUTO: 40.3 %
HGB BLD-MCNC: 12.9 G/DL
IMM GRANULOCYTES # BLD AUTO: 0.01 X10(3) UL (ref 0–1)
IMM GRANULOCYTES NFR BLD: 0.1 %
LYMPHOCYTES # BLD AUTO: 4.14 X10(3) UL (ref 1–4)
LYMPHOCYTES NFR BLD AUTO: 60.1 %
M PROTEIN MFR SERPL ELPH: 7.4 G/DL (ref 6.4–8.2)
MAGNESIUM SERPL-MCNC: 2 MG/DL (ref 1.6–2.6)
MCH RBC QN AUTO: 30.4 PG (ref 26–34)
MCHC RBC AUTO-ENTMCNC: 32 G/DL (ref 31–37)
MCV RBC AUTO: 95 FL
MONOCYTES # BLD AUTO: 0.52 X10(3) UL (ref 0.1–1)
MONOCYTES NFR BLD AUTO: 7.5 %
NEUTROPHILS # BLD AUTO: 1.7 X10 (3) UL (ref 1.5–7.7)
NEUTROPHILS # BLD AUTO: 1.7 X10(3) UL (ref 1.5–7.7)
NEUTROPHILS NFR BLD AUTO: 24.7 %
OSMOLALITY SERPL CALC.SUM OF ELEC: 291 MOSM/KG (ref 275–295)
PLATELET # BLD AUTO: 204 10(3)UL (ref 150–450)
POTASSIUM SERPL-SCNC: 3.9 MMOL/L (ref 3.5–5.1)
RBC # BLD AUTO: 4.24 X10(6)UL
SODIUM SERPL-SCNC: 142 MMOL/L (ref 136–145)
WBC # BLD AUTO: 6.9 X10(3) UL (ref 4–11)

## 2021-09-08 PROCEDURE — 83735 ASSAY OF MAGNESIUM: CPT

## 2021-09-08 PROCEDURE — 99215 OFFICE O/P EST HI 40 MIN: CPT | Performed by: INTERNAL MEDICINE

## 2021-09-08 PROCEDURE — 80053 COMPREHEN METABOLIC PANEL: CPT

## 2021-09-08 PROCEDURE — 96413 CHEMO IV INFUSION 1 HR: CPT

## 2021-09-08 PROCEDURE — 85025 COMPLETE CBC W/AUTO DIFF WBC: CPT

## 2021-09-08 RX ORDER — DIPHENHYDRAMINE HCL 50 MG
CAPSULE ORAL
Status: DISCONTINUED
Start: 2021-09-08 | End: 2021-09-08

## 2021-09-08 RX ORDER — HEPARIN SODIUM (PORCINE) LOCK FLUSH IV SOLN 100 UNIT/ML 100 UNIT/ML
500 SOLUTION INTRAVENOUS ONCE
Status: CANCELLED | OUTPATIENT
Start: 2021-09-08

## 2021-09-08 RX ORDER — ONDANSETRON 4 MG/1
TABLET, ORALLY DISINTEGRATING ORAL
Status: DISCONTINUED
Start: 2021-09-08 | End: 2021-09-08

## 2021-09-08 RX ORDER — SODIUM CHLORIDE 9 MG/ML
INJECTION INTRAVENOUS
Status: DISCONTINUED
Start: 2021-09-08 | End: 2021-09-08

## 2021-09-08 RX ORDER — ACETAMINOPHEN 325 MG/1
650 TABLET ORAL ONCE
Status: COMPLETED | OUTPATIENT
Start: 2021-09-08 | End: 2021-09-08

## 2021-09-08 RX ORDER — HEPARIN SODIUM (PORCINE) LOCK FLUSH IV SOLN 100 UNIT/ML 100 UNIT/ML
500 SOLUTION INTRAVENOUS ONCE
Status: COMPLETED | OUTPATIENT
Start: 2021-09-08 | End: 2021-09-08

## 2021-09-08 RX ORDER — ONDANSETRON 4 MG/1
8 TABLET, ORALLY DISINTEGRATING ORAL ONCE
Status: COMPLETED | OUTPATIENT
Start: 2021-09-08 | End: 2021-09-08

## 2021-09-08 RX ORDER — ONDANSETRON HYDROCHLORIDE 8 MG/1
TABLET, FILM COATED ORAL
Status: DISCONTINUED
Start: 2021-09-08 | End: 2021-09-08 | Stop reason: WASHOUT

## 2021-09-08 RX ORDER — DIPHENHYDRAMINE HCL 50 MG
50 CAPSULE ORAL ONCE
Status: COMPLETED | OUTPATIENT
Start: 2021-09-08 | End: 2021-09-08

## 2021-09-08 RX ORDER — HEPARIN SODIUM (PORCINE) LOCK FLUSH IV SOLN 100 UNIT/ML 100 UNIT/ML
SOLUTION INTRAVENOUS
Status: DISPENSED
Start: 2021-09-08 | End: 2021-09-09

## 2021-09-08 RX ORDER — ACETAMINOPHEN 325 MG/1
TABLET ORAL
Status: DISCONTINUED
Start: 2021-09-08 | End: 2021-09-08

## 2021-09-08 RX ADMIN — DIPHENHYDRAMINE HCL 50 MG: 50 MG CAPSULE ORAL at 13:08:00

## 2021-09-08 RX ADMIN — ONDANSETRON 8 MG: 4 TABLET, ORALLY DISINTEGRATING ORAL at 13:09:00

## 2021-09-08 RX ADMIN — ACETAMINOPHEN 650 MG: 325 TABLET ORAL at 13:08:00

## 2021-09-08 RX ADMIN — HEPARIN SODIUM (PORCINE) LOCK FLUSH IV SOLN 100 UNIT/ML 500 UNITS: 100 SOLUTION INTRAVENOUS at 16:12:00

## 2021-09-08 NOTE — PROGRESS NOTES
Pt here for C2D15 Erbitux. Arrives Ambulating independently, accompanied by Self            Modifications in dose or schedule: No    Received after MD visit. Denies any current complaints. Infusion given over 90 minutes and tolerated well.  Observed fo

## 2021-09-08 NOTE — PROGRESS NOTES
Cancer Center Progress Note    Patient Name: Mariposa Alonso   YOB: 1950   Medical Record Number: [id number]   Attending Physician: Shane Scheuermann, M.D.      Chief Complaint:  Colon cancer    Oncology History:  3/6/2020 (Radha Kohli) colonoscopy showed 12.8 oz)  08/25/21 : 65.2 kg (143 lb 12.8 oz)  08/10/21 : 65.8 kg (145 lb)  07/28/21 : 67.6 kg (149 lb)  07/28/21 : 67.6 kg (149 lb)      Social History:  She is [place]. Currently, her brother is living with her.   She lives close to her daughter, other c reviewed and negative x10 except as listed above    Vital Signs:  /75 (BP Location: Right arm, Patient Position: Sitting, Cuff Size: adult)   Pulse 87   Temp 98 °F (36.7 °C) (Oral)   Resp 16   Ht 1.575 m (5' 2\")   Wt 65.8 kg (145 lb)   SpO2 96%   BM Unknown (cTX, cNX, cM1) - Signed by Karin Cox MD on 11/12/2020  70year old with stage IIA colon adenoca (3/2020) with pertioneal recurrence, MSI-stable, HER2 neg, KITA WT  - s/p 12 cycles of FOLFIRI demetria.  PET with complete response, now s/p HIPEC with mi

## 2021-09-15 ENCOUNTER — APPOINTMENT (OUTPATIENT)
Dept: HEMATOLOGY/ONCOLOGY | Facility: HOSPITAL | Age: 71
End: 2021-09-15
Attending: INTERNAL MEDICINE
Payer: MEDICARE

## 2021-09-16 ENCOUNTER — OFFICE VISIT (OUTPATIENT)
Dept: INTERNAL MEDICINE CLINIC | Facility: CLINIC | Age: 71
End: 2021-09-16
Payer: MEDICARE

## 2021-09-16 VITALS
WEIGHT: 141.63 LBS | OXYGEN SATURATION: 96 % | SYSTOLIC BLOOD PRESSURE: 100 MMHG | DIASTOLIC BLOOD PRESSURE: 68 MMHG | BODY MASS INDEX: 26.06 KG/M2 | HEIGHT: 62 IN | HEART RATE: 115 BPM

## 2021-09-16 DIAGNOSIS — C78.6 PERITONEAL CARCINOMATOSIS (HCC): ICD-10-CM

## 2021-09-16 DIAGNOSIS — C18.9 ADENOCARCINOMA OF COLON (HCC): ICD-10-CM

## 2021-09-16 DIAGNOSIS — Z90.81 ASPLENIA AFTER SURGICAL PROCEDURE: ICD-10-CM

## 2021-09-16 DIAGNOSIS — Z00.00 MEDICARE ANNUAL WELLNESS VISIT, SUBSEQUENT: Primary | ICD-10-CM

## 2021-09-16 DIAGNOSIS — Z23 IMMUNIZATION DUE: ICD-10-CM

## 2021-09-16 PROCEDURE — 90471 IMMUNIZATION ADMIN: CPT | Performed by: INTERNAL MEDICINE

## 2021-09-16 PROCEDURE — 96160 PT-FOCUSED HLTH RISK ASSMT: CPT | Performed by: INTERNAL MEDICINE

## 2021-09-16 PROCEDURE — 3008F BODY MASS INDEX DOCD: CPT | Performed by: INTERNAL MEDICINE

## 2021-09-16 PROCEDURE — 99397 PER PM REEVAL EST PAT 65+ YR: CPT | Performed by: INTERNAL MEDICINE

## 2021-09-16 PROCEDURE — G0439 PPPS, SUBSEQ VISIT: HCPCS | Performed by: INTERNAL MEDICINE

## 2021-09-16 PROCEDURE — 90620 MENB-4C VACCINE IM: CPT | Performed by: INTERNAL MEDICINE

## 2021-09-16 PROCEDURE — 90734 MENACWYD/MENACWYCRM VACC IM: CPT | Performed by: INTERNAL MEDICINE

## 2021-09-16 PROCEDURE — 90472 IMMUNIZATION ADMIN EACH ADD: CPT | Performed by: INTERNAL MEDICINE

## 2021-09-16 PROCEDURE — 3074F SYST BP LT 130 MM HG: CPT | Performed by: INTERNAL MEDICINE

## 2021-09-16 PROCEDURE — 3078F DIAST BP <80 MM HG: CPT | Performed by: INTERNAL MEDICINE

## 2021-09-16 NOTE — PROGRESS NOTES
Subjective:   Patient ID: Giselle Gonzalez is a 70year old female. HPIPt here for Ma supervisit and fu on advanced stage colon cancer and SP splenectomy. Is feeling better and stronger - is getting chemotherapy.     HPI:   Giselle Gonzalez is a 70 year o Screening (PHQ-2/PHQ-9): Over the LAST 2 WEEKS                      Advance Directives     Do you have a healthcare power of ?: No    Do you have a living will?: No   Was Medicare Assessment Questionnaire completed by patient and sent to HIM for sc Reported on 9/16/2021) 20 tablet 0   • Acetaminophen 500 MG Oral Cap Take by mouth every 6 (six) hours as needed for Pain.    (Patient not taking: Reported on 9/16/2021)        MEDICAL INFORMATION:   Past Medical History:   Diagnosis Date   • Anesthesia com exertion  CARDIOVASCULAR: denies chest pain on exertion  GI: denies abdominal pain, denies heartburn  : denies dysuria, vaginal discharge or itching, no complaint of urinary incontinence   MUSCULOSKELETAL: denies back pain  NEURO: denies headaches  PSYCH USE    Other orders  -     SEROGROUP B MENINGOCOCCAL (MENB) 2 DOSE SCHEDULE         The patient indicates understanding of these issues and agrees to the plan. The patient is asked to return in 3m for fu.        PREVENTATIVE SERVICES  INDICATIONS AND SCHED PNEUMOCOCCAL VACC, 13 LOPEZ IM    Update Immunization Activity if applicable    Hepatitis B No orders found for this or any previous visit. Update Immunization Activity if applicable    Tetanus No orders found for this or any previous visit.  Update Immunizat Glaucoma If at high risk q1 year    Pap If at high risk q1 year No recommendations at this time   Pap All Patients q2 year if indicated No recommendations at this time   Mammogram Age > 44 q1 year Mammogram due on 03/15/2022   EKG All Patients One at [facility] strip 3   • Alcohol Swabs (BD SWAB SINGLE USE REGULAR) Does not apply Pads 1 Units by Does not apply route daily. 100 each 3   • Vitamin D3 50 MCG (2000 UT) Oral Cap Take 2,000 Units by mouth 2 (two) times a day. Per pt.  Takes 4000 units daily(at one time)

## 2021-09-22 ENCOUNTER — NURSE ONLY (OUTPATIENT)
Dept: HEMATOLOGY/ONCOLOGY | Facility: HOSPITAL | Age: 71
End: 2021-09-22
Attending: INTERNAL MEDICINE
Payer: MEDICARE

## 2021-09-22 VITALS
RESPIRATION RATE: 16 BRPM | TEMPERATURE: 99 F | WEIGHT: 143 LBS | SYSTOLIC BLOOD PRESSURE: 126 MMHG | OXYGEN SATURATION: 98 % | HEART RATE: 88 BPM | DIASTOLIC BLOOD PRESSURE: 79 MMHG | BODY MASS INDEX: 26 KG/M2

## 2021-09-22 VITALS
TEMPERATURE: 99 F | OXYGEN SATURATION: 98 % | SYSTOLIC BLOOD PRESSURE: 126 MMHG | DIASTOLIC BLOOD PRESSURE: 79 MMHG | RESPIRATION RATE: 16 BRPM | BODY MASS INDEX: 26.31 KG/M2 | HEART RATE: 88 BPM | WEIGHT: 143 LBS | HEIGHT: 62 IN

## 2021-09-22 DIAGNOSIS — Z51.11 CHEMOTHERAPY MANAGEMENT, ENCOUNTER FOR: Primary | ICD-10-CM

## 2021-09-22 DIAGNOSIS — C18.7 CANCER OF SIGMOID COLON (HCC): ICD-10-CM

## 2021-09-22 DIAGNOSIS — C18.7 CANCER OF SIGMOID COLON (HCC): Primary | ICD-10-CM

## 2021-09-22 DIAGNOSIS — Z51.11 CHEMOTHERAPY MANAGEMENT, ENCOUNTER FOR: ICD-10-CM

## 2021-09-22 LAB
ALBUMIN SERPL-MCNC: 3.5 G/DL (ref 3.4–5)
ALBUMIN/GLOB SERPL: 0.9 {RATIO} (ref 1–2)
ALP LIVER SERPL-CCNC: 81 U/L
ALT SERPL-CCNC: 17 U/L
ANION GAP SERPL CALC-SCNC: 4 MMOL/L (ref 0–18)
AST SERPL-CCNC: 20 U/L (ref 15–37)
BASOPHILS # BLD AUTO: 0.08 X10(3) UL (ref 0–0.2)
BASOPHILS NFR BLD AUTO: 1.1 %
BILIRUB SERPL-MCNC: 0.4 MG/DL (ref 0.1–2)
BUN BLD-MCNC: 7 MG/DL (ref 7–18)
BUN/CREAT SERPL: 16.3 (ref 10–20)
CALCIUM BLD-MCNC: 8.8 MG/DL (ref 8.5–10.1)
CEA SERPL-MCNC: 0.8 NG/ML (ref ?–5)
CHLORIDE SERPL-SCNC: 108 MMOL/L (ref 98–112)
CO2 SERPL-SCNC: 29 MMOL/L (ref 21–32)
CREAT BLD-MCNC: 0.43 MG/DL
DEPRECATED RDW RBC AUTO: 55.4 FL (ref 35.1–46.3)
EOSINOPHIL # BLD AUTO: 0.58 X10(3) UL (ref 0–0.7)
EOSINOPHIL NFR BLD AUTO: 8.3 %
ERYTHROCYTE [DISTWIDTH] IN BLOOD BY AUTOMATED COUNT: 16.1 % (ref 11–15)
GLOBULIN PLAS-MCNC: 3.9 G/DL (ref 2.8–4.4)
GLUCOSE BLD-MCNC: 89 MG/DL (ref 70–99)
HCT VFR BLD AUTO: 40 %
HGB BLD-MCNC: 12.7 G/DL
IMM GRANULOCYTES # BLD AUTO: 0.01 X10(3) UL (ref 0–1)
IMM GRANULOCYTES NFR BLD: 0.1 %
LYMPHOCYTES # BLD AUTO: 3.82 X10(3) UL (ref 1–4)
LYMPHOCYTES NFR BLD AUTO: 54.9 %
MAGNESIUM SERPL-MCNC: 2.1 MG/DL (ref 1.6–2.6)
MCH RBC QN AUTO: 29.7 PG (ref 26–34)
MCHC RBC AUTO-ENTMCNC: 31.8 G/DL (ref 31–37)
MCV RBC AUTO: 93.5 FL
MONOCYTES # BLD AUTO: 0.55 X10(3) UL (ref 0.1–1)
MONOCYTES NFR BLD AUTO: 7.9 %
NEUTROPHILS # BLD AUTO: 1.92 X10 (3) UL (ref 1.5–7.7)
NEUTROPHILS # BLD AUTO: 1.92 X10(3) UL (ref 1.5–7.7)
NEUTROPHILS NFR BLD AUTO: 27.7 %
OSMOLALITY SERPL CALC.SUM OF ELEC: 289 MOSM/KG (ref 275–295)
PLATELET # BLD AUTO: 204 10(3)UL (ref 150–450)
POTASSIUM SERPL-SCNC: 3.8 MMOL/L (ref 3.5–5.1)
PROT SERPL-MCNC: 7.4 G/DL (ref 6.4–8.2)
RBC # BLD AUTO: 4.28 X10(6)UL
SODIUM SERPL-SCNC: 141 MMOL/L (ref 136–145)
WBC # BLD AUTO: 7 X10(3) UL (ref 4–11)

## 2021-09-22 PROCEDURE — 99215 OFFICE O/P EST HI 40 MIN: CPT | Performed by: INTERNAL MEDICINE

## 2021-09-22 PROCEDURE — 83735 ASSAY OF MAGNESIUM: CPT

## 2021-09-22 PROCEDURE — 85025 COMPLETE CBC W/AUTO DIFF WBC: CPT

## 2021-09-22 PROCEDURE — 96415 CHEMO IV INFUSION ADDL HR: CPT

## 2021-09-22 PROCEDURE — 80053 COMPREHEN METABOLIC PANEL: CPT

## 2021-09-22 PROCEDURE — 96367 TX/PROPH/DG ADDL SEQ IV INF: CPT

## 2021-09-22 PROCEDURE — 82378 CARCINOEMBRYONIC ANTIGEN: CPT

## 2021-09-22 PROCEDURE — 96413 CHEMO IV INFUSION 1 HR: CPT

## 2021-09-22 PROCEDURE — 96366 THER/PROPH/DIAG IV INF ADDON: CPT

## 2021-09-22 RX ORDER — DIPHENHYDRAMINE HCL 50 MG
50 CAPSULE ORAL ONCE
Status: CANCELLED | OUTPATIENT
Start: 2021-09-22

## 2021-09-22 RX ORDER — DIPHENHYDRAMINE HCL 50 MG
50 CAPSULE ORAL ONCE
Status: DISCONTINUED | OUTPATIENT
Start: 2021-09-22 | End: 2021-09-22

## 2021-09-22 RX ORDER — HEPARIN SODIUM (PORCINE) LOCK FLUSH IV SOLN 100 UNIT/ML 100 UNIT/ML
SOLUTION INTRAVENOUS
Status: COMPLETED
Start: 2021-09-22 | End: 2021-09-22

## 2021-09-22 RX ORDER — MAGNESIUM SULFATE HEPTAHYDRATE 40 MG/ML
2 INJECTION, SOLUTION INTRAVENOUS ONCE
Status: CANCELLED | OUTPATIENT
Start: 2021-10-06

## 2021-09-22 RX ORDER — ACETAMINOPHEN 325 MG/1
650 TABLET ORAL ONCE
Status: COMPLETED | OUTPATIENT
Start: 2021-09-22 | End: 2021-09-22

## 2021-09-22 RX ORDER — ONDANSETRON 4 MG/1
8 TABLET, ORALLY DISINTEGRATING ORAL ONCE
Status: CANCELLED
Start: 2021-10-06 | End: 2021-10-06

## 2021-09-22 RX ORDER — ACETAMINOPHEN 325 MG/1
650 TABLET ORAL ONCE
Status: CANCELLED | OUTPATIENT
Start: 2021-10-06

## 2021-09-22 RX ORDER — MAGNESIUM SULFATE HEPTAHYDRATE 40 MG/ML
2 INJECTION, SOLUTION INTRAVENOUS ONCE
Status: CANCELLED | OUTPATIENT
Start: 2021-09-22

## 2021-09-22 RX ORDER — ACETAMINOPHEN 325 MG/1
650 TABLET ORAL ONCE
Status: CANCELLED | OUTPATIENT
Start: 2021-09-22

## 2021-09-22 RX ORDER — SODIUM CHLORIDE 9 MG/ML
INJECTION INTRAVENOUS
Status: DISCONTINUED
Start: 2021-09-22 | End: 2021-09-22

## 2021-09-22 RX ORDER — ONDANSETRON 4 MG/1
8 TABLET, ORALLY DISINTEGRATING ORAL ONCE
Status: DISCONTINUED | OUTPATIENT
Start: 2021-09-22 | End: 2021-09-22

## 2021-09-22 RX ORDER — DIPHENHYDRAMINE HCL 50 MG
CAPSULE ORAL
Status: DISCONTINUED
Start: 2021-09-22 | End: 2021-09-22

## 2021-09-22 RX ORDER — ONDANSETRON 4 MG/1
8 TABLET, ORALLY DISINTEGRATING ORAL ONCE
Status: CANCELLED
Start: 2021-09-22 | End: 2021-09-22

## 2021-09-22 RX ORDER — ACETAMINOPHEN 325 MG/1
TABLET ORAL
Status: COMPLETED
Start: 2021-09-22 | End: 2021-09-22

## 2021-09-22 RX ORDER — DIPHENHYDRAMINE HCL 50 MG
50 CAPSULE ORAL ONCE
Status: CANCELLED | OUTPATIENT
Start: 2021-10-06

## 2021-09-22 RX ADMIN — HEPARIN SODIUM (PORCINE) LOCK FLUSH IV SOLN 100 UNIT/ML 500 UNITS: 100 SOLUTION INTRAVENOUS at 17:33:00

## 2021-09-22 RX ADMIN — ACETAMINOPHEN 650 MG: 325 TABLET ORAL at 13:47:00

## 2021-09-22 NOTE — PROGRESS NOTES
Cancer Center Progress Note    Patient Name: Katelynn Myles   YOB: 1950   Medical Record Number: [id number]   Attending Physician: Jake Verde M.D.      Chief Complaint:  Colon cancer    Oncology History:  3/6/2020 (Richardson Goodpasture) colonoscopy showed lb)  09/16/21 : 64.2 kg (141 lb 9.6 oz)  09/08/21 : 65.8 kg (145 lb)  09/08/21 : 65.8 kg (145 lb)  08/25/21 : 65.2 kg (143 lb 12.8 oz)  08/25/21 : 65.2 kg (143 lb 12.8 oz)      Social History:  She is [place]. Currently, her brother is living with her.   Luis Salcedo 0    Allergies:  No Known Allergies     Review of Systems:  All other systems reviewed and negative x10 except as listed above    Vital Signs:  /79 (BP Location: Left arm, Patient Position: Sitting, Cuff Size: adult)   Pulse 88   Temp 98.5 °F (36.9 ° Stage Unknown (cTX, cNX, cM1) - Signed by Rafael Mccall MD on 11/5/2020  - Clinical stage from 11/12/2020: Stage Unknown (cTX, cNX, cM1) - Signed by Rafael Mccall MD on 11/12/2020  70year old with stage IIA colon adenoca (3/2020) with pertioneal recurrence,

## 2021-09-22 NOTE — PROGRESS NOTES
Pt here for C3D1 Erbitux. Arrives Ambulating independently, accompanied by Self            Modifications in dose or schedule: No  Patient did take her own  Benadryl and Zofran upon arrival      Infusion given over 2 hours and tolerated well.  Observed for

## 2021-09-28 ENCOUNTER — TELEPHONE (OUTPATIENT)
Dept: INTERNAL MEDICINE CLINIC | Facility: CLINIC | Age: 71
End: 2021-09-28

## 2021-09-28 NOTE — TELEPHONE ENCOUNTER
Let patient know that the [facility] notice of payment due of $6500 will be taken care of either by [facility] or the vendor, Yuriy Lizarraga.  This information was given to this writer from an individual working with cancer related labs at [facility] AND [facility]. My name was gi

## 2021-09-29 ENCOUNTER — TELEPHONE (OUTPATIENT)
Dept: HEMATOLOGY/ONCOLOGY | Facility: HOSPITAL | Age: 71
End: 2021-09-29

## 2021-09-29 NOTE — TELEPHONE ENCOUNTER
Nory Chamberlain called. She would like to speak with Merribeth Favre, RN regarding a medication questions. Please call.

## 2021-10-05 ENCOUNTER — HOSPITAL ENCOUNTER (OUTPATIENT)
Dept: CT IMAGING | Facility: HOSPITAL | Age: 71
Discharge: HOME | End: 2021-10-05
Attending: INTERNAL MEDICINE
Payer: MEDICARE

## 2021-10-05 DIAGNOSIS — C18.7 CANCER OF SIGMOID COLON (HCC): ICD-10-CM

## 2021-10-05 PROCEDURE — 74177 CT ABD & PELVIS W/CONTRAST: CPT | Performed by: INTERNAL MEDICINE

## 2021-10-05 PROCEDURE — 71260 CT THORAX DX C+: CPT | Performed by: INTERNAL MEDICINE

## 2021-10-06 ENCOUNTER — OFFICE VISIT (OUTPATIENT)
Dept: HEMATOLOGY/ONCOLOGY | Facility: HOSPITAL | Age: 71
End: 2021-10-06
Attending: INTERNAL MEDICINE
Payer: MEDICARE

## 2021-10-06 VITALS
HEIGHT: 62 IN | RESPIRATION RATE: 16 BRPM | BODY MASS INDEX: 26.31 KG/M2 | SYSTOLIC BLOOD PRESSURE: 123 MMHG | DIASTOLIC BLOOD PRESSURE: 71 MMHG | WEIGHT: 143 LBS | OXYGEN SATURATION: 98 % | HEART RATE: 80 BPM | TEMPERATURE: 98 F

## 2021-10-06 VITALS
OXYGEN SATURATION: 98 % | TEMPERATURE: 98 F | WEIGHT: 143.38 LBS | BODY MASS INDEX: 26.39 KG/M2 | SYSTOLIC BLOOD PRESSURE: 123 MMHG | DIASTOLIC BLOOD PRESSURE: 71 MMHG | HEART RATE: 80 BPM | HEIGHT: 62 IN | RESPIRATION RATE: 16 BRPM

## 2021-10-06 DIAGNOSIS — Z51.11 CHEMOTHERAPY MANAGEMENT, ENCOUNTER FOR: Primary | ICD-10-CM

## 2021-10-06 DIAGNOSIS — Z51.11 CHEMOTHERAPY MANAGEMENT, ENCOUNTER FOR: ICD-10-CM

## 2021-10-06 DIAGNOSIS — C18.7 CANCER OF SIGMOID COLON (HCC): ICD-10-CM

## 2021-10-06 DIAGNOSIS — N83.202 CYST OF LEFT OVARY: Primary | ICD-10-CM

## 2021-10-06 PROCEDURE — 96415 CHEMO IV INFUSION ADDL HR: CPT

## 2021-10-06 PROCEDURE — 83735 ASSAY OF MAGNESIUM: CPT

## 2021-10-06 PROCEDURE — 80053 COMPREHEN METABOLIC PANEL: CPT

## 2021-10-06 PROCEDURE — 96413 CHEMO IV INFUSION 1 HR: CPT

## 2021-10-06 PROCEDURE — 99215 OFFICE O/P EST HI 40 MIN: CPT | Performed by: INTERNAL MEDICINE

## 2021-10-06 PROCEDURE — 85025 COMPLETE CBC W/AUTO DIFF WBC: CPT

## 2021-10-06 RX ORDER — SODIUM CHLORIDE 9 MG/ML
INJECTION INTRAVENOUS
Status: DISPENSED
Start: 2021-10-06 | End: 2021-10-07

## 2021-10-06 RX ORDER — HEPARIN SODIUM (PORCINE) LOCK FLUSH IV SOLN 100 UNIT/ML 100 UNIT/ML
SOLUTION INTRAVENOUS
Status: DISCONTINUED
Start: 2021-10-06 | End: 2021-10-06

## 2021-10-06 RX ORDER — ACETAMINOPHEN 325 MG/1
650 TABLET ORAL ONCE
Status: COMPLETED | OUTPATIENT
Start: 2021-10-06 | End: 2021-10-06

## 2021-10-06 RX ORDER — ONDANSETRON 4 MG/1
8 TABLET, ORALLY DISINTEGRATING ORAL ONCE
Status: DISCONTINUED | OUTPATIENT
Start: 2021-10-06 | End: 2021-10-06

## 2021-10-06 RX ORDER — ACETAMINOPHEN 325 MG/1
TABLET ORAL
Status: DISCONTINUED
Start: 2021-10-06 | End: 2021-10-06

## 2021-10-06 RX ORDER — DIPHENHYDRAMINE HCL 50 MG
50 CAPSULE ORAL ONCE
Status: DISCONTINUED | OUTPATIENT
Start: 2021-10-06 | End: 2021-10-06

## 2021-10-06 RX ADMIN — ACETAMINOPHEN 650 MG: 325 TABLET ORAL at 13:22:00

## 2021-10-06 NOTE — PROGRESS NOTES
Pt here for C3D15 Erbitux. Arrives Ambulating independently, accompanied by Self            Modifications in dose or schedule: No  Patient did take her own  Benadryl and Zofran upon arrival      Infusion given over 2 hours and tolerated well.  Observed for

## 2021-10-06 NOTE — PROGRESS NOTES
Cancer Center Progress Note    Patient Name: Linda Vieira   YOB: 1950   Medical Record Number: [id number]   Attending Physician: Carol Bullard M.D.      Chief Complaint:  Colon cancer    Oncology History:  3/6/2020 (Shae Abdullahi) colonoscopy showed : 64.9 kg (143 lb)  09/22/21 : 64.9 kg (143 lb)  09/16/21 : 64.2 kg (141 lb 9.6 oz)  09/08/21 : 65.8 kg (145 lb)  09/08/21 : 65.8 kg (145 lb)      Social History:  She is [place]. Currently, her brother is living with her.   She lives close to her daughter Review of Systems:  All other systems reviewed and negative x10 except as listed above    Vital Signs:  /71 (Patient Position: Sitting, Cuff Size: adult)   Pulse 80   Temp 98 °F (36.7 °C) (Oral)   Resp 16   Ht 1.575 m (5' 2\")   Wt 65 kg (143 lb 6. stage from 11/12/2020: Stage Unknown (cTX, cNX, cM1) - Signed by Jarett Gamino MD on 11/12/2020  70year old with stage IIA colon adenoca (3/2020) with pertioneal recurrence, MSI-stable, HER2 neg, KITA WT  - s/p 12 cycles of FOLFIRI demetria.  PET with complete re

## 2021-10-20 ENCOUNTER — OFFICE VISIT (OUTPATIENT)
Dept: HEMATOLOGY/ONCOLOGY | Facility: HOSPITAL | Age: 71
End: 2021-10-20
Attending: INTERNAL MEDICINE
Payer: MEDICARE

## 2021-10-20 ENCOUNTER — TELEPHONE (OUTPATIENT)
Dept: INTERNAL MEDICINE CLINIC | Facility: CLINIC | Age: 71
End: 2021-10-20

## 2021-10-20 VITALS — WEIGHT: 143 LBS | BODY MASS INDEX: 26 KG/M2

## 2021-10-20 VITALS
HEART RATE: 80 BPM | RESPIRATION RATE: 16 BRPM | DIASTOLIC BLOOD PRESSURE: 61 MMHG | SYSTOLIC BLOOD PRESSURE: 107 MMHG | TEMPERATURE: 98 F | OXYGEN SATURATION: 98 %

## 2021-10-20 DIAGNOSIS — Z51.11 CHEMOTHERAPY MANAGEMENT, ENCOUNTER FOR: ICD-10-CM

## 2021-10-20 DIAGNOSIS — C18.7 CANCER OF SIGMOID COLON (HCC): ICD-10-CM

## 2021-10-20 DIAGNOSIS — Z79.899 ENCOUNTER FOR MEDICATION MANAGEMENT: ICD-10-CM

## 2021-10-20 DIAGNOSIS — Z45.2 ENCOUNTER FOR CENTRAL LINE CARE: ICD-10-CM

## 2021-10-20 DIAGNOSIS — Z51.11 CHEMOTHERAPY MANAGEMENT, ENCOUNTER FOR: Primary | ICD-10-CM

## 2021-10-20 DIAGNOSIS — C18.7 CANCER OF SIGMOID COLON (HCC): Primary | ICD-10-CM

## 2021-10-20 PROCEDURE — 96413 CHEMO IV INFUSION 1 HR: CPT

## 2021-10-20 PROCEDURE — 82378 CARCINOEMBRYONIC ANTIGEN: CPT

## 2021-10-20 PROCEDURE — 96415 CHEMO IV INFUSION ADDL HR: CPT

## 2021-10-20 PROCEDURE — 80053 COMPREHEN METABOLIC PANEL: CPT

## 2021-10-20 PROCEDURE — 83735 ASSAY OF MAGNESIUM: CPT

## 2021-10-20 PROCEDURE — 85025 COMPLETE CBC W/AUTO DIFF WBC: CPT

## 2021-10-20 PROCEDURE — 99215 OFFICE O/P EST HI 40 MIN: CPT | Performed by: INTERNAL MEDICINE

## 2021-10-20 RX ORDER — ONDANSETRON 4 MG/1
8 TABLET, ORALLY DISINTEGRATING ORAL ONCE
Status: CANCELLED | OUTPATIENT
Start: 2021-10-20 | End: 2021-10-20

## 2021-10-20 RX ORDER — ACETAMINOPHEN 325 MG/1
TABLET ORAL
Status: DISCONTINUED
Start: 2021-10-20 | End: 2021-10-20

## 2021-10-20 RX ORDER — HEPARIN SODIUM (PORCINE) LOCK FLUSH IV SOLN 100 UNIT/ML 100 UNIT/ML
SOLUTION INTRAVENOUS
Status: COMPLETED
Start: 2021-10-20 | End: 2021-10-20

## 2021-10-20 RX ORDER — MAGNESIUM SULFATE HEPTAHYDRATE 40 MG/ML
2 INJECTION, SOLUTION INTRAVENOUS ONCE
Status: CANCELLED | OUTPATIENT
Start: 2021-11-03

## 2021-10-20 RX ORDER — HEPARIN SODIUM (PORCINE) LOCK FLUSH IV SOLN 100 UNIT/ML 100 UNIT/ML
500 SOLUTION INTRAVENOUS ONCE
Status: CANCELLED | OUTPATIENT
Start: 2021-10-20

## 2021-10-20 RX ORDER — ACETAMINOPHEN 325 MG/1
650 TABLET ORAL ONCE
Status: CANCELLED | OUTPATIENT
Start: 2021-10-20

## 2021-10-20 RX ORDER — SODIUM CHLORIDE 9 MG/ML
INJECTION INTRAVENOUS
Status: DISCONTINUED
Start: 2021-10-20 | End: 2021-10-20

## 2021-10-20 RX ORDER — HEPARIN SODIUM (PORCINE) LOCK FLUSH IV SOLN 100 UNIT/ML 100 UNIT/ML
500 SOLUTION INTRAVENOUS ONCE
Status: COMPLETED | OUTPATIENT
Start: 2021-10-20 | End: 2021-10-20

## 2021-10-20 RX ORDER — ACETAMINOPHEN 325 MG/1
650 TABLET ORAL ONCE
Status: CANCELLED | OUTPATIENT
Start: 2021-11-03

## 2021-10-20 RX ORDER — DIPHENHYDRAMINE HCL 50 MG
50 CAPSULE ORAL ONCE
Status: CANCELLED | OUTPATIENT
Start: 2021-10-20

## 2021-10-20 RX ORDER — ONDANSETRON 4 MG/1
8 TABLET, ORALLY DISINTEGRATING ORAL ONCE
Status: CANCELLED
Start: 2021-11-03 | End: 2021-11-03

## 2021-10-20 RX ORDER — DIPHENHYDRAMINE HCL 50 MG
50 CAPSULE ORAL ONCE
Status: CANCELLED | OUTPATIENT
Start: 2021-11-03

## 2021-10-20 RX ORDER — ONDANSETRON 4 MG/1
TABLET, ORALLY DISINTEGRATING ORAL
Status: DISCONTINUED
Start: 2021-10-20 | End: 2021-10-20 | Stop reason: WASHOUT

## 2021-10-20 RX ORDER — DIPHENHYDRAMINE HCL 50 MG
50 CAPSULE ORAL ONCE
Status: DISCONTINUED | OUTPATIENT
Start: 2021-10-20 | End: 2021-10-20

## 2021-10-20 RX ORDER — MAGNESIUM SULFATE HEPTAHYDRATE 40 MG/ML
2 INJECTION, SOLUTION INTRAVENOUS ONCE
Status: CANCELLED | OUTPATIENT
Start: 2021-10-20

## 2021-10-20 RX ORDER — DIPHENHYDRAMINE HCL 50 MG
CAPSULE ORAL
Status: DISCONTINUED
Start: 2021-10-20 | End: 2021-10-20

## 2021-10-20 RX ORDER — ONDANSETRON 4 MG/1
8 TABLET, ORALLY DISINTEGRATING ORAL ONCE
Status: DISCONTINUED | OUTPATIENT
Start: 2021-10-20 | End: 2021-10-20

## 2021-10-20 RX ORDER — ACETAMINOPHEN 325 MG/1
650 TABLET ORAL ONCE
Status: DISCONTINUED | OUTPATIENT
Start: 2021-10-20 | End: 2021-10-20

## 2021-10-20 RX ADMIN — HEPARIN SODIUM (PORCINE) LOCK FLUSH IV SOLN 100 UNIT/ML 500 UNITS: 100 SOLUTION INTRAVENOUS at 16:15:00

## 2021-10-20 NOTE — PROGRESS NOTES
Pt here for C4D1 Erbitux. Arrives Ambulating independently, accompanied by Self            Modifications in dose or schedule: No  Patient did take her own  Tylenol, Benadryl and Zofran upon arrival      Infusion given over 2 hours and tolerated well.   [place]

## 2021-10-20 NOTE — TELEPHONE ENCOUNTER
Pt stopped at the office because she has been getting bills. Per the pt Dr. Mell Zeng needs to write a referral for Dr. Kylah Scruggs. ..

## 2021-10-20 NOTE — PROGRESS NOTES
Cancer Center Progress Note    Patient Name: Essie Lan   YOB: 1950   Medical Record Number: [id number]   Attending Physician: Joy Mello M.D.      Chief Complaint:  Colon cancer    Oncology History:  3/6/2020 (Vivek Lopez) colonoscopy showed lb)  10/06/21 : 65 kg (143 lb 6.4 oz)  09/22/21 : 64.9 kg (143 lb)  09/22/21 : 64.9 kg (143 lb)  09/16/21 : 64.2 kg (141 lb 9.6 oz)  09/08/21 : 65.8 kg (145 lb)      Social History:  She is [place]. Currently, her brother is living with her.   She lives cl Known Allergies     Review of Systems:  All other systems reviewed and negative x10 except as listed above    Vital Signs: There were no vitals taken for this visit.     Wt Readings from Last 6 Encounters:  10/06/21 : 64.9 kg (143 lb)  10/06/21 : 65 kg (14 adenoca (3/2020) with pertioneal recurrence, MSI-stable, HER2 neg, KITA WT  - s/p 12 cycles of FOLFIRI demetria.  PET with complete response, now s/p HIPEC with mitomycin 6/29 with presence of significant amount of cancer on FOLFIRI demetria  - Maintenance cetuximab t

## 2021-11-03 ENCOUNTER — NURSE ONLY (OUTPATIENT)
Dept: HEMATOLOGY/ONCOLOGY | Facility: HOSPITAL | Age: 71
End: 2021-11-03
Attending: INTERNAL MEDICINE
Payer: MEDICARE

## 2021-11-03 VITALS
BODY MASS INDEX: 26.5 KG/M2 | HEIGHT: 62 IN | OXYGEN SATURATION: 98 % | TEMPERATURE: 99 F | WEIGHT: 144 LBS | DIASTOLIC BLOOD PRESSURE: 83 MMHG | SYSTOLIC BLOOD PRESSURE: 134 MMHG | HEART RATE: 89 BPM | RESPIRATION RATE: 16 BRPM

## 2021-11-03 VITALS
TEMPERATURE: 99 F | BODY MASS INDEX: 26 KG/M2 | DIASTOLIC BLOOD PRESSURE: 83 MMHG | SYSTOLIC BLOOD PRESSURE: 134 MMHG | HEART RATE: 89 BPM | RESPIRATION RATE: 16 BRPM | WEIGHT: 144 LBS | OXYGEN SATURATION: 98 %

## 2021-11-03 DIAGNOSIS — Z45.2 ENCOUNTER FOR CENTRAL LINE CARE: ICD-10-CM

## 2021-11-03 DIAGNOSIS — Z79.899 ENCOUNTER FOR MEDICATION MANAGEMENT: ICD-10-CM

## 2021-11-03 DIAGNOSIS — Z51.11 CHEMOTHERAPY MANAGEMENT, ENCOUNTER FOR: ICD-10-CM

## 2021-11-03 DIAGNOSIS — Z51.11 CHEMOTHERAPY MANAGEMENT, ENCOUNTER FOR: Primary | ICD-10-CM

## 2021-11-03 DIAGNOSIS — C18.7 CANCER OF SIGMOID COLON (HCC): ICD-10-CM

## 2021-11-03 DIAGNOSIS — C18.7 CANCER OF SIGMOID COLON (HCC): Primary | ICD-10-CM

## 2021-11-03 DIAGNOSIS — C78.6 PERITONEAL CARCINOMATOSIS (HCC): ICD-10-CM

## 2021-11-03 PROCEDURE — 80053 COMPREHEN METABOLIC PANEL: CPT

## 2021-11-03 PROCEDURE — 96413 CHEMO IV INFUSION 1 HR: CPT

## 2021-11-03 PROCEDURE — 96415 CHEMO IV INFUSION ADDL HR: CPT

## 2021-11-03 PROCEDURE — 83735 ASSAY OF MAGNESIUM: CPT

## 2021-11-03 PROCEDURE — 85025 COMPLETE CBC W/AUTO DIFF WBC: CPT

## 2021-11-03 PROCEDURE — 99215 OFFICE O/P EST HI 40 MIN: CPT | Performed by: INTERNAL MEDICINE

## 2021-11-03 RX ORDER — HEPARIN SODIUM (PORCINE) LOCK FLUSH IV SOLN 100 UNIT/ML 100 UNIT/ML
500 SOLUTION INTRAVENOUS ONCE
Status: COMPLETED | OUTPATIENT
Start: 2021-11-03 | End: 2021-11-03

## 2021-11-03 RX ORDER — HEPARIN SODIUM (PORCINE) LOCK FLUSH IV SOLN 100 UNIT/ML 100 UNIT/ML
SOLUTION INTRAVENOUS
Status: COMPLETED
Start: 2021-11-03 | End: 2021-11-03

## 2021-11-03 RX ORDER — HEPARIN SODIUM (PORCINE) LOCK FLUSH IV SOLN 100 UNIT/ML 100 UNIT/ML
500 SOLUTION INTRAVENOUS ONCE
Status: CANCELLED | OUTPATIENT
Start: 2021-11-03

## 2021-11-03 RX ORDER — DIPHENHYDRAMINE HCL 50 MG
50 CAPSULE ORAL ONCE
Status: DISCONTINUED | OUTPATIENT
Start: 2021-11-03 | End: 2021-11-03

## 2021-11-03 RX ORDER — SODIUM CHLORIDE 9 MG/ML
INJECTION INTRAVENOUS
Status: DISCONTINUED
Start: 2021-11-03 | End: 2021-11-03

## 2021-11-03 RX ORDER — ACETAMINOPHEN 325 MG/1
650 TABLET ORAL ONCE
Status: DISCONTINUED | OUTPATIENT
Start: 2021-11-03 | End: 2021-11-03

## 2021-11-03 RX ORDER — ONDANSETRON 4 MG/1
8 TABLET, ORALLY DISINTEGRATING ORAL ONCE
Status: DISCONTINUED | OUTPATIENT
Start: 2021-11-03 | End: 2021-11-03

## 2021-11-03 RX ADMIN — HEPARIN SODIUM (PORCINE) LOCK FLUSH IV SOLN 100 UNIT/ML 500 UNITS: 100 SOLUTION INTRAVENOUS at 16:29:00

## 2021-11-03 NOTE — PROGRESS NOTES
Cancer Center Progress Note    Patient Name: Romi Bishop   YOB: 1950   Medical Record Number: [id number]   Attending Physician: Cammy Leon M.D.      Chief Complaint:  Colon cancer    Oncology History:  3/6/2020 (Lindsey Thomas) colonoscopy showed Currently, her brother is living with her. She lives close to her daughter, other children are farther away.  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 PET with complete response, now s/p HIPEC with mitomycin 6/29 with presence of significant amount of cancer on FOLFIRI demetria  - Maintenance cetuximab today.  Will discuss treatment giselle

## 2021-11-03 NOTE — PROGRESS NOTES
Pt here for C4D15 Erbitux. Arrives Ambulating independently, accompanied by Self            Modifications in dose or schedule: No  Patient did take her own  Tylenol, Benadryl and Zofran upon arrival      Infusion given over 2 hours and tolerated well.   Pt

## 2021-11-04 ENCOUNTER — HOSPITAL ENCOUNTER (OUTPATIENT)
Dept: ULTRASOUND IMAGING | Facility: HOSPITAL | Age: 71
Discharge: HOME | End: 2021-11-04
Attending: INTERNAL MEDICINE
Payer: MEDICARE

## 2021-11-04 DIAGNOSIS — N83.202 CYST OF LEFT OVARY: ICD-10-CM

## 2021-11-04 PROCEDURE — 76830 TRANSVAGINAL US NON-OB: CPT | Performed by: INTERNAL MEDICINE

## 2021-11-04 PROCEDURE — 76856 US EXAM PELVIC COMPLETE: CPT | Performed by: INTERNAL MEDICINE

## 2021-11-05 ENCOUNTER — TELEPHONE (OUTPATIENT)
Dept: HEMATOLOGY/ONCOLOGY | Facility: HOSPITAL | Age: 71
End: 2021-11-05

## 2021-11-17 ENCOUNTER — OFFICE VISIT (OUTPATIENT)
Dept: HEMATOLOGY/ONCOLOGY | Facility: HOSPITAL | Age: 71
End: 2021-11-17
Attending: INTERNAL MEDICINE
Payer: MEDICARE

## 2021-11-17 VITALS
BODY MASS INDEX: 26 KG/M2 | SYSTOLIC BLOOD PRESSURE: 134 MMHG | HEART RATE: 84 BPM | RESPIRATION RATE: 16 BRPM | OXYGEN SATURATION: 98 % | TEMPERATURE: 98 F | WEIGHT: 141 LBS | DIASTOLIC BLOOD PRESSURE: 77 MMHG

## 2021-11-17 VITALS
WEIGHT: 141 LBS | OXYGEN SATURATION: 98 % | HEIGHT: 62 IN | HEART RATE: 84 BPM | SYSTOLIC BLOOD PRESSURE: 134 MMHG | DIASTOLIC BLOOD PRESSURE: 77 MMHG | TEMPERATURE: 98 F | RESPIRATION RATE: 16 BRPM | BODY MASS INDEX: 25.95 KG/M2

## 2021-11-17 DIAGNOSIS — C18.7 CANCER OF SIGMOID COLON (HCC): ICD-10-CM

## 2021-11-17 DIAGNOSIS — Z51.11 CHEMOTHERAPY MANAGEMENT, ENCOUNTER FOR: ICD-10-CM

## 2021-11-17 DIAGNOSIS — Z51.11 CHEMOTHERAPY MANAGEMENT, ENCOUNTER FOR: Primary | ICD-10-CM

## 2021-11-17 DIAGNOSIS — C18.7 CANCER OF SIGMOID COLON (HCC): Primary | ICD-10-CM

## 2021-11-17 PROCEDURE — 96413 CHEMO IV INFUSION 1 HR: CPT

## 2021-11-17 PROCEDURE — 83735 ASSAY OF MAGNESIUM: CPT

## 2021-11-17 PROCEDURE — 82378 CARCINOEMBRYONIC ANTIGEN: CPT

## 2021-11-17 PROCEDURE — 99215 OFFICE O/P EST HI 40 MIN: CPT | Performed by: INTERNAL MEDICINE

## 2021-11-17 PROCEDURE — 85025 COMPLETE CBC W/AUTO DIFF WBC: CPT

## 2021-11-17 PROCEDURE — 80053 COMPREHEN METABOLIC PANEL: CPT

## 2021-11-17 PROCEDURE — 96415 CHEMO IV INFUSION ADDL HR: CPT

## 2021-11-17 RX ORDER — MAGNESIUM SULFATE HEPTAHYDRATE 40 MG/ML
2 INJECTION, SOLUTION INTRAVENOUS ONCE
Status: CANCELLED | OUTPATIENT
Start: 2021-11-17

## 2021-11-17 RX ORDER — ACETAMINOPHEN 325 MG/1
650 TABLET ORAL ONCE
Status: CANCELLED | OUTPATIENT
Start: 2021-12-01

## 2021-11-17 RX ORDER — ACETAMINOPHEN 325 MG/1
650 TABLET ORAL ONCE
Status: DISCONTINUED | OUTPATIENT
Start: 2021-11-17 | End: 2021-11-17

## 2021-11-17 RX ORDER — DIPHENHYDRAMINE HCL 50 MG
50 CAPSULE ORAL ONCE
Status: CANCELLED | OUTPATIENT
Start: 2021-12-01

## 2021-11-17 RX ORDER — DIPHENHYDRAMINE HCL 50 MG
50 CAPSULE ORAL ONCE
Status: DISCONTINUED | OUTPATIENT
Start: 2021-11-17 | End: 2021-11-17

## 2021-11-17 RX ORDER — SODIUM CHLORIDE 9 MG/ML
INJECTION INTRAVENOUS
Status: DISCONTINUED
Start: 2021-11-17 | End: 2021-11-17

## 2021-11-17 RX ORDER — DIPHENHYDRAMINE HCL 50 MG
50 CAPSULE ORAL ONCE
Status: CANCELLED | OUTPATIENT
Start: 2021-11-17

## 2021-11-17 RX ORDER — HEPARIN SODIUM (PORCINE) LOCK FLUSH IV SOLN 100 UNIT/ML 100 UNIT/ML
SOLUTION INTRAVENOUS
Status: COMPLETED
Start: 2021-11-17 | End: 2021-11-17

## 2021-11-17 RX ORDER — ONDANSETRON 4 MG/1
8 TABLET, ORALLY DISINTEGRATING ORAL ONCE
Status: CANCELLED
Start: 2021-12-01 | End: 2021-12-01

## 2021-11-17 RX ORDER — ONDANSETRON 4 MG/1
8 TABLET, ORALLY DISINTEGRATING ORAL ONCE
Status: DISCONTINUED | OUTPATIENT
Start: 2021-11-17 | End: 2021-11-17

## 2021-11-17 RX ORDER — MAGNESIUM SULFATE HEPTAHYDRATE 40 MG/ML
2 INJECTION, SOLUTION INTRAVENOUS ONCE
Status: CANCELLED | OUTPATIENT
Start: 2021-12-01

## 2021-11-17 RX ORDER — DIPHENHYDRAMINE HCL 50 MG
CAPSULE ORAL
Status: DISCONTINUED
Start: 2021-11-17 | End: 2021-11-17 | Stop reason: WASHOUT

## 2021-11-17 RX ORDER — ONDANSETRON 4 MG/1
8 TABLET, ORALLY DISINTEGRATING ORAL ONCE
Status: CANCELLED | OUTPATIENT
Start: 2021-11-17 | End: 2021-11-17

## 2021-11-17 RX ORDER — ACETAMINOPHEN 325 MG/1
TABLET ORAL
Status: DISPENSED
Start: 2021-11-17 | End: 2021-11-18

## 2021-11-17 RX ORDER — ACETAMINOPHEN 325 MG/1
650 TABLET ORAL ONCE
Status: CANCELLED | OUTPATIENT
Start: 2021-11-17

## 2021-11-17 RX ORDER — ONDANSETRON 4 MG/1
TABLET, ORALLY DISINTEGRATING ORAL
Status: DISPENSED
Start: 2021-11-17 | End: 2021-11-18

## 2021-11-17 RX ADMIN — HEPARIN SODIUM (PORCINE) LOCK FLUSH IV SOLN 100 UNIT/ML 500 UNITS: 100 SOLUTION INTRAVENOUS at 16:40:00

## 2021-11-17 NOTE — PROGRESS NOTES
Pt here for C5D1 Erbitux.  Arrives Ambulating independently, accompanied by Self            Modifications in dose or schedule: No Pt took pre-medications prior to infusion at 1300. Infusion given over 2 hours and tolerated well.  Observed for 1 hour post

## 2021-11-17 NOTE — PROGRESS NOTES
Cancer Center Progress Note    Patient Name: Julien Whitfield   YOB: 1950   Medical Record Number: [id number]   Attending Physician: Eliot Glover M.D.      Chief Complaint:  Colon cancer    Oncology History:  3/6/2020 ([place] Hint) colonoscopy showed Currently, her brother is living with her. She lives close to her daughter, other children are farther away.  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

## 2021-12-01 ENCOUNTER — OFFICE VISIT (OUTPATIENT)
Dept: HEMATOLOGY/ONCOLOGY | Facility: HOSPITAL | Age: 71
End: 2021-12-01
Attending: INTERNAL MEDICINE
Payer: MEDICARE

## 2021-12-01 VITALS
TEMPERATURE: 98 F | OXYGEN SATURATION: 97 % | RESPIRATION RATE: 16 BRPM | BODY MASS INDEX: 26.5 KG/M2 | HEIGHT: 62 IN | DIASTOLIC BLOOD PRESSURE: 60 MMHG | WEIGHT: 144 LBS | SYSTOLIC BLOOD PRESSURE: 114 MMHG | HEART RATE: 84 BPM

## 2021-12-01 DIAGNOSIS — Z51.11 CHEMOTHERAPY MANAGEMENT, ENCOUNTER FOR: Primary | ICD-10-CM

## 2021-12-01 DIAGNOSIS — C18.7 CANCER OF SIGMOID COLON (HCC): ICD-10-CM

## 2021-12-01 DIAGNOSIS — C18.7 CANCER OF SIGMOID COLON (HCC): Primary | ICD-10-CM

## 2021-12-01 DIAGNOSIS — Z51.11 CHEMOTHERAPY MANAGEMENT, ENCOUNTER FOR: ICD-10-CM

## 2021-12-01 PROCEDURE — 96415 CHEMO IV INFUSION ADDL HR: CPT

## 2021-12-01 PROCEDURE — 80053 COMPREHEN METABOLIC PANEL: CPT

## 2021-12-01 PROCEDURE — 99215 OFFICE O/P EST HI 40 MIN: CPT | Performed by: INTERNAL MEDICINE

## 2021-12-01 PROCEDURE — 85025 COMPLETE CBC W/AUTO DIFF WBC: CPT

## 2021-12-01 PROCEDURE — 96413 CHEMO IV INFUSION 1 HR: CPT

## 2021-12-01 PROCEDURE — 83735 ASSAY OF MAGNESIUM: CPT

## 2021-12-01 RX ORDER — ACETAMINOPHEN 325 MG/1
650 TABLET ORAL ONCE
Status: DISCONTINUED | OUTPATIENT
Start: 2021-12-01 | End: 2021-12-01

## 2021-12-01 RX ORDER — ONDANSETRON 4 MG/1
8 TABLET, ORALLY DISINTEGRATING ORAL ONCE
Status: DISCONTINUED | OUTPATIENT
Start: 2021-12-01 | End: 2021-12-01

## 2021-12-01 RX ORDER — HEPARIN SODIUM (PORCINE) LOCK FLUSH IV SOLN 100 UNIT/ML 100 UNIT/ML
SOLUTION INTRAVENOUS
Status: DISPENSED
Start: 2021-12-01 | End: 2021-12-02

## 2021-12-01 RX ORDER — DIPHENHYDRAMINE HCL 50 MG
50 CAPSULE ORAL ONCE
Status: DISCONTINUED | OUTPATIENT
Start: 2021-12-01 | End: 2021-12-01

## 2021-12-01 RX ORDER — SODIUM CHLORIDE 9 MG/ML
INJECTION INTRAVENOUS
Status: DISCONTINUED
Start: 2021-12-01 | End: 2021-12-01

## 2021-12-01 NOTE — PROGRESS NOTES
Cancer Center Progress Note    Patient Name: Keeley Driscoll   YOB: 1950   Medical Record Number: [id number]   Attending Physician: Helen Weir M.D.      Chief Complaint:  Colon cancer    Oncology History:  3/6/2020 (Holly Yeboah) colonoscopy showed with her. She lives close to her daughter, other children are farther away.  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

## 2021-12-01 NOTE — PROGRESS NOTES
Pt here for C5D15 Erbitux.  Arrives Ambulating independently, accompanied by Self            Modifications in dose or schedule: No   Pt took pre-medications prior to infusion    Mag 1.8  today  Infusion given over 2 hours and tolerated well.  Observed for 1

## 2021-12-15 ENCOUNTER — OFFICE VISIT (OUTPATIENT)
Dept: HEMATOLOGY/ONCOLOGY | Facility: HOSPITAL | Age: 71
End: 2021-12-15
Attending: INTERNAL MEDICINE
Payer: MEDICARE

## 2021-12-15 VITALS
HEART RATE: 93 BPM | RESPIRATION RATE: 16 BRPM | WEIGHT: 140 LBS | DIASTOLIC BLOOD PRESSURE: 75 MMHG | SYSTOLIC BLOOD PRESSURE: 134 MMHG | TEMPERATURE: 98 F | BODY MASS INDEX: 26 KG/M2 | OXYGEN SATURATION: 96 %

## 2021-12-15 VITALS
HEIGHT: 62 IN | TEMPERATURE: 98 F | OXYGEN SATURATION: 96 % | HEART RATE: 93 BPM | BODY MASS INDEX: 25.76 KG/M2 | RESPIRATION RATE: 16 BRPM | SYSTOLIC BLOOD PRESSURE: 134 MMHG | DIASTOLIC BLOOD PRESSURE: 75 MMHG | WEIGHT: 140 LBS

## 2021-12-15 DIAGNOSIS — C18.7 CANCER OF SIGMOID COLON (HCC): Primary | ICD-10-CM

## 2021-12-15 DIAGNOSIS — C78.6 PERITONEAL CARCINOMATOSIS (HCC): ICD-10-CM

## 2021-12-15 DIAGNOSIS — Z51.11 CHEMOTHERAPY MANAGEMENT, ENCOUNTER FOR: Primary | ICD-10-CM

## 2021-12-15 DIAGNOSIS — C18.7 CANCER OF SIGMOID COLON (HCC): ICD-10-CM

## 2021-12-15 PROCEDURE — 82378 CARCINOEMBRYONIC ANTIGEN: CPT

## 2021-12-15 PROCEDURE — 96415 CHEMO IV INFUSION ADDL HR: CPT

## 2021-12-15 PROCEDURE — 85025 COMPLETE CBC W/AUTO DIFF WBC: CPT

## 2021-12-15 PROCEDURE — 80053 COMPREHEN METABOLIC PANEL: CPT

## 2021-12-15 PROCEDURE — 99215 OFFICE O/P EST HI 40 MIN: CPT | Performed by: INTERNAL MEDICINE

## 2021-12-15 PROCEDURE — 83735 ASSAY OF MAGNESIUM: CPT

## 2021-12-15 PROCEDURE — 96413 CHEMO IV INFUSION 1 HR: CPT

## 2021-12-15 RX ORDER — ONDANSETRON 4 MG/1
8 TABLET, ORALLY DISINTEGRATING ORAL ONCE
Status: CANCELLED
Start: 2021-12-29 | End: 2021-12-29

## 2021-12-15 RX ORDER — DIPHENHYDRAMINE HCL 50 MG
50 CAPSULE ORAL ONCE
Status: DISCONTINUED | OUTPATIENT
Start: 2021-12-15 | End: 2021-12-15

## 2021-12-15 RX ORDER — SODIUM CHLORIDE 9 MG/ML
INJECTION INTRAVENOUS
Status: DISCONTINUED
Start: 2021-12-15 | End: 2021-12-15

## 2021-12-15 RX ORDER — ONDANSETRON 4 MG/1
8 TABLET, ORALLY DISINTEGRATING ORAL ONCE
Status: CANCELLED | OUTPATIENT
Start: 2021-12-15 | End: 2021-12-15

## 2021-12-15 RX ORDER — ONDANSETRON 4 MG/1
8 TABLET, ORALLY DISINTEGRATING ORAL ONCE
Status: DISCONTINUED | OUTPATIENT
Start: 2021-12-15 | End: 2021-12-15

## 2021-12-15 RX ORDER — ACETAMINOPHEN 325 MG/1
650 TABLET ORAL ONCE
Status: CANCELLED | OUTPATIENT
Start: 2021-12-29

## 2021-12-15 RX ORDER — MAGNESIUM SULFATE HEPTAHYDRATE 40 MG/ML
2 INJECTION, SOLUTION INTRAVENOUS ONCE
Status: CANCELLED | OUTPATIENT
Start: 2021-12-15

## 2021-12-15 RX ORDER — ACETAMINOPHEN 325 MG/1
650 TABLET ORAL ONCE
Status: DISCONTINUED | OUTPATIENT
Start: 2021-12-15 | End: 2021-12-15

## 2021-12-15 RX ORDER — ACETAMINOPHEN 325 MG/1
650 TABLET ORAL ONCE
Status: CANCELLED | OUTPATIENT
Start: 2021-12-15

## 2021-12-15 RX ORDER — MAGNESIUM SULFATE HEPTAHYDRATE 40 MG/ML
2 INJECTION, SOLUTION INTRAVENOUS ONCE
Status: CANCELLED | OUTPATIENT
Start: 2021-12-29

## 2021-12-15 RX ORDER — DIPHENHYDRAMINE HCL 50 MG
50 CAPSULE ORAL ONCE
Status: CANCELLED | OUTPATIENT
Start: 2021-12-29

## 2021-12-15 RX ORDER — DIPHENHYDRAMINE HCL 50 MG
50 CAPSULE ORAL ONCE
Status: CANCELLED | OUTPATIENT
Start: 2021-12-15

## 2021-12-15 NOTE — PROGRESS NOTES
Cancer Center Progress Note    Patient Name: Nadia Kendall   YOB: 1950   Medical Record Number: [id number]   Attending Physician: Artie Sandoval M.D.      Chief Complaint:  Colon cancer    Oncology History:  3/6/2020 (Hemant Hebert) colonoscopy showed close to her daughter, other children are farther away. Daughter is getting NP (currently nurse Ob/gyn)    Current Medications:    Current Outpatient Medications:   •  Vitamin D3 50 MCG (2000 UT) Oral Cap, Take 2,000 Units by mouth 2 (two) times a day.  P 12/01/2021    ALT 19 12/01/2021    ALKPHOS 72 08/06/2015    BILT 0.5 12/01/2021    TP 7.2 12/01/2021    ALB 3.3 (L) 12/01/2021    GLOBULIN 3.9 12/01/2021    AGRATIO 1.6 01/10/2020     12/01/2021    K 4.0 12/01/2021     12/01/2021    CO2 29.0 12

## 2021-12-15 NOTE — PROGRESS NOTES
Pt here for C6 D1 Erbitux.  Arrives Ambulating independently, accompanied by Self            Modifications in dose or schedule: No Pt took pre-medications prior to infusion at 1300. Infusion given over 2 hours and tolerated well.  Observed for 1 hour pos

## 2021-12-29 ENCOUNTER — OFFICE VISIT (OUTPATIENT)
Dept: SURGERY | Facility: CLINIC | Age: 71
End: 2021-12-29
Payer: MEDICARE

## 2021-12-29 ENCOUNTER — OFFICE VISIT (OUTPATIENT)
Dept: HEMATOLOGY/ONCOLOGY | Facility: HOSPITAL | Age: 71
End: 2021-12-29
Attending: INTERNAL MEDICINE
Payer: MEDICARE

## 2021-12-29 VITALS
HEIGHT: 62 IN | WEIGHT: 145 LBS | HEART RATE: 86 BPM | TEMPERATURE: 97 F | RESPIRATION RATE: 16 BRPM | SYSTOLIC BLOOD PRESSURE: 138 MMHG | DIASTOLIC BLOOD PRESSURE: 84 MMHG | OXYGEN SATURATION: 96 % | BODY MASS INDEX: 26.68 KG/M2

## 2021-12-29 VITALS
TEMPERATURE: 97 F | SYSTOLIC BLOOD PRESSURE: 138 MMHG | WEIGHT: 145 LBS | DIASTOLIC BLOOD PRESSURE: 84 MMHG | HEIGHT: 62 IN | RESPIRATION RATE: 16 BRPM | OXYGEN SATURATION: 96 % | HEART RATE: 86 BPM | BODY MASS INDEX: 26.68 KG/M2

## 2021-12-29 DIAGNOSIS — C18.9 ADENOCARCINOMA, COLON (HCC): Primary | ICD-10-CM

## 2021-12-29 DIAGNOSIS — C78.6 PERITONEAL CARCINOMATOSIS (HCC): Primary | ICD-10-CM

## 2021-12-29 DIAGNOSIS — C18.7 CANCER OF SIGMOID COLON (HCC): ICD-10-CM

## 2021-12-29 DIAGNOSIS — Z51.11 CHEMOTHERAPY MANAGEMENT, ENCOUNTER FOR: ICD-10-CM

## 2021-12-29 DIAGNOSIS — Z51.11 CHEMOTHERAPY MANAGEMENT, ENCOUNTER FOR: Primary | ICD-10-CM

## 2021-12-29 PROCEDURE — 96413 CHEMO IV INFUSION 1 HR: CPT

## 2021-12-29 PROCEDURE — 3079F DIAST BP 80-89 MM HG: CPT | Performed by: SURGERY

## 2021-12-29 PROCEDURE — 83735 ASSAY OF MAGNESIUM: CPT

## 2021-12-29 PROCEDURE — 3008F BODY MASS INDEX DOCD: CPT | Performed by: SURGERY

## 2021-12-29 PROCEDURE — 85025 COMPLETE CBC W/AUTO DIFF WBC: CPT

## 2021-12-29 PROCEDURE — 99215 OFFICE O/P EST HI 40 MIN: CPT | Performed by: INTERNAL MEDICINE

## 2021-12-29 PROCEDURE — 3079F DIAST BP 80-89 MM HG: CPT | Performed by: INTERNAL MEDICINE

## 2021-12-29 PROCEDURE — 82378 CARCINOEMBRYONIC ANTIGEN: CPT

## 2021-12-29 PROCEDURE — 3008F BODY MASS INDEX DOCD: CPT | Performed by: INTERNAL MEDICINE

## 2021-12-29 PROCEDURE — 99215 OFFICE O/P EST HI 40 MIN: CPT | Performed by: SURGERY

## 2021-12-29 PROCEDURE — 80053 COMPREHEN METABOLIC PANEL: CPT

## 2021-12-29 PROCEDURE — 96415 CHEMO IV INFUSION ADDL HR: CPT

## 2021-12-29 PROCEDURE — 3075F SYST BP GE 130 - 139MM HG: CPT | Performed by: SURGERY

## 2021-12-29 PROCEDURE — 3075F SYST BP GE 130 - 139MM HG: CPT | Performed by: INTERNAL MEDICINE

## 2021-12-29 RX ORDER — DIPHENHYDRAMINE HCL 50 MG
CAPSULE ORAL
Status: DISCONTINUED
Start: 2021-12-29 | End: 2021-12-29

## 2021-12-29 RX ORDER — ACETAMINOPHEN 325 MG/1
650 TABLET ORAL ONCE
Status: DISCONTINUED | OUTPATIENT
Start: 2021-12-29 | End: 2021-12-29

## 2021-12-29 RX ORDER — DIPHENHYDRAMINE HCL 50 MG
50 CAPSULE ORAL ONCE
Status: DISCONTINUED | OUTPATIENT
Start: 2021-12-29 | End: 2021-12-29

## 2021-12-29 RX ORDER — HEPARIN SODIUM (PORCINE) LOCK FLUSH IV SOLN 100 UNIT/ML 100 UNIT/ML
SOLUTION INTRAVENOUS
Status: COMPLETED
Start: 2021-12-29 | End: 2021-12-29

## 2021-12-29 RX ORDER — ACETAMINOPHEN 325 MG/1
TABLET ORAL
Status: DISCONTINUED
Start: 2021-12-29 | End: 2021-12-29

## 2021-12-29 RX ORDER — ONDANSETRON 4 MG/1
8 TABLET, ORALLY DISINTEGRATING ORAL ONCE
Status: DISCONTINUED | OUTPATIENT
Start: 2021-12-29 | End: 2021-12-29

## 2021-12-29 RX ORDER — ONDANSETRON 4 MG/1
TABLET, ORALLY DISINTEGRATING ORAL
Status: DISCONTINUED
Start: 2021-12-29 | End: 2021-12-29

## 2021-12-29 RX ADMIN — HEPARIN SODIUM (PORCINE) LOCK FLUSH IV SOLN 100 UNIT/ML 500 UNITS: 100 SOLUTION INTRAVENOUS at 17:38:00

## 2021-12-29 NOTE — PROGRESS NOTES
Cancer Center Progress Note    Patient Name: Nadia Kendall   YOB: 1950   Medical Record Number: [id number]   Attending Physician: Artie Sandoval M.D.      Chief Complaint:  Colon cancer  Chemotherapy    Oncology History:  3/6/2020 (Valeta Rank) colo Self 79       Social History:  She is [place]. Currently, her brother is living with her. She lives close to her daughter, other children are farther away.    Daughter is getting NP (currently nurse Ob/gyn)    Current Medications:    Current Outpatient Me 110 12/15/2021    CA 9.1 12/15/2021    OSMOCALC 294 12/15/2021    ALKPHO 108 12/15/2021    AST 19 12/15/2021    ALT 17 12/15/2021    ALKPHOS 72 08/06/2015    BILT 0.4 12/15/2021    TP 7.4 12/15/2021    ALB 3.4 12/15/2021    GLOBULIN 4.0 12/15/2021    AGRAT

## 2021-12-29 NOTE — PROGRESS NOTES
Pt here for C6D15 Erbitux.  Arrives Ambulating independently, accompanied by Self            Modifications in dose or schedule: Yes; pt to have tx break following this infusion. MD/lab f/u scheduled per clinic.   Pt took pre-medications prior to infusion at

## 2022-01-03 NOTE — PROGRESS NOTES
[facility]    Patient Name:  Francia Mendes   YOB: 1950   Gender:  Female   Appt Date: 12/29/2021   Provider:  Mirian Garcia MD   Insurance:  [facility]     PATIENT PROVIDERS  Referring Provide evidence of metastatic disease. There was a 2.6 cystic lesion in the pelvis along the left psoas. Most recent CEA within normal limits.     Oncologic history:  3/2020: presented with rectal bleeding  3/6/2020: colonoscopy with Dr. Que Mccain revealed a 5 cm mass 138/84 (BP Location: Left arm, Patient Position: Sitting, Cuff Size: adult)   Pulse 86   Temp 97.1 °F (36.2 °C)   Resp 16   Ht 1.575 m (5' 2\")   Wt 65.8 kg (145 lb)   SpO2 96%   BMI 26.52 kg/m²      Medications Reviewed:    Current Outpatient Medications: History:   Procedure Laterality Date   • COLONOSCOPY     • COLONOSCOPY N/A 3/6/2020    Procedure: COLONOSCOPY;  Surgeon: Leonor Hurtaod MD;  Location: [address] [place] [address] ENDOSCOPY   • D & C  3 years ago   • DILATION/CURETTAGE,DIAGNOSTIC     • TOTAL HIP REPLACEMENT Right 2 abdominal tenderness. There is no guarding or rebound. Hernia: No hernia is present. Musculoskeletal:         General: Normal range of motion. Cervical back: Normal range of motion. Skin:     General: Skin is warm and dry.    Neurological: [facility], CT CHEST+ABDOMEN+PELVIS(ALL CNTRST ONLY)(CPT=71260/63210), 1/30/2021, 9:40 AM. [facility] , CT, CT   ANGIOGRAPHY, CHEST (CPT=71275), 7/02/2021, 3:52 PM. [facility], PET STANDARD BODY SCAN (ARP=92478), 4/20/2021, 11:09 AM. measuring up to 1.5 cm, unchanged. GALLBLADDER: Normal wall thickness.  No pericholecystic fluid. BILIARY: No intra-or extrahepatic biliary ductal dilation. SPLEEN: Postoperative changes of a splenectomy.    PANCREAS: The pancreas enhances symmetrical unchanged.               Dictated by (CST): Maryjo Garcia MD on 10/06/2021 at 10:19 AM       Finalized by (CST): Maryjo Garcia MD on 10/06/2021 at 10:37 AM          Assessment / Plan:  70year old year old female with history of Stage IIA sigmoid colon cance

## 2022-01-19 ENCOUNTER — NURSE ONLY (OUTPATIENT)
Dept: HEMATOLOGY/ONCOLOGY | Facility: HOSPITAL | Age: 72
End: 2022-01-19
Attending: INTERNAL MEDICINE
Payer: MEDICARE

## 2022-01-19 PROCEDURE — 36415 COLL VENOUS BLD VENIPUNCTURE: CPT

## 2022-02-04 ENCOUNTER — TELEPHONE (OUTPATIENT)
Dept: HEMATOLOGY/ONCOLOGY | Facility: HOSPITAL | Age: 72
End: 2022-02-04

## 2022-02-04 NOTE — TELEPHONE ENCOUNTER
Spoke with Cuate Villegas. Set her up to see Dr Anant Pena the day after her CT scan. She verbalized understanding.

## 2022-02-07 ENCOUNTER — HOSPITAL ENCOUNTER (OUTPATIENT)
Dept: CT IMAGING | Facility: HOSPITAL | Age: 72
Discharge: HOME | End: 2022-02-07
Attending: INTERNAL MEDICINE
Payer: MEDICARE

## 2022-02-07 DIAGNOSIS — C78.6 PERITONEAL CARCINOMATOSIS (HCC): ICD-10-CM

## 2022-02-07 LAB — CREAT BLD-MCNC: 0.8 MG/DL

## 2022-02-07 PROCEDURE — 71260 CT THORAX DX C+: CPT | Performed by: INTERNAL MEDICINE

## 2022-02-07 PROCEDURE — 74177 CT ABD & PELVIS W/CONTRAST: CPT | Performed by: INTERNAL MEDICINE

## 2022-02-07 PROCEDURE — 82565 ASSAY OF CREATININE: CPT

## 2022-02-08 ENCOUNTER — NURSE ONLY (OUTPATIENT)
Dept: HEMATOLOGY/ONCOLOGY | Facility: HOSPITAL | Age: 72
End: 2022-02-08
Attending: INTERNAL MEDICINE
Payer: MEDICARE

## 2022-02-08 VITALS
WEIGHT: 145 LBS | DIASTOLIC BLOOD PRESSURE: 65 MMHG | HEIGHT: 62 IN | BODY MASS INDEX: 26.68 KG/M2 | SYSTOLIC BLOOD PRESSURE: 132 MMHG | TEMPERATURE: 98 F | RESPIRATION RATE: 16 BRPM | HEART RATE: 86 BPM | OXYGEN SATURATION: 97 %

## 2022-02-08 DIAGNOSIS — Z79.899 ENCOUNTER FOR MEDICATION MANAGEMENT: ICD-10-CM

## 2022-02-08 DIAGNOSIS — C78.6 PERITONEAL CARCINOMATOSIS (HCC): ICD-10-CM

## 2022-02-08 DIAGNOSIS — C18.7 CANCER OF SIGMOID COLON (HCC): Primary | ICD-10-CM

## 2022-02-08 DIAGNOSIS — Z45.2 ENCOUNTER FOR CENTRAL LINE CARE: Primary | ICD-10-CM

## 2022-02-08 LAB
ALBUMIN SERPL-MCNC: 3.7 G/DL (ref 3.4–5)
ALBUMIN/GLOB SERPL: 0.9 {RATIO} (ref 1–2)
ALP LIVER SERPL-CCNC: 122 U/L
ALT SERPL-CCNC: 23 U/L
ANION GAP SERPL CALC-SCNC: 7 MMOL/L (ref 0–18)
AST SERPL-CCNC: 19 U/L (ref 15–37)
BASOPHILS # BLD AUTO: 0.07 X10(3) UL (ref 0–0.2)
BASOPHILS NFR BLD AUTO: 1.2 %
BILIRUB SERPL-MCNC: 0.5 MG/DL (ref 0.1–2)
BUN BLD-MCNC: 14 MG/DL (ref 7–18)
BUN/CREAT SERPL: 20.6 (ref 10–20)
CALCIUM BLD-MCNC: 10 MG/DL (ref 8.5–10.1)
CEA SERPL-MCNC: 1.9 NG/ML (ref ?–5)
CHLORIDE SERPL-SCNC: 106 MMOL/L (ref 98–112)
CO2 SERPL-SCNC: 28 MMOL/L (ref 21–32)
CREAT BLD-MCNC: 0.68 MG/DL
DEPRECATED RDW RBC AUTO: 52.3 FL (ref 35.1–46.3)
EOSINOPHIL # BLD AUTO: 0.17 X10(3) UL (ref 0–0.7)
ERYTHROCYTE [DISTWIDTH] IN BLOOD BY AUTOMATED COUNT: 14.5 % (ref 11–15)
GLOBULIN PLAS-MCNC: 3.9 G/DL (ref 2.8–4.4)
GLUCOSE BLD-MCNC: 87 MG/DL (ref 70–99)
HCT VFR BLD AUTO: 41.3 %
HGB BLD-MCNC: 13.3 G/DL
IMM GRANULOCYTES # BLD AUTO: 0.01 X10(3) UL (ref 0–1)
IMM GRANULOCYTES NFR BLD: 0.2 %
LYMPHOCYTES # BLD AUTO: 3.44 X10(3) UL (ref 1–4)
LYMPHOCYTES NFR BLD AUTO: 57.3 %
MCH RBC QN AUTO: 31.7 PG (ref 26–34)
MCHC RBC AUTO-ENTMCNC: 32.2 G/DL (ref 31–37)
MCV RBC AUTO: 98.3 FL
MONOCYTES # BLD AUTO: 0.49 X10(3) UL (ref 0.1–1)
MONOCYTES NFR BLD AUTO: 8.2 %
NEUTROPHILS # BLD AUTO: 1.82 X10 (3) UL (ref 1.5–7.7)
NEUTROPHILS # BLD AUTO: 1.82 X10(3) UL (ref 1.5–7.7)
NEUTROPHILS NFR BLD AUTO: 30.3 %
OSMOLALITY SERPL CALC.SUM OF ELEC: 292 MOSM/KG (ref 275–295)
PLATELET # BLD AUTO: 208 10(3)UL (ref 150–450)
POTASSIUM SERPL-SCNC: 4.1 MMOL/L (ref 3.5–5.1)
PROT SERPL-MCNC: 7.6 G/DL (ref 6.4–8.2)
RBC # BLD AUTO: 4.2 X10(6)UL
SODIUM SERPL-SCNC: 141 MMOL/L (ref 136–145)
WBC # BLD AUTO: 6 X10(3) UL (ref 4–11)

## 2022-02-08 PROCEDURE — 82378 CARCINOEMBRYONIC ANTIGEN: CPT

## 2022-02-08 PROCEDURE — 85025 COMPLETE CBC W/AUTO DIFF WBC: CPT

## 2022-02-08 PROCEDURE — 36591 DRAW BLOOD OFF VENOUS DEVICE: CPT

## 2022-02-08 PROCEDURE — 80053 COMPREHEN METABOLIC PANEL: CPT

## 2022-02-08 PROCEDURE — 99214 OFFICE O/P EST MOD 30 MIN: CPT | Performed by: INTERNAL MEDICINE

## 2022-02-08 RX ORDER — HEPARIN SODIUM (PORCINE) LOCK FLUSH IV SOLN 100 UNIT/ML 100 UNIT/ML
500 SOLUTION INTRAVENOUS ONCE
OUTPATIENT
Start: 2022-02-08

## 2022-02-08 RX ORDER — HEPARIN SODIUM (PORCINE) LOCK FLUSH IV SOLN 100 UNIT/ML 100 UNIT/ML
500 SOLUTION INTRAVENOUS ONCE
Status: COMPLETED | OUTPATIENT
Start: 2022-02-08 | End: 2022-02-08

## 2022-02-08 RX ADMIN — HEPARIN SODIUM (PORCINE) LOCK FLUSH IV SOLN 100 UNIT/ML 500 UNITS: 100 SOLUTION INTRAVENOUS at 14:34:00

## 2022-02-23 ENCOUNTER — APPOINTMENT (OUTPATIENT)
Dept: HEMATOLOGY/ONCOLOGY | Facility: HOSPITAL | Age: 72
End: 2022-02-23
Attending: INTERNAL MEDICINE
Payer: MEDICARE

## 2022-03-21 ENCOUNTER — TELEPHONE (OUTPATIENT)
Dept: INTERNAL MEDICINE CLINIC | Facility: CLINIC | Age: 72
End: 2022-03-21

## 2022-03-24 ENCOUNTER — OFFICE VISIT (OUTPATIENT)
Dept: INTERNAL MEDICINE CLINIC | Facility: CLINIC | Age: 72
End: 2022-03-24
Payer: MEDICARE

## 2022-03-24 VITALS
HEART RATE: 102 BPM | WEIGHT: 147.19 LBS | SYSTOLIC BLOOD PRESSURE: 120 MMHG | OXYGEN SATURATION: 98 % | DIASTOLIC BLOOD PRESSURE: 68 MMHG | BODY MASS INDEX: 27.08 KG/M2 | HEIGHT: 62 IN

## 2022-03-24 DIAGNOSIS — C18.9 ADENOCARCINOMA OF COLON (HCC): ICD-10-CM

## 2022-03-24 DIAGNOSIS — K62.5 BRIGHT RED RECTAL BLEEDING: Primary | ICD-10-CM

## 2022-03-24 PROCEDURE — 3008F BODY MASS INDEX DOCD: CPT | Performed by: INTERNAL MEDICINE

## 2022-03-24 PROCEDURE — 99214 OFFICE O/P EST MOD 30 MIN: CPT | Performed by: INTERNAL MEDICINE

## 2022-03-24 PROCEDURE — 3074F SYST BP LT 130 MM HG: CPT | Performed by: INTERNAL MEDICINE

## 2022-03-24 PROCEDURE — 3078F DIAST BP <80 MM HG: CPT | Performed by: INTERNAL MEDICINE

## 2022-03-25 ENCOUNTER — TELEPHONE (OUTPATIENT)
Dept: INTERNAL MEDICINE CLINIC | Facility: CLINIC | Age: 72
End: 2022-03-25

## 2022-03-25 NOTE — TELEPHONE ENCOUNTER
Spoke with pt to schedule annual MA Supervisit, she stated that she didn't want to schedule at this time. I told her I will c/b later in the year, pt told me not to call her back that she will c/b to schedule herself. I then inform pt that calling back is a part of my job since she hasn't schedule yet.

## 2022-03-31 ENCOUNTER — HOSPITAL ENCOUNTER (OUTPATIENT)
Dept: CT IMAGING | Facility: HOSPITAL | Age: 72
Discharge: HOME | End: 2022-03-31
Attending: INTERNAL MEDICINE
Payer: MEDICARE

## 2022-03-31 ENCOUNTER — LAB ENCOUNTER (OUTPATIENT)
Dept: LAB | Facility: HOSPITAL | Age: 72
End: 2022-03-31
Attending: INTERNAL MEDICINE
Payer: MEDICARE

## 2022-03-31 DIAGNOSIS — C18.7 CANCER OF SIGMOID COLON (HCC): ICD-10-CM

## 2022-03-31 LAB
ALBUMIN SERPL-MCNC: 3.8 G/DL (ref 3.4–5)
ALBUMIN/GLOB SERPL: 1 {RATIO} (ref 1–2)
ALP LIVER SERPL-CCNC: 104 U/L
ALT SERPL-CCNC: 18 U/L
ANION GAP SERPL CALC-SCNC: 3 MMOL/L (ref 0–18)
AST SERPL-CCNC: 20 U/L (ref 15–37)
BASOPHILS # BLD AUTO: 0.09 X10(3) UL (ref 0–0.2)
BASOPHILS NFR BLD AUTO: 1.3 %
BILIRUB SERPL-MCNC: 0.4 MG/DL (ref 0.1–2)
BUN BLD-MCNC: 13 MG/DL (ref 7–18)
BUN/CREAT SERPL: 15.3 (ref 10–20)
CALCIUM BLD-MCNC: 9.2 MG/DL (ref 8.5–10.1)
CEA SERPL-MCNC: 8.7 NG/ML (ref ?–5)
CHLORIDE SERPL-SCNC: 106 MMOL/L (ref 98–112)
CO2 SERPL-SCNC: 31 MMOL/L (ref 21–32)
CREAT BLD-MCNC: 0.7 MG/DL
CREAT BLD-MCNC: 0.85 MG/DL
DEPRECATED RDW RBC AUTO: 49.6 FL (ref 35.1–46.3)
EOSINOPHIL # BLD AUTO: 0.72 X10(3) UL (ref 0–0.7)
EOSINOPHIL NFR BLD AUTO: 10.6 %
ERYTHROCYTE [DISTWIDTH] IN BLOOD BY AUTOMATED COUNT: 13.6 % (ref 11–15)
FASTING STATUS PATIENT QL REPORTED: YES
GLOBULIN PLAS-MCNC: 4 G/DL (ref 2.8–4.4)
GLUCOSE BLD-MCNC: 92 MG/DL (ref 70–99)
HCT VFR BLD AUTO: 43.4 %
HGB BLD-MCNC: 14 G/DL
IMM GRANULOCYTES # BLD AUTO: 0.01 X10(3) UL (ref 0–1)
IMM GRANULOCYTES NFR BLD: 0.1 %
LYMPHOCYTES # BLD AUTO: 2.91 X10(3) UL (ref 1–4)
LYMPHOCYTES NFR BLD AUTO: 43 %
MCH RBC QN AUTO: 31.7 PG (ref 26–34)
MCHC RBC AUTO-ENTMCNC: 32.3 G/DL (ref 31–37)
MCV RBC AUTO: 98.2 FL
MONOCYTES # BLD AUTO: 0.42 X10(3) UL (ref 0.1–1)
MONOCYTES NFR BLD AUTO: 6.2 %
NEUTROPHILS # BLD AUTO: 2.62 X10 (3) UL (ref 1.5–7.7)
NEUTROPHILS # BLD AUTO: 2.62 X10(3) UL (ref 1.5–7.7)
NEUTROPHILS NFR BLD AUTO: 38.8 %
OSMOLALITY SERPL CALC.SUM OF ELEC: 290 MOSM/KG (ref 275–295)
PLATELET # BLD AUTO: 252 10(3)UL (ref 150–450)
POTASSIUM SERPL-SCNC: 4.3 MMOL/L (ref 3.5–5.1)
PROT SERPL-MCNC: 7.8 G/DL (ref 6.4–8.2)
RBC # BLD AUTO: 4.42 X10(6)UL
SODIUM SERPL-SCNC: 140 MMOL/L (ref 136–145)
WBC # BLD AUTO: 6.8 X10(3) UL (ref 4–11)

## 2022-03-31 PROCEDURE — 71260 CT THORAX DX C+: CPT | Performed by: INTERNAL MEDICINE

## 2022-03-31 PROCEDURE — 74177 CT ABD & PELVIS W/CONTRAST: CPT | Performed by: INTERNAL MEDICINE

## 2022-03-31 PROCEDURE — 36415 COLL VENOUS BLD VENIPUNCTURE: CPT

## 2022-03-31 PROCEDURE — 80053 COMPREHEN METABOLIC PANEL: CPT

## 2022-03-31 PROCEDURE — 82378 CARCINOEMBRYONIC ANTIGEN: CPT

## 2022-03-31 PROCEDURE — 82565 ASSAY OF CREATININE: CPT

## 2022-03-31 PROCEDURE — 85025 COMPLETE CBC W/AUTO DIFF WBC: CPT

## 2022-04-01 ENCOUNTER — TELEPHONE (OUTPATIENT)
Dept: HEMATOLOGY/ONCOLOGY | Facility: HOSPITAL | Age: 72
End: 2022-04-01

## 2022-04-01 NOTE — TELEPHONE ENCOUNTER
Spoke with Jolynn Ravi. Scheduled her for f/u with Dr Yassine Lowe at [phone] on Tuesday to review the CT results.

## 2022-04-02 ENCOUNTER — HOSPITAL ENCOUNTER (OUTPATIENT)
Dept: MAMMOGRAPHY | Facility: HOSPITAL | Age: 72
Discharge: HOME | End: 2022-04-02
Attending: INTERNAL MEDICINE
Payer: MEDICARE

## 2022-04-02 DIAGNOSIS — Z12.31 SCREENING MAMMOGRAM FOR BREAST CANCER: ICD-10-CM

## 2022-04-02 PROCEDURE — 77067 SCR MAMMO BI INCL CAD: CPT | Performed by: INTERNAL MEDICINE

## 2022-04-02 PROCEDURE — 77063 BREAST TOMOSYNTHESIS BI: CPT | Performed by: INTERNAL MEDICINE

## 2022-04-05 ENCOUNTER — OFFICE VISIT (OUTPATIENT)
Dept: HEMATOLOGY/ONCOLOGY | Facility: HOSPITAL | Age: 72
End: 2022-04-05
Attending: INTERNAL MEDICINE
Payer: MEDICARE

## 2022-04-05 VITALS
HEART RATE: 93 BPM | WEIGHT: 149 LBS | TEMPERATURE: 98 F | DIASTOLIC BLOOD PRESSURE: 72 MMHG | OXYGEN SATURATION: 96 % | BODY MASS INDEX: 27.42 KG/M2 | SYSTOLIC BLOOD PRESSURE: 122 MMHG | HEIGHT: 62 IN | RESPIRATION RATE: 16 BRPM

## 2022-04-05 DIAGNOSIS — C18.7 CANCER OF SIGMOID COLON (HCC): Primary | ICD-10-CM

## 2022-04-05 DIAGNOSIS — C78.6 PERITONEAL CARCINOMATOSIS (HCC): ICD-10-CM

## 2022-04-05 PROCEDURE — 99211 OFF/OP EST MAY X REQ PHY/QHP: CPT

## 2022-04-11 ENCOUNTER — TELEPHONE (OUTPATIENT)
Dept: HEMATOLOGY/ONCOLOGY | Facility: HOSPITAL | Age: 72
End: 2022-04-11

## 2022-04-11 NOTE — TELEPHONE ENCOUNTER
Patient calling and is asking when the Chemo treatments will start.     Please return call to patient

## 2022-04-12 ENCOUNTER — OFFICE VISIT (OUTPATIENT)
Dept: HEMATOLOGY/ONCOLOGY | Facility: HOSPITAL | Age: 72
End: 2022-04-12
Attending: INTERNAL MEDICINE
Payer: MEDICARE

## 2022-04-12 VITALS
HEART RATE: 81 BPM | RESPIRATION RATE: 16 BRPM | TEMPERATURE: 98 F | BODY MASS INDEX: 27 KG/M2 | DIASTOLIC BLOOD PRESSURE: 79 MMHG | OXYGEN SATURATION: 100 % | SYSTOLIC BLOOD PRESSURE: 139 MMHG | WEIGHT: 149 LBS

## 2022-04-12 VITALS
WEIGHT: 149 LBS | DIASTOLIC BLOOD PRESSURE: 79 MMHG | TEMPERATURE: 98 F | HEART RATE: 81 BPM | RESPIRATION RATE: 16 BRPM | HEIGHT: 62 IN | SYSTOLIC BLOOD PRESSURE: 139 MMHG | OXYGEN SATURATION: 100 % | BODY MASS INDEX: 27.42 KG/M2

## 2022-04-12 DIAGNOSIS — C78.6 PERITONEAL CARCINOMATOSIS (HCC): ICD-10-CM

## 2022-04-12 DIAGNOSIS — Z79.899 ENCOUNTER FOR MEDICATION MANAGEMENT: ICD-10-CM

## 2022-04-12 DIAGNOSIS — C18.7 CANCER OF SIGMOID COLON (HCC): ICD-10-CM

## 2022-04-12 DIAGNOSIS — Z45.2 ENCOUNTER FOR CENTRAL LINE CARE: ICD-10-CM

## 2022-04-12 DIAGNOSIS — Z51.11 CHEMOTHERAPY MANAGEMENT, ENCOUNTER FOR: ICD-10-CM

## 2022-04-12 DIAGNOSIS — C18.7 CANCER OF SIGMOID COLON (HCC): Primary | ICD-10-CM

## 2022-04-12 DIAGNOSIS — Z51.11 CHEMOTHERAPY MANAGEMENT, ENCOUNTER FOR: Primary | ICD-10-CM

## 2022-04-12 LAB
BILIRUB UR QL: NEGATIVE
CEA SERPL-MCNC: 13.2 NG/ML (ref ?–5)
COLOR UR: YELLOW
GLUCOSE UR-MCNC: NEGATIVE MG/DL
HGB UR QL STRIP.AUTO: NEGATIVE
KETONES UR-MCNC: NEGATIVE MG/DL
NITRITE UR QL STRIP.AUTO: POSITIVE
PH UR: 7 [PH] (ref 5–8)
PROT UR-MCNC: NEGATIVE MG/DL
SP GR UR STRIP: 1.01 (ref 1–1.03)
UROBILINOGEN UR STRIP-ACNC: <2
VIT C UR-MCNC: NEGATIVE MG/DL

## 2022-04-12 PROCEDURE — 82378 CARCINOEMBRYONIC ANTIGEN: CPT

## 2022-04-12 PROCEDURE — 96411 CHEMO IV PUSH ADDL DRUG: CPT

## 2022-04-12 PROCEDURE — 81001 URINALYSIS AUTO W/SCOPE: CPT

## 2022-04-12 PROCEDURE — 96413 CHEMO IV INFUSION 1 HR: CPT

## 2022-04-12 PROCEDURE — 99215 OFFICE O/P EST HI 40 MIN: CPT | Performed by: INTERNAL MEDICINE

## 2022-04-12 PROCEDURE — 96375 TX/PRO/DX INJ NEW DRUG ADDON: CPT

## 2022-04-12 PROCEDURE — 96367 TX/PROPH/DG ADDL SEQ IV INF: CPT

## 2022-04-12 RX ORDER — HEPARIN SODIUM (PORCINE) LOCK FLUSH IV SOLN 100 UNIT/ML 100 UNIT/ML
500 SOLUTION INTRAVENOUS ONCE
Status: COMPLETED | OUTPATIENT
Start: 2022-04-12 | End: 2022-04-12

## 2022-04-12 RX ORDER — HEPARIN SODIUM (PORCINE) LOCK FLUSH IV SOLN 100 UNIT/ML 100 UNIT/ML
SOLUTION INTRAVENOUS
Status: COMPLETED
Start: 2022-04-12 | End: 2022-04-12

## 2022-04-12 RX ORDER — FLUOROURACIL 50 MG/ML
2400 INJECTION, SOLUTION INTRAVENOUS CONTINUOUS
Status: DISCONTINUED | OUTPATIENT
Start: 2022-04-12 | End: 2022-04-12

## 2022-04-12 RX ORDER — FLUOROURACIL 50 MG/ML
400 INJECTION, SOLUTION INTRAVENOUS ONCE
Status: COMPLETED | OUTPATIENT
Start: 2022-04-12 | End: 2022-04-12

## 2022-04-12 RX ORDER — FLUOROURACIL 50 MG/ML
2400 INJECTION, SOLUTION INTRAVENOUS CONTINUOUS
Status: CANCELLED | OUTPATIENT
Start: 2022-04-12

## 2022-04-12 RX ORDER — HEPARIN SODIUM (PORCINE) LOCK FLUSH IV SOLN 100 UNIT/ML 100 UNIT/ML
500 SOLUTION INTRAVENOUS ONCE
OUTPATIENT
Start: 2022-04-12

## 2022-04-12 RX ORDER — FLUOROURACIL 50 MG/ML
400 INJECTION, SOLUTION INTRAVENOUS ONCE
Status: CANCELLED | OUTPATIENT
Start: 2022-04-12

## 2022-04-12 RX ADMIN — HEPARIN SODIUM (PORCINE) LOCK FLUSH IV SOLN 100 UNIT/ML 500 UNITS: 100 SOLUTION INTRAVENOUS at 18:00:00

## 2022-04-12 RX ADMIN — FLUOROURACIL 4050 MG: 50 INJECTION, SOLUTION INTRAVENOUS at 13:57:00

## 2022-04-12 RX ADMIN — FLUOROURACIL 700 MG: 50 INJECTION, SOLUTION INTRAVENOUS at 13:47:00

## 2022-04-12 NOTE — PROGRESS NOTES
Pt returned to infusion area at [phone] with malfunction in cadd pump. Pt with blood present on shirt and pants. Pt states tubing came apart and she was bleeding all over. She then reconnected the tubing. Blood noted in tubing from port to sergei cap. Pt showed me the area that had come apart (Connection between cadd tubing and sergei). Paulie Marcano present with pt. Port flushed and de-accessed. Per discussion between Dr. Paulie Coats and pt, she will return on Thursday at 7:30 am to have the pump reconnected. Discharged home.

## 2022-04-12 NOTE — PROGRESS NOTES
Pt here for C1D1 MVASI/LV/5FU. Arrives Ambulating independently, accompanied by Self           Pregnancy screening: Not applicable- hysterectomy on 6/29/21 w/ cytoreductive surgery    Modifications in dose or schedule: Yes- Oxaliplatin on hold at this time- potential to add to regimen in the future per MD progress note     Frequency of blood return and site check throughout administration: Prior to administration, Prior to each drug, Every 2-3 ml IVP and At completion of therapy   Discharged to Home, Ambulating independently, accompanied by:Self    Outpatient Oncology Care Plan  Problem list:  fatigue  knowledge deficit  Problems related to:    chemotherapy  disease/disease progression  self care  side effect of treatment  Interventions:  maintain safe environment  nausea/vomiting teaching  optimize nutrition status  patient/family supported  teach protective precaution  educate regarding self care  encourage activity as tolerated  monitor effect of therapy  monitor lab values  promoted rest  provided general teaching  Expected outcomes:  adequate sleep/rest  free of infection  maintains/gains weight  optimal lab values  patient supported/coping well  safe in environment  symptoms relieved/minimized  understands plan of care  verbalize how to care for self  Progress towards outcome:  making progress    Education Record    Learner:  Patient  Barriers / Limitations:  None  Method:  Brief focused, Discussion and Reinforcement  Outcome:  Shows understanding  Comments:    New antiemetic and emla script sent to pharmacy by clinic RN.

## 2022-04-13 ENCOUNTER — TELEPHONE (OUTPATIENT)
Dept: HEMATOLOGY/ONCOLOGY | Facility: HOSPITAL | Age: 72
End: 2022-04-13

## 2022-04-13 NOTE — TELEPHONE ENCOUNTER
Toxicities: C1 D1 Fluorouracil/Leucovorin/Oxaliplatin/Bevacizumab-awwb on 4/12/2022    Brianne Blue said she is not having any side effects. I encouraged her to please call the office if she is not feeling well or she has any questions or concerns. Brianne Blue reports that her CADD pump tubing got disconnected around 6pm yesterday. \"I spoke with Dr Rupal Cruz and she said I could come in on Thursday morning to have it reconnected. \" I checked with Ramesh Reed RN and she confirmed the patient should come tomorrow morning. I transferred the patient to [place], [place] to get rescheduled.

## 2022-04-14 ENCOUNTER — NURSE ONLY (OUTPATIENT)
Dept: HEMATOLOGY/ONCOLOGY | Facility: HOSPITAL | Age: 72
End: 2022-04-14
Attending: INTERNAL MEDICINE
Payer: MEDICARE

## 2022-04-14 DIAGNOSIS — C18.7 CANCER OF SIGMOID COLON (HCC): ICD-10-CM

## 2022-04-14 DIAGNOSIS — Z51.11 CHEMOTHERAPY MANAGEMENT, ENCOUNTER FOR: Primary | ICD-10-CM

## 2022-04-14 RX ORDER — FLUOROURACIL 50 MG/ML
2400 INJECTION, SOLUTION INTRAVENOUS CONTINUOUS
Status: DISCONTINUED | OUTPATIENT
Start: 2022-04-14 | End: 2022-04-14

## 2022-04-14 RX ADMIN — FLUOROURACIL 4050 MG: 50 INJECTION, SOLUTION INTRAVENOUS at 09:17:00

## 2022-04-16 ENCOUNTER — TELEPHONE (OUTPATIENT)
Dept: HEMATOLOGY/ONCOLOGY | Facility: HOSPITAL | Age: 72
End: 2022-04-16

## 2022-04-16 ENCOUNTER — APPOINTMENT (OUTPATIENT)
Dept: HEMATOLOGY/ONCOLOGY | Facility: HOSPITAL | Age: 72
End: 2022-04-16
Attending: INTERNAL MEDICINE
Payer: MEDICARE

## 2022-04-16 RX ORDER — HEPARIN SODIUM (PORCINE) LOCK FLUSH IV SOLN 100 UNIT/ML 100 UNIT/ML
SOLUTION INTRAVENOUS
Status: DISCONTINUED
Start: 2022-04-16 | End: 2022-04-16 | Stop reason: WASHOUT

## 2022-04-16 NOTE — TELEPHONE ENCOUNTER
Sulma Santana about her appointment today for cadd removal.  She states the pump ended around 6 am and she removed it. She was given the kit for removal at her last appt. She states she flushed with saline and then heparin. She then removed the port needle. She will bring the pump back with her at the next appointment. She states her daughter is a nurse and she was able to help her.

## 2022-04-18 ENCOUNTER — TELEPHONE (OUTPATIENT)
Dept: HEMATOLOGY/ONCOLOGY | Facility: HOSPITAL | Age: 72
End: 2022-04-18

## 2022-04-26 ENCOUNTER — OFFICE VISIT (OUTPATIENT)
Dept: HEMATOLOGY/ONCOLOGY | Facility: HOSPITAL | Age: 72
End: 2022-04-26
Attending: NURSE PRACTITIONER
Payer: MEDICARE

## 2022-04-26 ENCOUNTER — NURSE ONLY (OUTPATIENT)
Dept: HEMATOLOGY/ONCOLOGY | Facility: HOSPITAL | Age: 72
End: 2022-04-26
Attending: INTERNAL MEDICINE
Payer: MEDICARE

## 2022-04-26 VITALS
BODY MASS INDEX: 26.31 KG/M2 | TEMPERATURE: 97 F | HEIGHT: 62 IN | HEART RATE: 74 BPM | OXYGEN SATURATION: 97 % | SYSTOLIC BLOOD PRESSURE: 143 MMHG | RESPIRATION RATE: 16 BRPM | DIASTOLIC BLOOD PRESSURE: 72 MMHG | WEIGHT: 143 LBS

## 2022-04-26 DIAGNOSIS — Z51.11 CHEMOTHERAPY MANAGEMENT, ENCOUNTER FOR: Primary | ICD-10-CM

## 2022-04-26 DIAGNOSIS — C18.7 CANCER OF SIGMOID COLON (HCC): ICD-10-CM

## 2022-04-26 DIAGNOSIS — C78.6 PERITONEAL CARCINOMATOSIS (HCC): ICD-10-CM

## 2022-04-26 DIAGNOSIS — C18.7 CANCER OF SIGMOID COLON (HCC): Primary | ICD-10-CM

## 2022-04-26 LAB
ALBUMIN SERPL-MCNC: 3.6 G/DL (ref 3.4–5)
ALBUMIN/GLOB SERPL: 0.9 {RATIO} (ref 1–2)
ALP LIVER SERPL-CCNC: 83 U/L
ALT SERPL-CCNC: 17 U/L
ANION GAP SERPL CALC-SCNC: 5 MMOL/L (ref 0–18)
AST SERPL-CCNC: 16 U/L (ref 15–37)
BASOPHILS # BLD AUTO: 0.05 X10(3) UL (ref 0–0.2)
BASOPHILS NFR BLD AUTO: 1 %
BILIRUB SERPL-MCNC: 0.4 MG/DL (ref 0.1–2)
BUN BLD-MCNC: 11 MG/DL (ref 7–18)
BUN/CREAT SERPL: 15.5 (ref 10–20)
CALCIUM BLD-MCNC: 9 MG/DL (ref 8.5–10.1)
CHLORIDE SERPL-SCNC: 107 MMOL/L (ref 98–112)
CO2 SERPL-SCNC: 30 MMOL/L (ref 21–32)
CREAT BLD-MCNC: 0.71 MG/DL
DEPRECATED RDW RBC AUTO: 49.8 FL (ref 35.1–46.3)
EOSINOPHIL # BLD AUTO: 0.25 X10(3) UL (ref 0–0.7)
EOSINOPHIL NFR BLD AUTO: 5.2 %
ERYTHROCYTE [DISTWIDTH] IN BLOOD BY AUTOMATED COUNT: 14.1 % (ref 11–15)
GLOBULIN PLAS-MCNC: 4.1 G/DL (ref 2.8–4.4)
GLUCOSE BLD-MCNC: 95 MG/DL (ref 70–99)
HCT VFR BLD AUTO: 43 %
HGB BLD-MCNC: 13.7 G/DL
IMM GRANULOCYTES # BLD AUTO: 0 X10(3) UL (ref 0–1)
IMM GRANULOCYTES NFR BLD: 0 %
LYMPHOCYTES # BLD AUTO: 2.38 X10(3) UL (ref 1–4)
LYMPHOCYTES NFR BLD AUTO: 49.7 %
MCH RBC QN AUTO: 31.4 PG (ref 26–34)
MCHC RBC AUTO-ENTMCNC: 31.9 G/DL (ref 31–37)
MCV RBC AUTO: 98.4 FL
MONOCYTES # BLD AUTO: 0.5 X10(3) UL (ref 0.1–1)
MONOCYTES NFR BLD AUTO: 10.4 %
NEUTROPHILS # BLD AUTO: 1.61 X10 (3) UL (ref 1.5–7.7)
NEUTROPHILS # BLD AUTO: 1.61 X10(3) UL (ref 1.5–7.7)
NEUTROPHILS NFR BLD AUTO: 33.7 %
OSMOLALITY SERPL CALC.SUM OF ELEC: 293 MOSM/KG (ref 275–295)
PLATELET # BLD AUTO: 209 10(3)UL (ref 150–450)
POTASSIUM SERPL-SCNC: 4.1 MMOL/L (ref 3.5–5.1)
PROT SERPL-MCNC: 7.7 G/DL (ref 6.4–8.2)
RBC # BLD AUTO: 4.37 X10(6)UL
SODIUM SERPL-SCNC: 142 MMOL/L (ref 136–145)
WBC # BLD AUTO: 4.8 X10(3) UL (ref 4–11)

## 2022-04-26 PROCEDURE — 80053 COMPREHEN METABOLIC PANEL: CPT

## 2022-04-26 PROCEDURE — 96367 TX/PROPH/DG ADDL SEQ IV INF: CPT

## 2022-04-26 PROCEDURE — 96411 CHEMO IV PUSH ADDL DRUG: CPT

## 2022-04-26 PROCEDURE — 96375 TX/PRO/DX INJ NEW DRUG ADDON: CPT

## 2022-04-26 PROCEDURE — 99215 OFFICE O/P EST HI 40 MIN: CPT | Performed by: NURSE PRACTITIONER

## 2022-04-26 PROCEDURE — 96366 THER/PROPH/DIAG IV INF ADDON: CPT

## 2022-04-26 PROCEDURE — 96413 CHEMO IV INFUSION 1 HR: CPT

## 2022-04-26 PROCEDURE — 85025 COMPLETE CBC W/AUTO DIFF WBC: CPT

## 2022-04-26 RX ORDER — FLUOROURACIL 50 MG/ML
2400 INJECTION, SOLUTION INTRAVENOUS CONTINUOUS
Status: DISCONTINUED | OUTPATIENT
Start: 2022-04-26 | End: 2022-04-26

## 2022-04-26 RX ORDER — FLUOROURACIL 50 MG/ML
400 INJECTION, SOLUTION INTRAVENOUS ONCE
Status: COMPLETED | OUTPATIENT
Start: 2022-04-26 | End: 2022-04-26

## 2022-04-26 RX ORDER — FLUOROURACIL 50 MG/ML
400 INJECTION, SOLUTION INTRAVENOUS ONCE
Status: CANCELLED | OUTPATIENT
Start: 2022-04-26

## 2022-04-26 RX ORDER — FLUOROURACIL 50 MG/ML
2400 INJECTION, SOLUTION INTRAVENOUS CONTINUOUS
Status: CANCELLED | OUTPATIENT
Start: 2022-04-26

## 2022-04-26 RX ADMIN — FLUOROURACIL 700 MG: 50 INJECTION, SOLUTION INTRAVENOUS at 11:50:00

## 2022-04-26 RX ADMIN — FLUOROURACIL 4050 MG: 50 INJECTION, SOLUTION INTRAVENOUS at 11:57:00

## 2022-04-26 NOTE — PROGRESS NOTES
Pt here for C2D1  MVASI/LV/5FU. Arrives Ambulating independently, accompanied by Self           Pregnancy screening: Not applicable- hysterectomy on 6/29/21 w/ cytoreductive surgery    Modifications in dose or schedule: Yes- Oxaliplatin continues to be on hold    Drugs/infusion dual verified for appearance, volume and dose, and physical integrity. Frequency of blood return and site check throughout administration: Prior to administration, Prior to each drug, Every 2-3 ml IVP and At completion of therapy   Discharged to Home, Ambulating independently, accompanied by:Self    Outpatient Oncology Care Plan  Problem list:  fatigue  knowledge deficit  Problems related to:    chemotherapy  disease/disease progression  self care  side effect of treatment  Interventions:  maintain safe environment  nausea/vomiting teaching  optimize nutrition status  patient/family supported  teach protective precaution  educate regarding self care  encourage activity as tolerated  monitor effect of therapy  monitor lab values  promoted rest  provided general teaching  Expected outcomes:  adequate sleep/rest  free of infection  maintains/gains weight  optimal lab values  patient supported/coping well  safe in environment  symptoms relieved/minimized  understands plan of care  verbalize how to care for self  Progress towards outcome:  making progress    Education Record    Learner:  Patient  Barriers / Limitations:  None  Method:  Brief focused, Discussion and Reinforcement  Outcome:  Shows understanding  Comments:    New antiemetic and emla script sent to pharmacy by clinic RN.

## 2022-04-28 ENCOUNTER — NURSE ONLY (OUTPATIENT)
Dept: HEMATOLOGY/ONCOLOGY | Facility: HOSPITAL | Age: 72
End: 2022-04-28
Attending: INTERNAL MEDICINE
Payer: MEDICARE

## 2022-04-28 DIAGNOSIS — Z79.899 ENCOUNTER FOR MEDICATION MANAGEMENT: ICD-10-CM

## 2022-04-28 DIAGNOSIS — Z45.2 ENCOUNTER FOR CENTRAL LINE CARE: Primary | ICD-10-CM

## 2022-04-28 PROCEDURE — 96523 IRRIG DRUG DELIVERY DEVICE: CPT

## 2022-04-28 RX ORDER — HEPARIN SODIUM (PORCINE) LOCK FLUSH IV SOLN 100 UNIT/ML 100 UNIT/ML
500 SOLUTION INTRAVENOUS ONCE
Status: COMPLETED | OUTPATIENT
Start: 2022-04-28 | End: 2022-04-28

## 2022-04-28 RX ORDER — HEPARIN SODIUM (PORCINE) LOCK FLUSH IV SOLN 100 UNIT/ML 100 UNIT/ML
500 SOLUTION INTRAVENOUS ONCE
OUTPATIENT
Start: 2022-04-28

## 2022-04-28 RX ADMIN — HEPARIN SODIUM (PORCINE) LOCK FLUSH IV SOLN 100 UNIT/ML 500 UNITS: 100 SOLUTION INTRAVENOUS at 10:10:00

## 2022-05-09 ENCOUNTER — NURSE ONLY (OUTPATIENT)
Dept: HEMATOLOGY/ONCOLOGY | Facility: HOSPITAL | Age: 72
End: 2022-05-09
Attending: INTERNAL MEDICINE
Payer: MEDICARE

## 2022-05-09 VITALS
DIASTOLIC BLOOD PRESSURE: 84 MMHG | BODY MASS INDEX: 27.05 KG/M2 | HEIGHT: 62 IN | TEMPERATURE: 98 F | SYSTOLIC BLOOD PRESSURE: 120 MMHG | WEIGHT: 147 LBS | OXYGEN SATURATION: 96 % | RESPIRATION RATE: 16 BRPM | HEART RATE: 76 BPM

## 2022-05-09 DIAGNOSIS — C18.7 CANCER OF SIGMOID COLON (HCC): Primary | ICD-10-CM

## 2022-05-09 DIAGNOSIS — T45.1X5A CHEMOTHERAPY INDUCED NEUTROPENIA (HCC): ICD-10-CM

## 2022-05-09 DIAGNOSIS — Z51.11 CHEMOTHERAPY MANAGEMENT, ENCOUNTER FOR: ICD-10-CM

## 2022-05-09 DIAGNOSIS — D70.1 CHEMOTHERAPY INDUCED NEUTROPENIA (HCC): ICD-10-CM

## 2022-05-09 LAB
ALBUMIN SERPL-MCNC: 3.7 G/DL (ref 3.4–5)
ALBUMIN/GLOB SERPL: 0.9 {RATIO} (ref 1–2)
ALP LIVER SERPL-CCNC: 90 U/L
ALT SERPL-CCNC: 14 U/L
ANION GAP SERPL CALC-SCNC: 4 MMOL/L (ref 0–18)
AST SERPL-CCNC: 18 U/L (ref 15–37)
BASOPHILS # BLD AUTO: 0.06 X10(3) UL (ref 0–0.2)
BASOPHILS NFR BLD AUTO: 1.3 %
BILIRUB SERPL-MCNC: 0.4 MG/DL (ref 0.1–2)
BUN BLD-MCNC: 16 MG/DL (ref 7–18)
BUN/CREAT SERPL: 17.8 (ref 10–20)
CALCIUM BLD-MCNC: 9.6 MG/DL (ref 8.5–10.1)
CEA SERPL-MCNC: 16.3 NG/ML (ref ?–5)
CHLORIDE SERPL-SCNC: 107 MMOL/L (ref 98–112)
CO2 SERPL-SCNC: 30 MMOL/L (ref 21–32)
CREAT BLD-MCNC: 0.9 MG/DL
DEPRECATED RDW RBC AUTO: 50.9 FL (ref 35.1–46.3)
EOSINOPHIL # BLD AUTO: 0.12 X10(3) UL (ref 0–0.7)
EOSINOPHIL NFR BLD AUTO: 2.7 %
ERYTHROCYTE [DISTWIDTH] IN BLOOD BY AUTOMATED COUNT: 14.4 % (ref 11–15)
GLOBULIN PLAS-MCNC: 4.1 G/DL (ref 2.8–4.4)
GLUCOSE BLD-MCNC: 103 MG/DL (ref 70–99)
HCT VFR BLD AUTO: 43 %
HGB BLD-MCNC: 13.7 G/DL
IMM GRANULOCYTES # BLD AUTO: 0 X10(3) UL (ref 0–1)
IMM GRANULOCYTES NFR BLD: 0 %
LYMPHOCYTES # BLD AUTO: 3.18 X10(3) UL (ref 1–4)
LYMPHOCYTES NFR BLD AUTO: 71 %
MCH RBC QN AUTO: 31.6 PG (ref 26–34)
MCHC RBC AUTO-ENTMCNC: 31.9 G/DL (ref 31–37)
MCV RBC AUTO: 99.1 FL
MONOCYTES # BLD AUTO: 0.42 X10(3) UL (ref 0.1–1)
MONOCYTES NFR BLD AUTO: 9.4 %
NEUTROPHILS # BLD AUTO: 0.7 X10 (3) UL (ref 1.5–7.7)
NEUTROPHILS # BLD AUTO: 0.7 X10(3) UL (ref 1.5–7.7)
NEUTROPHILS NFR BLD AUTO: 15.6 %
OSMOLALITY SERPL CALC.SUM OF ELEC: 293 MOSM/KG (ref 275–295)
PLATELET # BLD AUTO: 170 10(3)UL (ref 150–450)
POTASSIUM SERPL-SCNC: 4.2 MMOL/L (ref 3.5–5.1)
PROT SERPL-MCNC: 7.8 G/DL (ref 6.4–8.2)
RBC # BLD AUTO: 4.34 X10(6)UL
SODIUM SERPL-SCNC: 141 MMOL/L (ref 136–145)
WBC # BLD AUTO: 4.5 X10(3) UL (ref 4–11)

## 2022-05-09 PROCEDURE — 80053 COMPREHEN METABOLIC PANEL: CPT

## 2022-05-09 PROCEDURE — 85025 COMPLETE CBC W/AUTO DIFF WBC: CPT

## 2022-05-09 PROCEDURE — 99215 OFFICE O/P EST HI 40 MIN: CPT | Performed by: INTERNAL MEDICINE

## 2022-05-09 PROCEDURE — 36415 COLL VENOUS BLD VENIPUNCTURE: CPT

## 2022-05-09 PROCEDURE — 85060 BLOOD SMEAR INTERPRETATION: CPT

## 2022-05-09 PROCEDURE — 82378 CARCINOEMBRYONIC ANTIGEN: CPT

## 2022-05-10 ENCOUNTER — APPOINTMENT (OUTPATIENT)
Dept: HEMATOLOGY/ONCOLOGY | Facility: HOSPITAL | Age: 72
End: 2022-05-10
Attending: INTERNAL MEDICINE
Payer: MEDICARE

## 2022-05-12 ENCOUNTER — OFFICE VISIT (OUTPATIENT)
Dept: HEMATOLOGY/ONCOLOGY | Facility: HOSPITAL | Age: 72
End: 2022-05-12
Attending: INTERNAL MEDICINE
Payer: MEDICARE

## 2022-05-12 DIAGNOSIS — Z51.11 CHEMOTHERAPY MANAGEMENT, ENCOUNTER FOR: ICD-10-CM

## 2022-05-12 DIAGNOSIS — Z45.2 ENCOUNTER FOR CENTRAL LINE CARE: ICD-10-CM

## 2022-05-12 DIAGNOSIS — C18.7 CANCER OF SIGMOID COLON (HCC): Primary | ICD-10-CM

## 2022-05-12 DIAGNOSIS — Z79.899 ENCOUNTER FOR MEDICATION MANAGEMENT: ICD-10-CM

## 2022-05-12 LAB
BASOPHILS # BLD AUTO: 0.07 X10(3) UL (ref 0–0.2)
BASOPHILS NFR BLD AUTO: 2.2 %
DEPRECATED RDW RBC AUTO: 52.5 FL (ref 35.1–46.3)
EOSINOPHIL # BLD AUTO: 0.14 X10(3) UL (ref 0–0.7)
EOSINOPHIL NFR BLD AUTO: 4.5 %
ERYTHROCYTE [DISTWIDTH] IN BLOOD BY AUTOMATED COUNT: 14.9 % (ref 11–15)
HCT VFR BLD AUTO: 41.9 %
HGB BLD-MCNC: 13.5 G/DL
IMM GRANULOCYTES # BLD AUTO: 0 X10(3) UL (ref 0–1)
IMM GRANULOCYTES NFR BLD: 0 %
LYMPHOCYTES # BLD AUTO: 1.87 X10(3) UL (ref 1–4)
LYMPHOCYTES NFR BLD AUTO: 59.6 %
MCH RBC QN AUTO: 31.8 PG (ref 26–34)
MCHC RBC AUTO-ENTMCNC: 32.2 G/DL (ref 31–37)
MCV RBC AUTO: 98.8 FL
MONOCYTES # BLD AUTO: 0.37 X10(3) UL (ref 0.1–1)
MONOCYTES NFR BLD AUTO: 11.8 %
NEUTROPHILS # BLD AUTO: 0.69 X10 (3) UL (ref 1.5–7.7)
NEUTROPHILS # BLD AUTO: 0.69 X10(3) UL (ref 1.5–7.7)
NEUTROPHILS NFR BLD AUTO: 21.9 %
PLATELET # BLD AUTO: 175 10(3)UL (ref 150–450)
RBC # BLD AUTO: 4.24 X10(6)UL
WBC # BLD AUTO: 3.1 X10(3) UL (ref 4–11)

## 2022-05-12 PROCEDURE — 85025 COMPLETE CBC W/AUTO DIFF WBC: CPT

## 2022-05-12 PROCEDURE — 36591 DRAW BLOOD OFF VENOUS DEVICE: CPT

## 2022-05-12 RX ORDER — HEPARIN SODIUM (PORCINE) LOCK FLUSH IV SOLN 100 UNIT/ML 100 UNIT/ML
500 SOLUTION INTRAVENOUS ONCE
Status: DISCONTINUED | OUTPATIENT
Start: 2022-05-12 | End: 2022-05-12

## 2022-05-12 RX ORDER — HEPARIN SODIUM (PORCINE) LOCK FLUSH IV SOLN 100 UNIT/ML 100 UNIT/ML
500 SOLUTION INTRAVENOUS ONCE
OUTPATIENT
Start: 2022-05-12

## 2022-05-12 RX ORDER — HEPARIN SODIUM (PORCINE) LOCK FLUSH IV SOLN 100 UNIT/ML 100 UNIT/ML
SOLUTION INTRAVENOUS
Status: DISCONTINUED
Start: 2022-05-12 | End: 2022-05-12

## 2022-05-12 NOTE — PROGRESS NOTES
Patient to center for stat CBC and possible chemo  ANC 0.69  Treatment deferred 1 week   Reinforce neutropenic precautions  Port flushed de accessed 2 x 2 and tape to site.   Discharged stable , aware of future appointments

## 2022-05-14 ENCOUNTER — APPOINTMENT (OUTPATIENT)
Dept: HEMATOLOGY/ONCOLOGY | Facility: HOSPITAL | Age: 72
End: 2022-05-14
Attending: INTERNAL MEDICINE
Payer: MEDICARE

## 2022-05-16 PROBLEM — D70.1 CHEMOTHERAPY-INDUCED NEUTROPENIA (HCC): Status: ACTIVE | Noted: 2022-05-16

## 2022-05-16 PROBLEM — T45.1X5A CHEMOTHERAPY-INDUCED NEUTROPENIA: Status: ACTIVE | Noted: 2022-05-16

## 2022-05-16 PROBLEM — T45.1X5A CHEMOTHERAPY-INDUCED NEUTROPENIA  (HCC): Status: ACTIVE | Noted: 2022-05-16

## 2022-05-16 PROBLEM — T45.1X5A CHEMOTHERAPY-INDUCED NEUTROPENIA (HCC): Status: ACTIVE | Noted: 2022-05-16

## 2022-05-16 PROBLEM — D70.1 CHEMOTHERAPY-INDUCED NEUTROPENIA: Status: ACTIVE | Noted: 2022-05-16

## 2022-05-16 PROBLEM — D70.1 CHEMOTHERAPY-INDUCED NEUTROPENIA  (HCC): Status: ACTIVE | Noted: 2022-05-16

## 2022-05-17 ENCOUNTER — OFFICE VISIT (OUTPATIENT)
Dept: HEMATOLOGY/ONCOLOGY | Facility: HOSPITAL | Age: 72
End: 2022-05-17
Attending: INTERNAL MEDICINE
Payer: MEDICARE

## 2022-05-17 ENCOUNTER — TELEPHONE (OUTPATIENT)
Dept: HEMATOLOGY/ONCOLOGY | Facility: HOSPITAL | Age: 72
End: 2022-05-17

## 2022-05-17 VITALS
TEMPERATURE: 98 F | RESPIRATION RATE: 16 BRPM | WEIGHT: 148 LBS | OXYGEN SATURATION: 96 % | HEART RATE: 77 BPM | BODY MASS INDEX: 27.23 KG/M2 | DIASTOLIC BLOOD PRESSURE: 82 MMHG | HEIGHT: 62 IN | SYSTOLIC BLOOD PRESSURE: 129 MMHG

## 2022-05-17 DIAGNOSIS — Z51.11 CHEMOTHERAPY MANAGEMENT, ENCOUNTER FOR: ICD-10-CM

## 2022-05-17 DIAGNOSIS — Z79.899 ENCOUNTER FOR MEDICATION MANAGEMENT: ICD-10-CM

## 2022-05-17 DIAGNOSIS — Z45.2 ENCOUNTER FOR CENTRAL LINE CARE: Primary | ICD-10-CM

## 2022-05-17 DIAGNOSIS — T45.1X5A CHEMOTHERAPY INDUCED NEUTROPENIA (HCC): ICD-10-CM

## 2022-05-17 DIAGNOSIS — D70.1 CHEMOTHERAPY INDUCED NEUTROPENIA (HCC): ICD-10-CM

## 2022-05-17 DIAGNOSIS — C18.7 CANCER OF SIGMOID COLON (HCC): Primary | ICD-10-CM

## 2022-05-17 LAB
ALBUMIN SERPL-MCNC: 3.7 G/DL (ref 3.4–5)
ALBUMIN/GLOB SERPL: 1 {RATIO} (ref 1–2)
ALP LIVER SERPL-CCNC: 79 U/L
ALT SERPL-CCNC: 18 U/L
ANION GAP SERPL CALC-SCNC: 5 MMOL/L (ref 0–18)
AST SERPL-CCNC: 20 U/L (ref 15–37)
BASOPHILS # BLD AUTO: 0.08 X10(3) UL (ref 0–0.2)
BASOPHILS NFR BLD AUTO: 2 %
BILIRUB SERPL-MCNC: 0.5 MG/DL (ref 0.1–2)
BUN BLD-MCNC: 10 MG/DL (ref 7–18)
BUN/CREAT SERPL: 12.8 (ref 10–20)
CALCIUM BLD-MCNC: 9.3 MG/DL (ref 8.5–10.1)
CEA SERPL-MCNC: 12.1 NG/ML (ref ?–5)
CHLORIDE SERPL-SCNC: 104 MMOL/L (ref 98–112)
CO2 SERPL-SCNC: 30 MMOL/L (ref 21–32)
CREAT BLD-MCNC: 0.78 MG/DL
DEPRECATED RDW RBC AUTO: 56.8 FL (ref 35.1–46.3)
EOSINOPHIL # BLD AUTO: 0.15 X10(3) UL (ref 0–0.7)
EOSINOPHIL NFR BLD AUTO: 3.8 %
ERYTHROCYTE [DISTWIDTH] IN BLOOD BY AUTOMATED COUNT: 15.7 % (ref 11–15)
GLOBULIN PLAS-MCNC: 3.6 G/DL (ref 2.8–4.4)
GLUCOSE BLD-MCNC: 104 MG/DL (ref 70–99)
HCT VFR BLD AUTO: 42.9 %
HGB BLD-MCNC: 13.7 G/DL
IMM GRANULOCYTES # BLD AUTO: 0 X10(3) UL (ref 0–1)
IMM GRANULOCYTES NFR BLD: 0 %
LYMPHOCYTES # BLD AUTO: 2.61 X10(3) UL (ref 1–4)
LYMPHOCYTES NFR BLD AUTO: 66.2 %
MCH RBC QN AUTO: 31.6 PG (ref 26–34)
MCHC RBC AUTO-ENTMCNC: 31.9 G/DL (ref 31–37)
MCV RBC AUTO: 99.1 FL
MONOCYTES # BLD AUTO: 0.53 X10(3) UL (ref 0.1–1)
MONOCYTES NFR BLD AUTO: 13.5 %
NEUTROPHILS # BLD AUTO: 0.57 X10 (3) UL (ref 1.5–7.7)
NEUTROPHILS # BLD AUTO: 0.57 X10(3) UL (ref 1.5–7.7)
NEUTROPHILS NFR BLD AUTO: 14.5 %
OSMOLALITY SERPL CALC.SUM OF ELEC: 287 MOSM/KG (ref 275–295)
PLATELET # BLD AUTO: 187 10(3)UL (ref 150–450)
POTASSIUM SERPL-SCNC: 4 MMOL/L (ref 3.5–5.1)
PROT SERPL-MCNC: 7.3 G/DL (ref 6.4–8.2)
RBC # BLD AUTO: 4.33 X10(6)UL
SODIUM SERPL-SCNC: 139 MMOL/L (ref 136–145)
WBC # BLD AUTO: 3.9 X10(3) UL (ref 4–11)

## 2022-05-17 PROCEDURE — 36591 DRAW BLOOD OFF VENOUS DEVICE: CPT

## 2022-05-17 PROCEDURE — 99215 OFFICE O/P EST HI 40 MIN: CPT | Performed by: INTERNAL MEDICINE

## 2022-05-17 PROCEDURE — 82378 CARCINOEMBRYONIC ANTIGEN: CPT

## 2022-05-17 PROCEDURE — 80053 COMPREHEN METABOLIC PANEL: CPT

## 2022-05-17 PROCEDURE — 85025 COMPLETE CBC W/AUTO DIFF WBC: CPT

## 2022-05-17 RX ORDER — HEPARIN SODIUM (PORCINE) LOCK FLUSH IV SOLN 100 UNIT/ML 100 UNIT/ML
500 SOLUTION INTRAVENOUS ONCE
Status: DISCONTINUED | OUTPATIENT
Start: 2022-05-17 | End: 2022-05-17

## 2022-05-17 RX ORDER — HEPARIN SODIUM (PORCINE) LOCK FLUSH IV SOLN 100 UNIT/ML 100 UNIT/ML
500 SOLUTION INTRAVENOUS ONCE
Status: CANCELLED | OUTPATIENT
Start: 2022-05-17

## 2022-05-17 RX ORDER — HEPARIN SODIUM (PORCINE) LOCK FLUSH IV SOLN 100 UNIT/ML 100 UNIT/ML
SOLUTION INTRAVENOUS
Status: DISCONTINUED
Start: 2022-05-17 | End: 2022-05-17

## 2022-05-17 RX ORDER — LORATADINE 10 MG/1
10 TABLET ORAL DAILY
Qty: 30 TABLET | Refills: 0 | Status: SHIPPED | OUTPATIENT
Start: 2022-05-17

## 2022-05-17 NOTE — TELEPHONE ENCOUNTER
Patient wanted to let Dr. Ernesto Burdick know that she is now eating grapefruits and this maybe affecting her Chemo.

## 2022-05-17 NOTE — PROGRESS NOTES
Pt being deferred r/t neutropenia. Pt to come back for repeat labs and possible chemo Thursday. Reinforced neutropenic precautions. Port flushed with heparin/saline and deaccessed.

## 2022-05-18 NOTE — TELEPHONE ENCOUNTER
Returned call and left message. Grapefruit most likely not causing her counts to be down. Asked for a call back with questions.

## 2022-05-19 ENCOUNTER — OFFICE VISIT (OUTPATIENT)
Dept: HEMATOLOGY/ONCOLOGY | Facility: HOSPITAL | Age: 72
End: 2022-05-19
Attending: INTERNAL MEDICINE
Payer: MEDICARE

## 2022-05-19 VITALS
DIASTOLIC BLOOD PRESSURE: 86 MMHG | TEMPERATURE: 98 F | HEART RATE: 77 BPM | OXYGEN SATURATION: 97 % | RESPIRATION RATE: 16 BRPM | SYSTOLIC BLOOD PRESSURE: 148 MMHG

## 2022-05-19 DIAGNOSIS — C18.7 CANCER OF SIGMOID COLON (HCC): ICD-10-CM

## 2022-05-19 DIAGNOSIS — T45.1X5A CHEMOTHERAPY INDUCED NEUTROPENIA (HCC): ICD-10-CM

## 2022-05-19 DIAGNOSIS — D70.1 CHEMOTHERAPY INDUCED NEUTROPENIA (HCC): ICD-10-CM

## 2022-05-19 DIAGNOSIS — Z51.11 CHEMOTHERAPY MANAGEMENT, ENCOUNTER FOR: Primary | ICD-10-CM

## 2022-05-19 DIAGNOSIS — C18.7 CANCER OF SIGMOID COLON (HCC): Primary | ICD-10-CM

## 2022-05-19 LAB
BASOPHILS # BLD AUTO: 0.07 X10(3) UL (ref 0–0.2)
BASOPHILS NFR BLD AUTO: 1.9 %
DEPRECATED RDW RBC AUTO: 57.8 FL (ref 35.1–46.3)
EOSINOPHIL # BLD AUTO: 0.17 X10(3) UL (ref 0–0.7)
EOSINOPHIL NFR BLD AUTO: 4.7 %
ERYTHROCYTE [DISTWIDTH] IN BLOOD BY AUTOMATED COUNT: 15.8 % (ref 11–15)
HCT VFR BLD AUTO: 42.3 %
HGB BLD-MCNC: 13.6 G/DL
IMM GRANULOCYTES # BLD AUTO: 0.01 X10(3) UL (ref 0–1)
IMM GRANULOCYTES NFR BLD: 0.3 %
LYMPHOCYTES # BLD AUTO: 2.15 X10(3) UL (ref 1–4)
LYMPHOCYTES NFR BLD AUTO: 59.2 %
MCH RBC QN AUTO: 32.2 PG (ref 26–34)
MCHC RBC AUTO-ENTMCNC: 32.2 G/DL (ref 31–37)
MCV RBC AUTO: 100 FL
MONOCYTES # BLD AUTO: 0.58 X10(3) UL (ref 0.1–1)
MONOCYTES NFR BLD AUTO: 16 %
NEUTROPHILS # BLD AUTO: 0.65 X10 (3) UL (ref 1.5–7.7)
NEUTROPHILS # BLD AUTO: 0.65 X10(3) UL (ref 1.5–7.7)
NEUTROPHILS NFR BLD AUTO: 17.9 %
PLATELET # BLD AUTO: 193 10(3)UL (ref 150–450)
RBC # BLD AUTO: 4.23 X10(6)UL
WBC # BLD AUTO: 3.6 X10(3) UL (ref 4–11)

## 2022-05-19 PROCEDURE — 96413 CHEMO IV INFUSION 1 HR: CPT

## 2022-05-19 PROCEDURE — 96417 CHEMO IV INFUS EACH ADDL SEQ: CPT

## 2022-05-19 PROCEDURE — 85025 COMPLETE CBC W/AUTO DIFF WBC: CPT

## 2022-05-19 PROCEDURE — 96415 CHEMO IV INFUSION ADDL HR: CPT

## 2022-05-19 PROCEDURE — 96375 TX/PRO/DX INJ NEW DRUG ADDON: CPT

## 2022-05-19 RX ORDER — FLUOROURACIL 50 MG/ML
2000 INJECTION, SOLUTION INTRAVENOUS CONTINUOUS
Status: DISCONTINUED | OUTPATIENT
Start: 2022-05-19 | End: 2022-05-19

## 2022-05-19 RX ADMIN — FLUOROURACIL 3350 MG: 50 INJECTION, SOLUTION INTRAVENOUS at 14:25:00

## 2022-05-19 NOTE — PROGRESS NOTES
Pt here for CD1  MVASI/LV/5FU. Arrives Ambulating independently, accompanied by Self           Pregnancy screening: Not applicable- hysterectomy on 6/29/21 w/ cytoreductive surgery    Modifications in dose or schedule: Yes-     ANC 0.65 ok to treat , will dose reduce , Oxali added today , 5 FU push removed  and add Neulasta Onpro Day 3       Drugs/infusion dual verified for appearance, volume and dose, and physical integrity.      Frequency of blood return and site check throughout administration: Prior to administration, Prior to each drug, Every 2-3 ml IVP and At completion of therapy   Discharged to Home, Ambulating independently, accompanied by:Self    Outpatient Oncology Care Plan  Problem list:  fatigue  knowledge deficit  Problems related to:    chemotherapy  disease/disease progression  self care  side effect of treatment  Interventions:  maintain safe environment  nausea/vomiting teaching  optimize nutrition status  patient/family supported  teach protective precaution  educate regarding self care  encourage activity as tolerated  monitor effect of therapy  monitor lab values  promoted rest  provided general teaching  Expected outcomes:  adequate sleep/rest  free of infection  maintains/gains weight  optimal lab values  patient supported/coping well  safe in environment  symptoms relieved/minimized  understands plan of care  verbalize how to care for self  Progress towards outcome:  making progress    Education Record    Learner:  Patient  Barriers / Limitations:  None  Method:  Brief focused, Discussion and Reinforcement  Outcome:  Shows understanding  Comments:

## 2022-05-21 ENCOUNTER — NURSE ONLY (OUTPATIENT)
Dept: HEMATOLOGY/ONCOLOGY | Facility: HOSPITAL | Age: 72
End: 2022-05-21
Attending: INTERNAL MEDICINE
Payer: MEDICARE

## 2022-05-21 VITALS
DIASTOLIC BLOOD PRESSURE: 76 MMHG | HEART RATE: 73 BPM | SYSTOLIC BLOOD PRESSURE: 143 MMHG | RESPIRATION RATE: 16 BRPM | TEMPERATURE: 98 F | OXYGEN SATURATION: 96 %

## 2022-05-21 DIAGNOSIS — C18.7 CANCER OF SIGMOID COLON (HCC): ICD-10-CM

## 2022-05-21 DIAGNOSIS — Z51.11 CHEMOTHERAPY MANAGEMENT, ENCOUNTER FOR: ICD-10-CM

## 2022-05-21 DIAGNOSIS — T45.1X5A CHEMOTHERAPY INDUCED NEUTROPENIA (HCC): Primary | ICD-10-CM

## 2022-05-21 DIAGNOSIS — D70.1 CHEMOTHERAPY INDUCED NEUTROPENIA (HCC): Primary | ICD-10-CM

## 2022-05-21 DIAGNOSIS — Z79.899 ENCOUNTER FOR MEDICATION MANAGEMENT: ICD-10-CM

## 2022-05-21 DIAGNOSIS — Z45.2 ENCOUNTER FOR CENTRAL LINE CARE: ICD-10-CM

## 2022-05-21 PROCEDURE — 96372 THER/PROPH/DIAG INJ SC/IM: CPT

## 2022-05-21 PROCEDURE — 96523 IRRIG DRUG DELIVERY DEVICE: CPT

## 2022-05-21 RX ORDER — HEPARIN SODIUM (PORCINE) LOCK FLUSH IV SOLN 100 UNIT/ML 100 UNIT/ML
500 SOLUTION INTRAVENOUS ONCE
Status: COMPLETED | OUTPATIENT
Start: 2022-05-21 | End: 2022-05-21

## 2022-05-21 RX ORDER — HEPARIN SODIUM (PORCINE) LOCK FLUSH IV SOLN 100 UNIT/ML 100 UNIT/ML
500 SOLUTION INTRAVENOUS ONCE
OUTPATIENT
Start: 2022-05-21

## 2022-05-21 RX ORDER — HEPARIN SODIUM (PORCINE) LOCK FLUSH IV SOLN 100 UNIT/ML 100 UNIT/ML
SOLUTION INTRAVENOUS
Status: COMPLETED
Start: 2022-05-21 | End: 2022-05-21

## 2022-05-21 RX ADMIN — HEPARIN SODIUM (PORCINE) LOCK FLUSH IV SOLN 100 UNIT/ML 500 UNITS: 100 SOLUTION INTRAVENOUS at 10:56:00

## 2022-05-21 NOTE — PROGRESS NOTES
Leslee to infusion today for CADD removal, port flush and Neulasta. She arrives alert and independent. No complaints or concerns. Feeling well. Patient presented with CADD pump connected. Reservoir remained with 1.1ml- patient agreed to stay to let more infuse. At time of disconnect, only 0.2ml remaining. Disconnected CADD pump, flushed port with 0.9% Normal Saline and established blood return in port. Flushed per protocol with 500 units of Heparin and de-accessed port. Site covered with 2x2 gauze and paper tape. Spoke with patient regarding Neulasta and possible side effects. She verbalized understanding. States she does not have Claritin at home at this time but knows the recommended scheduled for taking. If she does not pick-up Claritin today, she will take Tylenol if pain/aches. Neulasta given subcutaneous to left upper outer arm. Site left VIKI. Patient had Neulasta ON-Pro ordered in treatment plan. Spoke with Dr. Courtney Barros in-person. Verbal okay per Dr. Courtney Barros to give Neulasta and will change treatment plans from ON-Pro to regular Neulasta. Nadiya Comer discharged in stable condition from infusion, aware of future appointments scheduled. no

## 2022-05-24 ENCOUNTER — APPOINTMENT (OUTPATIENT)
Dept: HEMATOLOGY/ONCOLOGY | Facility: HOSPITAL | Age: 72
End: 2022-05-24
Attending: INTERNAL MEDICINE
Payer: MEDICARE

## 2022-05-26 ENCOUNTER — APPOINTMENT (OUTPATIENT)
Dept: HEMATOLOGY/ONCOLOGY | Facility: HOSPITAL | Age: 72
End: 2022-05-26
Attending: INTERNAL MEDICINE
Payer: MEDICARE

## 2022-05-31 ENCOUNTER — TELEPHONE (OUTPATIENT)
Dept: HEMATOLOGY/ONCOLOGY | Facility: HOSPITAL | Age: 72
End: 2022-05-31

## 2022-06-02 ENCOUNTER — OFFICE VISIT (OUTPATIENT)
Dept: HEMATOLOGY/ONCOLOGY | Facility: HOSPITAL | Age: 72
End: 2022-06-02
Attending: INTERNAL MEDICINE
Payer: MEDICARE

## 2022-06-02 ENCOUNTER — OFFICE VISIT (OUTPATIENT)
Dept: HEMATOLOGY/ONCOLOGY | Facility: HOSPITAL | Age: 72
End: 2022-06-02
Attending: NURSE PRACTITIONER
Payer: MEDICARE

## 2022-06-02 VITALS
BODY MASS INDEX: 27.05 KG/M2 | HEART RATE: 82 BPM | RESPIRATION RATE: 16 BRPM | OXYGEN SATURATION: 99 % | SYSTOLIC BLOOD PRESSURE: 131 MMHG | DIASTOLIC BLOOD PRESSURE: 79 MMHG | HEIGHT: 62 IN | WEIGHT: 147 LBS | TEMPERATURE: 98 F

## 2022-06-02 VITALS
HEART RATE: 82 BPM | BODY MASS INDEX: 27.05 KG/M2 | WEIGHT: 147 LBS | TEMPERATURE: 98 F | HEIGHT: 62 IN | OXYGEN SATURATION: 99 % | SYSTOLIC BLOOD PRESSURE: 131 MMHG | RESPIRATION RATE: 16 BRPM | DIASTOLIC BLOOD PRESSURE: 79 MMHG

## 2022-06-02 DIAGNOSIS — C78.6 PERITONEAL CARCINOMATOSIS (HCC): ICD-10-CM

## 2022-06-02 DIAGNOSIS — D70.1 CHEMOTHERAPY INDUCED NEUTROPENIA (HCC): ICD-10-CM

## 2022-06-02 DIAGNOSIS — Z51.11 CHEMOTHERAPY MANAGEMENT, ENCOUNTER FOR: Primary | ICD-10-CM

## 2022-06-02 DIAGNOSIS — C18.7 CANCER OF SIGMOID COLON (HCC): Primary | ICD-10-CM

## 2022-06-02 DIAGNOSIS — C18.7 CANCER OF SIGMOID COLON (HCC): ICD-10-CM

## 2022-06-02 DIAGNOSIS — T45.1X5A CHEMOTHERAPY INDUCED NEUTROPENIA (HCC): ICD-10-CM

## 2022-06-02 DIAGNOSIS — Z79.899 ENCOUNTER FOR MEDICATION MANAGEMENT: ICD-10-CM

## 2022-06-02 LAB
ALBUMIN SERPL-MCNC: 3.8 G/DL (ref 3.4–5)
ALBUMIN/GLOB SERPL: 1.1 {RATIO} (ref 1–2)
ALP LIVER SERPL-CCNC: 113 U/L
ALT SERPL-CCNC: 15 U/L
ANION GAP SERPL CALC-SCNC: 6 MMOL/L (ref 0–18)
AST SERPL-CCNC: 20 U/L (ref 15–37)
BASOPHILS # BLD AUTO: 0.07 X10(3) UL (ref 0–0.2)
BASOPHILS NFR BLD AUTO: 0.7 %
BILIRUB SERPL-MCNC: 0.4 MG/DL (ref 0.1–2)
BILIRUB UR QL: NEGATIVE
BUN BLD-MCNC: 17 MG/DL (ref 7–18)
BUN/CREAT SERPL: 21 (ref 10–20)
CALCIUM BLD-MCNC: 9.3 MG/DL (ref 8.5–10.1)
CHLORIDE SERPL-SCNC: 105 MMOL/L (ref 98–112)
CLARITY UR: CLEAR
CO2 SERPL-SCNC: 28 MMOL/L (ref 21–32)
COLOR UR: YELLOW
CREAT BLD-MCNC: 0.81 MG/DL
DEPRECATED RDW RBC AUTO: 58.5 FL (ref 35.1–46.3)
EOSINOPHIL # BLD AUTO: 0.14 X10(3) UL (ref 0–0.7)
EOSINOPHIL NFR BLD AUTO: 1.5 %
ERYTHROCYTE [DISTWIDTH] IN BLOOD BY AUTOMATED COUNT: 16 % (ref 11–15)
FASTING STATUS PATIENT QL REPORTED: NO
GLOBULIN PLAS-MCNC: 3.5 G/DL (ref 2.8–4.4)
GLUCOSE BLD-MCNC: 88 MG/DL (ref 70–99)
GLUCOSE UR-MCNC: NEGATIVE MG/DL
HCT VFR BLD AUTO: 41.6 %
HGB BLD-MCNC: 13.5 G/DL
HGB UR QL STRIP.AUTO: NEGATIVE
IMM GRANULOCYTES # BLD AUTO: 0.08 X10(3) UL (ref 0–1)
IMM GRANULOCYTES NFR BLD: 0.9 %
KETONES UR-MCNC: NEGATIVE MG/DL
LEUKOCYTE ESTERASE UR QL STRIP.AUTO: NEGATIVE
LYMPHOCYTES # BLD AUTO: 3.82 X10(3) UL (ref 1–4)
LYMPHOCYTES NFR BLD AUTO: 40.8 %
MCH RBC QN AUTO: 32.5 PG (ref 26–34)
MCHC RBC AUTO-ENTMCNC: 32.5 G/DL (ref 31–37)
MCV RBC AUTO: 100 FL
MONOCYTES # BLD AUTO: 0.71 X10(3) UL (ref 0.1–1)
MONOCYTES NFR BLD AUTO: 7.6 %
NEUTROPHILS # BLD AUTO: 4.55 X10 (3) UL (ref 1.5–7.7)
NEUTROPHILS # BLD AUTO: 4.55 X10(3) UL (ref 1.5–7.7)
NEUTROPHILS NFR BLD AUTO: 48.5 %
NITRITE UR QL STRIP.AUTO: NEGATIVE
OSMOLALITY SERPL CALC.SUM OF ELEC: 289 MOSM/KG (ref 275–295)
PH UR: 5.5 [PH] (ref 5–8)
PLATELET # BLD AUTO: 204 10(3)UL (ref 150–450)
POTASSIUM SERPL-SCNC: 3.9 MMOL/L (ref 3.5–5.1)
PROT SERPL-MCNC: 7.3 G/DL (ref 6.4–8.2)
PROT UR-MCNC: NEGATIVE MG/DL
RBC # BLD AUTO: 4.16 X10(6)UL
SODIUM SERPL-SCNC: 139 MMOL/L (ref 136–145)
SP GR UR STRIP: 1.02 (ref 1–1.03)
UROBILINOGEN UR STRIP-ACNC: 0.2
WBC # BLD AUTO: 9.4 X10(3) UL (ref 4–11)

## 2022-06-02 PROCEDURE — 99215 OFFICE O/P EST HI 40 MIN: CPT | Performed by: NURSE PRACTITIONER

## 2022-06-02 PROCEDURE — 81003 URINALYSIS AUTO W/O SCOPE: CPT

## 2022-06-02 PROCEDURE — 96415 CHEMO IV INFUSION ADDL HR: CPT

## 2022-06-02 PROCEDURE — 80053 COMPREHEN METABOLIC PANEL: CPT

## 2022-06-02 PROCEDURE — 96375 TX/PRO/DX INJ NEW DRUG ADDON: CPT

## 2022-06-02 PROCEDURE — 96417 CHEMO IV INFUS EACH ADDL SEQ: CPT

## 2022-06-02 PROCEDURE — 96413 CHEMO IV INFUSION 1 HR: CPT

## 2022-06-02 PROCEDURE — 85025 COMPLETE CBC W/AUTO DIFF WBC: CPT

## 2022-06-02 RX ORDER — FLUOROURACIL 50 MG/ML
2400 INJECTION, SOLUTION INTRAVENOUS CONTINUOUS
Status: DISCONTINUED | OUTPATIENT
Start: 2022-06-02 | End: 2022-06-02

## 2022-06-02 RX ORDER — FLUOROURACIL 50 MG/ML
2400 INJECTION, SOLUTION INTRAVENOUS CONTINUOUS
Status: CANCELLED | OUTPATIENT
Start: 2022-06-02

## 2022-06-02 RX ADMIN — FLUOROURACIL 4050 MG: 50 INJECTION, SOLUTION INTRAVENOUS at 17:32:00

## 2022-06-02 NOTE — PROGRESS NOTES
Pt here for C4  MVASI/FOLFOX. Arrives Ambulating independently, accompanied by Self           Pregnancy screening: Not applicable    Modifications in dose or schedule: No     Drugs/infusion dual verified for appearance, volume and dose, and physical integrity: yes     Frequency of blood return and site check throughout administration: Prior to administration, Prior to each drug and At completion of therapy      Appeared to tolerate infusion, no signs and symptoms of adverse reaction noted. Discharged to Home, Ambulating independently.     Outpatient Oncology Care Plan  Problem list:  fatigue  knowledge deficit  Problems related to:    self care  side effect of treatment  Interventions:  encourage activity as tolerated  promoted rest  provided general teaching  Expected outcomes:  adequate sleep/rest  symptoms relieved/minimized  understands plan of care  verbalize how to care for self  Progress towards outcome:  making progress    Education Record    Learner:  Patient  Barriers / Limitations:  None  Method:  Reinforcement  Outcome:  Shows understanding  Comments:

## 2022-06-04 ENCOUNTER — NURSE ONLY (OUTPATIENT)
Dept: HEMATOLOGY/ONCOLOGY | Facility: HOSPITAL | Age: 72
End: 2022-06-04
Attending: INTERNAL MEDICINE
Payer: MEDICARE

## 2022-06-04 ENCOUNTER — TELEPHONE (OUTPATIENT)
Dept: HEMATOLOGY/ONCOLOGY | Facility: HOSPITAL | Age: 72
End: 2022-06-04

## 2022-06-04 DIAGNOSIS — Z51.11 CHEMOTHERAPY MANAGEMENT, ENCOUNTER FOR: ICD-10-CM

## 2022-06-04 DIAGNOSIS — D70.1 CHEMOTHERAPY INDUCED NEUTROPENIA (HCC): Primary | ICD-10-CM

## 2022-06-04 DIAGNOSIS — T45.1X5A CHEMOTHERAPY INDUCED NEUTROPENIA (HCC): Primary | ICD-10-CM

## 2022-06-04 DIAGNOSIS — C18.7 CANCER OF SIGMOID COLON (HCC): ICD-10-CM

## 2022-06-04 RX ORDER — HEPARIN SODIUM (PORCINE) LOCK FLUSH IV SOLN 100 UNIT/ML 100 UNIT/ML
SOLUTION INTRAVENOUS
Status: DISCONTINUED
Start: 2022-06-04 | End: 2022-06-04 | Stop reason: WASHOUT

## 2022-06-04 NOTE — TELEPHONE ENCOUNTER
Spoke with Michelle Stiles about her pump removal. Clinic closing today about 10:30 am . Michelle Go states she has about 6 hours left on her pump and does not wish to remove it early. She will let it finish today and then turn it off. She will come early AM tomorrow to remove cadd and get her injection.

## 2022-06-05 ENCOUNTER — OFFICE VISIT (OUTPATIENT)
Dept: HEMATOLOGY/ONCOLOGY | Facility: HOSPITAL | Age: 72
End: 2022-06-05

## 2022-06-05 VITALS
TEMPERATURE: 98 F | SYSTOLIC BLOOD PRESSURE: 151 MMHG | OXYGEN SATURATION: 98 % | RESPIRATION RATE: 16 BRPM | HEART RATE: 86 BPM | DIASTOLIC BLOOD PRESSURE: 70 MMHG

## 2022-06-05 DIAGNOSIS — T45.1X5A CHEMOTHERAPY INDUCED NEUTROPENIA (HCC): ICD-10-CM

## 2022-06-05 DIAGNOSIS — D70.1 CHEMOTHERAPY INDUCED NEUTROPENIA (HCC): ICD-10-CM

## 2022-06-05 DIAGNOSIS — Z51.11 CHEMOTHERAPY MANAGEMENT, ENCOUNTER FOR: ICD-10-CM

## 2022-06-05 DIAGNOSIS — Z79.899 ENCOUNTER FOR MEDICATION MANAGEMENT: ICD-10-CM

## 2022-06-05 DIAGNOSIS — Z45.2 ENCOUNTER FOR CENTRAL LINE CARE: ICD-10-CM

## 2022-06-05 DIAGNOSIS — C18.7 CANCER OF SIGMOID COLON (HCC): Primary | ICD-10-CM

## 2022-06-05 PROCEDURE — 96372 THER/PROPH/DIAG INJ SC/IM: CPT

## 2022-06-05 RX ORDER — HEPARIN SODIUM (PORCINE) LOCK FLUSH IV SOLN 100 UNIT/ML 100 UNIT/ML
500 SOLUTION INTRAVENOUS ONCE
OUTPATIENT
Start: 2022-06-05

## 2022-06-05 RX ORDER — HEPARIN SODIUM (PORCINE) LOCK FLUSH IV SOLN 100 UNIT/ML 100 UNIT/ML
500 SOLUTION INTRAVENOUS ONCE
Status: COMPLETED | OUTPATIENT
Start: 2022-06-05 | End: 2022-06-05

## 2022-06-05 RX ORDER — HEPARIN SODIUM (PORCINE) LOCK FLUSH IV SOLN 100 UNIT/ML 100 UNIT/ML
SOLUTION INTRAVENOUS
Status: DISPENSED
Start: 2022-06-05 | End: 2022-06-05

## 2022-06-05 RX ADMIN — HEPARIN SODIUM (PORCINE) LOCK FLUSH IV SOLN 100 UNIT/ML 500 UNITS: 100 SOLUTION INTRAVENOUS at 08:24:00

## 2022-06-05 NOTE — PROGRESS NOTES
Patient presented with CADD pump connected. [place] empty. Pt states it ended about 3 pm yesterday. Disconnected CADD pump, flushed port with 0.9% Normal Saline and established blood return in port. Flushed with 500 units of Heparin and de-accessed port. Gauze and paper tape applied to port site. Neulasta given left upper arm. No complaints today. Discharged home with future appointments.

## 2022-06-16 ENCOUNTER — NURSE ONLY (OUTPATIENT)
Dept: HEMATOLOGY/ONCOLOGY | Facility: HOSPITAL | Age: 72
End: 2022-06-16
Attending: INTERNAL MEDICINE
Payer: MEDICARE

## 2022-06-16 ENCOUNTER — OFFICE VISIT (OUTPATIENT)
Dept: HEMATOLOGY/ONCOLOGY | Facility: HOSPITAL | Age: 72
End: 2022-06-16
Attending: NURSE PRACTITIONER
Payer: MEDICARE

## 2022-06-16 VITALS
BODY MASS INDEX: 27.23 KG/M2 | WEIGHT: 148 LBS | HEIGHT: 62 IN | TEMPERATURE: 98 F | HEART RATE: 88 BPM | DIASTOLIC BLOOD PRESSURE: 74 MMHG | RESPIRATION RATE: 16 BRPM | OXYGEN SATURATION: 95 % | SYSTOLIC BLOOD PRESSURE: 116 MMHG

## 2022-06-16 VITALS
HEIGHT: 62 IN | RESPIRATION RATE: 16 BRPM | OXYGEN SATURATION: 95 % | HEART RATE: 88 BPM | TEMPERATURE: 98 F | WEIGHT: 148 LBS | DIASTOLIC BLOOD PRESSURE: 74 MMHG | BODY MASS INDEX: 27.23 KG/M2 | SYSTOLIC BLOOD PRESSURE: 116 MMHG

## 2022-06-16 DIAGNOSIS — C18.7 CANCER OF SIGMOID COLON (HCC): ICD-10-CM

## 2022-06-16 DIAGNOSIS — Z51.11 CHEMOTHERAPY MANAGEMENT, ENCOUNTER FOR: ICD-10-CM

## 2022-06-16 DIAGNOSIS — Z51.11 CHEMOTHERAPY MANAGEMENT, ENCOUNTER FOR: Primary | ICD-10-CM

## 2022-06-16 DIAGNOSIS — D70.1 CHEMOTHERAPY INDUCED NEUTROPENIA (HCC): Primary | ICD-10-CM

## 2022-06-16 DIAGNOSIS — L81.9 HYPERPIGMENTATION: ICD-10-CM

## 2022-06-16 DIAGNOSIS — K20.90 ESOPHAGITIS: ICD-10-CM

## 2022-06-16 DIAGNOSIS — C78.6 PERITONEAL CARCINOMATOSIS (HCC): ICD-10-CM

## 2022-06-16 DIAGNOSIS — T45.1X5A CHEMOTHERAPY INDUCED NEUTROPENIA (HCC): Primary | ICD-10-CM

## 2022-06-16 LAB
ALBUMIN SERPL-MCNC: 3.7 G/DL (ref 3.4–5)
ALBUMIN/GLOB SERPL: 1 {RATIO} (ref 1–2)
ALP LIVER SERPL-CCNC: 152 U/L
ALT SERPL-CCNC: 17 U/L
ANION GAP SERPL CALC-SCNC: 5 MMOL/L (ref 0–18)
AST SERPL-CCNC: 19 U/L (ref 15–37)
BASOPHILS # BLD AUTO: 0.08 X10(3) UL (ref 0–0.2)
BASOPHILS NFR BLD AUTO: 0.7 %
BILIRUB SERPL-MCNC: 0.4 MG/DL (ref 0.1–2)
BUN BLD-MCNC: 14 MG/DL (ref 7–18)
BUN/CREAT SERPL: 20.9 (ref 10–20)
CALCIUM BLD-MCNC: 9.7 MG/DL (ref 8.5–10.1)
CEA SERPL-MCNC: 6 NG/ML (ref ?–5)
CHLORIDE SERPL-SCNC: 107 MMOL/L (ref 98–112)
CO2 SERPL-SCNC: 30 MMOL/L (ref 21–32)
CREAT BLD-MCNC: 0.67 MG/DL
DEPRECATED RDW RBC AUTO: 60.7 FL (ref 35.1–46.3)
EOSINOPHIL # BLD AUTO: 0.11 X10(3) UL (ref 0–0.7)
EOSINOPHIL NFR BLD AUTO: 1 %
ERYTHROCYTE [DISTWIDTH] IN BLOOD BY AUTOMATED COUNT: 16.5 % (ref 11–15)
GLOBULIN PLAS-MCNC: 3.8 G/DL (ref 2.8–4.4)
GLUCOSE BLD-MCNC: 98 MG/DL (ref 70–99)
HCT VFR BLD AUTO: 42.7 %
HGB BLD-MCNC: 13.7 G/DL
IMM GRANULOCYTES # BLD AUTO: 0.08 X10(3) UL (ref 0–1)
IMM GRANULOCYTES NFR BLD: 0.7 %
LYMPHOCYTES # BLD AUTO: 2.93 X10(3) UL (ref 1–4)
LYMPHOCYTES NFR BLD AUTO: 26.7 %
MCH RBC QN AUTO: 32.4 PG (ref 26–34)
MCHC RBC AUTO-ENTMCNC: 32.1 G/DL (ref 31–37)
MCV RBC AUTO: 100.9 FL
MONOCYTES # BLD AUTO: 0.96 X10(3) UL (ref 0.1–1)
MONOCYTES NFR BLD AUTO: 8.7 %
NEUTROPHILS # BLD AUTO: 6.82 X10 (3) UL (ref 1.5–7.7)
NEUTROPHILS # BLD AUTO: 6.82 X10(3) UL (ref 1.5–7.7)
NEUTROPHILS NFR BLD AUTO: 62.2 %
OSMOLALITY SERPL CALC.SUM OF ELEC: 294 MOSM/KG (ref 275–295)
PLATELET # BLD AUTO: 200 10(3)UL (ref 150–450)
POTASSIUM SERPL-SCNC: 4.3 MMOL/L (ref 3.5–5.1)
PROT SERPL-MCNC: 7.5 G/DL (ref 6.4–8.2)
RBC # BLD AUTO: 4.23 X10(6)UL
SODIUM SERPL-SCNC: 142 MMOL/L (ref 136–145)
WBC # BLD AUTO: 11 X10(3) UL (ref 4–11)

## 2022-06-16 PROCEDURE — 96415 CHEMO IV INFUSION ADDL HR: CPT

## 2022-06-16 PROCEDURE — 80053 COMPREHEN METABOLIC PANEL: CPT

## 2022-06-16 PROCEDURE — 99215 OFFICE O/P EST HI 40 MIN: CPT | Performed by: INTERNAL MEDICINE

## 2022-06-16 PROCEDURE — 96375 TX/PRO/DX INJ NEW DRUG ADDON: CPT

## 2022-06-16 PROCEDURE — 96413 CHEMO IV INFUSION 1 HR: CPT

## 2022-06-16 PROCEDURE — 82378 CARCINOEMBRYONIC ANTIGEN: CPT

## 2022-06-16 PROCEDURE — 85025 COMPLETE CBC W/AUTO DIFF WBC: CPT

## 2022-06-16 PROCEDURE — 96417 CHEMO IV INFUS EACH ADDL SEQ: CPT

## 2022-06-16 RX ORDER — FLUOROURACIL 50 MG/ML
2400 INJECTION, SOLUTION INTRAVENOUS CONTINUOUS
Status: DISCONTINUED | OUTPATIENT
Start: 2022-06-16 | End: 2022-06-16

## 2022-06-16 RX ORDER — FLUOROURACIL 50 MG/ML
2400 INJECTION, SOLUTION INTRAVENOUS CONTINUOUS
Status: CANCELLED | OUTPATIENT
Start: 2022-06-16

## 2022-06-16 RX ORDER — LIDOCAINE HYDROCHLORIDE 20 MG/ML
5 SOLUTION OROPHARYNGEAL 4 TIMES DAILY PRN
Qty: 100 ML | Refills: 1 | Status: SHIPPED | OUTPATIENT
Start: 2022-06-16

## 2022-06-16 RX ADMIN — FLUOROURACIL 4050 MG: 50 INJECTION, SOLUTION INTRAVENOUS at 14:59:00

## 2022-06-16 NOTE — PATIENT INSTRUCTIONS
Melatonin at dinner for sleep- over the counter    or    Doxylamine (Unisom)  1/2 tab or 1 tab at bedtime- over-the-counter        Ice application/pack to port at least 10 minutes before access. Or the lidocaine creme          Magic mouthwash cocktail:      5ml of liquid benadryl, plus 5ml of maalox + 5ml of lidocaine viscous liquid (sent to pharmacy). Combine and swish and spit or swish and swallow for oral or throat irritation.  Up to 4 times/daily

## 2022-06-16 NOTE — PROGRESS NOTES
Pt here for C5  MVASI/FOLFOX. Arrives Ambulating independently, accompanied by Self           Pregnancy screening: Not applicable    Modifications in dose or schedule: Yes      Will proceed with C5 FOLFOX w/ neulasta support, will increase Oxaliplatin dosing to 75m2. Consider next imaging 7/2022. Drugs/infusion dual verified for appearance, volume and dose, and physical integrity: yes     Frequency of blood return and site check throughout administration: Prior to administration, Prior to each drug and At completion of therapy      Appeared to tolerate infusion, no signs and symptoms of adverse reaction noted. Discharged to Home, Ambulating independently.     Outpatient Oncology Care Plan  Problem list:  fatigue  knowledge deficit  Problems related to:    self care  side effect of treatment  Interventions:  encourage activity as tolerated  promoted rest  provided general teaching  Expected outcomes:  adequate sleep/rest  symptoms relieved/minimized  understands plan of care  verbalize how to care for self  Progress towards outcome:  making progress    Education Record    Learner:  Patient  Barriers / Limitations:  None  Method:  Reinforcement  Outcome:  Shows understanding  Comments:

## 2022-06-18 ENCOUNTER — NURSE ONLY (OUTPATIENT)
Dept: HEMATOLOGY/ONCOLOGY | Facility: HOSPITAL | Age: 72
End: 2022-06-18
Attending: INTERNAL MEDICINE
Payer: MEDICARE

## 2022-06-18 VITALS
SYSTOLIC BLOOD PRESSURE: 128 MMHG | TEMPERATURE: 98 F | OXYGEN SATURATION: 97 % | HEART RATE: 78 BPM | DIASTOLIC BLOOD PRESSURE: 71 MMHG | RESPIRATION RATE: 16 BRPM

## 2022-06-18 DIAGNOSIS — C18.7 CANCER OF SIGMOID COLON (HCC): ICD-10-CM

## 2022-06-18 DIAGNOSIS — Z51.11 CHEMOTHERAPY MANAGEMENT, ENCOUNTER FOR: Primary | ICD-10-CM

## 2022-06-18 PROCEDURE — 96372 THER/PROPH/DIAG INJ SC/IM: CPT

## 2022-06-18 RX ORDER — HEPARIN SODIUM (PORCINE) LOCK FLUSH IV SOLN 100 UNIT/ML 100 UNIT/ML
SOLUTION INTRAVENOUS
Status: DISCONTINUED
Start: 2022-06-18 | End: 2022-06-18

## 2022-06-18 NOTE — PROGRESS NOTES
Patient presented with CADD pump connected. [place] empty  Disconnected CADD pump, flushed port with 0.9% Normal Saline and established blood return in port. Flushed with 500 units of Heparin and de-accessed port. Gauze and paper tape applied to port site. Neulasta given left upper arm. No complaints today. Discharged home with future appointments.

## 2022-06-20 ENCOUNTER — TELEPHONE (OUTPATIENT)
Dept: INTERNAL MEDICINE CLINIC | Facility: CLINIC | Age: 72
End: 2022-06-20

## 2022-06-23 ENCOUNTER — TELEPHONE (OUTPATIENT)
Dept: HEMATOLOGY/ONCOLOGY | Facility: HOSPITAL | Age: 72
End: 2022-06-23

## 2022-06-23 NOTE — TELEPHONE ENCOUNTER
Patient is calling regarding the new prescription she received last week, the medication is no longer available and she would like a call back to discuss this matter.

## 2022-06-23 NOTE — TELEPHONE ENCOUNTER
Spoke with pt. She states that Maalox is not longer available and wanted to know if she could use mylanta instead. Okay per Dr Ernesto Burdick to use mylanta. She verbalized understanding.

## 2022-06-30 ENCOUNTER — OFFICE VISIT (OUTPATIENT)
Dept: HEMATOLOGY/ONCOLOGY | Facility: HOSPITAL | Age: 72
End: 2022-06-30
Attending: INTERNAL MEDICINE
Payer: MEDICARE

## 2022-06-30 VITALS
HEIGHT: 62 IN | OXYGEN SATURATION: 96 % | SYSTOLIC BLOOD PRESSURE: 134 MMHG | HEART RATE: 85 BPM | WEIGHT: 149 LBS | DIASTOLIC BLOOD PRESSURE: 77 MMHG | BODY MASS INDEX: 27.42 KG/M2 | RESPIRATION RATE: 16 BRPM | TEMPERATURE: 98 F

## 2022-06-30 VITALS
SYSTOLIC BLOOD PRESSURE: 134 MMHG | TEMPERATURE: 98 F | DIASTOLIC BLOOD PRESSURE: 77 MMHG | WEIGHT: 149 LBS | RESPIRATION RATE: 16 BRPM | HEART RATE: 85 BPM | BODY MASS INDEX: 27 KG/M2 | OXYGEN SATURATION: 96 %

## 2022-06-30 DIAGNOSIS — Z51.11 CHEMOTHERAPY MANAGEMENT, ENCOUNTER FOR: ICD-10-CM

## 2022-06-30 DIAGNOSIS — C18.7 CANCER OF SIGMOID COLON (HCC): ICD-10-CM

## 2022-06-30 DIAGNOSIS — T45.1X5A CHEMOTHERAPY INDUCED NEUTROPENIA (HCC): ICD-10-CM

## 2022-06-30 DIAGNOSIS — C78.6 PERITONEAL CARCINOMATOSIS (HCC): Primary | ICD-10-CM

## 2022-06-30 DIAGNOSIS — D70.1 CHEMOTHERAPY INDUCED NEUTROPENIA (HCC): Primary | ICD-10-CM

## 2022-06-30 DIAGNOSIS — T45.1X5A CHEMOTHERAPY INDUCED NEUTROPENIA (HCC): Primary | ICD-10-CM

## 2022-06-30 DIAGNOSIS — D70.1 CHEMOTHERAPY INDUCED NEUTROPENIA (HCC): ICD-10-CM

## 2022-06-30 DIAGNOSIS — Z51.11 CHEMOTHERAPY MANAGEMENT, ENCOUNTER FOR: Primary | ICD-10-CM

## 2022-06-30 LAB
ALBUMIN SERPL-MCNC: 3.6 G/DL (ref 3.4–5)
ALBUMIN/GLOB SERPL: 1.1 {RATIO} (ref 1–2)
ALP LIVER SERPL-CCNC: 124 U/L
ALT SERPL-CCNC: 17 U/L
ANION GAP SERPL CALC-SCNC: 8 MMOL/L (ref 0–18)
AST SERPL-CCNC: 19 U/L (ref 15–37)
BASOPHILS # BLD AUTO: 0.07 X10(3) UL (ref 0–0.2)
BASOPHILS NFR BLD AUTO: 1.2 %
BILIRUB SERPL-MCNC: 0.4 MG/DL (ref 0.1–2)
BUN BLD-MCNC: 10 MG/DL (ref 7–18)
BUN/CREAT SERPL: 12.7 (ref 10–20)
CALCIUM BLD-MCNC: 9.8 MG/DL (ref 8.5–10.1)
CEA SERPL-MCNC: 5.4 NG/ML (ref ?–5)
CHLORIDE SERPL-SCNC: 107 MMOL/L (ref 98–112)
CO2 SERPL-SCNC: 29 MMOL/L (ref 21–32)
CREAT BLD-MCNC: 0.79 MG/DL
DEPRECATED RDW RBC AUTO: 63 FL (ref 35.1–46.3)
EOSINOPHIL # BLD AUTO: 0.1 X10(3) UL (ref 0–0.7)
EOSINOPHIL NFR BLD AUTO: 1.8 %
ERYTHROCYTE [DISTWIDTH] IN BLOOD BY AUTOMATED COUNT: 16.8 % (ref 11–15)
GLOBULIN PLAS-MCNC: 3.4 G/DL (ref 2.8–4.4)
GLUCOSE BLD-MCNC: 99 MG/DL (ref 70–99)
HCT VFR BLD AUTO: 40.9 %
HGB BLD-MCNC: 13.1 G/DL
IMM GRANULOCYTES # BLD AUTO: 0.01 X10(3) UL (ref 0–1)
IMM GRANULOCYTES NFR BLD: 0.2 %
LYMPHOCYTES # BLD AUTO: 2.21 X10(3) UL (ref 1–4)
LYMPHOCYTES NFR BLD AUTO: 39.4 %
MCH RBC QN AUTO: 32.8 PG (ref 26–34)
MCHC RBC AUTO-ENTMCNC: 32 G/DL (ref 31–37)
MCV RBC AUTO: 102.3 FL
MONOCYTES # BLD AUTO: 1 X10(3) UL (ref 0.1–1)
MONOCYTES NFR BLD AUTO: 17.8 %
NEUTROPHILS # BLD AUTO: 2.22 X10 (3) UL (ref 1.5–7.7)
NEUTROPHILS # BLD AUTO: 2.22 X10(3) UL (ref 1.5–7.7)
NEUTROPHILS NFR BLD AUTO: 39.6 %
OSMOLALITY SERPL CALC.SUM OF ELEC: 297 MOSM/KG (ref 275–295)
PLATELET # BLD AUTO: 210 10(3)UL (ref 150–450)
POTASSIUM SERPL-SCNC: 4.4 MMOL/L (ref 3.5–5.1)
PROT SERPL-MCNC: 7 G/DL (ref 6.4–8.2)
RBC # BLD AUTO: 4 X10(6)UL
SODIUM SERPL-SCNC: 144 MMOL/L (ref 136–145)
WBC # BLD AUTO: 5.6 X10(3) UL (ref 4–11)

## 2022-06-30 PROCEDURE — 85025 COMPLETE CBC W/AUTO DIFF WBC: CPT

## 2022-06-30 PROCEDURE — 99215 OFFICE O/P EST HI 40 MIN: CPT | Performed by: INTERNAL MEDICINE

## 2022-06-30 PROCEDURE — 96417 CHEMO IV INFUS EACH ADDL SEQ: CPT

## 2022-06-30 PROCEDURE — 82378 CARCINOEMBRYONIC ANTIGEN: CPT

## 2022-06-30 PROCEDURE — 80053 COMPREHEN METABOLIC PANEL: CPT

## 2022-06-30 PROCEDURE — 96413 CHEMO IV INFUSION 1 HR: CPT

## 2022-06-30 PROCEDURE — 96375 TX/PRO/DX INJ NEW DRUG ADDON: CPT

## 2022-06-30 PROCEDURE — 96415 CHEMO IV INFUSION ADDL HR: CPT

## 2022-06-30 RX ORDER — FLUOROURACIL 50 MG/ML
2400 INJECTION, SOLUTION INTRAVENOUS CONTINUOUS
Status: DISCONTINUED | OUTPATIENT
Start: 2022-06-30 | End: 2022-06-30

## 2022-06-30 RX ORDER — FLUOROURACIL 50 MG/ML
2400 INJECTION, SOLUTION INTRAVENOUS CONTINUOUS
Status: CANCELLED | OUTPATIENT
Start: 2022-06-30

## 2022-06-30 RX ADMIN — FLUOROURACIL 4050 MG: 50 INJECTION, SOLUTION INTRAVENOUS at 12:51:00

## 2022-06-30 NOTE — PROGRESS NOTES
Pt here for C6  MVASI/FOLFOX. Arrives Ambulating independently, accompanied by Self           Pregnancy screening: Not applicable    Modifications in dose or schedule: Yes      Drugs/infusion dual verified for appearance, volume and dose, and physical integrity: yes     Frequency of blood return and site check throughout administration: Prior to administration, Prior to each drug and At completion of therapy      Appeared to tolerate infusion, no signs and symptoms of adverse reaction noted. Discharged to Home, Ambulating independently.     Outpatient Oncology Care Plan  Problem list:  fatigue  knowledge deficit  Problems related to:    self care  side effect of treatment  Interventions:  encourage activity as tolerated  promoted rest  provided general teaching  Expected outcomes:  adequate sleep/rest  symptoms relieved/minimized  understands plan of care  verbalize how to care for self  Progress towards outcome:  making progress    Education Record    Learner:  Patient  Barriers / Limitations:  None  Method:  Reinforcement  Outcome:  Shows understanding  Comments:

## 2022-07-02 ENCOUNTER — NURSE ONLY (OUTPATIENT)
Dept: HEMATOLOGY/ONCOLOGY | Facility: HOSPITAL | Age: 72
End: 2022-07-02
Attending: INTERNAL MEDICINE
Payer: MEDICARE

## 2022-07-02 VITALS
DIASTOLIC BLOOD PRESSURE: 69 MMHG | RESPIRATION RATE: 16 BRPM | OXYGEN SATURATION: 97 % | SYSTOLIC BLOOD PRESSURE: 117 MMHG | TEMPERATURE: 98 F | HEART RATE: 91 BPM

## 2022-07-02 DIAGNOSIS — T45.1X5A CHEMOTHERAPY INDUCED NEUTROPENIA (HCC): ICD-10-CM

## 2022-07-02 DIAGNOSIS — Z79.899 ENCOUNTER FOR MEDICATION MANAGEMENT: ICD-10-CM

## 2022-07-02 DIAGNOSIS — Z45.2 ENCOUNTER FOR CENTRAL LINE CARE: ICD-10-CM

## 2022-07-02 DIAGNOSIS — C18.7 CANCER OF SIGMOID COLON (HCC): Primary | ICD-10-CM

## 2022-07-02 DIAGNOSIS — Z51.11 CHEMOTHERAPY MANAGEMENT, ENCOUNTER FOR: ICD-10-CM

## 2022-07-02 DIAGNOSIS — D70.1 CHEMOTHERAPY INDUCED NEUTROPENIA (HCC): ICD-10-CM

## 2022-07-02 PROCEDURE — 96372 THER/PROPH/DIAG INJ SC/IM: CPT

## 2022-07-02 RX ORDER — HEPARIN SODIUM (PORCINE) LOCK FLUSH IV SOLN 100 UNIT/ML 100 UNIT/ML
500 SOLUTION INTRAVENOUS ONCE
OUTPATIENT
Start: 2022-07-02

## 2022-07-02 RX ORDER — HEPARIN SODIUM (PORCINE) LOCK FLUSH IV SOLN 100 UNIT/ML 100 UNIT/ML
SOLUTION INTRAVENOUS
Status: COMPLETED
Start: 2022-07-02 | End: 2022-07-02

## 2022-07-02 RX ORDER — HEPARIN SODIUM (PORCINE) LOCK FLUSH IV SOLN 100 UNIT/ML 100 UNIT/ML
500 SOLUTION INTRAVENOUS ONCE
Status: COMPLETED | OUTPATIENT
Start: 2022-07-02 | End: 2022-07-02

## 2022-07-02 RX ADMIN — HEPARIN SODIUM (PORCINE) LOCK FLUSH IV SOLN 100 UNIT/ML 500 UNITS: 100 SOLUTION INTRAVENOUS at 09:55:00

## 2022-07-02 NOTE — PROGRESS NOTES
Patient presented with CADD pump connected. Reservoir empty. Disconnected CADD pump, flushed port with 0.9% Normal Saline and established blood return in port. Flushed with 500 units of Heparin and de-accessed port. Gauze and paper tape applied to port site. Patient states she is feeling good. No complaints today. Neulasta given in right upper arm. Tolerated injection well. Discharged ambulatory. AVS printed with future appointment made.

## 2022-07-06 ENCOUNTER — TELEPHONE (OUTPATIENT)
Dept: HEMATOLOGY/ONCOLOGY | Facility: HOSPITAL | Age: 72
End: 2022-07-06

## 2022-07-06 NOTE — TELEPHONE ENCOUNTER
Patient is calling to discuss her upcoming CT SCAN on 7/11/22 and she is asking about the contrast and she have questions about it and some medication questions as well.

## 2022-07-08 ENCOUNTER — TELEPHONE (OUTPATIENT)
Dept: HEMATOLOGY/ONCOLOGY | Facility: HOSPITAL | Age: 72
End: 2022-07-08

## 2022-07-11 ENCOUNTER — HOSPITAL ENCOUNTER (OUTPATIENT)
Dept: CT IMAGING | Age: 72
Discharge: HOME | End: 2022-07-11
Attending: INTERNAL MEDICINE
Payer: MEDICARE

## 2022-07-11 DIAGNOSIS — C78.6 PERITONEAL CARCINOMATOSIS (HCC): ICD-10-CM

## 2022-07-11 PROCEDURE — 74177 CT ABD & PELVIS W/CONTRAST: CPT | Performed by: INTERNAL MEDICINE

## 2022-07-11 PROCEDURE — 71260 CT THORAX DX C+: CPT | Performed by: INTERNAL MEDICINE

## 2022-07-14 ENCOUNTER — NURSE ONLY (OUTPATIENT)
Dept: HEMATOLOGY/ONCOLOGY | Facility: HOSPITAL | Age: 72
End: 2022-07-14
Attending: INTERNAL MEDICINE
Payer: MEDICARE

## 2022-07-14 VITALS
BODY MASS INDEX: 27 KG/M2 | SYSTOLIC BLOOD PRESSURE: 126 MMHG | DIASTOLIC BLOOD PRESSURE: 75 MMHG | OXYGEN SATURATION: 97 % | RESPIRATION RATE: 16 BRPM | TEMPERATURE: 98 F | HEART RATE: 79 BPM | WEIGHT: 149 LBS

## 2022-07-14 VITALS
DIASTOLIC BLOOD PRESSURE: 75 MMHG | OXYGEN SATURATION: 97 % | WEIGHT: 149 LBS | HEART RATE: 79 BPM | SYSTOLIC BLOOD PRESSURE: 126 MMHG | RESPIRATION RATE: 16 BRPM | TEMPERATURE: 98 F | HEIGHT: 62 IN | BODY MASS INDEX: 27.42 KG/M2

## 2022-07-14 DIAGNOSIS — D70.1 CHEMOTHERAPY INDUCED NEUTROPENIA (HCC): ICD-10-CM

## 2022-07-14 DIAGNOSIS — C18.7 CANCER OF SIGMOID COLON (HCC): ICD-10-CM

## 2022-07-14 DIAGNOSIS — T45.1X5A CHEMOTHERAPY INDUCED NEUTROPENIA (HCC): Primary | ICD-10-CM

## 2022-07-14 DIAGNOSIS — Z51.11 CHEMOTHERAPY MANAGEMENT, ENCOUNTER FOR: ICD-10-CM

## 2022-07-14 DIAGNOSIS — C18.7 CANCER OF SIGMOID COLON (HCC): Primary | ICD-10-CM

## 2022-07-14 DIAGNOSIS — Z51.11 CHEMOTHERAPY MANAGEMENT, ENCOUNTER FOR: Primary | ICD-10-CM

## 2022-07-14 DIAGNOSIS — T45.1X5A CHEMOTHERAPY INDUCED NEUTROPENIA (HCC): ICD-10-CM

## 2022-07-14 DIAGNOSIS — D70.1 CHEMOTHERAPY INDUCED NEUTROPENIA (HCC): Primary | ICD-10-CM

## 2022-07-14 LAB
ALBUMIN SERPL-MCNC: 3.3 G/DL (ref 3.4–5)
ALBUMIN/GLOB SERPL: 0.9 {RATIO} (ref 1–2)
ALP LIVER SERPL-CCNC: 122 U/L
ALT SERPL-CCNC: 14 U/L
ANION GAP SERPL CALC-SCNC: 6 MMOL/L (ref 0–18)
AST SERPL-CCNC: 18 U/L (ref 15–37)
BASOPHILS # BLD AUTO: 0.08 X10(3) UL (ref 0–0.2)
BASOPHILS NFR BLD AUTO: 1.6 %
BILIRUB SERPL-MCNC: 0.4 MG/DL (ref 0.1–2)
BUN BLD-MCNC: 12 MG/DL (ref 7–18)
BUN/CREAT SERPL: 19.4 (ref 10–20)
CALCIUM BLD-MCNC: 9.2 MG/DL (ref 8.5–10.1)
CEA SERPL-MCNC: 4 NG/ML (ref ?–5)
CHLORIDE SERPL-SCNC: 110 MMOL/L (ref 98–112)
CO2 SERPL-SCNC: 27 MMOL/L (ref 21–32)
CREAT BLD-MCNC: 0.62 MG/DL
DEPRECATED RDW RBC AUTO: 65.8 FL (ref 35.1–46.3)
EOSINOPHIL # BLD AUTO: 0.14 X10(3) UL (ref 0–0.7)
EOSINOPHIL NFR BLD AUTO: 2.8 %
ERYTHROCYTE [DISTWIDTH] IN BLOOD BY AUTOMATED COUNT: 17.2 % (ref 11–15)
GLOBULIN PLAS-MCNC: 3.5 G/DL (ref 2.8–4.4)
GLUCOSE BLD-MCNC: 93 MG/DL (ref 70–99)
HCT VFR BLD AUTO: 39.7 %
HELMET CELLS BLD QL SMEAR: PRESENT
HGB BLD-MCNC: 12.5 G/DL
IMM GRANULOCYTES # BLD AUTO: 0.01 X10(3) UL (ref 0–1)
IMM GRANULOCYTES NFR BLD: 0.2 %
LYMPHOCYTES # BLD AUTO: 2.5 X10(3) UL (ref 1–4)
LYMPHOCYTES NFR BLD AUTO: 49.9 %
MCH RBC QN AUTO: 32.7 PG (ref 26–34)
MCHC RBC AUTO-ENTMCNC: 31.5 G/DL (ref 31–37)
MCV RBC AUTO: 103.9 FL
MONOCYTES # BLD AUTO: 0.57 X10(3) UL (ref 0.1–1)
MONOCYTES NFR BLD AUTO: 11.4 %
NEUTROPHILS # BLD AUTO: 1.71 X10 (3) UL (ref 1.5–7.7)
NEUTROPHILS # BLD AUTO: 1.71 X10(3) UL (ref 1.5–7.7)
NEUTROPHILS NFR BLD AUTO: 34.1 %
OSMOLALITY SERPL CALC.SUM OF ELEC: 295 MOSM/KG (ref 275–295)
PLATELET # BLD AUTO: 194 10(3)UL (ref 150–450)
PLATELET MORPHOLOGY: NORMAL
POTASSIUM SERPL-SCNC: 4.2 MMOL/L (ref 3.5–5.1)
PROT SERPL-MCNC: 6.8 G/DL (ref 6.4–8.2)
RBC # BLD AUTO: 3.82 X10(6)UL
SODIUM SERPL-SCNC: 143 MMOL/L (ref 136–145)
WBC # BLD AUTO: 5 X10(3) UL (ref 4–11)

## 2022-07-14 PROCEDURE — 96415 CHEMO IV INFUSION ADDL HR: CPT

## 2022-07-14 PROCEDURE — 96417 CHEMO IV INFUS EACH ADDL SEQ: CPT

## 2022-07-14 PROCEDURE — 96413 CHEMO IV INFUSION 1 HR: CPT

## 2022-07-14 PROCEDURE — 80053 COMPREHEN METABOLIC PANEL: CPT

## 2022-07-14 PROCEDURE — 82378 CARCINOEMBRYONIC ANTIGEN: CPT

## 2022-07-14 PROCEDURE — 96375 TX/PRO/DX INJ NEW DRUG ADDON: CPT

## 2022-07-14 PROCEDURE — 99215 OFFICE O/P EST HI 40 MIN: CPT | Performed by: INTERNAL MEDICINE

## 2022-07-14 PROCEDURE — 85025 COMPLETE CBC W/AUTO DIFF WBC: CPT

## 2022-07-14 RX ORDER — FLUOROURACIL 50 MG/ML
2400 INJECTION, SOLUTION INTRAVENOUS CONTINUOUS
Status: DISCONTINUED | OUTPATIENT
Start: 2022-07-14 | End: 2022-07-14

## 2022-07-14 RX ORDER — FLUOROURACIL 50 MG/ML
2400 INJECTION, SOLUTION INTRAVENOUS CONTINUOUS
Status: CANCELLED | OUTPATIENT
Start: 2022-07-14

## 2022-07-14 RX ADMIN — FLUOROURACIL 4050 MG: 50 INJECTION, SOLUTION INTRAVENOUS at 14:25:00

## 2022-07-14 NOTE — PROGRESS NOTES
Pt here for C7  MVASI/FOLFOX. Arrives Ambulating independently, accompanied by Self           Pregnancy screening: Not applicable    Modifications in dose or schedule: Yes      Drugs/infusion dual verified for appearance, volume and dose, and physical integrity: yes     Frequency of blood return and site check throughout administration: Prior to administration, Prior to each drug and At completion of therapy      Appeared to tolerate infusion, no signs and symptoms of adverse reaction noted. Discharged to Home, Ambulating independently.     Outpatient Oncology Care Plan  Problem list:  fatigue  knowledge deficit  Problems related to:    self care  side effect of treatment  Interventions:  encourage activity as tolerated  promoted rest  provided general teaching  Expected outcomes:  adequate sleep/rest  symptoms relieved/minimized  understands plan of care  verbalize how to care for self  Progress towards outcome:  making progress    Education Record    Learner:  Patient  Barriers / Limitations:  None  Method:  Reinforcement  Outcome:  Shows understanding  Comments:

## 2022-07-16 ENCOUNTER — NURSE ONLY (OUTPATIENT)
Dept: HEMATOLOGY/ONCOLOGY | Facility: HOSPITAL | Age: 72
End: 2022-07-16
Attending: INTERNAL MEDICINE
Payer: MEDICARE

## 2022-07-16 DIAGNOSIS — D70.1 CHEMOTHERAPY INDUCED NEUTROPENIA (HCC): Primary | ICD-10-CM

## 2022-07-16 DIAGNOSIS — C18.7 CANCER OF SIGMOID COLON (HCC): ICD-10-CM

## 2022-07-16 DIAGNOSIS — T45.1X5A CHEMOTHERAPY INDUCED NEUTROPENIA (HCC): Primary | ICD-10-CM

## 2022-07-16 DIAGNOSIS — Z51.11 CHEMOTHERAPY MANAGEMENT, ENCOUNTER FOR: ICD-10-CM

## 2022-07-16 PROCEDURE — 96372 THER/PROPH/DIAG INJ SC/IM: CPT

## 2022-07-16 RX ORDER — HEPARIN SODIUM (PORCINE) LOCK FLUSH IV SOLN 100 UNIT/ML 100 UNIT/ML
SOLUTION INTRAVENOUS
Status: COMPLETED
Start: 2022-07-16 | End: 2022-07-16

## 2022-07-16 RX ADMIN — HEPARIN SODIUM (PORCINE) LOCK FLUSH IV SOLN 100 UNIT/ML 500 UNITS: 100 SOLUTION INTRAVENOUS at 10:30:00

## 2022-07-28 ENCOUNTER — OFFICE VISIT (OUTPATIENT)
Dept: HEMATOLOGY/ONCOLOGY | Facility: HOSPITAL | Age: 72
End: 2022-07-28
Attending: INTERNAL MEDICINE
Payer: MEDICARE

## 2022-07-28 VITALS
BODY MASS INDEX: 26 KG/M2 | RESPIRATION RATE: 16 BRPM | DIASTOLIC BLOOD PRESSURE: 81 MMHG | TEMPERATURE: 98 F | HEART RATE: 80 BPM | SYSTOLIC BLOOD PRESSURE: 141 MMHG | WEIGHT: 143 LBS | OXYGEN SATURATION: 97 %

## 2022-07-28 VITALS
WEIGHT: 143 LBS | SYSTOLIC BLOOD PRESSURE: 141 MMHG | RESPIRATION RATE: 16 BRPM | DIASTOLIC BLOOD PRESSURE: 81 MMHG | BODY MASS INDEX: 26.31 KG/M2 | OXYGEN SATURATION: 97 % | HEART RATE: 80 BPM | TEMPERATURE: 98 F | HEIGHT: 62 IN

## 2022-07-28 DIAGNOSIS — D70.1 CHEMOTHERAPY INDUCED NEUTROPENIA (HCC): ICD-10-CM

## 2022-07-28 DIAGNOSIS — Z51.11 CHEMOTHERAPY MANAGEMENT, ENCOUNTER FOR: ICD-10-CM

## 2022-07-28 DIAGNOSIS — C18.7 CANCER OF SIGMOID COLON (HCC): Primary | ICD-10-CM

## 2022-07-28 DIAGNOSIS — C18.7 CANCER OF SIGMOID COLON (HCC): ICD-10-CM

## 2022-07-28 DIAGNOSIS — T45.1X5A CHEMOTHERAPY INDUCED NEUTROPENIA (HCC): Primary | ICD-10-CM

## 2022-07-28 DIAGNOSIS — D70.1 CHEMOTHERAPY INDUCED NEUTROPENIA (HCC): Primary | ICD-10-CM

## 2022-07-28 DIAGNOSIS — T45.1X5A CHEMOTHERAPY INDUCED NEUTROPENIA (HCC): ICD-10-CM

## 2022-07-28 LAB
ALBUMIN SERPL-MCNC: 3.5 G/DL (ref 3.4–5)
ALBUMIN/GLOB SERPL: 1 {RATIO} (ref 1–2)
ALP LIVER SERPL-CCNC: 135 U/L
ALT SERPL-CCNC: 16 U/L
ANION GAP SERPL CALC-SCNC: 8 MMOL/L (ref 0–18)
AST SERPL-CCNC: 19 U/L (ref 15–37)
BASOPHILS # BLD AUTO: 0.1 X10(3) UL (ref 0–0.2)
BASOPHILS NFR BLD AUTO: 1 %
BILIRUB SERPL-MCNC: 0.3 MG/DL (ref 0.1–2)
BUN BLD-MCNC: 16 MG/DL (ref 7–18)
BUN/CREAT SERPL: 23.9 (ref 10–20)
CALCIUM BLD-MCNC: 9.5 MG/DL (ref 8.5–10.1)
CEA SERPL-MCNC: 4.3 NG/ML (ref ?–5)
CHLORIDE SERPL-SCNC: 108 MMOL/L (ref 98–112)
CO2 SERPL-SCNC: 26 MMOL/L (ref 21–32)
CREAT BLD-MCNC: 0.67 MG/DL
DEPRECATED RDW RBC AUTO: 63.9 FL (ref 35.1–46.3)
EOSINOPHIL # BLD AUTO: 0.13 X10(3) UL (ref 0–0.7)
EOSINOPHIL NFR BLD AUTO: 1.3 %
ERYTHROCYTE [DISTWIDTH] IN BLOOD BY AUTOMATED COUNT: 16.4 % (ref 11–15)
GLOBULIN PLAS-MCNC: 3.6 G/DL (ref 2.8–4.4)
GLUCOSE BLD-MCNC: 100 MG/DL (ref 70–99)
HCT VFR BLD AUTO: 40.7 %
HGB BLD-MCNC: 13.2 G/DL
IMM GRANULOCYTES # BLD AUTO: 0.07 X10(3) UL (ref 0–1)
IMM GRANULOCYTES NFR BLD: 0.7 %
LYMPHOCYTES # BLD AUTO: 2.79 X10(3) UL (ref 1–4)
LYMPHOCYTES NFR BLD AUTO: 27 %
MCH RBC QN AUTO: 34.2 PG (ref 26–34)
MCHC RBC AUTO-ENTMCNC: 32.4 G/DL (ref 31–37)
MCV RBC AUTO: 105.4 FL
MONOCYTES # BLD AUTO: 1.05 X10(3) UL (ref 0.1–1)
MONOCYTES NFR BLD AUTO: 10.2 %
NEUTROPHILS # BLD AUTO: 6.2 X10 (3) UL (ref 1.5–7.7)
NEUTROPHILS # BLD AUTO: 6.2 X10(3) UL (ref 1.5–7.7)
NEUTROPHILS NFR BLD AUTO: 59.8 %
OSMOLALITY SERPL CALC.SUM OF ELEC: 295 MOSM/KG (ref 275–295)
PLATELET # BLD AUTO: 191 10(3)UL (ref 150–450)
POTASSIUM SERPL-SCNC: 4.3 MMOL/L (ref 3.5–5.1)
PROT SERPL-MCNC: 7.1 G/DL (ref 6.4–8.2)
RBC # BLD AUTO: 3.86 X10(6)UL
SODIUM SERPL-SCNC: 142 MMOL/L (ref 136–145)
WBC # BLD AUTO: 10.3 X10(3) UL (ref 4–11)

## 2022-07-28 PROCEDURE — 82378 CARCINOEMBRYONIC ANTIGEN: CPT

## 2022-07-28 PROCEDURE — 96417 CHEMO IV INFUS EACH ADDL SEQ: CPT

## 2022-07-28 PROCEDURE — 96415 CHEMO IV INFUSION ADDL HR: CPT

## 2022-07-28 PROCEDURE — 99215 OFFICE O/P EST HI 40 MIN: CPT | Performed by: INTERNAL MEDICINE

## 2022-07-28 PROCEDURE — 96413 CHEMO IV INFUSION 1 HR: CPT

## 2022-07-28 PROCEDURE — 85025 COMPLETE CBC W/AUTO DIFF WBC: CPT

## 2022-07-28 PROCEDURE — 80053 COMPREHEN METABOLIC PANEL: CPT

## 2022-07-28 PROCEDURE — 96367 TX/PROPH/DG ADDL SEQ IV INF: CPT

## 2022-07-28 RX ORDER — FLUOROURACIL 50 MG/ML
2400 INJECTION, SOLUTION INTRAVENOUS CONTINUOUS
Status: DISCONTINUED | OUTPATIENT
Start: 2022-07-28 | End: 2022-07-28

## 2022-07-28 RX ORDER — FLUOROURACIL 50 MG/ML
2400 INJECTION, SOLUTION INTRAVENOUS CONTINUOUS
Status: CANCELLED | OUTPATIENT
Start: 2022-07-28

## 2022-07-28 RX ADMIN — FLUOROURACIL 4050 MG: 50 INJECTION, SOLUTION INTRAVENOUS at 13:08:00

## 2022-07-28 NOTE — PROGRESS NOTES
Pt here for C8  MVASI/FOLFOX. Arrives Ambulating independently, accompanied by Self    From MD appointment prior to infusion. Pregnancy screening: Not applicable    Modifications in dose or schedule: No    Drugs/infusion dual verified for appearance, volume and dose, and physical integrity: yes    Pt arrived with port accessed from previous blood draw. Flushed with good blood return noted. Frequency of blood return and site check throughout administration: Prior to administration, Prior to each drug and At completion of therapy      Appeared to tolerate infusion, no signs and symptoms of adverse reaction noted. CADD pump connected and running. All connections reinforced with tape. Port access is clean and dry, secured with steri strips and tegaderm dressing. Patient verbalized understanding of CADD pump instructions, including troubleshooting. Discharged to Home, Ambulating independently. Future appointments scheduled. Printed AVS given to pt.         Outpatient Oncology Care Plan  Problem list:  fatigue  knowledge deficit  Problems related to:    self care  side effect of treatment  Interventions:  encourage activity as tolerated  promoted rest  provided general teaching  Expected outcomes:  adequate sleep/rest  symptoms relieved/minimized  understands plan of care  verbalize how to care for self  Progress towards outcome:  making progress    Education Record    Learner:  Patient  Barriers / Limitations:  None  Method:  Reinforcement  Outcome:  Shows understanding  Comments:

## 2022-07-29 ENCOUNTER — TELEPHONE (OUTPATIENT)
Dept: HEMATOLOGY/ONCOLOGY | Facility: HOSPITAL | Age: 72
End: 2022-07-29

## 2022-07-29 NOTE — TELEPHONE ENCOUNTER
Spoke with Nadiya Comer. Her pump is not due to come off till 1115 on Saturday. The infusion center will be closing at 0900. Nadiya Souleymane stated that she will come  the supplies to de-access herself in the am on Saturday. 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 Will prepare the supplies for her.

## 2022-07-30 ENCOUNTER — NURSE ONLY (OUTPATIENT)
Dept: HEMATOLOGY/ONCOLOGY | Facility: HOSPITAL | Age: 72
End: 2022-07-30
Attending: INTERNAL MEDICINE
Payer: MEDICARE

## 2022-07-30 VITALS
TEMPERATURE: 98 F | DIASTOLIC BLOOD PRESSURE: 75 MMHG | HEART RATE: 86 BPM | RESPIRATION RATE: 16 BRPM | OXYGEN SATURATION: 96 % | SYSTOLIC BLOOD PRESSURE: 129 MMHG

## 2022-07-30 DIAGNOSIS — T45.1X5A CHEMOTHERAPY INDUCED NEUTROPENIA (HCC): Primary | ICD-10-CM

## 2022-07-30 DIAGNOSIS — Z51.11 CHEMOTHERAPY MANAGEMENT, ENCOUNTER FOR: ICD-10-CM

## 2022-07-30 DIAGNOSIS — D70.1 CHEMOTHERAPY INDUCED NEUTROPENIA (HCC): Primary | ICD-10-CM

## 2022-07-30 DIAGNOSIS — C18.7 CANCER OF SIGMOID COLON (HCC): ICD-10-CM

## 2022-07-30 PROCEDURE — 96372 THER/PROPH/DIAG INJ SC/IM: CPT

## 2022-07-30 RX ORDER — HEPARIN SODIUM (PORCINE) LOCK FLUSH IV SOLN 100 UNIT/ML 100 UNIT/ML
SOLUTION INTRAVENOUS
Status: COMPLETED
Start: 2022-07-30 | End: 2022-07-30

## 2022-07-30 RX ADMIN — HEPARIN SODIUM (PORCINE) LOCK FLUSH IV SOLN 100 UNIT/ML 500 UNITS: 100 SOLUTION INTRAVENOUS at 09:44:00

## 2022-08-09 ENCOUNTER — TELEPHONE (OUTPATIENT)
Dept: HEMATOLOGY/ONCOLOGY | Facility: HOSPITAL | Age: 72
End: 2022-08-09

## 2022-08-09 ENCOUNTER — OFFICE VISIT (OUTPATIENT)
Dept: HEMATOLOGY/ONCOLOGY | Facility: HOSPITAL | Age: 72
End: 2022-08-09
Attending: INTERNAL MEDICINE
Payer: MEDICARE

## 2022-08-09 VITALS
HEART RATE: 92 BPM | SYSTOLIC BLOOD PRESSURE: 120 MMHG | HEIGHT: 62 IN | WEIGHT: 143 LBS | OXYGEN SATURATION: 99 % | RESPIRATION RATE: 16 BRPM | BODY MASS INDEX: 26.31 KG/M2 | DIASTOLIC BLOOD PRESSURE: 73 MMHG | TEMPERATURE: 98 F

## 2022-08-09 DIAGNOSIS — T45.1X5A CHEMOTHERAPY INDUCED NEUTROPENIA (HCC): ICD-10-CM

## 2022-08-09 DIAGNOSIS — Z45.2 ENCOUNTER FOR CENTRAL LINE CARE: ICD-10-CM

## 2022-08-09 DIAGNOSIS — C18.7 CANCER OF SIGMOID COLON (HCC): Primary | ICD-10-CM

## 2022-08-09 DIAGNOSIS — D70.1 CHEMOTHERAPY INDUCED NEUTROPENIA (HCC): ICD-10-CM

## 2022-08-09 DIAGNOSIS — Z51.11 CHEMOTHERAPY MANAGEMENT, ENCOUNTER FOR: ICD-10-CM

## 2022-08-09 DIAGNOSIS — Z79.899 ENCOUNTER FOR MEDICATION MANAGEMENT: ICD-10-CM

## 2022-08-09 DIAGNOSIS — C78.6 PERITONEAL CARCINOMATOSIS (HCC): ICD-10-CM

## 2022-08-09 LAB
ALBUMIN SERPL-MCNC: 3.6 G/DL (ref 3.4–5)
ALBUMIN/GLOB SERPL: 0.9 {RATIO} (ref 1–2)
ALP LIVER SERPL-CCNC: 170 U/L
ALT SERPL-CCNC: 18 U/L
ANION GAP SERPL CALC-SCNC: 5 MMOL/L (ref 0–18)
AST SERPL-CCNC: 22 U/L (ref 15–37)
BASOPHILS # BLD AUTO: 0.1 X10(3) UL (ref 0–0.2)
BASOPHILS NFR BLD AUTO: 0.9 %
BILIRUB SERPL-MCNC: 0.4 MG/DL (ref 0.1–2)
BUN BLD-MCNC: 12 MG/DL (ref 7–18)
BUN/CREAT SERPL: 14.5 (ref 10–20)
CALCIUM BLD-MCNC: 9.5 MG/DL (ref 8.5–10.1)
CHLORIDE SERPL-SCNC: 108 MMOL/L (ref 98–112)
CO2 SERPL-SCNC: 28 MMOL/L (ref 21–32)
CREAT BLD-MCNC: 0.83 MG/DL
DEPRECATED RDW RBC AUTO: 63.8 FL (ref 35.1–46.3)
EOSINOPHIL # BLD AUTO: 0.15 X10(3) UL (ref 0–0.7)
EOSINOPHIL NFR BLD AUTO: 1.3 %
ERYTHROCYTE [DISTWIDTH] IN BLOOD BY AUTOMATED COUNT: 16.3 % (ref 11–15)
GFR SERPLBLD BASED ON 1.73 SQ M-ARVRAT: 75 ML/MIN/1.73M2 (ref 60–?)
GLOBULIN PLAS-MCNC: 3.8 G/DL (ref 2.8–4.4)
GLUCOSE BLD-MCNC: 98 MG/DL (ref 70–99)
HCT VFR BLD AUTO: 42.8 %
HGB BLD-MCNC: 14 G/DL
IMM GRANULOCYTES # BLD AUTO: 0.09 X10(3) UL (ref 0–1)
IMM GRANULOCYTES NFR BLD: 0.8 %
LYMPHOCYTES # BLD AUTO: 3.55 X10(3) UL (ref 1–4)
LYMPHOCYTES NFR BLD AUTO: 31.8 %
MCH RBC QN AUTO: 34.8 PG (ref 26–34)
MCHC RBC AUTO-ENTMCNC: 32.7 G/DL (ref 31–37)
MCV RBC AUTO: 106.5 FL
MONOCYTES # BLD AUTO: 1.31 X10(3) UL (ref 0.1–1)
MONOCYTES NFR BLD AUTO: 11.7 %
NEUTROPHILS # BLD AUTO: 5.96 X10 (3) UL (ref 1.5–7.7)
NEUTROPHILS # BLD AUTO: 5.96 X10(3) UL (ref 1.5–7.7)
NEUTROPHILS NFR BLD AUTO: 53.5 %
OSMOLALITY SERPL CALC.SUM OF ELEC: 292 MOSM/KG (ref 275–295)
PLATELET # BLD AUTO: 184 10(3)UL (ref 150–450)
POTASSIUM SERPL-SCNC: 4.1 MMOL/L (ref 3.5–5.1)
PROT SERPL-MCNC: 7.4 G/DL (ref 6.4–8.2)
RBC # BLD AUTO: 4.02 X10(6)UL
SODIUM SERPL-SCNC: 141 MMOL/L (ref 136–145)
WBC # BLD AUTO: 11.2 X10(3) UL (ref 4–11)

## 2022-08-09 PROCEDURE — 36415 COLL VENOUS BLD VENIPUNCTURE: CPT

## 2022-08-09 PROCEDURE — 99215 OFFICE O/P EST HI 40 MIN: CPT | Performed by: INTERNAL MEDICINE

## 2022-08-09 PROCEDURE — 85025 COMPLETE CBC W/AUTO DIFF WBC: CPT

## 2022-08-09 PROCEDURE — 80053 COMPREHEN METABOLIC PANEL: CPT

## 2022-08-09 RX ORDER — HEPARIN SODIUM (PORCINE) LOCK FLUSH IV SOLN 100 UNIT/ML 100 UNIT/ML
500 SOLUTION INTRAVENOUS ONCE
Status: DISCONTINUED | OUTPATIENT
Start: 2022-08-09 | End: 2022-08-09

## 2022-08-09 RX ORDER — FLUOROURACIL 50 MG/ML
2400 INJECTION, SOLUTION INTRAVENOUS CONTINUOUS
Status: CANCELLED | OUTPATIENT
Start: 2022-08-11

## 2022-08-09 RX ORDER — HEPARIN SODIUM (PORCINE) LOCK FLUSH IV SOLN 100 UNIT/ML 100 UNIT/ML
500 SOLUTION INTRAVENOUS ONCE
Status: CANCELLED | OUTPATIENT
Start: 2022-08-09

## 2022-08-09 NOTE — TELEPHONE ENCOUNTER
Giana Valadez called regarding her chemo tomorrow. I let her know we did not change this cycle because we did not have it available. Giana Valadez states she made plans to go out of town so she will not be home the weekend. I added her on for labs and MD today and chemo tomorrow to accommodate her since she did want to switch back to wednesdays.  FYI only

## 2022-08-10 ENCOUNTER — OFFICE VISIT (OUTPATIENT)
Dept: HEMATOLOGY/ONCOLOGY | Facility: HOSPITAL | Age: 72
End: 2022-08-10
Attending: INTERNAL MEDICINE
Payer: MEDICARE

## 2022-08-10 VITALS
SYSTOLIC BLOOD PRESSURE: 113 MMHG | TEMPERATURE: 98 F | HEART RATE: 91 BPM | DIASTOLIC BLOOD PRESSURE: 66 MMHG | RESPIRATION RATE: 16 BRPM | OXYGEN SATURATION: 97 %

## 2022-08-10 DIAGNOSIS — C18.7 CANCER OF SIGMOID COLON (HCC): ICD-10-CM

## 2022-08-10 DIAGNOSIS — Z51.11 CHEMOTHERAPY MANAGEMENT, ENCOUNTER FOR: ICD-10-CM

## 2022-08-10 DIAGNOSIS — D70.1 CHEMOTHERAPY INDUCED NEUTROPENIA (HCC): Primary | ICD-10-CM

## 2022-08-10 DIAGNOSIS — T45.1X5A CHEMOTHERAPY INDUCED NEUTROPENIA (HCC): Primary | ICD-10-CM

## 2022-08-10 PROCEDURE — 96415 CHEMO IV INFUSION ADDL HR: CPT

## 2022-08-10 PROCEDURE — 96367 TX/PROPH/DG ADDL SEQ IV INF: CPT

## 2022-08-10 PROCEDURE — 96417 CHEMO IV INFUS EACH ADDL SEQ: CPT

## 2022-08-10 PROCEDURE — 96413 CHEMO IV INFUSION 1 HR: CPT

## 2022-08-10 RX ORDER — FLUOROURACIL 50 MG/ML
2400 INJECTION, SOLUTION INTRAVENOUS CONTINUOUS
Status: DISCONTINUED | OUTPATIENT
Start: 2022-08-10 | End: 2022-08-10

## 2022-08-10 RX ADMIN — FLUOROURACIL 4050 MG: 50 INJECTION, SOLUTION INTRAVENOUS at 12:14:00

## 2022-08-10 NOTE — PROGRESS NOTES
Pt here for C9  MVASI/FOLFOX. Arrives Ambulating independently, accompanied by Self    From home. MD visit yesterday with Dr. Murdock Sender. Pregnancy screening: Not applicable    Modifications in dose or schedule: No    Drugs/infusion dual verified for appearance, volume and dose, and physical integrity: yes    PORT accessed with sterile technique. Flushed with good blood return noted. Frequency of blood return and site check throughout administration: Prior to administration, Prior to each drug and At completion of therapy      Appeared to tolerate infusion, no signs and symptoms of adverse reaction noted. CADD pump connected and running. All connections reinforced with tape. Port access is clean and dry, secured with steri strips and tegaderm dressing. Patient verbalized understanding of CADD pump instructions, including troubleshooting. Discharged to Home, Ambulating independently. Outpatient Oncology Care Plan  Problem list:  fatigue  knowledge deficit  Problems related to:    self care  side effect of treatment  Interventions:  encourage activity as tolerated  promoted rest  provided general teaching  Expected outcomes:  adequate sleep/rest  symptoms relieved/minimized  understands plan of care  verbalize how to care for self  Progress towards outcome:  making progress    Education Record    Learner:  Patient  Barriers / Limitations:  None  Method:  Reinforcement  Outcome:  Shows understanding  Comments: AVS printed with appointment's scheduled through Cycle 11.

## 2022-08-11 ENCOUNTER — APPOINTMENT (OUTPATIENT)
Dept: HEMATOLOGY/ONCOLOGY | Facility: HOSPITAL | Age: 72
End: 2022-08-11
Attending: INTERNAL MEDICINE
Payer: MEDICARE

## 2022-08-11 ENCOUNTER — APPOINTMENT (OUTPATIENT)
Dept: HEMATOLOGY/ONCOLOGY | Facility: HOSPITAL | Age: 72
End: 2022-08-11
Attending: INTERNAL MEDICINE

## 2022-08-12 ENCOUNTER — NURSE ONLY (OUTPATIENT)
Dept: HEMATOLOGY/ONCOLOGY | Facility: HOSPITAL | Age: 72
End: 2022-08-12
Attending: INTERNAL MEDICINE
Payer: MEDICARE

## 2022-08-12 DIAGNOSIS — C18.7 CANCER OF SIGMOID COLON (HCC): Primary | ICD-10-CM

## 2022-08-12 DIAGNOSIS — D70.1 CHEMOTHERAPY INDUCED NEUTROPENIA (HCC): ICD-10-CM

## 2022-08-12 DIAGNOSIS — T45.1X5A CHEMOTHERAPY INDUCED NEUTROPENIA (HCC): ICD-10-CM

## 2022-08-12 DIAGNOSIS — Z45.2 ENCOUNTER FOR CENTRAL LINE CARE: ICD-10-CM

## 2022-08-12 DIAGNOSIS — Z51.11 CHEMOTHERAPY MANAGEMENT, ENCOUNTER FOR: ICD-10-CM

## 2022-08-12 DIAGNOSIS — Z79.899 ENCOUNTER FOR MEDICATION MANAGEMENT: ICD-10-CM

## 2022-08-12 PROCEDURE — 96372 THER/PROPH/DIAG INJ SC/IM: CPT

## 2022-08-12 RX ORDER — HEPARIN SODIUM (PORCINE) LOCK FLUSH IV SOLN 100 UNIT/ML 100 UNIT/ML
500 SOLUTION INTRAVENOUS ONCE
OUTPATIENT
Start: 2022-08-12

## 2022-08-12 RX ORDER — HEPARIN SODIUM (PORCINE) LOCK FLUSH IV SOLN 100 UNIT/ML 100 UNIT/ML
500 SOLUTION INTRAVENOUS ONCE
Status: COMPLETED | OUTPATIENT
Start: 2022-08-12 | End: 2022-08-12

## 2022-08-12 RX ADMIN — HEPARIN SODIUM (PORCINE) LOCK FLUSH IV SOLN 100 UNIT/ML 500 UNITS: 100 SOLUTION INTRAVENOUS at 09:14:00

## 2022-08-12 NOTE — PROGRESS NOTES
Patient presented with CADD pump connected. Reservoir empty. Disconnected CADD pump, flushed port with 0.9% Normal Saline and established blood return in port. Flushed with 500 units of Heparin and de-accessed port. Gauze and paper tape applied to port site. Patient had cadd pump disconnect today. Patient states she has constipation. Uses stool softeners, pushes fluids, eats fruits. Neulasta given in left upper arm. AVS printed with future appointments made.

## 2022-08-13 ENCOUNTER — APPOINTMENT (OUTPATIENT)
Dept: HEMATOLOGY/ONCOLOGY | Facility: HOSPITAL | Age: 72
End: 2022-08-13
Attending: INTERNAL MEDICINE
Payer: MEDICARE

## 2022-08-24 ENCOUNTER — OFFICE VISIT (OUTPATIENT)
Dept: HEMATOLOGY/ONCOLOGY | Facility: HOSPITAL | Age: 72
End: 2022-08-24
Attending: INTERNAL MEDICINE
Payer: MEDICARE

## 2022-08-24 VITALS
WEIGHT: 145.19 LBS | RESPIRATION RATE: 16 BRPM | OXYGEN SATURATION: 97 % | HEIGHT: 62 IN | HEART RATE: 84 BPM | SYSTOLIC BLOOD PRESSURE: 131 MMHG | BODY MASS INDEX: 26.72 KG/M2 | TEMPERATURE: 98 F | DIASTOLIC BLOOD PRESSURE: 73 MMHG

## 2022-08-24 DIAGNOSIS — T45.1X5A CHEMOTHERAPY INDUCED NEUTROPENIA (HCC): Primary | ICD-10-CM

## 2022-08-24 DIAGNOSIS — D69.59 THROMBOCYTOPENIA DUE TO DRUGS: ICD-10-CM

## 2022-08-24 DIAGNOSIS — C18.7 CANCER OF SIGMOID COLON (HCC): Primary | ICD-10-CM

## 2022-08-24 DIAGNOSIS — D70.1 CHEMOTHERAPY INDUCED NEUTROPENIA (HCC): Primary | ICD-10-CM

## 2022-08-24 DIAGNOSIS — Z51.11 CHEMOTHERAPY MANAGEMENT, ENCOUNTER FOR: ICD-10-CM

## 2022-08-24 DIAGNOSIS — T50.905A THROMBOCYTOPENIA DUE TO DRUGS: ICD-10-CM

## 2022-08-24 DIAGNOSIS — C18.7 CANCER OF SIGMOID COLON (HCC): ICD-10-CM

## 2022-08-24 LAB
ALBUMIN SERPL-MCNC: 3.4 G/DL (ref 3.4–5)
ALBUMIN/GLOB SERPL: 1 {RATIO} (ref 1–2)
ALP LIVER SERPL-CCNC: 119 U/L
ALT SERPL-CCNC: 17 U/L
ANION GAP SERPL CALC-SCNC: 5 MMOL/L (ref 0–18)
AST SERPL-CCNC: 18 U/L (ref 15–37)
BASOPHILS # BLD AUTO: 0.07 X10(3) UL (ref 0–0.2)
BASOPHILS NFR BLD AUTO: 0.9 %
BILIRUB SERPL-MCNC: 0.4 MG/DL (ref 0.1–2)
BUN BLD-MCNC: 18 MG/DL (ref 7–18)
BUN/CREAT SERPL: 27.3 (ref 10–20)
CALCIUM BLD-MCNC: 8.9 MG/DL (ref 8.5–10.1)
CHLORIDE SERPL-SCNC: 108 MMOL/L (ref 98–112)
CO2 SERPL-SCNC: 28 MMOL/L (ref 21–32)
CREAT BLD-MCNC: 0.66 MG/DL
DEPRECATED RDW RBC AUTO: 64.3 FL (ref 35.1–46.3)
EOSINOPHIL # BLD AUTO: 0.12 X10(3) UL (ref 0–0.7)
EOSINOPHIL NFR BLD AUTO: 1.6 %
ERYTHROCYTE [DISTWIDTH] IN BLOOD BY AUTOMATED COUNT: 16.3 % (ref 11–15)
GFR SERPLBLD BASED ON 1.73 SQ M-ARVRAT: 93 ML/MIN/1.73M2 (ref 60–?)
GLOBULIN PLAS-MCNC: 3.3 G/DL (ref 2.8–4.4)
GLUCOSE BLD-MCNC: 91 MG/DL (ref 70–99)
HCT VFR BLD AUTO: 37.6 %
HGB BLD-MCNC: 12.2 G/DL
IMM GRANULOCYTES # BLD AUTO: 0.03 X10(3) UL (ref 0–1)
IMM GRANULOCYTES NFR BLD: 0.4 %
LYMPHOCYTES # BLD AUTO: 4.24 X10(3) UL (ref 1–4)
LYMPHOCYTES NFR BLD AUTO: 55.4 %
MCH RBC QN AUTO: 34.8 PG (ref 26–34)
MCHC RBC AUTO-ENTMCNC: 32.4 G/DL (ref 31–37)
MCV RBC AUTO: 107.1 FL
MONOCYTES # BLD AUTO: 0.83 X10(3) UL (ref 0.1–1)
MONOCYTES NFR BLD AUTO: 10.8 %
NEUTROPHILS # BLD AUTO: 2.36 X10 (3) UL (ref 1.5–7.7)
NEUTROPHILS # BLD AUTO: 2.36 X10(3) UL (ref 1.5–7.7)
NEUTROPHILS NFR BLD AUTO: 30.9 %
OSMOLALITY SERPL CALC.SUM OF ELEC: 293 MOSM/KG (ref 275–295)
PLATELET # BLD AUTO: 115 10(3)UL (ref 150–450)
POTASSIUM SERPL-SCNC: 4.1 MMOL/L (ref 3.5–5.1)
PROT SERPL-MCNC: 6.7 G/DL (ref 6.4–8.2)
RBC # BLD AUTO: 3.51 X10(6)UL
SODIUM SERPL-SCNC: 141 MMOL/L (ref 136–145)
WBC # BLD AUTO: 7.7 X10(3) UL (ref 4–11)

## 2022-08-24 PROCEDURE — 85025 COMPLETE CBC W/AUTO DIFF WBC: CPT

## 2022-08-24 PROCEDURE — 96413 CHEMO IV INFUSION 1 HR: CPT

## 2022-08-24 PROCEDURE — 99215 OFFICE O/P EST HI 40 MIN: CPT | Performed by: INTERNAL MEDICINE

## 2022-08-24 PROCEDURE — 80053 COMPREHEN METABOLIC PANEL: CPT

## 2022-08-24 PROCEDURE — 96417 CHEMO IV INFUS EACH ADDL SEQ: CPT

## 2022-08-24 PROCEDURE — 96375 TX/PRO/DX INJ NEW DRUG ADDON: CPT

## 2022-08-24 PROCEDURE — 96415 CHEMO IV INFUSION ADDL HR: CPT

## 2022-08-24 RX ORDER — FLUOROURACIL 50 MG/ML
2400 INJECTION, SOLUTION INTRAVENOUS CONTINUOUS
Status: CANCELLED | OUTPATIENT
Start: 2022-08-25

## 2022-08-24 RX ORDER — FLUOROURACIL 50 MG/ML
2400 INJECTION, SOLUTION INTRAVENOUS CONTINUOUS
Status: DISCONTINUED | OUTPATIENT
Start: 2022-08-24 | End: 2022-08-24

## 2022-08-24 RX ADMIN — FLUOROURACIL 4050 MG: 50 INJECTION, SOLUTION INTRAVENOUS at 17:26:00

## 2022-08-24 NOTE — PROGRESS NOTES
Pt here for C10D1 MVASI/FOLFOX. Arrives Ambulating independently, accompanied by Self    From home. MD visit yesterday with Dr. Denis Roberts. No complaints at this time. Pregnancy screening: Not applicable    Modifications in dose or schedule: No - okay to treat with platelet 697. Drugs/infusions dual verified for appearance and physical integrity. IV pump settings were dual verified: yes    Frequency of blood return and site check throughout administration: Prior to administration, Prior to each drug and At completion of therapy      Appeared to tolerate infusion, no signs and symptoms of adverse reaction noted. CADD pump connected and running. All connections reinforced with tape. Port access is clean and dry, secured with steri strips and tegaderm dressing. Patient verbalized understanding of CADD pump instructions, including troubleshooting. Discharged to Home, Ambulating independently. Return in 3 days for neulasta injection and CADD d'c.      Outpatient Oncology Care Plan  Problem list:  fatigue  knowledge deficit  Problems related to:    self care  side effect of treatment  Interventions:  encourage activity as tolerated  promoted rest  provided general teaching  Expected outcomes:  adequate sleep/rest  symptoms relieved/minimized  understands plan of care  verbalize how to care for self  Progress towards outcome:  making progress    Education Record    Learner:  Patient  Barriers / Limitations:  None  Method:  Reinforcement  Outcome:  Shows understanding

## 2022-08-25 ENCOUNTER — APPOINTMENT (OUTPATIENT)
Dept: HEMATOLOGY/ONCOLOGY | Facility: HOSPITAL | Age: 72
End: 2022-08-25
Attending: INTERNAL MEDICINE

## 2022-08-25 ENCOUNTER — APPOINTMENT (OUTPATIENT)
Dept: HEMATOLOGY/ONCOLOGY | Facility: HOSPITAL | Age: 72
End: 2022-08-25
Attending: INTERNAL MEDICINE
Payer: MEDICARE

## 2022-08-26 ENCOUNTER — NURSE ONLY (OUTPATIENT)
Dept: HEMATOLOGY/ONCOLOGY | Facility: HOSPITAL | Age: 72
End: 2022-08-26
Attending: INTERNAL MEDICINE
Payer: MEDICARE

## 2022-08-26 DIAGNOSIS — D70.1 CHEMOTHERAPY INDUCED NEUTROPENIA (HCC): ICD-10-CM

## 2022-08-26 DIAGNOSIS — Z45.2 ENCOUNTER FOR CENTRAL LINE CARE: ICD-10-CM

## 2022-08-26 DIAGNOSIS — Z51.11 CHEMOTHERAPY MANAGEMENT, ENCOUNTER FOR: ICD-10-CM

## 2022-08-26 DIAGNOSIS — C18.7 CANCER OF SIGMOID COLON (HCC): Primary | ICD-10-CM

## 2022-08-26 DIAGNOSIS — Z79.899 ENCOUNTER FOR MEDICATION MANAGEMENT: ICD-10-CM

## 2022-08-26 DIAGNOSIS — T45.1X5A CHEMOTHERAPY INDUCED NEUTROPENIA (HCC): ICD-10-CM

## 2022-08-26 PROCEDURE — 96372 THER/PROPH/DIAG INJ SC/IM: CPT

## 2022-08-26 RX ORDER — HEPARIN SODIUM (PORCINE) LOCK FLUSH IV SOLN 100 UNIT/ML 100 UNIT/ML
500 SOLUTION INTRAVENOUS ONCE
OUTPATIENT
Start: 2022-08-26

## 2022-08-26 RX ORDER — HEPARIN SODIUM (PORCINE) LOCK FLUSH IV SOLN 100 UNIT/ML 100 UNIT/ML
500 SOLUTION INTRAVENOUS ONCE
Status: COMPLETED | OUTPATIENT
Start: 2022-08-26 | End: 2022-08-26

## 2022-08-26 RX ADMIN — HEPARIN SODIUM (PORCINE) LOCK FLUSH IV SOLN 100 UNIT/ML 500 UNITS: 100 SOLUTION INTRAVENOUS at 13:58:00

## 2022-08-26 NOTE — PROGRESS NOTES
Patient presented with CADD pump connected. Reservoir 0.9ml remain - asked for dc. Disconnected CADD pump, flushed port with 0.9% Normal Saline and established blood return in port. Flushed with 500 units of Heparin and de-accessed port. Gauze and paper tape applied to port site. Denies complaints/ concerns, feeling well overall  Reviewed symptom mgmt in general, call MD/ triage RN if needed - she agreed    Reviewed purpose, pot SE's of neulasta as well as symptom mgmt - she voiced understanding  Neulasta subcutaneous given left upper arm, tolerated well  2x2 gauze/ paper tape to site    Discharged stable to home with future appts - would like to come in around 9-10am, but Dr is not available until the afternoon on her Wed treatment days. She does not wish to come in on Thurs for a dc on Sat. She will ask dr if Tuesdays would work out.   Does not want to swap until she speaks with MD  Gait marcella, indep

## 2022-08-27 ENCOUNTER — APPOINTMENT (OUTPATIENT)
Dept: HEMATOLOGY/ONCOLOGY | Facility: HOSPITAL | Age: 72
End: 2022-08-27
Attending: INTERNAL MEDICINE
Payer: MEDICARE

## 2022-09-07 ENCOUNTER — OFFICE VISIT (OUTPATIENT)
Dept: HEMATOLOGY/ONCOLOGY | Facility: HOSPITAL | Age: 72
End: 2022-09-07
Attending: INTERNAL MEDICINE
Payer: MEDICARE

## 2022-09-07 VITALS
WEIGHT: 143 LBS | SYSTOLIC BLOOD PRESSURE: 157 MMHG | BODY MASS INDEX: 26.31 KG/M2 | TEMPERATURE: 98 F | RESPIRATION RATE: 16 BRPM | HEART RATE: 78 BPM | OXYGEN SATURATION: 97 % | DIASTOLIC BLOOD PRESSURE: 90 MMHG | HEIGHT: 62 IN

## 2022-09-07 VITALS
SYSTOLIC BLOOD PRESSURE: 157 MMHG | OXYGEN SATURATION: 97 % | BODY MASS INDEX: 26.31 KG/M2 | WEIGHT: 143 LBS | TEMPERATURE: 98 F | DIASTOLIC BLOOD PRESSURE: 90 MMHG | RESPIRATION RATE: 16 BRPM | HEIGHT: 62 IN | HEART RATE: 78 BPM

## 2022-09-07 DIAGNOSIS — T45.1X5A CHEMOTHERAPY INDUCED NEUTROPENIA (HCC): Primary | ICD-10-CM

## 2022-09-07 DIAGNOSIS — Z51.11 CHEMOTHERAPY MANAGEMENT, ENCOUNTER FOR: Primary | ICD-10-CM

## 2022-09-07 DIAGNOSIS — C18.7 CANCER OF SIGMOID COLON (HCC): Primary | ICD-10-CM

## 2022-09-07 DIAGNOSIS — Z51.11 CHEMOTHERAPY MANAGEMENT, ENCOUNTER FOR: ICD-10-CM

## 2022-09-07 DIAGNOSIS — D70.1 CHEMOTHERAPY INDUCED NEUTROPENIA (HCC): Primary | ICD-10-CM

## 2022-09-07 DIAGNOSIS — C18.7 CANCER OF SIGMOID COLON (HCC): ICD-10-CM

## 2022-09-07 LAB
ALBUMIN SERPL-MCNC: 3.5 G/DL (ref 3.4–5)
ALBUMIN/GLOB SERPL: 1 {RATIO} (ref 1–2)
ALP LIVER SERPL-CCNC: 136 U/L
ALT SERPL-CCNC: 17 U/L
ANION GAP SERPL CALC-SCNC: 6 MMOL/L (ref 0–18)
AST SERPL-CCNC: 23 U/L (ref 15–37)
BASOPHILS # BLD AUTO: 0.06 X10(3) UL (ref 0–0.2)
BASOPHILS NFR BLD AUTO: 0.7 %
BILIRUB SERPL-MCNC: 0.4 MG/DL (ref 0.1–2)
BUN BLD-MCNC: 14 MG/DL (ref 7–18)
BUN/CREAT SERPL: 20.3 (ref 10–20)
CALCIUM BLD-MCNC: 9.3 MG/DL (ref 8.5–10.1)
CEA SERPL-MCNC: 4.2 NG/ML (ref ?–5)
CHLORIDE SERPL-SCNC: 108 MMOL/L (ref 98–112)
CO2 SERPL-SCNC: 28 MMOL/L (ref 21–32)
CREAT BLD-MCNC: 0.69 MG/DL
DEPRECATED RDW RBC AUTO: 68.3 FL (ref 35.1–46.3)
EOSINOPHIL # BLD AUTO: 0.09 X10(3) UL (ref 0–0.7)
EOSINOPHIL NFR BLD AUTO: 1 %
ERYTHROCYTE [DISTWIDTH] IN BLOOD BY AUTOMATED COUNT: 16.7 % (ref 11–15)
GFR SERPLBLD BASED ON 1.73 SQ M-ARVRAT: 92 ML/MIN/1.73M2 (ref 60–?)
GLOBULIN PLAS-MCNC: 3.5 G/DL (ref 2.8–4.4)
GLUCOSE BLD-MCNC: 100 MG/DL (ref 70–99)
HCT VFR BLD AUTO: 37.1 %
HGB BLD-MCNC: 11.9 G/DL
IMM GRANULOCYTES # BLD AUTO: 0.05 X10(3) UL (ref 0–1)
IMM GRANULOCYTES NFR BLD: 0.6 %
LYMPHOCYTES # BLD AUTO: 4.08 X10(3) UL (ref 1–4)
LYMPHOCYTES NFR BLD AUTO: 45.9 %
MCH RBC QN AUTO: 35.2 PG (ref 26–34)
MCHC RBC AUTO-ENTMCNC: 32.1 G/DL (ref 31–37)
MCV RBC AUTO: 109.8 FL
MONOCYTES # BLD AUTO: 1.01 X10(3) UL (ref 0.1–1)
MONOCYTES NFR BLD AUTO: 11.4 %
NEUTROPHILS # BLD AUTO: 3.59 X10 (3) UL (ref 1.5–7.7)
NEUTROPHILS # BLD AUTO: 3.59 X10(3) UL (ref 1.5–7.7)
NEUTROPHILS NFR BLD AUTO: 40.4 %
OSMOLALITY SERPL CALC.SUM OF ELEC: 295 MOSM/KG (ref 275–295)
PLATELET # BLD AUTO: 142 10(3)UL (ref 150–450)
PLATELET MORPHOLOGY: NORMAL
POTASSIUM SERPL-SCNC: 4.1 MMOL/L (ref 3.5–5.1)
PROT SERPL-MCNC: 7 G/DL (ref 6.4–8.2)
RBC # BLD AUTO: 3.38 X10(6)UL
SODIUM SERPL-SCNC: 142 MMOL/L (ref 136–145)
WBC # BLD AUTO: 8.9 X10(3) UL (ref 4–11)

## 2022-09-07 PROCEDURE — 82378 CARCINOEMBRYONIC ANTIGEN: CPT

## 2022-09-07 PROCEDURE — 96417 CHEMO IV INFUS EACH ADDL SEQ: CPT

## 2022-09-07 PROCEDURE — 99215 OFFICE O/P EST HI 40 MIN: CPT | Performed by: INTERNAL MEDICINE

## 2022-09-07 PROCEDURE — 85025 COMPLETE CBC W/AUTO DIFF WBC: CPT

## 2022-09-07 PROCEDURE — 96367 TX/PROPH/DG ADDL SEQ IV INF: CPT

## 2022-09-07 PROCEDURE — 96413 CHEMO IV INFUSION 1 HR: CPT

## 2022-09-07 PROCEDURE — 80053 COMPREHEN METABOLIC PANEL: CPT

## 2022-09-07 PROCEDURE — 96415 CHEMO IV INFUSION ADDL HR: CPT

## 2022-09-07 RX ORDER — FLUOROURACIL 50 MG/ML
2400 INJECTION, SOLUTION INTRAVENOUS CONTINUOUS
Status: DISCONTINUED | OUTPATIENT
Start: 2022-09-07 | End: 2022-09-07

## 2022-09-07 RX ORDER — FLUOROURACIL 50 MG/ML
2400 INJECTION, SOLUTION INTRAVENOUS CONTINUOUS
Status: CANCELLED | OUTPATIENT
Start: 2022-09-08

## 2022-09-07 RX ADMIN — FLUOROURACIL 4050 MG: 50 INJECTION, SOLUTION INTRAVENOUS at 17:22:00

## 2022-09-07 NOTE — PROGRESS NOTES
Pt here for C11D1 MVASI/FOLFOX. Arrives Ambulating independently, accompanied by Self    No complaints at this time. Pregnancy screening: Not applicable    Modifications in dose or schedule: No -    Drugs/infusions dual verified for appearance and physical integrity. IV pump settings were dual verified: yes    Frequency of blood return and site check throughout administration: Prior to administration, Prior to each drug and At completion of therapy      Appeared to tolerate infusion, no signs and symptoms of adverse reaction noted. CADD pump connected and running. All connections reinforced with tape. Port access is clean and dry, secured with steri strips and tegaderm dressing. Patient verbalized understanding of CADD pump instructions, including troubleshooting. Discharged to Home, Ambulating independently. Return for pump disconnect and injection.      Outpatient Oncology Care Plan  Problem list:  fatigue  knowledge deficit  Problems related to:    self care  side effect of treatment  Interventions:  encourage activity as tolerated  promoted rest  provided general teaching  Expected outcomes:  adequate sleep/rest  symptoms relieved/minimized  understands plan of care  verbalize how to care for self  Progress towards outcome:  making progress    Education Record    Learner:  Patient  Barriers / Limitations:  None  Method:  Reinforcement  Outcome:  Shows understanding

## 2022-09-09 ENCOUNTER — NURSE ONLY (OUTPATIENT)
Dept: HEMATOLOGY/ONCOLOGY | Facility: HOSPITAL | Age: 72
End: 2022-09-09
Attending: INTERNAL MEDICINE
Payer: MEDICARE

## 2022-09-09 DIAGNOSIS — Z51.11 CHEMOTHERAPY MANAGEMENT, ENCOUNTER FOR: ICD-10-CM

## 2022-09-09 DIAGNOSIS — Z45.2 ENCOUNTER FOR CENTRAL LINE CARE: ICD-10-CM

## 2022-09-09 DIAGNOSIS — T45.1X5A CHEMOTHERAPY INDUCED NEUTROPENIA (HCC): ICD-10-CM

## 2022-09-09 DIAGNOSIS — Z79.899 ENCOUNTER FOR MEDICATION MANAGEMENT: ICD-10-CM

## 2022-09-09 DIAGNOSIS — D70.1 CHEMOTHERAPY INDUCED NEUTROPENIA (HCC): ICD-10-CM

## 2022-09-09 DIAGNOSIS — C18.7 CANCER OF SIGMOID COLON (HCC): Primary | ICD-10-CM

## 2022-09-09 PROCEDURE — 96372 THER/PROPH/DIAG INJ SC/IM: CPT

## 2022-09-09 RX ORDER — HEPARIN SODIUM (PORCINE) LOCK FLUSH IV SOLN 100 UNIT/ML 100 UNIT/ML
500 SOLUTION INTRAVENOUS ONCE
Status: COMPLETED | OUTPATIENT
Start: 2022-09-09 | End: 2022-09-09

## 2022-09-09 RX ORDER — HEPARIN SODIUM (PORCINE) LOCK FLUSH IV SOLN 100 UNIT/ML 100 UNIT/ML
500 SOLUTION INTRAVENOUS ONCE
OUTPATIENT
Start: 2022-09-09

## 2022-09-09 RX ADMIN — HEPARIN SODIUM (PORCINE) LOCK FLUSH IV SOLN 100 UNIT/ML 500 UNITS: 100 SOLUTION INTRAVENOUS at 13:37:00

## 2022-09-09 NOTE — PROGRESS NOTES
Patient here for CADD pump removal,  3ML left. Patient states it started alarming at home earlier. States she called Infu system for help trouble shooting. States pump would run for a couple of minutes then start alarming. Infu system suggested she come in. Infusion appeared to be complete. I flushed port then removed Bone needle. Patient with no complaints today. Feeling good. Neulasta given in left upper arm today. Tolerated injection well. Discharged ambulatory. Reviewed future appointment with patient.

## 2022-09-21 ENCOUNTER — NURSE ONLY (OUTPATIENT)
Dept: HEMATOLOGY/ONCOLOGY | Facility: HOSPITAL | Age: 72
End: 2022-09-21
Attending: INTERNAL MEDICINE

## 2022-09-21 VITALS
DIASTOLIC BLOOD PRESSURE: 77 MMHG | BODY MASS INDEX: 26 KG/M2 | WEIGHT: 143 LBS | SYSTOLIC BLOOD PRESSURE: 140 MMHG | HEART RATE: 79 BPM | TEMPERATURE: 98 F | RESPIRATION RATE: 16 BRPM | OXYGEN SATURATION: 97 %

## 2022-09-21 VITALS
RESPIRATION RATE: 16 BRPM | HEART RATE: 79 BPM | OXYGEN SATURATION: 97 % | BODY MASS INDEX: 26.31 KG/M2 | DIASTOLIC BLOOD PRESSURE: 77 MMHG | SYSTOLIC BLOOD PRESSURE: 140 MMHG | TEMPERATURE: 98 F | WEIGHT: 143 LBS | HEIGHT: 62 IN

## 2022-09-21 DIAGNOSIS — D70.1 CHEMOTHERAPY INDUCED NEUTROPENIA (HCC): Primary | ICD-10-CM

## 2022-09-21 DIAGNOSIS — C18.7 CANCER OF SIGMOID COLON (HCC): ICD-10-CM

## 2022-09-21 DIAGNOSIS — R63.0 DECREASED APPETITE: ICD-10-CM

## 2022-09-21 DIAGNOSIS — D69.59 THROMBOCYTOPENIA DUE TO DRUGS: ICD-10-CM

## 2022-09-21 DIAGNOSIS — R53.83 CHEMOTHERAPY-INDUCED FATIGUE: ICD-10-CM

## 2022-09-21 DIAGNOSIS — T50.905A THROMBOCYTOPENIA DUE TO DRUGS: ICD-10-CM

## 2022-09-21 DIAGNOSIS — T45.1X5A CHEMOTHERAPY-INDUCED FATIGUE: ICD-10-CM

## 2022-09-21 DIAGNOSIS — T45.1X5A CHEMOTHERAPY INDUCED NEUTROPENIA (HCC): Primary | ICD-10-CM

## 2022-09-21 DIAGNOSIS — C78.7 COLON CANCER METASTASIZED TO LIVER (HCC): Primary | ICD-10-CM

## 2022-09-21 DIAGNOSIS — C18.7 CANCER OF SIGMOID COLON (HCC): Primary | ICD-10-CM

## 2022-09-21 DIAGNOSIS — Z51.11 CHEMOTHERAPY MANAGEMENT, ENCOUNTER FOR: ICD-10-CM

## 2022-09-21 DIAGNOSIS — C18.9 COLON CANCER METASTASIZED TO LIVER (HCC): Primary | ICD-10-CM

## 2022-09-21 LAB
ALBUMIN SERPL-MCNC: 3.5 G/DL (ref 3.4–5)
ALBUMIN/GLOB SERPL: 1.2 {RATIO} (ref 1–2)
ALP LIVER SERPL-CCNC: 117 U/L
ALT SERPL-CCNC: 17 U/L
ANION GAP SERPL CALC-SCNC: 6 MMOL/L (ref 0–18)
AST SERPL-CCNC: 22 U/L (ref 15–37)
BASOPHILS # BLD AUTO: 0.08 X10(3) UL (ref 0–0.2)
BASOPHILS NFR BLD AUTO: 0.9 %
BILIRUB SERPL-MCNC: 0.3 MG/DL (ref 0.1–2)
BUN BLD-MCNC: 10 MG/DL (ref 7–18)
BUN/CREAT SERPL: 17.5 (ref 10–20)
CALCIUM BLD-MCNC: 9.1 MG/DL (ref 8.5–10.1)
CHLORIDE SERPL-SCNC: 110 MMOL/L (ref 98–112)
CO2 SERPL-SCNC: 27 MMOL/L (ref 21–32)
CREAT BLD-MCNC: 0.57 MG/DL
DEPRECATED RDW RBC AUTO: 65.3 FL (ref 35.1–46.3)
EOSINOPHIL # BLD AUTO: 0.07 X10(3) UL (ref 0–0.7)
EOSINOPHIL NFR BLD AUTO: 0.8 %
ERYTHROCYTE [DISTWIDTH] IN BLOOD BY AUTOMATED COUNT: 16.1 % (ref 11–15)
FASTING STATUS PATIENT QL REPORTED: NO
GFR SERPLBLD BASED ON 1.73 SQ M-ARVRAT: 96 ML/MIN/1.73M2 (ref 60–?)
GLOBULIN PLAS-MCNC: 3 G/DL (ref 2.8–4.4)
GLUCOSE BLD-MCNC: 114 MG/DL (ref 70–99)
HCT VFR BLD AUTO: 35.8 %
HELMET CELLS BLD QL SMEAR: PRESENT
HGB BLD-MCNC: 11.6 G/DL
IMM GRANULOCYTES # BLD AUTO: 0.09 X10(3) UL (ref 0–1)
IMM GRANULOCYTES NFR BLD: 1.1 %
LYMPHOCYTES # BLD AUTO: 3.37 X10(3) UL (ref 1–4)
LYMPHOCYTES NFR BLD AUTO: 39.6 %
MCH RBC QN AUTO: 35.6 PG (ref 26–34)
MCHC RBC AUTO-ENTMCNC: 32.4 G/DL (ref 31–37)
MCV RBC AUTO: 109.8 FL
MONOCYTES # BLD AUTO: 1.18 X10(3) UL (ref 0.1–1)
MONOCYTES NFR BLD AUTO: 13.9 %
NEUTROPHILS # BLD AUTO: 3.71 X10 (3) UL (ref 1.5–7.7)
NEUTROPHILS # BLD AUTO: 3.71 X10(3) UL (ref 1.5–7.7)
NEUTROPHILS NFR BLD AUTO: 43.7 %
OSMOLALITY SERPL CALC.SUM OF ELEC: 296 MOSM/KG (ref 275–295)
PLATELET # BLD AUTO: 118 10(3)UL (ref 150–450)
PLATELET MORPHOLOGY: NORMAL
POTASSIUM SERPL-SCNC: 3.8 MMOL/L (ref 3.5–5.1)
PROT SERPL-MCNC: 6.5 G/DL (ref 6.4–8.2)
RBC # BLD AUTO: 3.26 X10(6)UL
SODIUM SERPL-SCNC: 143 MMOL/L (ref 136–145)
WBC # BLD AUTO: 8.5 X10(3) UL (ref 4–11)

## 2022-09-21 PROCEDURE — 80053 COMPREHEN METABOLIC PANEL: CPT

## 2022-09-21 PROCEDURE — 96415 CHEMO IV INFUSION ADDL HR: CPT

## 2022-09-21 PROCEDURE — 85025 COMPLETE CBC W/AUTO DIFF WBC: CPT

## 2022-09-21 PROCEDURE — 96417 CHEMO IV INFUS EACH ADDL SEQ: CPT

## 2022-09-21 PROCEDURE — 96367 TX/PROPH/DG ADDL SEQ IV INF: CPT

## 2022-09-21 PROCEDURE — 96413 CHEMO IV INFUSION 1 HR: CPT

## 2022-09-21 PROCEDURE — 99215 OFFICE O/P EST HI 40 MIN: CPT | Performed by: INTERNAL MEDICINE

## 2022-09-21 RX ORDER — FLUOROURACIL 50 MG/ML
2400 INJECTION, SOLUTION INTRAVENOUS CONTINUOUS
Status: DISCONTINUED | OUTPATIENT
Start: 2022-09-21 | End: 2022-09-21

## 2022-09-21 RX ORDER — FLUOROURACIL 50 MG/ML
2400 INJECTION, SOLUTION INTRAVENOUS CONTINUOUS
Status: CANCELLED | OUTPATIENT
Start: 2022-09-22

## 2022-09-21 RX ADMIN — FLUOROURACIL 4050 MG: 50 INJECTION, SOLUTION INTRAVENOUS at 17:23:00

## 2022-09-21 NOTE — PROGRESS NOTES
Pt here for C12D1 MVASI/FOLFOX. Arrives Ambulating independently, accompanied by Self . No complaints at this time. Pregnancy screening: Not applicable    Modifications in dose or schedule: No    Drugs/infusions dual verified for appearance and physical integrity. IV pump settings were dual verified: yes    Frequency of blood return and site check throughout administration: Prior to administration, Prior to each drug and At completion of therapy      Appeared to tolerate infusion, no signs and symptoms of adverse reaction noted. CADD pump connected and running. All connections reinforced with tape. Port access is clean and dry, secured with steri strips and tegaderm dressing. Patient verbalized understanding of CADD pump instructions, including troubleshooting. Discharged to Home, Ambulating independently.     Return for pump disconnect and injection-pt aware,     Outpatient Oncology Care Plan  Problem list:  fatigue  knowledge deficit  Problems related to:    self care  side effect of treatment  Interventions:  encourage activity as tolerated  promoted rest  provided general teaching  Expected outcomes:  adequate sleep/rest  symptoms relieved/minimized  understands plan of care  verbalize how to care for self  Progress towards outcome:  making progress    Education Record    Learner:  Patient  Barriers / Limitations:  None  Method:  Reinforcement  Outcome:  Shows understanding

## 2022-09-21 NOTE — PATIENT INSTRUCTIONS
1. Fatigue - push more water- flavored water  2. Decrease in appetite - fruit/veggie smoothies.  Small frequent meals

## 2022-09-23 ENCOUNTER — NURSE ONLY (OUTPATIENT)
Dept: HEMATOLOGY/ONCOLOGY | Facility: HOSPITAL | Age: 72
End: 2022-09-23
Attending: INTERNAL MEDICINE

## 2022-09-23 DIAGNOSIS — Z45.2 ENCOUNTER FOR CENTRAL LINE CARE: ICD-10-CM

## 2022-09-23 DIAGNOSIS — D70.1 CHEMOTHERAPY INDUCED NEUTROPENIA (HCC): ICD-10-CM

## 2022-09-23 DIAGNOSIS — C18.7 CANCER OF SIGMOID COLON (HCC): Primary | ICD-10-CM

## 2022-09-23 DIAGNOSIS — Z51.11 CHEMOTHERAPY MANAGEMENT, ENCOUNTER FOR: ICD-10-CM

## 2022-09-23 DIAGNOSIS — T45.1X5A CHEMOTHERAPY INDUCED NEUTROPENIA (HCC): ICD-10-CM

## 2022-09-23 DIAGNOSIS — Z79.899 ENCOUNTER FOR MEDICATION MANAGEMENT: ICD-10-CM

## 2022-09-23 PROCEDURE — 96372 THER/PROPH/DIAG INJ SC/IM: CPT

## 2022-09-23 RX ORDER — HEPARIN SODIUM (PORCINE) LOCK FLUSH IV SOLN 100 UNIT/ML 100 UNIT/ML
500 SOLUTION INTRAVENOUS ONCE
OUTPATIENT
Start: 2022-09-23

## 2022-09-23 RX ORDER — HEPARIN SODIUM (PORCINE) LOCK FLUSH IV SOLN 100 UNIT/ML 100 UNIT/ML
500 SOLUTION INTRAVENOUS ONCE
Status: COMPLETED | OUTPATIENT
Start: 2022-09-23 | End: 2022-09-23

## 2022-09-23 RX ADMIN — HEPARIN SODIUM (PORCINE) LOCK FLUSH IV SOLN 100 UNIT/ML 500 UNITS: 100 SOLUTION INTRAVENOUS at 15:01:00

## 2022-09-23 NOTE — PROGRESS NOTES
Patient presented with CADD pump connected. Reservoir empty. Disconnected CADD pump, flushed port with 0.9% Normal Saline and established blood return in port. Flushed with 500 units of Heparin and de-accessed port. Gauze and paper tape applied to port site.      Denies complaints/ concerns, feeling well overall  Reviewed symptom mgmt in general, call MD/ triage RN if needed - she agreed    Reviewed purpose, pot SE's of neulasta as well as symptom mgmt - she voiced understanding  Day 3 Neulasta subcutaneous given left upper arm, tolerated well  2x2 gauze/ paper tape to site    Discharged stable to home with future appts   Gait steady, indep

## 2022-10-03 ENCOUNTER — TELEPHONE (OUTPATIENT)
Dept: SURGERY | Facility: CLINIC | Age: 72
End: 2022-10-03

## 2022-10-03 ENCOUNTER — PATIENT OUTREACH (OUTPATIENT)
Dept: CASE MANAGEMENT | Age: 72
End: 2022-10-03

## 2022-10-03 ENCOUNTER — TELEPHONE (OUTPATIENT)
Dept: HEMATOLOGY/ONCOLOGY | Facility: HOSPITAL | Age: 72
End: 2022-10-03

## 2022-10-03 ENCOUNTER — TELEPHONE (OUTPATIENT)
Dept: INTERNAL MEDICINE CLINIC | Facility: CLINIC | Age: 72
End: 2022-10-03

## 2022-10-03 DIAGNOSIS — C18.9 MALIGNANT NEOPLASM OF COLON, UNSPECIFIED PART OF COLON (HCC): Primary | ICD-10-CM

## 2022-10-03 DIAGNOSIS — Z02.9 ENCOUNTERS FOR ADMINISTRATIVE PURPOSE: ICD-10-CM

## 2022-10-03 DIAGNOSIS — Z85.038 PERSONAL HISTORY OF COLON CANCER: Primary | ICD-10-CM

## 2022-10-03 PROCEDURE — 1111F DSCHRG MED/CURRENT MED MERGE: CPT

## 2022-10-03 RX ORDER — PANTOPRAZOLE SODIUM 40 MG/1
40 TABLET, DELAYED RELEASE ORAL 2 TIMES DAILY
COMMUNITY
Start: 2022-10-01

## 2022-10-03 RX ORDER — PSEUDOEPHEDRINE HCL 30 MG
100 TABLET ORAL DAILY
COMMUNITY

## 2022-10-03 NOTE — TELEPHONE ENCOUNTER
Pt called to let us know that she was recently admitted at [facility] for a stomach ulcer. Was told no surgery bc of they HIPEC and no chemo for 2 months. Is coming in to see Dr Florencia Anne on Wednesday to discuss.

## 2022-10-03 NOTE — TELEPHONE ENCOUNTER
Called patient to discuss recent hospitalization. Pt to f/u with Dr. Price/Dr. Nancy Castelan on 10/5. Pt to bring outside imaging from [facility].

## 2022-10-03 NOTE — TELEPHONE ENCOUNTER
I have sent a prescription for Norvasc 5 mg daily for pt to take prior to her apt with me for BP.  -D.P. Patient informed. Also, needs referral to GI - she needs  Colonoscopy. Referral entered, but patient needs a call back once authorized. She does not use [employer] [facility].

## 2022-10-03 NOTE — TELEPHONE ENCOUNTER
Patient called as recently her blood pressure has been high. She doesn't take any medication for her blood pressure. She said she was in the hospital of an ulcer. She is wondering if that is the reason her blood pressure is high. Was only she said for (3) days on [place]. Asking for the nurse to please call her back.

## 2022-10-03 NOTE — TELEPHONE ENCOUNTER
LVM for patient to call and make after hospitalization appointment on Tuesday, 10/11/22 (currently open). In the meantime, please call back with blood pressure readings to help us help her with advice on what to do.

## 2022-10-03 NOTE — TELEPHONE ENCOUNTER
NC s/w patient for TCM. Her bp this morning was 170/96. Her bp during phone call at 12:30p was 150/84. She denies having any HA, n/v/c/d, sob, pain, lightheadedness or any other symptoms. Pt is also asking for a referral for a GI MD as she was instructed to see one after hospitalization from [facility]. Please advise.

## 2022-10-05 ENCOUNTER — OFFICE VISIT (OUTPATIENT)
Dept: SURGERY | Facility: CLINIC | Age: 72
End: 2022-10-05
Payer: MEDICARE

## 2022-10-05 ENCOUNTER — TELEPHONE (OUTPATIENT)
Dept: GASTROENTEROLOGY | Facility: CLINIC | Age: 72
End: 2022-10-05

## 2022-10-05 ENCOUNTER — OFFICE VISIT (OUTPATIENT)
Dept: HEMATOLOGY/ONCOLOGY | Facility: HOSPITAL | Age: 72
End: 2022-10-05
Attending: NURSE PRACTITIONER
Payer: MEDICARE

## 2022-10-05 ENCOUNTER — APPOINTMENT (OUTPATIENT)
Dept: HEMATOLOGY/ONCOLOGY | Facility: HOSPITAL | Age: 72
End: 2022-10-05
Attending: INTERNAL MEDICINE
Payer: MEDICARE

## 2022-10-05 VITALS
WEIGHT: 142 LBS | SYSTOLIC BLOOD PRESSURE: 158 MMHG | DIASTOLIC BLOOD PRESSURE: 84 MMHG | HEIGHT: 62 IN | BODY MASS INDEX: 26.13 KG/M2 | TEMPERATURE: 98 F | RESPIRATION RATE: 16 BRPM | HEART RATE: 89 BPM | OXYGEN SATURATION: 97 %

## 2022-10-05 VITALS
SYSTOLIC BLOOD PRESSURE: 158 MMHG | RESPIRATION RATE: 16 BRPM | HEIGHT: 62 IN | OXYGEN SATURATION: 97 % | BODY MASS INDEX: 26.13 KG/M2 | HEART RATE: 89 BPM | TEMPERATURE: 98 F | DIASTOLIC BLOOD PRESSURE: 84 MMHG | WEIGHT: 142 LBS

## 2022-10-05 DIAGNOSIS — Z09 HOSPITAL DISCHARGE FOLLOW-UP: ICD-10-CM

## 2022-10-05 DIAGNOSIS — D69.59 THROMBOCYTOPENIA DUE TO DRUGS: ICD-10-CM

## 2022-10-05 DIAGNOSIS — T50.905A THROMBOCYTOPENIA DUE TO DRUGS: ICD-10-CM

## 2022-10-05 DIAGNOSIS — C18.7 CANCER OF SIGMOID COLON (HCC): Primary | ICD-10-CM

## 2022-10-05 DIAGNOSIS — C78.6 PERITONEAL CARCINOMATOSIS (HCC): Primary | ICD-10-CM

## 2022-10-05 LAB
ALBUMIN SERPL-MCNC: 3.1 G/DL (ref 3.4–5)
ALBUMIN/GLOB SERPL: 0.8 {RATIO} (ref 1–2)
ALP LIVER SERPL-CCNC: 135 U/L
ALT SERPL-CCNC: 18 U/L
ANION GAP SERPL CALC-SCNC: 5 MMOL/L (ref 0–18)
AST SERPL-CCNC: 20 U/L (ref 15–37)
BASOPHILS # BLD AUTO: 0.1 X10(3) UL (ref 0–0.2)
BASOPHILS NFR BLD AUTO: 0.9 %
BILIRUB SERPL-MCNC: 0.4 MG/DL (ref 0.1–2)
BUN BLD-MCNC: 6 MG/DL (ref 7–18)
BUN/CREAT SERPL: 10 (ref 10–20)
CALCIUM BLD-MCNC: 8.9 MG/DL (ref 8.5–10.1)
CHLORIDE SERPL-SCNC: 109 MMOL/L (ref 98–112)
CO2 SERPL-SCNC: 29 MMOL/L (ref 21–32)
CREAT BLD-MCNC: 0.6 MG/DL
DEPRECATED RDW RBC AUTO: 63.6 FL (ref 35.1–46.3)
EOSINOPHIL # BLD AUTO: 0.2 X10(3) UL (ref 0–0.7)
EOSINOPHIL NFR BLD AUTO: 1.9 %
ERYTHROCYTE [DISTWIDTH] IN BLOOD BY AUTOMATED COUNT: 15.5 % (ref 11–15)
GFR SERPLBLD BASED ON 1.73 SQ M-ARVRAT: 95 ML/MIN/1.73M2 (ref 60–?)
GLOBULIN PLAS-MCNC: 3.8 G/DL (ref 2.8–4.4)
GLUCOSE BLD-MCNC: 87 MG/DL (ref 70–99)
HCT VFR BLD AUTO: 35.3 %
HGB BLD-MCNC: 11.4 G/DL
IMM GRANULOCYTES # BLD AUTO: 0.16 X10(3) UL (ref 0–1)
IMM GRANULOCYTES NFR BLD: 1.5 %
LYMPHOCYTES # BLD AUTO: 3.94 X10(3) UL (ref 1–4)
LYMPHOCYTES NFR BLD AUTO: 37.2 %
MCH RBC QN AUTO: 36 PG (ref 26–34)
MCHC RBC AUTO-ENTMCNC: 32.3 G/DL (ref 31–37)
MCV RBC AUTO: 111.4 FL
MONOCYTES # BLD AUTO: 1.14 X10(3) UL (ref 0.1–1)
MONOCYTES NFR BLD AUTO: 10.8 %
NEUTROPHILS # BLD AUTO: 5.06 X10 (3) UL (ref 1.5–7.7)
NEUTROPHILS # BLD AUTO: 5.06 X10(3) UL (ref 1.5–7.7)
NEUTROPHILS NFR BLD AUTO: 47.7 %
OSMOLALITY SERPL CALC.SUM OF ELEC: 293 MOSM/KG (ref 275–295)
PLATELET # BLD AUTO: 117 10(3)UL (ref 150–450)
POTASSIUM SERPL-SCNC: 3.5 MMOL/L (ref 3.5–5.1)
PROT SERPL-MCNC: 6.9 G/DL (ref 6.4–8.2)
RBC # BLD AUTO: 3.17 X10(6)UL
SODIUM SERPL-SCNC: 143 MMOL/L (ref 136–145)
WBC # BLD AUTO: 10.6 X10(3) UL (ref 4–11)

## 2022-10-05 PROCEDURE — 3077F SYST BP >= 140 MM HG: CPT | Performed by: INTERNAL MEDICINE

## 2022-10-05 PROCEDURE — 3079F DIAST BP 80-89 MM HG: CPT | Performed by: INTERNAL MEDICINE

## 2022-10-05 PROCEDURE — 36415 COLL VENOUS BLD VENIPUNCTURE: CPT

## 2022-10-05 PROCEDURE — 3077F SYST BP >= 140 MM HG: CPT | Performed by: SURGERY

## 2022-10-05 PROCEDURE — 3079F DIAST BP 80-89 MM HG: CPT | Performed by: SURGERY

## 2022-10-05 PROCEDURE — 1126F AMNT PAIN NOTED NONE PRSNT: CPT | Performed by: SURGERY

## 2022-10-05 PROCEDURE — 99214 OFFICE O/P EST MOD 30 MIN: CPT | Performed by: INTERNAL MEDICINE

## 2022-10-05 PROCEDURE — 85060 BLOOD SMEAR INTERPRETATION: CPT

## 2022-10-05 PROCEDURE — 3008F BODY MASS INDEX DOCD: CPT | Performed by: SURGERY

## 2022-10-05 PROCEDURE — 80053 COMPREHEN METABOLIC PANEL: CPT

## 2022-10-05 PROCEDURE — 99215 OFFICE O/P EST HI 40 MIN: CPT | Performed by: SURGERY

## 2022-10-05 PROCEDURE — 1126F AMNT PAIN NOTED NONE PRSNT: CPT | Performed by: INTERNAL MEDICINE

## 2022-10-05 PROCEDURE — 3008F BODY MASS INDEX DOCD: CPT | Performed by: INTERNAL MEDICINE

## 2022-10-05 PROCEDURE — 85025 COMPLETE CBC W/AUTO DIFF WBC: CPT

## 2022-10-05 NOTE — TELEPHONE ENCOUNTER
----- Message from Edith Mackey PA-C sent at 10/5/2022  1:16 PM CDT -----  Regarding: repeat EGD  Hi Dr. Shay Munguia! How are you? I am Dr. Salo SUTHERLAND. Reaching out regarding a mutual patient. She was recently hospitalized at [facility] for abd pain/n/v. CT AP consistent with pneumoperitoneum likely 2/2 PUD - recommendations were a repeat EGD in 6-8 weeks. If you don't mind seeing the pt at your earliest convenience - will need your clearance prior to resuming chemotherapy. Thanks so much for your time!     [place]

## 2022-10-05 NOTE — TELEPHONE ENCOUNTER
Please have patient see me or Toyin Mckinney NP within 2-3 weeks since she needs urgent EGD prior to restarting chemo. Admission was 9/26 so have to wait 6-8 weeks for EGD -- earliest 3rd week of Nov    Can double book my wed 1 pm or 2 pm    Thank you.

## 2022-10-06 ENCOUNTER — TELEPHONE (OUTPATIENT)
Dept: INTERNAL MEDICINE CLINIC | Facility: CLINIC | Age: 72
End: 2022-10-06

## 2022-10-06 NOTE — TELEPHONE ENCOUNTER
Patient called as she has an emergent situation needing to see Dr. Cindy Mcclure. The first available appointment to see Dr. Cindy Mcclure is in January. She was told if she needs to see Dr. Cindy Mcclure sooner to reach out to her PCP and ask her if she can reach out to the GI Dept. Matteo Parker

## 2022-10-07 ENCOUNTER — APPOINTMENT (OUTPATIENT)
Dept: HEMATOLOGY/ONCOLOGY | Facility: HOSPITAL | Age: 72
End: 2022-10-07
Attending: INTERNAL MEDICINE
Payer: MEDICARE

## 2022-10-07 NOTE — TELEPHONE ENCOUNTER
Patient contacted and appointment made for 10/19/2022 at 1:00 pm at [facility] location per Dr. Jori Fowler. Patient voiced understanding. Address given.

## 2022-10-11 ENCOUNTER — OFFICE VISIT (OUTPATIENT)
Dept: INTERNAL MEDICINE CLINIC | Facility: CLINIC | Age: 72
End: 2022-10-11
Payer: MEDICARE

## 2022-10-11 VITALS
OXYGEN SATURATION: 98 % | HEIGHT: 62 IN | SYSTOLIC BLOOD PRESSURE: 134 MMHG | HEART RATE: 88 BPM | WEIGHT: 143 LBS | BODY MASS INDEX: 26.31 KG/M2 | DIASTOLIC BLOOD PRESSURE: 82 MMHG

## 2022-10-11 DIAGNOSIS — I10 PRIMARY HYPERTENSION: ICD-10-CM

## 2022-10-11 DIAGNOSIS — C18.9 ADENOCARCINOMA OF COLON (HCC): Primary | ICD-10-CM

## 2022-10-11 DIAGNOSIS — K27.9 PEPTIC ULCER: ICD-10-CM

## 2022-10-11 PROCEDURE — 99214 OFFICE O/P EST MOD 30 MIN: CPT | Performed by: INTERNAL MEDICINE

## 2022-10-11 PROCEDURE — 3008F BODY MASS INDEX DOCD: CPT | Performed by: INTERNAL MEDICINE

## 2022-10-11 PROCEDURE — 3075F SYST BP GE 130 - 139MM HG: CPT | Performed by: INTERNAL MEDICINE

## 2022-10-11 PROCEDURE — 3079F DIAST BP 80-89 MM HG: CPT | Performed by: INTERNAL MEDICINE

## 2022-10-11 RX ORDER — AMLODIPINE BESYLATE 5 MG/1
5 TABLET ORAL DAILY
Qty: 90 TABLET | Refills: 3 | Status: SHIPPED | OUTPATIENT
Start: 2022-10-11

## 2022-10-14 ENCOUNTER — HOSPITAL ENCOUNTER (OUTPATIENT)
Dept: CT IMAGING | Facility: HOSPITAL | Age: 72
Discharge: HOME | End: 2022-10-14
Attending: PHYSICIAN ASSISTANT
Payer: MEDICARE

## 2022-10-14 DIAGNOSIS — C78.7 COLON CANCER METASTASIZED TO LIVER (HCC): ICD-10-CM

## 2022-10-14 DIAGNOSIS — C18.9 COLON CANCER METASTASIZED TO LIVER (HCC): ICD-10-CM

## 2022-10-14 PROCEDURE — 74177 CT ABD & PELVIS W/CONTRAST: CPT | Performed by: PHYSICIAN ASSISTANT

## 2022-10-14 PROCEDURE — 71260 CT THORAX DX C+: CPT | Performed by: PHYSICIAN ASSISTANT

## 2022-10-19 ENCOUNTER — APPOINTMENT (OUTPATIENT)
Dept: HEMATOLOGY/ONCOLOGY | Facility: HOSPITAL | Age: 72
End: 2022-10-19
Attending: INTERNAL MEDICINE
Payer: MEDICARE

## 2022-10-19 ENCOUNTER — OFFICE VISIT (OUTPATIENT)
Dept: GASTROENTEROLOGY | Facility: CLINIC | Age: 72
End: 2022-10-19
Payer: MEDICARE

## 2022-10-19 ENCOUNTER — TELEPHONE (OUTPATIENT)
Dept: GASTROENTEROLOGY | Facility: CLINIC | Age: 72
End: 2022-10-19

## 2022-10-19 VITALS
DIASTOLIC BLOOD PRESSURE: 78 MMHG | WEIGHT: 141 LBS | HEIGHT: 62 IN | BODY MASS INDEX: 25.95 KG/M2 | SYSTOLIC BLOOD PRESSURE: 124 MMHG

## 2022-10-19 DIAGNOSIS — C18.9 METASTATIC COLON CANCER IN FEMALE (HCC): Primary | ICD-10-CM

## 2022-10-19 DIAGNOSIS — K25.5 GASTRIC ULCER WITH PERFORATION, UNSPECIFIED CHRONICITY (HCC): Primary | ICD-10-CM

## 2022-10-19 DIAGNOSIS — K25.5 GASTRIC ULCER WITH PERFORATION, UNSPECIFIED CHRONICITY (HCC): ICD-10-CM

## 2022-10-19 PROCEDURE — 3074F SYST BP LT 130 MM HG: CPT | Performed by: INTERNAL MEDICINE

## 2022-10-19 PROCEDURE — 3008F BODY MASS INDEX DOCD: CPT | Performed by: INTERNAL MEDICINE

## 2022-10-19 PROCEDURE — 3078F DIAST BP <80 MM HG: CPT | Performed by: INTERNAL MEDICINE

## 2022-10-19 PROCEDURE — 99214 OFFICE O/P EST MOD 30 MIN: CPT | Performed by: INTERNAL MEDICINE

## 2022-10-19 NOTE — PATIENT INSTRUCTIONS
Schedule EGD with MAC at hospital for follow up for perforated gastric ulcer so she can restart chemo/treatment    Continue all medications for procedure except    ** If MAC @ [facility]:     - NO alcohol, recreational drugs nor erectile dysfunction mediations 24 hours before procedure(s)   - NO herbal supplements or weight loss medications x 7 days prior to the procedure(s)    ** If MAC @ [facility] or IV twilight - continue all medications as prescribed      You have to get COVID testing done 72 hours before the procedure date

## 2022-10-19 NOTE — TELEPHONE ENCOUNTER
Scheduled for:  EGD 88731  Provider Name:  Dr. Sandra Busch  Date:  11/25/2022 - ok'd by Emmy/Rain & Dr. Sandra Busch  Location:  [address] [place] [address]  Sedation:  MAC  Time:  9:00 am, arrival 8:00 am  Prep:  NPO after midnight  Meds/Allergies Reconciled?:  Physician reviewed  Diagnosis with codes:  Gastric ulcer with perforation K25.5  Was patient informed to call insurance with codes (Y/N):  Yes  Referral sent?:  Referral was sent at the time of electronic surgical scheduling. [address] [place] [address] or [id number] notified?:  I sent an electronic request to Endo Scheduling and received a confirmation today. Medication Orders:  N/A  Misc Orders:  Patient was informed that they will need a COVID 19 test prior to their procedure. Patient verbally understood & will await a phone call from [facility] to schedule. Further instructions given by staff:  I provided patient with prep instruction sheet.

## 2022-10-20 ENCOUNTER — NURSE ONLY (OUTPATIENT)
Dept: HEMATOLOGY/ONCOLOGY | Facility: HOSPITAL | Age: 72
End: 2022-10-20
Attending: INTERNAL MEDICINE
Payer: MEDICARE

## 2022-10-20 VITALS
SYSTOLIC BLOOD PRESSURE: 124 MMHG | BODY MASS INDEX: 25.76 KG/M2 | TEMPERATURE: 98 F | RESPIRATION RATE: 16 BRPM | HEART RATE: 95 BPM | OXYGEN SATURATION: 97 % | WEIGHT: 140 LBS | HEIGHT: 62 IN | DIASTOLIC BLOOD PRESSURE: 77 MMHG

## 2022-10-20 DIAGNOSIS — C18.7 CANCER OF SIGMOID COLON (HCC): ICD-10-CM

## 2022-10-20 DIAGNOSIS — C18.7 CANCER OF SIGMOID COLON (HCC): Primary | ICD-10-CM

## 2022-10-20 DIAGNOSIS — C78.6 PERITONEAL CARCINOMATOSIS (HCC): ICD-10-CM

## 2022-10-20 LAB
ALBUMIN SERPL-MCNC: 3.6 G/DL (ref 3.4–5)
ALBUMIN/GLOB SERPL: 0.9 {RATIO} (ref 1–2)
ALP LIVER SERPL-CCNC: 87 U/L
ALT SERPL-CCNC: 16 U/L
ANION GAP SERPL CALC-SCNC: 8 MMOL/L (ref 0–18)
AST SERPL-CCNC: 21 U/L (ref 15–37)
BASOPHILS # BLD AUTO: 0.08 X10(3) UL (ref 0–0.2)
BASOPHILS NFR BLD AUTO: 1.4 %
BILIRUB SERPL-MCNC: 0.4 MG/DL (ref 0.1–2)
BUN BLD-MCNC: 10 MG/DL (ref 7–18)
BUN/CREAT SERPL: 14.5 (ref 10–20)
CALCIUM BLD-MCNC: 9.3 MG/DL (ref 8.5–10.1)
CEA SERPL-MCNC: 6 NG/ML (ref ?–5)
CHLORIDE SERPL-SCNC: 107 MMOL/L (ref 98–112)
CO2 SERPL-SCNC: 27 MMOL/L (ref 21–32)
CREAT BLD-MCNC: 0.69 MG/DL
DEPRECATED RDW RBC AUTO: 61.7 FL (ref 35.1–46.3)
EOSINOPHIL # BLD AUTO: 0.2 X10(3) UL (ref 0–0.7)
EOSINOPHIL NFR BLD AUTO: 3.5 %
ERYTHROCYTE [DISTWIDTH] IN BLOOD BY AUTOMATED COUNT: 14.7 % (ref 11–15)
GFR SERPLBLD BASED ON 1.73 SQ M-ARVRAT: 92 ML/MIN/1.73M2 (ref 60–?)
GLOBULIN PLAS-MCNC: 3.8 G/DL (ref 2.8–4.4)
GLUCOSE BLD-MCNC: 112 MG/DL (ref 70–99)
HCT VFR BLD AUTO: 36.5 %
HGB BLD-MCNC: 11.7 G/DL
IMM GRANULOCYTES # BLD AUTO: 0.02 X10(3) UL (ref 0–1)
IMM GRANULOCYTES NFR BLD: 0.3 %
LYMPHOCYTES # BLD AUTO: 3.55 X10(3) UL (ref 1–4)
LYMPHOCYTES NFR BLD AUTO: 62.1 %
MCH RBC QN AUTO: 35.9 PG (ref 26–34)
MCHC RBC AUTO-ENTMCNC: 32.1 G/DL (ref 31–37)
MCV RBC AUTO: 112 FL
MONOCYTES # BLD AUTO: 0.53 X10(3) UL (ref 0.1–1)
MONOCYTES NFR BLD AUTO: 9.3 %
NEUTROPHILS # BLD AUTO: 1.34 X10 (3) UL (ref 1.5–7.7)
NEUTROPHILS # BLD AUTO: 1.34 X10(3) UL (ref 1.5–7.7)
NEUTROPHILS NFR BLD AUTO: 23.4 %
OSMOLALITY SERPL CALC.SUM OF ELEC: 294 MOSM/KG (ref 275–295)
PLATELET # BLD AUTO: 142 10(3)UL (ref 150–450)
POTASSIUM SERPL-SCNC: 3.6 MMOL/L (ref 3.5–5.1)
PROT SERPL-MCNC: 7.4 G/DL (ref 6.4–8.2)
RBC # BLD AUTO: 3.26 X10(6)UL
SODIUM SERPL-SCNC: 142 MMOL/L (ref 136–145)
VIT B12 SERPL-MCNC: 1712 PG/ML (ref 193–986)
WBC # BLD AUTO: 5.7 X10(3) UL (ref 4–11)

## 2022-10-20 PROCEDURE — 82607 VITAMIN B-12: CPT

## 2022-10-20 PROCEDURE — 99215 OFFICE O/P EST HI 40 MIN: CPT | Performed by: INTERNAL MEDICINE

## 2022-10-20 PROCEDURE — 36415 COLL VENOUS BLD VENIPUNCTURE: CPT

## 2022-10-20 PROCEDURE — 82378 CARCINOEMBRYONIC ANTIGEN: CPT

## 2022-10-20 PROCEDURE — 80053 COMPREHEN METABOLIC PANEL: CPT

## 2022-10-20 PROCEDURE — 85025 COMPLETE CBC W/AUTO DIFF WBC: CPT

## 2022-10-21 ENCOUNTER — TELEPHONE (OUTPATIENT)
Dept: GASTROENTEROLOGY | Facility: CLINIC | Age: 72
End: 2022-10-21

## 2022-10-21 DIAGNOSIS — K25.5 GASTRIC ULCER WITH PERFORATION, UNSPECIFIED CHRONICITY (HCC): Primary | ICD-10-CM

## 2022-10-21 NOTE — TELEPHONE ENCOUNTER
Dr. Wenona Castleman asked to do procedure ASAP as she needs chemo    Does Dr. Daiana Gastelum or other providers have space?   She requests it be done in the first or second week at the hospital with MAC

## 2022-10-21 NOTE — TELEPHONE ENCOUNTER
Dr Aiden Marquez- Dr Liam Shaw has no availabilities until December. I do see Dr Darlin Beltrán has an opening at [facility] next Wednesday. Is it ok to schedule procedure with Dr Darlin Beltrán?

## 2022-10-24 ENCOUNTER — TELEPHONE (OUTPATIENT)
Facility: CLINIC | Age: 72
End: 2022-10-24

## 2022-10-24 ENCOUNTER — LAB ENCOUNTER (OUTPATIENT)
Dept: LAB | Facility: HOSPITAL | Age: 72
End: 2022-10-24
Attending: INTERNAL MEDICINE
Payer: MEDICARE

## 2022-10-24 DIAGNOSIS — Z20.822 ENCOUNTER FOR PREOPERATIVE SCREENING LABORATORY TESTING FOR COVID-19 VIRUS: ICD-10-CM

## 2022-10-24 DIAGNOSIS — Z01.812 ENCOUNTER FOR PREOPERATIVE SCREENING LABORATORY TESTING FOR COVID-19 VIRUS: ICD-10-CM

## 2022-10-24 LAB — SARS-COV-2 RNA RESP QL NAA+PROBE: NOT DETECTED

## 2022-10-24 NOTE — TELEPHONE ENCOUNTER
Patient is scheduled for an endoscopy 10/26. Patient is requesting an order for covid test to complete prior to procedure appointment.

## 2022-10-24 NOTE — TELEPHONE ENCOUNTER
Patient has covid test scheduled for today, order in place.      Your appointments     Date & Time Appointment Department [facility])    Oct 24, 2022 11:30 AM CDT [facility] [facility]

## 2022-10-25 ENCOUNTER — TELEPHONE (OUTPATIENT)
Dept: HEMATOLOGY/ONCOLOGY | Facility: HOSPITAL | Age: 72
End: 2022-10-25

## 2022-10-25 NOTE — TELEPHONE ENCOUNTER
George Chaves from [employer] is calling regardig the patients Pet scan and she states that since the patient has had an abdominal CT within the last three month they want to know if withdrawal of the procedure is something to be considered or if they should take it to [facility].  Tracking number for the case is [id number], Phone number is [phone] [facility] [phone]

## 2022-10-26 ENCOUNTER — TELEPHONE (OUTPATIENT)
Dept: HEMATOLOGY/ONCOLOGY | Facility: HOSPITAL | Age: 72
End: 2022-10-26

## 2022-10-26 NOTE — TELEPHONE ENCOUNTER
Spoke with Cholo García. Dr Caitlyn Cavazos is okay with her starting chemo now or waiting two weeks for the EGD then start chemo. Cholo García would rather start chemo now. Will put in request to start.

## 2022-10-31 ENCOUNTER — HOSPITAL ENCOUNTER (OUTPATIENT)
Dept: NUCLEAR MEDICINE | Facility: HOSPITAL | Age: 72
Discharge: HOME | End: 2022-10-31
Attending: INTERNAL MEDICINE
Payer: MEDICARE

## 2022-10-31 DIAGNOSIS — C18.7 CANCER OF SIGMOID COLON (HCC): ICD-10-CM

## 2022-10-31 LAB — GLUCOSE BLDC GLUCOMTR-MCNC: 79 MG/DL (ref 70–99)

## 2022-10-31 PROCEDURE — 78815 PET IMAGE W/CT SKULL-THIGH: CPT | Performed by: INTERNAL MEDICINE

## 2022-10-31 PROCEDURE — 82962 GLUCOSE BLOOD TEST: CPT

## 2022-11-02 ENCOUNTER — TELEPHONE (OUTPATIENT)
Dept: HEMATOLOGY/ONCOLOGY | Facility: HOSPITAL | Age: 72
End: 2022-11-02

## 2022-11-02 NOTE — TELEPHONE ENCOUNTER
Informed patient of added lab appointment tomorrow at 7:45am. Patient stated understanding and thanked me for calling.

## 2022-11-03 ENCOUNTER — OFFICE VISIT (OUTPATIENT)
Dept: HEMATOLOGY/ONCOLOGY | Facility: HOSPITAL | Age: 72
End: 2022-11-03
Attending: INTERNAL MEDICINE
Payer: MEDICARE

## 2022-11-03 ENCOUNTER — TELEPHONE (OUTPATIENT)
Dept: GASTROENTEROLOGY | Facility: CLINIC | Age: 72
End: 2022-11-03

## 2022-11-03 VITALS
TEMPERATURE: 98 F | OXYGEN SATURATION: 98 % | BODY MASS INDEX: 26.13 KG/M2 | SYSTOLIC BLOOD PRESSURE: 156 MMHG | WEIGHT: 142 LBS | HEART RATE: 83 BPM | DIASTOLIC BLOOD PRESSURE: 85 MMHG | HEIGHT: 62 IN | RESPIRATION RATE: 16 BRPM

## 2022-11-03 DIAGNOSIS — Z51.11 CHEMOTHERAPY MANAGEMENT, ENCOUNTER FOR: Primary | ICD-10-CM

## 2022-11-03 DIAGNOSIS — C18.7 CANCER OF SIGMOID COLON (HCC): Primary | ICD-10-CM

## 2022-11-03 DIAGNOSIS — C18.7 CANCER OF SIGMOID COLON (HCC): ICD-10-CM

## 2022-11-03 DIAGNOSIS — D70.1 CHEMOTHERAPY INDUCED NEUTROPENIA (HCC): ICD-10-CM

## 2022-11-03 DIAGNOSIS — K25.5 GASTRIC ULCER WITH PERFORATION, UNSPECIFIED CHRONICITY (HCC): Primary | ICD-10-CM

## 2022-11-03 DIAGNOSIS — T45.1X5A CHEMOTHERAPY INDUCED NEUTROPENIA (HCC): Primary | ICD-10-CM

## 2022-11-03 DIAGNOSIS — D70.1 CHEMOTHERAPY INDUCED NEUTROPENIA (HCC): Primary | ICD-10-CM

## 2022-11-03 DIAGNOSIS — Z51.11 CHEMOTHERAPY MANAGEMENT, ENCOUNTER FOR: ICD-10-CM

## 2022-11-03 DIAGNOSIS — T45.1X5A CHEMOTHERAPY INDUCED NEUTROPENIA (HCC): ICD-10-CM

## 2022-11-03 LAB
ALBUMIN SERPL-MCNC: 3.3 G/DL (ref 3.4–5)
ALBUMIN/GLOB SERPL: 0.9 {RATIO} (ref 1–2)
ALP LIVER SERPL-CCNC: 86 U/L
ALT SERPL-CCNC: 13 U/L
ANION GAP SERPL CALC-SCNC: 7 MMOL/L (ref 0–18)
AST SERPL-CCNC: 20 U/L (ref 15–37)
BASOPHILS # BLD AUTO: 0.09 X10(3) UL (ref 0–0.2)
BASOPHILS NFR BLD AUTO: 2.1 %
BILIRUB SERPL-MCNC: 0.3 MG/DL (ref 0.1–2)
BUN BLD-MCNC: 13 MG/DL (ref 7–18)
BUN/CREAT SERPL: 22 (ref 10–20)
CALCIUM BLD-MCNC: 8.8 MG/DL (ref 8.5–10.1)
CHLORIDE SERPL-SCNC: 110 MMOL/L (ref 98–112)
CO2 SERPL-SCNC: 28 MMOL/L (ref 21–32)
CREAT BLD-MCNC: 0.59 MG/DL
DEPRECATED RDW RBC AUTO: 56.6 FL (ref 35.1–46.3)
EOSINOPHIL # BLD AUTO: 0.42 X10(3) UL (ref 0–0.7)
EOSINOPHIL NFR BLD AUTO: 9.7 %
ERYTHROCYTE [DISTWIDTH] IN BLOOD BY AUTOMATED COUNT: 13.7 % (ref 11–15)
GFR SERPLBLD BASED ON 1.73 SQ M-ARVRAT: 96 ML/MIN/1.73M2 (ref 60–?)
GLOBULIN PLAS-MCNC: 3.5 G/DL (ref 2.8–4.4)
GLUCOSE BLD-MCNC: 99 MG/DL (ref 70–99)
HCT VFR BLD AUTO: 35.1 %
HGB BLD-MCNC: 11.3 G/DL
IMM GRANULOCYTES # BLD AUTO: 0 X10(3) UL (ref 0–1)
IMM GRANULOCYTES NFR BLD: 0 %
LYMPHOCYTES # BLD AUTO: 2.36 X10(3) UL (ref 1–4)
LYMPHOCYTES NFR BLD AUTO: 54.3 %
MCH RBC QN AUTO: 35.8 PG (ref 26–34)
MCHC RBC AUTO-ENTMCNC: 32.2 G/DL (ref 31–37)
MCV RBC AUTO: 111.1 FL
MONOCYTES # BLD AUTO: 0.42 X10(3) UL (ref 0.1–1)
MONOCYTES NFR BLD AUTO: 9.7 %
NEUTROPHILS # BLD AUTO: 1.06 X10 (3) UL (ref 1.5–7.7)
NEUTROPHILS # BLD AUTO: 1.06 X10(3) UL (ref 1.5–7.7)
NEUTROPHILS NFR BLD AUTO: 24.2 %
OSMOLALITY SERPL CALC.SUM OF ELEC: 300 MOSM/KG (ref 275–295)
PLATELET # BLD AUTO: 140 10(3)UL (ref 150–450)
POTASSIUM SERPL-SCNC: 3.8 MMOL/L (ref 3.5–5.1)
PROT SERPL-MCNC: 6.8 G/DL (ref 6.4–8.2)
RBC # BLD AUTO: 3.16 X10(6)UL
SODIUM SERPL-SCNC: 145 MMOL/L (ref 136–145)
WBC # BLD AUTO: 4.4 X10(3) UL (ref 4–11)

## 2022-11-03 PROCEDURE — 80053 COMPREHEN METABOLIC PANEL: CPT

## 2022-11-03 PROCEDURE — 99215 OFFICE O/P EST HI 40 MIN: CPT | Performed by: INTERNAL MEDICINE

## 2022-11-03 PROCEDURE — 96375 TX/PRO/DX INJ NEW DRUG ADDON: CPT

## 2022-11-03 PROCEDURE — 96413 CHEMO IV INFUSION 1 HR: CPT

## 2022-11-03 PROCEDURE — 96415 CHEMO IV INFUSION ADDL HR: CPT

## 2022-11-03 PROCEDURE — 85025 COMPLETE CBC W/AUTO DIFF WBC: CPT

## 2022-11-03 RX ORDER — FLUOROURACIL 50 MG/ML
2400 INJECTION, SOLUTION INTRAVENOUS CONTINUOUS
Status: DISCONTINUED | OUTPATIENT
Start: 2022-11-03 | End: 2022-11-03

## 2022-11-03 RX ORDER — FLUOROURACIL 50 MG/ML
2400 INJECTION, SOLUTION INTRAVENOUS CONTINUOUS
Status: CANCELLED | OUTPATIENT
Start: 2022-11-03

## 2022-11-03 RX ADMIN — FLUOROURACIL 4050 MG: 50 INJECTION, SOLUTION INTRAVENOUS at 13:15:00

## 2022-11-03 NOTE — TELEPHONE ENCOUNTER
Patient schedule EGD with MAC with me on 11/25  Discussed with Dr. Rachna Novak and wants to see if duodenum and stomach clear to see if chemo can be resume    Schedule with prior orders

## 2022-11-03 NOTE — PROGRESS NOTES
Pt here for C13D1 FOLFOX. Arrives Ambulating independently, accompanied by Self from MD visit. Pregnancy screening: Not applicable    Modifications in dose or schedule: Yes - pt was off treatment due to stomach ulcer. ANC 1.06, ok to treat from [facility]    Drugs/infusions dual verified for appearance and physical integrity. IV pump settings were dual verified: yes    Port accessed previously in lab - present blood return. Frequency of blood return and site check throughout administration: Prior to administration, Prior to each drug and At completion of therapy      Appeared to tolerate infusion, no signs and symptoms of adverse reaction noted. CADD pump connected and running. All connections reinforced with tape. Port access is clean and dry, secured with steri strips and tegaderm dressing. Patient verbalized understanding of CADD pump instructions, including troubleshooting. Discharged to Home, Ambulating independently with future appointments scheduled. Copy of AVS provided.     Outpatient Oncology Care Plan  Problem list:  fatigue  knowledge deficit  Problems related to:    self care  side effect of treatment  Interventions:  encourage activity as tolerated  promoted rest  provided general teaching  Expected outcomes:  adequate sleep/rest  symptoms relieved/minimized  understands plan of care  verbalize how to care for self  Progress towards outcome:  making progress    Education Record    Learner:  Patient  Barriers / Limitations:  None  Method:  Reinforcement  Outcome:  Shows understanding

## 2022-11-05 ENCOUNTER — NURSE ONLY (OUTPATIENT)
Dept: HEMATOLOGY/ONCOLOGY | Facility: HOSPITAL | Age: 72
End: 2022-11-05
Attending: INTERNAL MEDICINE
Payer: MEDICARE

## 2022-11-05 VITALS
RESPIRATION RATE: 16 BRPM | TEMPERATURE: 98 F | DIASTOLIC BLOOD PRESSURE: 73 MMHG | SYSTOLIC BLOOD PRESSURE: 157 MMHG | OXYGEN SATURATION: 99 % | HEART RATE: 91 BPM

## 2022-11-05 DIAGNOSIS — Z45.2 ENCOUNTER FOR CENTRAL LINE CARE: ICD-10-CM

## 2022-11-05 DIAGNOSIS — T45.1X5A CHEMOTHERAPY INDUCED NEUTROPENIA (HCC): Primary | ICD-10-CM

## 2022-11-05 DIAGNOSIS — D70.1 CHEMOTHERAPY INDUCED NEUTROPENIA (HCC): Primary | ICD-10-CM

## 2022-11-05 DIAGNOSIS — Z79.899 ENCOUNTER FOR MEDICATION MANAGEMENT: ICD-10-CM

## 2022-11-05 DIAGNOSIS — Z51.11 CHEMOTHERAPY MANAGEMENT, ENCOUNTER FOR: ICD-10-CM

## 2022-11-05 DIAGNOSIS — C18.7 CANCER OF SIGMOID COLON (HCC): ICD-10-CM

## 2022-11-05 PROCEDURE — 96372 THER/PROPH/DIAG INJ SC/IM: CPT

## 2022-11-05 PROCEDURE — 96523 IRRIG DRUG DELIVERY DEVICE: CPT

## 2022-11-05 RX ORDER — HEPARIN SODIUM (PORCINE) LOCK FLUSH IV SOLN 100 UNIT/ML 100 UNIT/ML
500 SOLUTION INTRAVENOUS ONCE
OUTPATIENT
Start: 2022-11-05

## 2022-11-05 RX ORDER — HEPARIN SODIUM (PORCINE) LOCK FLUSH IV SOLN 100 UNIT/ML 100 UNIT/ML
500 SOLUTION INTRAVENOUS ONCE
Status: COMPLETED | OUTPATIENT
Start: 2022-11-05 | End: 2022-11-05

## 2022-11-05 RX ORDER — HEPARIN SODIUM (PORCINE) LOCK FLUSH IV SOLN 100 UNIT/ML 100 UNIT/ML
SOLUTION INTRAVENOUS
Status: COMPLETED
Start: 2022-11-05 | End: 2022-11-05

## 2022-11-05 RX ADMIN — HEPARIN SODIUM (PORCINE) LOCK FLUSH IV SOLN 100 UNIT/ML 500 UNITS: 100 SOLUTION INTRAVENOUS at 10:00:00

## 2022-11-05 NOTE — PROGRESS NOTES
Patient presented with CADD pump connected. Reservoir empty. Disconnected CADD pump, flushed port with 0.9% Normal Saline and established blood return in port. Flushed with 500 units of Heparin and de-accessed port. Gauze and paper tape applied to port site.      Neulasta  given left upper arm, tolerated well  2x2 gauze/ paper tape to site    Discharged stable to home with future appts   Gait steady, indep

## 2022-11-09 RX ORDER — PANTOPRAZOLE SODIUM 40 MG/1
40 TABLET, DELAYED RELEASE ORAL
Qty: 90 TABLET | Refills: 3 | Status: SHIPPED | OUTPATIENT
Start: 2022-11-09

## 2022-11-09 NOTE — TELEPHONE ENCOUNTER
Dr. Castrejon Bud -    Patient scheduled back on for 11/25 for EGD. When I spoke with her, she asked if you could send another script for Pantoprazole to the [place] on file. She has run out and would like a refill. Thank you!

## 2022-11-16 ENCOUNTER — OFFICE VISIT (OUTPATIENT)
Dept: HEMATOLOGY/ONCOLOGY | Facility: HOSPITAL | Age: 72
End: 2022-11-16
Attending: PHYSICIAN ASSISTANT
Payer: MEDICARE

## 2022-11-16 ENCOUNTER — NURSE ONLY (OUTPATIENT)
Dept: HEMATOLOGY/ONCOLOGY | Facility: HOSPITAL | Age: 72
End: 2022-11-16
Attending: INTERNAL MEDICINE
Payer: MEDICARE

## 2022-11-16 VITALS
RESPIRATION RATE: 16 BRPM | WEIGHT: 142.63 LBS | DIASTOLIC BLOOD PRESSURE: 77 MMHG | HEART RATE: 88 BPM | TEMPERATURE: 98 F | SYSTOLIC BLOOD PRESSURE: 142 MMHG | OXYGEN SATURATION: 97 % | HEIGHT: 62 IN | BODY MASS INDEX: 26.25 KG/M2

## 2022-11-16 DIAGNOSIS — D70.1 CHEMOTHERAPY INDUCED NEUTROPENIA (HCC): Primary | ICD-10-CM

## 2022-11-16 DIAGNOSIS — T45.1X5A CHEMOTHERAPY INDUCED NEUTROPENIA (HCC): Primary | ICD-10-CM

## 2022-11-16 DIAGNOSIS — Z51.11 CHEMOTHERAPY MANAGEMENT, ENCOUNTER FOR: ICD-10-CM

## 2022-11-16 DIAGNOSIS — C18.7 CANCER OF SIGMOID COLON (HCC): ICD-10-CM

## 2022-11-16 DIAGNOSIS — C18.7 CANCER OF SIGMOID COLON (HCC): Primary | ICD-10-CM

## 2022-11-16 LAB
ALBUMIN SERPL-MCNC: 3.5 G/DL (ref 3.4–5)
ALBUMIN/GLOB SERPL: 0.9 {RATIO} (ref 1–2)
ALP LIVER SERPL-CCNC: 127 U/L
ALT SERPL-CCNC: 14 U/L
ANION GAP SERPL CALC-SCNC: 8 MMOL/L (ref 0–18)
AST SERPL-CCNC: 16 U/L (ref 15–37)
BASOPHILS # BLD AUTO: 0.05 X10(3) UL (ref 0–0.2)
BASOPHILS NFR BLD AUTO: 0.6 %
BILIRUB SERPL-MCNC: 0.3 MG/DL (ref 0.1–2)
BUN BLD-MCNC: 12 MG/DL (ref 7–18)
BUN/CREAT SERPL: 19 (ref 10–20)
CALCIUM BLD-MCNC: 9.4 MG/DL (ref 8.5–10.1)
CEA SERPL-MCNC: 15.6 NG/ML (ref ?–5)
CHLORIDE SERPL-SCNC: 107 MMOL/L (ref 98–112)
CO2 SERPL-SCNC: 27 MMOL/L (ref 21–32)
CREAT BLD-MCNC: 0.63 MG/DL
DEPRECATED RDW RBC AUTO: 53.7 FL (ref 35.1–46.3)
EOSINOPHIL # BLD AUTO: 0.14 X10(3) UL (ref 0–0.7)
EOSINOPHIL NFR BLD AUTO: 1.6 %
ERYTHROCYTE [DISTWIDTH] IN BLOOD BY AUTOMATED COUNT: 13.6 % (ref 11–15)
GFR SERPLBLD BASED ON 1.73 SQ M-ARVRAT: 94 ML/MIN/1.73M2 (ref 60–?)
GLOBULIN PLAS-MCNC: 3.8 G/DL (ref 2.8–4.4)
GLUCOSE BLD-MCNC: 96 MG/DL (ref 70–99)
HCT VFR BLD AUTO: 37.5 %
HGB BLD-MCNC: 12.1 G/DL
IMM GRANULOCYTES # BLD AUTO: 0.06 X10(3) UL (ref 0–1)
IMM GRANULOCYTES NFR BLD: 0.7 %
LYMPHOCYTES # BLD AUTO: 3.91 X10(3) UL (ref 1–4)
LYMPHOCYTES NFR BLD AUTO: 45.9 %
MCH RBC QN AUTO: 35 PG (ref 26–34)
MCHC RBC AUTO-ENTMCNC: 32.3 G/DL (ref 31–37)
MCV RBC AUTO: 108.4 FL
MONOCYTES # BLD AUTO: 0.59 X10(3) UL (ref 0.1–1)
MONOCYTES NFR BLD AUTO: 6.9 %
NEUTROPHILS # BLD AUTO: 3.76 X10 (3) UL (ref 1.5–7.7)
NEUTROPHILS # BLD AUTO: 3.76 X10(3) UL (ref 1.5–7.7)
NEUTROPHILS NFR BLD AUTO: 44.3 %
OSMOLALITY SERPL CALC.SUM OF ELEC: 294 MOSM/KG (ref 275–295)
PLATELET # BLD AUTO: 128 10(3)UL (ref 150–450)
POTASSIUM SERPL-SCNC: 3.7 MMOL/L (ref 3.5–5.1)
PROT SERPL-MCNC: 7.3 G/DL (ref 6.4–8.2)
RBC # BLD AUTO: 3.46 X10(6)UL
SODIUM SERPL-SCNC: 142 MMOL/L (ref 136–145)
WBC # BLD AUTO: 8.5 X10(3) UL (ref 4–11)

## 2022-11-16 PROCEDURE — 96413 CHEMO IV INFUSION 1 HR: CPT

## 2022-11-16 PROCEDURE — 85025 COMPLETE CBC W/AUTO DIFF WBC: CPT

## 2022-11-16 PROCEDURE — 82378 CARCINOEMBRYONIC ANTIGEN: CPT

## 2022-11-16 PROCEDURE — 96375 TX/PRO/DX INJ NEW DRUG ADDON: CPT

## 2022-11-16 PROCEDURE — 96415 CHEMO IV INFUSION ADDL HR: CPT

## 2022-11-16 PROCEDURE — 80053 COMPREHEN METABOLIC PANEL: CPT

## 2022-11-16 PROCEDURE — 99215 OFFICE O/P EST HI 40 MIN: CPT | Performed by: INTERNAL MEDICINE

## 2022-11-16 RX ORDER — FLUOROURACIL 50 MG/ML
2400 INJECTION, SOLUTION INTRAVENOUS CONTINUOUS
Status: DISCONTINUED | OUTPATIENT
Start: 2022-11-16 | End: 2022-11-16

## 2022-11-16 RX ORDER — FLUOROURACIL 50 MG/ML
2400 INJECTION, SOLUTION INTRAVENOUS CONTINUOUS
Status: CANCELLED | OUTPATIENT
Start: 2022-11-17

## 2022-11-16 RX ADMIN — FLUOROURACIL 4050 MG: 50 INJECTION, SOLUTION INTRAVENOUS at 14:51:00

## 2022-11-16 NOTE — PROGRESS NOTES
Pt here for C14D1 FOLFOX. Arrives Ambulating independently, accompanied by Self from MD visit. Pregnancy screening: Not applicable    Modifications in dose or schedule: no      Drugs/infusions dual verified for appearance and physical integrity. IV pump settings were dual verified: yes    Port accessed previously in lab - present blood return. Frequency of blood return and site check throughout administration: Prior to administration, Prior to each drug and At completion of therapy      Appeared to tolerate infusion, no signs and symptoms of adverse reaction noted. CADD pump connected and running. All connections reinforced with tape. Port access is clean and dry, secured with steri strips and tegaderm dressing. Patient verbalized understanding of CADD pump instructions, including troubleshooting. Discharged to Home, Ambulating independently with future appointments scheduled. Copy of AVS provided.     Outpatient Oncology Care Plan  Problem list:  fatigue  knowledge deficit  Problems related to:    self care  side effect of treatment  Interventions:  encourage activity as tolerated  promoted rest  provided general teaching  Expected outcomes:  adequate sleep/rest  symptoms relieved/minimized  understands plan of care  verbalize how to care for self  Progress towards outcome:  making progress    Education Record    Learner:  Patient  Barriers / Limitations:  None  Method:  Reinforcement  Outcome:  Shows understanding

## 2022-11-18 ENCOUNTER — NURSE ONLY (OUTPATIENT)
Dept: HEMATOLOGY/ONCOLOGY | Facility: HOSPITAL | Age: 72
End: 2022-11-18
Attending: INTERNAL MEDICINE
Payer: MEDICARE

## 2022-11-18 DIAGNOSIS — Z51.11 CHEMOTHERAPY MANAGEMENT, ENCOUNTER FOR: ICD-10-CM

## 2022-11-18 DIAGNOSIS — D70.1 CHEMOTHERAPY INDUCED NEUTROPENIA (HCC): Primary | ICD-10-CM

## 2022-11-18 DIAGNOSIS — Z79.899 ENCOUNTER FOR MEDICATION MANAGEMENT: ICD-10-CM

## 2022-11-18 DIAGNOSIS — C18.7 CANCER OF SIGMOID COLON (HCC): ICD-10-CM

## 2022-11-18 DIAGNOSIS — Z45.2 ENCOUNTER FOR CENTRAL LINE CARE: ICD-10-CM

## 2022-11-18 DIAGNOSIS — T45.1X5A CHEMOTHERAPY INDUCED NEUTROPENIA (HCC): Primary | ICD-10-CM

## 2022-11-18 PROCEDURE — 96372 THER/PROPH/DIAG INJ SC/IM: CPT

## 2022-11-18 RX ORDER — HEPARIN SODIUM (PORCINE) LOCK FLUSH IV SOLN 100 UNIT/ML 100 UNIT/ML
5 SOLUTION INTRAVENOUS ONCE
Status: CANCELLED | OUTPATIENT
Start: 2022-11-18

## 2022-11-18 RX ORDER — HEPARIN SODIUM (PORCINE) LOCK FLUSH IV SOLN 100 UNIT/ML 100 UNIT/ML
5 SOLUTION INTRAVENOUS ONCE
Status: COMPLETED | OUTPATIENT
Start: 2022-11-18 | End: 2022-11-18

## 2022-11-18 RX ORDER — SODIUM CHLORIDE 9 MG/ML
10 INJECTION INTRAVENOUS ONCE
OUTPATIENT
Start: 2022-11-18

## 2022-11-18 RX ORDER — SODIUM CHLORIDE 9 MG/ML
10 INJECTION INTRAVENOUS ONCE
Status: CANCELLED | OUTPATIENT
Start: 2022-11-18

## 2022-11-18 RX ORDER — HEPARIN SODIUM (PORCINE) LOCK FLUSH IV SOLN 100 UNIT/ML 100 UNIT/ML
5 SOLUTION INTRAVENOUS ONCE
OUTPATIENT
Start: 2022-11-18

## 2022-11-18 RX ADMIN — HEPARIN SODIUM (PORCINE) LOCK FLUSH IV SOLN 100 UNIT/ML 500 UNITS: 100 SOLUTION INTRAVENOUS at 09:09:00

## 2022-11-18 NOTE — PROGRESS NOTES
Patient presented with CADD pump connected. Reservoir 6.2ml remain but appears very empty, alarming n=\"no disposable won't run\" since 0800. Disconnected CADD pump, flushed port with 0.9% Normal Saline and established blood return in port. Flushed with 500 units of Heparin and de-accessed port. Gauze and paper tape applied to port site.      Denies other complaints/ concerns    Day 3 Neulasta subcutaneous given left upper arm, tolerated well  2x2 gauze/ paper tape to site    Discharged stable to home with future appts   Gait steady, indep

## 2022-11-22 ENCOUNTER — LAB ENCOUNTER (OUTPATIENT)
Dept: LAB | Facility: HOSPITAL | Age: 72
End: 2022-11-22
Attending: INTERNAL MEDICINE
Payer: MEDICARE

## 2022-11-22 DIAGNOSIS — Z01.818 PRE-OP TESTING: ICD-10-CM

## 2022-11-22 LAB — SARS-COV-2 RNA RESP QL NAA+PROBE: NOT DETECTED

## 2022-11-25 ENCOUNTER — ANESTHESIA (OUTPATIENT)
Dept: ENDOSCOPY | Facility: HOSPITAL | Age: 72
End: 2022-11-25
Payer: MEDICARE

## 2022-11-25 ENCOUNTER — HOSPITAL ENCOUNTER (OUTPATIENT)
Facility: HOSPITAL | Age: 72
Setting detail: HOSPITAL OUTPATIENT SURGERY
Discharge: HOME | End: 2022-11-25
Attending: INTERNAL MEDICINE | Admitting: INTERNAL MEDICINE
Payer: MEDICARE

## 2022-11-25 ENCOUNTER — ANESTHESIA EVENT (OUTPATIENT)
Dept: ENDOSCOPY | Facility: HOSPITAL | Age: 72
End: 2022-11-25
Payer: MEDICARE

## 2022-11-25 VITALS
RESPIRATION RATE: 15 BRPM | DIASTOLIC BLOOD PRESSURE: 80 MMHG | HEIGHT: 60 IN | BODY MASS INDEX: 27.88 KG/M2 | SYSTOLIC BLOOD PRESSURE: 135 MMHG | WEIGHT: 142 LBS | HEART RATE: 73 BPM | TEMPERATURE: 98 F | OXYGEN SATURATION: 96 %

## 2022-11-25 DIAGNOSIS — K25.5 GASTRIC ULCER WITH PERFORATION, UNSPECIFIED CHRONICITY (HCC): ICD-10-CM

## 2022-11-25 DIAGNOSIS — Z01.818 PRE-OP TESTING: Primary | ICD-10-CM

## 2022-11-25 PROCEDURE — 0DB68ZX EXCISION OF STOMACH, VIA NATURAL OR ARTIFICIAL OPENING ENDOSCOPIC, DIAGNOSTIC: ICD-10-PCS | Performed by: INTERNAL MEDICINE

## 2022-11-25 PROCEDURE — 43239 EGD BIOPSY SINGLE/MULTIPLE: CPT | Performed by: INTERNAL MEDICINE

## 2022-11-25 RX ORDER — SODIUM CHLORIDE, SODIUM LACTATE, POTASSIUM CHLORIDE, CALCIUM CHLORIDE 600; 310; 30; 20 MG/100ML; MG/100ML; MG/100ML; MG/100ML
INJECTION, SOLUTION INTRAVENOUS CONTINUOUS
Status: DISCONTINUED | OUTPATIENT
Start: 2022-11-25 | End: 2022-11-25

## 2022-11-25 RX ORDER — LIDOCAINE HYDROCHLORIDE 10 MG/ML
INJECTION, SOLUTION EPIDURAL; INFILTRATION; INTRACAUDAL; PERINEURAL AS NEEDED
Status: DISCONTINUED | OUTPATIENT
Start: 2022-11-25 | End: 2022-11-25 | Stop reason: SURG

## 2022-11-25 RX ORDER — NALOXONE HYDROCHLORIDE 0.4 MG/ML
80 INJECTION, SOLUTION INTRAMUSCULAR; INTRAVENOUS; SUBCUTANEOUS AS NEEDED
Status: DISCONTINUED | OUTPATIENT
Start: 2022-11-25 | End: 2022-11-25

## 2022-11-25 RX ADMIN — SODIUM CHLORIDE, SODIUM LACTATE, POTASSIUM CHLORIDE, CALCIUM CHLORIDE: 600; 310; 30; 20 INJECTION, SOLUTION INTRAVENOUS at 08:32:00

## 2022-11-25 RX ADMIN — SODIUM CHLORIDE, SODIUM LACTATE, POTASSIUM CHLORIDE, CALCIUM CHLORIDE: 600; 310; 30; 20 INJECTION, SOLUTION INTRAVENOUS at 08:44:00

## 2022-11-25 RX ADMIN — LIDOCAINE HYDROCHLORIDE 50 MG: 10 INJECTION, SOLUTION EPIDURAL; INFILTRATION; INTRACAUDAL; PERINEURAL at 08:35:00

## 2022-11-25 NOTE — PRE-SEDATION ASSESSMENT
Physician Pre-Sedation Assessment    Pre-Sedation Assessment:    Sedation History: Previous Sedation with No Complications and Airway Assessed    Cardiac: normal S1, S2  Respiratory: breath sounds clear bilaterally   Abdomen: soft, BS (+), non-tender    ASA Classification: 3.  Patient with severe systemic disease    Plan: MAC sedation

## 2022-11-25 NOTE — DISCHARGE INSTRUCTIONS

## 2022-11-25 NOTE — OPERATIVE REPORT
ESOPHAGOGASTRODUODENOSCOPY (EGD) REPORT    Sharon Dsouza     1950 Age 67year old   PCP Evie Rizzo MD Endoscopist Deborah Wisdom MD       Date of procedure: 22    Procedure: EGD w/biopsies    Pre-operative diagnosis: history of free air/perforation    Post-operative diagnosis: No ulcer seen, mild gastric erythema- biopsies taken    Medications: MAC sedation    Complications: none    Procedure:  Informed consent was obtained from the patient after the risks of the procedure were discussed, including but not limited to bleeding, perforation, aspiration, infection, or possibility of a missed lesion. After discussions of the risks/benefits and alternatives to this procedure, as well as the planned sedation, the patient was placed in the left lateral decubitus position and begun on continuous blood pressure pulse oximetry and EKG monitoring and this was maintained throughout the procedure. Once an adequate level of sedation was obtained a bite block was placed. Then the lubricated tip of the [id number] diagnostic video upper endoscope was inserted and advanced using direct visualization into the posterior pharynx and ultimately into the esophagus. Complications: None    Findings:      1. Esophagus: The squamocolumnar junction was noted at 38 cm and appeared regular. The GE junction was noted at 38 cm from the incisors. No significant hiatal hernia. The esophageal mucosa appeared normal. There was no endoscopic findings of esophagitis, stricture or Vazquez's esophagus. 2. Stomach: The stomach distended normally. Normal rugal folds were seen. The pylorus was patent. The gastric mucosa appeared erythematous so biopsies taken. Retroflexion revealed a normal fundus and a normal-patulous cardia. 3. Duodenum: The duodenal mucosa appeared normal in the 1st and 2nd portion of the duodenum. Impression:  1. No obvious etiology for perforation/free air    Recommend:  1.  Here are some reflux precautions:   - Avoid trigger foods  - Anti-reflux measures: raising the head of the bed at night, avoiding tight clothing or belts, avoiding eating late at night and not lying down shortly after mealtime and achieving weight loss   - Avoid NSAID's, caffeine, peppermints, alcohol, tobacco and foods that incite symptoms   2. Continue PPI  3. Follow up with Dr. Justin Cano    >>>If tissue was sampled/removed and you have not received your pathology results either by phone or letter within 2 weeks, please call our office at [phone].     Specimens:  Gastric biopsies

## 2022-11-29 ENCOUNTER — APPOINTMENT (OUTPATIENT)
Dept: HEMATOLOGY/ONCOLOGY | Facility: HOSPITAL | Age: 72
End: 2022-11-29
Attending: INTERNAL MEDICINE
Payer: MEDICARE

## 2022-11-30 ENCOUNTER — APPOINTMENT (OUTPATIENT)
Dept: HEMATOLOGY/ONCOLOGY | Facility: HOSPITAL | Age: 72
End: 2022-11-30
Attending: INTERNAL MEDICINE
Payer: MEDICARE

## 2022-11-30 VITALS
TEMPERATURE: 98 F | WEIGHT: 138 LBS | HEIGHT: 62 IN | HEART RATE: 84 BPM | OXYGEN SATURATION: 100 % | RESPIRATION RATE: 16 BRPM | BODY MASS INDEX: 25.4 KG/M2 | SYSTOLIC BLOOD PRESSURE: 150 MMHG | DIASTOLIC BLOOD PRESSURE: 88 MMHG

## 2022-11-30 DIAGNOSIS — D70.1 CHEMOTHERAPY INDUCED NEUTROPENIA (HCC): Primary | ICD-10-CM

## 2022-11-30 DIAGNOSIS — Z51.11 CHEMOTHERAPY MANAGEMENT, ENCOUNTER FOR: ICD-10-CM

## 2022-11-30 DIAGNOSIS — C18.7 CANCER OF SIGMOID COLON (HCC): Primary | ICD-10-CM

## 2022-11-30 DIAGNOSIS — D70.1 CHEMOTHERAPY INDUCED NEUTROPENIA (HCC): ICD-10-CM

## 2022-11-30 DIAGNOSIS — T45.1X5A CHEMOTHERAPY INDUCED NEUTROPENIA (HCC): Primary | ICD-10-CM

## 2022-11-30 DIAGNOSIS — C18.7 CANCER OF SIGMOID COLON (HCC): ICD-10-CM

## 2022-11-30 DIAGNOSIS — T45.1X5A CHEMOTHERAPY INDUCED NEUTROPENIA (HCC): ICD-10-CM

## 2022-11-30 LAB
ALBUMIN SERPL-MCNC: 3.6 G/DL (ref 3.4–5)
ALBUMIN/GLOB SERPL: 0.9 {RATIO} (ref 1–2)
ALP LIVER SERPL-CCNC: 141 U/L
ALT SERPL-CCNC: 18 U/L
ANION GAP SERPL CALC-SCNC: 6 MMOL/L (ref 0–18)
AST SERPL-CCNC: 20 U/L (ref 15–37)
BASOPHILS # BLD AUTO: 0.05 X10(3) UL (ref 0–0.2)
BASOPHILS NFR BLD AUTO: 0.6 %
BILIRUB SERPL-MCNC: 0.2 MG/DL (ref 0.1–2)
BUN BLD-MCNC: 10 MG/DL (ref 7–18)
BUN/CREAT SERPL: 15.6 (ref 10–20)
CALCIUM BLD-MCNC: 9.3 MG/DL (ref 8.5–10.1)
CEA SERPL-MCNC: 24 NG/ML (ref ?–5)
CHLORIDE SERPL-SCNC: 107 MMOL/L (ref 98–112)
CO2 SERPL-SCNC: 29 MMOL/L (ref 21–32)
CREAT BLD-MCNC: 0.64 MG/DL
DEPRECATED RDW RBC AUTO: 55.7 FL (ref 35.1–46.3)
EOSINOPHIL # BLD AUTO: 0.09 X10(3) UL (ref 0–0.7)
EOSINOPHIL NFR BLD AUTO: 1.1 %
ERYTHROCYTE [DISTWIDTH] IN BLOOD BY AUTOMATED COUNT: 13.9 % (ref 11–15)
FASTING STATUS PATIENT QL REPORTED: NO
GFR SERPLBLD BASED ON 1.73 SQ M-ARVRAT: 94 ML/MIN/1.73M2 (ref 60–?)
GLOBULIN PLAS-MCNC: 3.8 G/DL (ref 2.8–4.4)
GLUCOSE BLD-MCNC: 95 MG/DL (ref 70–99)
HCT VFR BLD AUTO: 38.5 %
HGB BLD-MCNC: 12.5 G/DL
IMM GRANULOCYTES # BLD AUTO: 0.06 X10(3) UL (ref 0–1)
IMM GRANULOCYTES NFR BLD: 0.8 %
LYMPHOCYTES # BLD AUTO: 3.98 X10(3) UL (ref 1–4)
LYMPHOCYTES NFR BLD AUTO: 50.4 %
MCH RBC QN AUTO: 35.2 PG (ref 26–34)
MCHC RBC AUTO-ENTMCNC: 32.5 G/DL (ref 31–37)
MCV RBC AUTO: 108.5 FL
MONOCYTES # BLD AUTO: 0.8 X10(3) UL (ref 0.1–1)
MONOCYTES NFR BLD AUTO: 10.1 %
NEUTROPHILS # BLD AUTO: 2.91 X10 (3) UL (ref 1.5–7.7)
NEUTROPHILS # BLD AUTO: 2.91 X10(3) UL (ref 1.5–7.7)
NEUTROPHILS NFR BLD AUTO: 37 %
OSMOLALITY SERPL CALC.SUM OF ELEC: 293 MOSM/KG (ref 275–295)
PLATELET # BLD AUTO: 146 10(3)UL (ref 150–450)
POTASSIUM SERPL-SCNC: 4 MMOL/L (ref 3.5–5.1)
PROT SERPL-MCNC: 7.4 G/DL (ref 6.4–8.2)
RBC # BLD AUTO: 3.55 X10(6)UL
SODIUM SERPL-SCNC: 142 MMOL/L (ref 136–145)
WBC # BLD AUTO: 7.9 X10(3) UL (ref 4–11)

## 2022-11-30 PROCEDURE — 99215 OFFICE O/P EST HI 40 MIN: CPT | Performed by: INTERNAL MEDICINE

## 2022-11-30 PROCEDURE — 96417 CHEMO IV INFUS EACH ADDL SEQ: CPT

## 2022-11-30 PROCEDURE — 96413 CHEMO IV INFUSION 1 HR: CPT

## 2022-11-30 PROCEDURE — 96375 TX/PRO/DX INJ NEW DRUG ADDON: CPT

## 2022-11-30 PROCEDURE — 96415 CHEMO IV INFUSION ADDL HR: CPT

## 2022-11-30 RX ORDER — FLUOROURACIL 50 MG/ML
2400 INJECTION, SOLUTION INTRAVENOUS CONTINUOUS
Status: DISCONTINUED | OUTPATIENT
Start: 2022-11-30 | End: 2022-11-30

## 2022-11-30 RX ORDER — FLUOROURACIL 50 MG/ML
2400 INJECTION, SOLUTION INTRAVENOUS CONTINUOUS
Status: CANCELLED | OUTPATIENT
Start: 2022-12-01

## 2022-11-30 RX ADMIN — FLUOROURACIL 3900 MG: 50 INJECTION, SOLUTION INTRAVENOUS at 17:39:00

## 2022-11-30 NOTE — PROGRESS NOTES
Pt here for C15D1 FOLFOX     Arrives Ambulating independently, accompanied by Self from MD visit. Pregnancy screening: Not applicable    Modifications in dose or schedule: Yes    Dose reduction oxaliplatin due to increased PN     Drugs/infusions dual verified for appearance and physical integrity. IV pump settings were dual verified: yes    Port accessed previously in lab - present blood return. Frequency of blood return and site check throughout administration: Prior to administration, Prior to each drug and At completion of therapy      Appeared to tolerate infusion, no signs and symptoms of adverse reaction noted. CADD pump connected and running. All connections reinforced with tape. Port access is clean and dry, secured with steri strips and tegaderm dressing. Patient verbalized understanding of CADD pump instructions, including troubleshooting. Discharged to Home, Ambulating independently with future appointments scheduled. Copy of AVS provided.     Outpatient Oncology Care Plan  Problem list:  fatigue  knowledge deficit  Problems related to:    self care  side effect of treatment  Interventions:  encourage activity as tolerated  promoted rest  provided general teaching  Expected outcomes:  adequate sleep/rest  symptoms relieved/minimized  understands plan of care  verbalize how to care for self  Progress towards outcome:  making progress    Education Record    Learner:  Patient  Barriers / Limitations:  None  Method:  Reinforcement  Outcome:  Shows understanding

## 2022-12-02 ENCOUNTER — NURSE ONLY (OUTPATIENT)
Dept: HEMATOLOGY/ONCOLOGY | Facility: HOSPITAL | Age: 72
End: 2022-12-02
Attending: INTERNAL MEDICINE
Payer: MEDICARE

## 2022-12-02 DIAGNOSIS — C18.7 CANCER OF SIGMOID COLON (HCC): ICD-10-CM

## 2022-12-02 DIAGNOSIS — Z45.2 ENCOUNTER FOR CENTRAL LINE CARE: ICD-10-CM

## 2022-12-02 DIAGNOSIS — T45.1X5A CHEMOTHERAPY INDUCED NEUTROPENIA (HCC): Primary | ICD-10-CM

## 2022-12-02 DIAGNOSIS — D70.1 CHEMOTHERAPY INDUCED NEUTROPENIA (HCC): Primary | ICD-10-CM

## 2022-12-02 DIAGNOSIS — Z79.899 ENCOUNTER FOR MEDICATION MANAGEMENT: ICD-10-CM

## 2022-12-02 DIAGNOSIS — Z51.11 CHEMOTHERAPY MANAGEMENT, ENCOUNTER FOR: ICD-10-CM

## 2022-12-02 PROCEDURE — 96372 THER/PROPH/DIAG INJ SC/IM: CPT

## 2022-12-02 RX ORDER — HEPARIN SODIUM (PORCINE) LOCK FLUSH IV SOLN 100 UNIT/ML 100 UNIT/ML
5 SOLUTION INTRAVENOUS ONCE
OUTPATIENT
Start: 2022-12-02

## 2022-12-02 RX ORDER — HEPARIN SODIUM (PORCINE) LOCK FLUSH IV SOLN 100 UNIT/ML 100 UNIT/ML
5 SOLUTION INTRAVENOUS ONCE
Status: COMPLETED | OUTPATIENT
Start: 2022-12-02 | End: 2022-12-02

## 2022-12-02 RX ORDER — SODIUM CHLORIDE 9 MG/ML
10 INJECTION INTRAVENOUS ONCE
OUTPATIENT
Start: 2022-12-02

## 2022-12-02 RX ADMIN — HEPARIN SODIUM (PORCINE) LOCK FLUSH IV SOLN 100 UNIT/ML 500 UNITS: 100 SOLUTION INTRAVENOUS at 14:28:00

## 2022-12-02 NOTE — PROGRESS NOTES
Patient presented with CADD pump connected. Reservoir 1.8ml/ approx less than 1 hr - requests dc; pump otherwise functioning well this time. Disconnected CADD pump, flushed port with 0.9% Normal Saline and established blood return in port. Flushed with 500 units of Heparin and de-accessed port. Gauze and paper tape applied to port site. Reports continuous PN - denies dropping items or injury/ MD is aware, dose reduction per pt of oxali  Denies other complaints/ concerns  Good spirits    Day 3 Neulasta subcutaneous given left upper arm, tolerated well  2x2 gauze/ paper tape to site    Discharged stable to home with future appts - printed out AVS for her which has cycle 16 and requested more appts for c17 and 18(done).   Gait steady, indep

## 2022-12-05 ENCOUNTER — MED REC SCAN ONLY (OUTPATIENT)
Facility: CLINIC | Age: 72
End: 2022-12-05

## 2022-12-09 RX ORDER — PANTOPRAZOLE SODIUM 40 MG/1
40 TABLET, DELAYED RELEASE ORAL
Qty: 90 TABLET | Refills: 3 | Status: SHIPPED | OUTPATIENT
Start: 2022-12-09

## 2022-12-14 ENCOUNTER — NURSE ONLY (OUTPATIENT)
Dept: HEMATOLOGY/ONCOLOGY | Facility: HOSPITAL | Age: 72
End: 2022-12-14
Attending: INTERNAL MEDICINE
Payer: MEDICARE

## 2022-12-14 VITALS
SYSTOLIC BLOOD PRESSURE: 135 MMHG | BODY MASS INDEX: 25.83 KG/M2 | DIASTOLIC BLOOD PRESSURE: 75 MMHG | HEIGHT: 62 IN | HEART RATE: 87 BPM | TEMPERATURE: 98 F | RESPIRATION RATE: 16 BRPM | WEIGHT: 140.38 LBS | OXYGEN SATURATION: 97 %

## 2022-12-14 DIAGNOSIS — Z51.11 CHEMOTHERAPY MANAGEMENT, ENCOUNTER FOR: ICD-10-CM

## 2022-12-14 DIAGNOSIS — C18.7 CANCER OF SIGMOID COLON (HCC): ICD-10-CM

## 2022-12-14 DIAGNOSIS — D70.1 CHEMOTHERAPY INDUCED NEUTROPENIA (HCC): ICD-10-CM

## 2022-12-14 DIAGNOSIS — G62.0 CHEMOTHERAPY-INDUCED NEUROPATHY (HCC): ICD-10-CM

## 2022-12-14 DIAGNOSIS — T45.1X5A CHEMOTHERAPY-INDUCED NEUROPATHY (HCC): ICD-10-CM

## 2022-12-14 DIAGNOSIS — T45.1X5A CHEMOTHERAPY INDUCED NEUTROPENIA (HCC): ICD-10-CM

## 2022-12-14 DIAGNOSIS — C18.7 CANCER OF SIGMOID COLON (HCC): Primary | ICD-10-CM

## 2022-12-14 DIAGNOSIS — T45.1X5A CHEMOTHERAPY INDUCED NEUTROPENIA (HCC): Primary | ICD-10-CM

## 2022-12-14 DIAGNOSIS — D70.1 CHEMOTHERAPY INDUCED NEUTROPENIA (HCC): Primary | ICD-10-CM

## 2022-12-14 DIAGNOSIS — D69.59 THROMBOCYTOPENIA DUE TO DRUGS: ICD-10-CM

## 2022-12-14 DIAGNOSIS — T50.905A THROMBOCYTOPENIA DUE TO DRUGS: ICD-10-CM

## 2022-12-14 PROBLEM — C78.01 MALIGNANT NEOPLASM METASTATIC TO BOTH LUNGS (HCC): Status: ACTIVE | Noted: 2022-12-14

## 2022-12-14 PROBLEM — C78.02 MALIGNANT NEOPLASM METASTATIC TO BOTH LUNGS (HCC): Status: ACTIVE | Noted: 2022-12-14

## 2022-12-14 LAB
ALBUMIN SERPL-MCNC: 3.6 G/DL (ref 3.4–5)
ALBUMIN/GLOB SERPL: 1 {RATIO} (ref 1–2)
ALP LIVER SERPL-CCNC: 155 U/L
ALT SERPL-CCNC: 18 U/L
ANION GAP SERPL CALC-SCNC: 4 MMOL/L (ref 0–18)
AST SERPL-CCNC: 19 U/L (ref 15–37)
BASOPHILS # BLD AUTO: 0.05 X10(3) UL (ref 0–0.2)
BASOPHILS NFR BLD AUTO: 0.6 %
BILIRUB SERPL-MCNC: 0.3 MG/DL (ref 0.1–2)
BILIRUB UR QL: NEGATIVE
BUN BLD-MCNC: 12 MG/DL (ref 7–18)
BUN/CREAT SERPL: 19 (ref 10–20)
CALCIUM BLD-MCNC: 9.3 MG/DL (ref 8.5–10.1)
CEA SERPL-MCNC: 29.6 NG/ML (ref ?–5)
CHLORIDE SERPL-SCNC: 108 MMOL/L (ref 98–112)
CLARITY UR: CLEAR
CO2 SERPL-SCNC: 27 MMOL/L (ref 21–32)
COLOR UR: YELLOW
CREAT BLD-MCNC: 0.63 MG/DL
DEPRECATED RDW RBC AUTO: 56.8 FL (ref 35.1–46.3)
EOSINOPHIL # BLD AUTO: 0.12 X10(3) UL (ref 0–0.7)
EOSINOPHIL NFR BLD AUTO: 1.4 %
ERYTHROCYTE [DISTWIDTH] IN BLOOD BY AUTOMATED COUNT: 14.1 % (ref 11–15)
GFR SERPLBLD BASED ON 1.73 SQ M-ARVRAT: 94 ML/MIN/1.73M2 (ref 60–?)
GLOBULIN PLAS-MCNC: 3.7 G/DL (ref 2.8–4.4)
GLUCOSE BLD-MCNC: 101 MG/DL (ref 70–99)
GLUCOSE UR-MCNC: NEGATIVE MG/DL
HCT VFR BLD AUTO: 38.6 %
HGB BLD-MCNC: 12.4 G/DL
HGB UR QL STRIP.AUTO: NEGATIVE
IMM GRANULOCYTES # BLD AUTO: 0.04 X10(3) UL (ref 0–1)
IMM GRANULOCYTES NFR BLD: 0.5 %
KETONES UR-MCNC: NEGATIVE MG/DL
LEUKOCYTE ESTERASE UR QL STRIP.AUTO: NEGATIVE
LYMPHOCYTES # BLD AUTO: 4.28 X10(3) UL (ref 1–4)
LYMPHOCYTES NFR BLD AUTO: 50.6 %
MCH RBC QN AUTO: 34.8 PG (ref 26–34)
MCHC RBC AUTO-ENTMCNC: 32.1 G/DL (ref 31–37)
MCV RBC AUTO: 108.4 FL
MONOCYTES # BLD AUTO: 1 X10(3) UL (ref 0.1–1)
MONOCYTES NFR BLD AUTO: 11.8 %
NEUTROPHILS # BLD AUTO: 2.97 X10 (3) UL (ref 1.5–7.7)
NEUTROPHILS # BLD AUTO: 2.97 X10(3) UL (ref 1.5–7.7)
NEUTROPHILS NFR BLD AUTO: 35.1 %
NITRITE UR QL STRIP.AUTO: NEGATIVE
OSMOLALITY SERPL CALC.SUM OF ELEC: 288 MOSM/KG (ref 275–295)
PH UR: 5 [PH] (ref 5–8)
PLATELET # BLD AUTO: 153 10(3)UL (ref 150–450)
POTASSIUM SERPL-SCNC: 3.8 MMOL/L (ref 3.5–5.1)
PROT SERPL-MCNC: 7.3 G/DL (ref 6.4–8.2)
PROT UR-MCNC: NEGATIVE MG/DL
RBC # BLD AUTO: 3.56 X10(6)UL
SODIUM SERPL-SCNC: 139 MMOL/L (ref 136–145)
SP GR UR STRIP: 1.01 (ref 1–1.03)
UROBILINOGEN UR STRIP-ACNC: <2
VIT C UR-MCNC: NEGATIVE MG/DL
WBC # BLD AUTO: 8.5 X10(3) UL (ref 4–11)

## 2022-12-14 PROCEDURE — 96367 TX/PROPH/DG ADDL SEQ IV INF: CPT

## 2022-12-14 PROCEDURE — 81003 URINALYSIS AUTO W/O SCOPE: CPT

## 2022-12-14 PROCEDURE — 99215 OFFICE O/P EST HI 40 MIN: CPT | Performed by: INTERNAL MEDICINE

## 2022-12-14 PROCEDURE — 82378 CARCINOEMBRYONIC ANTIGEN: CPT

## 2022-12-14 PROCEDURE — 80053 COMPREHEN METABOLIC PANEL: CPT

## 2022-12-14 PROCEDURE — 85025 COMPLETE CBC W/AUTO DIFF WBC: CPT

## 2022-12-14 PROCEDURE — 96413 CHEMO IV INFUSION 1 HR: CPT

## 2022-12-14 RX ORDER — FLUOROURACIL 50 MG/ML
2400 INJECTION, SOLUTION INTRAVENOUS CONTINUOUS
Status: CANCELLED | OUTPATIENT
Start: 2022-12-15

## 2022-12-14 RX ORDER — FLUOROURACIL 50 MG/ML
2400 INJECTION, SOLUTION INTRAVENOUS CONTINUOUS
Status: DISCONTINUED | OUTPATIENT
Start: 2022-12-14 | End: 2022-12-14

## 2022-12-14 RX ADMIN — FLUOROURACIL 3900 MG: 50 INJECTION, SOLUTION INTRAVENOUS at 15:16:00

## 2022-12-14 NOTE — PROGRESS NOTES
Pt here for C16D1 MVASI, 5FU cadd pump, oxaliplatin dropped. Arrives Ambulating independently, accompanied by Self from MD visit. Pregnancy screening: Not applicable    Modifications in dose or schedule: Yes, oxaliplatin removed. Drugs/infusions dual verified for appearance and physical integrity. IV pump settings were dual verified: yes    Port accessed previously in lab - present blood return. Frequency of blood return and site check throughout administration: Prior to administration, Prior to each drug and At completion of therapy     Pt will have a PET scan and possible change in treatment pending those results. Appeared to tolerate infusion, no signs and symptoms of adverse reaction noted. CADD pump connected and running. All connections reinforced with tape. Port access is clean and dry, secured with steri strips and tegaderm dressing. Patient verbalized understanding of CADD pump instructions, including troubleshooting. Discharged to Home, Ambulating independently with future appointments scheduled. Copy of AVS provided.     Outpatient Oncology Care Plan  Problem list:  fatigue  knowledge deficit  Problems related to:    self care  side effect of treatment  Interventions:  encourage activity as tolerated  promoted rest  provided general teaching  Expected outcomes:  adequate sleep/rest  symptoms relieved/minimized  understands plan of care  verbalize how to care for self  Progress towards outcome:  making progress    Education Record    Learner:  Patient  Barriers / Limitations:  None  Method:  Reinforcement  Outcome:  Shows understanding

## 2022-12-16 ENCOUNTER — NURSE ONLY (OUTPATIENT)
Dept: HEMATOLOGY/ONCOLOGY | Facility: HOSPITAL | Age: 72
End: 2022-12-16
Attending: INTERNAL MEDICINE
Payer: MEDICARE

## 2022-12-16 DIAGNOSIS — Z45.2 ENCOUNTER FOR CENTRAL LINE CARE: ICD-10-CM

## 2022-12-16 DIAGNOSIS — Z51.11 CHEMOTHERAPY MANAGEMENT, ENCOUNTER FOR: Primary | ICD-10-CM

## 2022-12-16 DIAGNOSIS — Z79.899 ENCOUNTER FOR MEDICATION MANAGEMENT: ICD-10-CM

## 2022-12-16 DIAGNOSIS — C18.7 CANCER OF SIGMOID COLON (HCC): ICD-10-CM

## 2022-12-16 PROCEDURE — 96523 IRRIG DRUG DELIVERY DEVICE: CPT

## 2022-12-16 RX ORDER — SODIUM CHLORIDE 9 MG/ML
10 INJECTION INTRAVENOUS ONCE
OUTPATIENT
Start: 2022-12-16

## 2022-12-16 RX ORDER — HEPARIN SODIUM (PORCINE) LOCK FLUSH IV SOLN 100 UNIT/ML 100 UNIT/ML
5 SOLUTION INTRAVENOUS ONCE
Status: COMPLETED | OUTPATIENT
Start: 2022-12-16 | End: 2022-12-16

## 2022-12-16 RX ORDER — HEPARIN SODIUM (PORCINE) LOCK FLUSH IV SOLN 100 UNIT/ML 100 UNIT/ML
5 SOLUTION INTRAVENOUS ONCE
OUTPATIENT
Start: 2022-12-16

## 2022-12-16 RX ADMIN — HEPARIN SODIUM (PORCINE) LOCK FLUSH IV SOLN 100 UNIT/ML 500 UNITS: 100 SOLUTION INTRAVENOUS at 13:59:00

## 2022-12-16 NOTE — PROGRESS NOTES
Patient presented with CADD pump connected. Reservoir empty. Disconnected CADD pump, flushed port with 0.9% Normal Saline and established blood return in port. Flushed with 500 units of Heparin and de-accessed port. Gauze and paper tape applied to port site. Reports continuous PN - has dropped glassware at home, no injury to self  Oxaliplatin removed this cycle. Denies other complaints/ concerns  Good spirits    Discharged stable to home with future appts .   Gait steady, indep

## 2022-12-19 ENCOUNTER — TELEPHONE (OUTPATIENT)
Dept: HEMATOLOGY/ONCOLOGY | Facility: HOSPITAL | Age: 72
End: 2022-12-19

## 2022-12-20 ENCOUNTER — TELEPHONE (OUTPATIENT)
Dept: HEMATOLOGY/ONCOLOGY | Facility: HOSPITAL | Age: 72
End: 2022-12-20

## 2022-12-20 NOTE — TELEPHONE ENCOUNTER
Spoke with Teola Aase. She is very interested in the trial at [facility]. Will send over her information to Dr London Means now. She verbalized understanding.

## 2022-12-22 ENCOUNTER — HOSPITAL ENCOUNTER (OUTPATIENT)
Dept: NUCLEAR MEDICINE | Facility: HOSPITAL | Age: 72
Discharge: HOME | End: 2022-12-22
Attending: PHYSICIAN ASSISTANT
Payer: MEDICARE

## 2022-12-22 DIAGNOSIS — C18.7 CANCER OF SIGMOID COLON (HCC): ICD-10-CM

## 2022-12-22 LAB — GLUCOSE BLDC GLUCOMTR-MCNC: 85 MG/DL (ref 70–99)

## 2022-12-22 PROCEDURE — 78815 PET IMAGE W/CT SKULL-THIGH: CPT | Performed by: PHYSICIAN ASSISTANT

## 2022-12-22 PROCEDURE — 82962 GLUCOSE BLOOD TEST: CPT

## 2022-12-28 ENCOUNTER — NURSE ONLY (OUTPATIENT)
Dept: HEMATOLOGY/ONCOLOGY | Facility: HOSPITAL | Age: 72
End: 2022-12-28
Attending: INTERNAL MEDICINE
Payer: MEDICARE

## 2022-12-28 ENCOUNTER — OFFICE VISIT (OUTPATIENT)
Dept: HEMATOLOGY/ONCOLOGY | Facility: HOSPITAL | Age: 72
End: 2022-12-28
Attending: NURSE PRACTITIONER
Payer: MEDICARE

## 2022-12-28 VITALS
BODY MASS INDEX: 26.13 KG/M2 | SYSTOLIC BLOOD PRESSURE: 144 MMHG | HEIGHT: 62 IN | DIASTOLIC BLOOD PRESSURE: 86 MMHG | OXYGEN SATURATION: 97 % | TEMPERATURE: 98 F | HEART RATE: 86 BPM | RESPIRATION RATE: 16 BRPM | WEIGHT: 142 LBS

## 2022-12-28 DIAGNOSIS — C18.7 CANCER OF SIGMOID COLON (HCC): ICD-10-CM

## 2022-12-28 DIAGNOSIS — C78.02 MALIGNANT NEOPLASM METASTATIC TO BOTH LUNGS (HCC): Primary | ICD-10-CM

## 2022-12-28 DIAGNOSIS — C78.01 MALIGNANT NEOPLASM METASTATIC TO BOTH LUNGS (HCC): ICD-10-CM

## 2022-12-28 DIAGNOSIS — T45.1X5A CHEMOTHERAPY INDUCED NEUTROPENIA (HCC): ICD-10-CM

## 2022-12-28 DIAGNOSIS — C78.01 MALIGNANT NEOPLASM METASTATIC TO BOTH LUNGS (HCC): Primary | ICD-10-CM

## 2022-12-28 DIAGNOSIS — C78.6 PERITONEAL CARCINOMATOSIS (HCC): Primary | ICD-10-CM

## 2022-12-28 DIAGNOSIS — Z45.2 ENCOUNTER FOR CENTRAL LINE CARE: ICD-10-CM

## 2022-12-28 DIAGNOSIS — Z79.899 ENCOUNTER FOR MEDICATION MANAGEMENT: ICD-10-CM

## 2022-12-28 DIAGNOSIS — C78.6 PERITONEAL CARCINOMATOSIS (HCC): ICD-10-CM

## 2022-12-28 DIAGNOSIS — Z51.11 CHEMOTHERAPY MANAGEMENT, ENCOUNTER FOR: Primary | ICD-10-CM

## 2022-12-28 DIAGNOSIS — Z51.11 CHEMOTHERAPY MANAGEMENT, ENCOUNTER FOR: ICD-10-CM

## 2022-12-28 DIAGNOSIS — C78.02 MALIGNANT NEOPLASM METASTATIC TO BOTH LUNGS (HCC): ICD-10-CM

## 2022-12-28 DIAGNOSIS — D70.1 CHEMOTHERAPY INDUCED NEUTROPENIA (HCC): ICD-10-CM

## 2022-12-28 LAB
ALBUMIN SERPL-MCNC: 3.3 G/DL (ref 3.4–5)
ALBUMIN/GLOB SERPL: 0.9 {RATIO} (ref 1–2)
ALP LIVER SERPL-CCNC: 99 U/L
ALT SERPL-CCNC: 16 U/L
ANION GAP SERPL CALC-SCNC: 7 MMOL/L (ref 0–18)
AST SERPL-CCNC: 18 U/L (ref 15–37)
BASOPHILS # BLD AUTO: 0.07 X10(3) UL (ref 0–0.2)
BASOPHILS NFR BLD AUTO: 1.4 %
BILIRUB SERPL-MCNC: 0.3 MG/DL (ref 0.1–2)
BUN BLD-MCNC: 11 MG/DL (ref 7–18)
BUN/CREAT SERPL: 14.3 (ref 10–20)
CALCIUM BLD-MCNC: 8.9 MG/DL (ref 8.5–10.1)
CEA SERPL-MCNC: 33.6 NG/ML (ref ?–5)
CHLORIDE SERPL-SCNC: 109 MMOL/L (ref 98–112)
CO2 SERPL-SCNC: 29 MMOL/L (ref 21–32)
CREAT BLD-MCNC: 0.77 MG/DL
DEPRECATED RDW RBC AUTO: 56.6 FL (ref 35.1–46.3)
EOSINOPHIL # BLD AUTO: 0.17 X10(3) UL (ref 0–0.7)
EOSINOPHIL NFR BLD AUTO: 3.3 %
ERYTHROCYTE [DISTWIDTH] IN BLOOD BY AUTOMATED COUNT: 14.4 % (ref 11–15)
GFR SERPLBLD BASED ON 1.73 SQ M-ARVRAT: 82 ML/MIN/1.73M2 (ref 60–?)
GLOBULIN PLAS-MCNC: 3.5 G/DL (ref 2.8–4.4)
GLUCOSE BLD-MCNC: 109 MG/DL (ref 70–99)
HCT VFR BLD AUTO: 37.6 %
HGB BLD-MCNC: 12.1 G/DL
IMM GRANULOCYTES # BLD AUTO: 0.01 X10(3) UL (ref 0–1)
IMM GRANULOCYTES NFR BLD: 0.2 %
LYMPHOCYTES # BLD AUTO: 3.39 X10(3) UL (ref 1–4)
LYMPHOCYTES NFR BLD AUTO: 65.4 %
MAGNESIUM SERPL-MCNC: 2.2 MG/DL (ref 1.6–2.6)
MCH RBC QN AUTO: 34.7 PG (ref 26–34)
MCHC RBC AUTO-ENTMCNC: 32.2 G/DL (ref 31–37)
MCV RBC AUTO: 107.7 FL
MONOCYTES # BLD AUTO: 0.65 X10(3) UL (ref 0.1–1)
MONOCYTES NFR BLD AUTO: 12.5 %
NEUTROPHILS # BLD AUTO: 0.89 X10 (3) UL (ref 1.5–7.7)
NEUTROPHILS # BLD AUTO: 0.89 X10(3) UL (ref 1.5–7.7)
NEUTROPHILS NFR BLD AUTO: 17.2 %
OSMOLALITY SERPL CALC.SUM OF ELEC: 300 MOSM/KG (ref 275–295)
PLATELET # BLD AUTO: 122 10(3)UL (ref 150–450)
POTASSIUM SERPL-SCNC: 4 MMOL/L (ref 3.5–5.1)
PROT SERPL-MCNC: 6.8 G/DL (ref 6.4–8.2)
RBC # BLD AUTO: 3.49 X10(6)UL
SODIUM SERPL-SCNC: 145 MMOL/L (ref 136–145)
WBC # BLD AUTO: 5.2 X10(3) UL (ref 4–11)

## 2022-12-28 PROCEDURE — 82378 CARCINOEMBRYONIC ANTIGEN: CPT

## 2022-12-28 PROCEDURE — 85025 COMPLETE CBC W/AUTO DIFF WBC: CPT

## 2022-12-28 PROCEDURE — 85060 BLOOD SMEAR INTERPRETATION: CPT

## 2022-12-28 PROCEDURE — 99215 OFFICE O/P EST HI 40 MIN: CPT | Performed by: NURSE PRACTITIONER

## 2022-12-28 PROCEDURE — 83735 ASSAY OF MAGNESIUM: CPT

## 2022-12-28 PROCEDURE — 36591 DRAW BLOOD OFF VENOUS DEVICE: CPT

## 2022-12-28 PROCEDURE — 80053 COMPREHEN METABOLIC PANEL: CPT

## 2022-12-28 RX ORDER — HEPARIN SODIUM (PORCINE) LOCK FLUSH IV SOLN 100 UNIT/ML 100 UNIT/ML
SOLUTION INTRAVENOUS
Status: COMPLETED
Start: 2022-12-28 | End: 2022-12-28

## 2022-12-28 RX ORDER — HEPARIN SODIUM (PORCINE) LOCK FLUSH IV SOLN 100 UNIT/ML 100 UNIT/ML
5 SOLUTION INTRAVENOUS ONCE
OUTPATIENT
Start: 2022-12-28

## 2022-12-28 RX ORDER — HEPARIN SODIUM (PORCINE) LOCK FLUSH IV SOLN 100 UNIT/ML 100 UNIT/ML
5 SOLUTION INTRAVENOUS ONCE
Status: DISCONTINUED | OUTPATIENT
Start: 2022-12-28 | End: 2022-12-28

## 2022-12-28 RX ORDER — SODIUM CHLORIDE 9 MG/ML
10 INJECTION INTRAVENOUS ONCE
OUTPATIENT
Start: 2022-12-28

## 2022-12-28 RX ADMIN — HEPARIN SODIUM (PORCINE) LOCK FLUSH IV SOLN 100 UNIT/ML 500 UNITS: 100 SOLUTION INTRAVENOUS at 10:31:00

## 2022-12-28 NOTE — PROGRESS NOTES
Pt deferred r/t neutropenia. Educated pt on neutropenic precautions-states understanding. PAC hep locked and decannulated. Scheduling messaged and will call pt with new appointment times. Dc'd ambulating.

## 2022-12-30 ENCOUNTER — APPOINTMENT (OUTPATIENT)
Dept: HEMATOLOGY/ONCOLOGY | Facility: HOSPITAL | Age: 72
End: 2022-12-30
Attending: INTERNAL MEDICINE
Payer: MEDICARE

## 2023-01-01 ENCOUNTER — TELEPHONE (OUTPATIENT)
Dept: HEMATOLOGY/ONCOLOGY | Facility: HOSPITAL | Age: 73
End: 2023-01-01

## 2023-01-01 RX ORDER — LISINOPRIL 10 MG/1
10 TABLET ORAL DAILY
Qty: 90 TABLET | Refills: 0 | Status: CANCELLED | OUTPATIENT
Start: 2023-01-01

## 2023-01-04 ENCOUNTER — OFFICE VISIT (OUTPATIENT)
Dept: HEMATOLOGY/ONCOLOGY | Facility: HOSPITAL | Age: 73
End: 2023-01-04
Attending: NURSE PRACTITIONER
Payer: MEDICARE

## 2023-01-04 ENCOUNTER — NURSE ONLY (OUTPATIENT)
Dept: HEMATOLOGY/ONCOLOGY | Facility: HOSPITAL | Age: 73
End: 2023-01-04
Attending: INTERNAL MEDICINE
Payer: MEDICARE

## 2023-01-04 VITALS
DIASTOLIC BLOOD PRESSURE: 75 MMHG | HEIGHT: 62 IN | HEART RATE: 91 BPM | OXYGEN SATURATION: 96 % | TEMPERATURE: 98 F | WEIGHT: 145 LBS | BODY MASS INDEX: 26.68 KG/M2 | SYSTOLIC BLOOD PRESSURE: 139 MMHG | RESPIRATION RATE: 16 BRPM

## 2023-01-04 DIAGNOSIS — C78.02 MALIGNANT NEOPLASM METASTATIC TO BOTH LUNGS (HCC): ICD-10-CM

## 2023-01-04 DIAGNOSIS — Z51.11 CHEMOTHERAPY MANAGEMENT, ENCOUNTER FOR: ICD-10-CM

## 2023-01-04 DIAGNOSIS — C18.7 CANCER OF SIGMOID COLON (HCC): ICD-10-CM

## 2023-01-04 DIAGNOSIS — G62.0 NEUROPATHY DUE TO CHEMOTHERAPEUTIC DRUG (HCC): ICD-10-CM

## 2023-01-04 DIAGNOSIS — C78.01 MALIGNANT NEOPLASM METASTATIC TO BOTH LUNGS (HCC): Primary | ICD-10-CM

## 2023-01-04 DIAGNOSIS — C78.6 PERITONEAL CARCINOMATOSIS (HCC): ICD-10-CM

## 2023-01-04 DIAGNOSIS — T45.1X5A NEUROPATHY DUE TO CHEMOTHERAPEUTIC DRUG (HCC): ICD-10-CM

## 2023-01-04 DIAGNOSIS — Z51.11 CHEMOTHERAPY MANAGEMENT, ENCOUNTER FOR: Primary | ICD-10-CM

## 2023-01-04 DIAGNOSIS — Z45.2 ENCOUNTER FOR CENTRAL LINE CARE: ICD-10-CM

## 2023-01-04 DIAGNOSIS — Z79.899 ENCOUNTER FOR MEDICATION MANAGEMENT: ICD-10-CM

## 2023-01-04 DIAGNOSIS — C78.01 MALIGNANT NEOPLASM METASTATIC TO BOTH LUNGS (HCC): ICD-10-CM

## 2023-01-04 DIAGNOSIS — C78.02 MALIGNANT NEOPLASM METASTATIC TO BOTH LUNGS (HCC): Primary | ICD-10-CM

## 2023-01-04 LAB
ALBUMIN SERPL-MCNC: 3.4 G/DL (ref 3.4–5)
ALBUMIN/GLOB SERPL: 0.9 {RATIO} (ref 1–2)
ALP LIVER SERPL-CCNC: 103 U/L
ALT SERPL-CCNC: 12 U/L
ANION GAP SERPL CALC-SCNC: 2 MMOL/L (ref 0–18)
AST SERPL-CCNC: 19 U/L (ref 15–37)
BASOPHILS # BLD AUTO: 0.06 X10(3) UL (ref 0–0.2)
BASOPHILS NFR BLD AUTO: 1.2 %
BILIRUB SERPL-MCNC: 0.5 MG/DL (ref 0.1–2)
BUN BLD-MCNC: 12 MG/DL (ref 7–18)
BUN/CREAT SERPL: 17.4 (ref 10–20)
CALCIUM BLD-MCNC: 9.2 MG/DL (ref 8.5–10.1)
CEA SERPL-MCNC: 32 NG/ML (ref ?–5)
CHLORIDE SERPL-SCNC: 107 MMOL/L (ref 98–112)
CO2 SERPL-SCNC: 31 MMOL/L (ref 21–32)
CREAT BLD-MCNC: 0.69 MG/DL
DEPRECATED RDW RBC AUTO: 56.1 FL (ref 35.1–46.3)
EOSINOPHIL # BLD AUTO: 0.21 X10(3) UL (ref 0–0.7)
EOSINOPHIL NFR BLD AUTO: 4.2 %
ERYTHROCYTE [DISTWIDTH] IN BLOOD BY AUTOMATED COUNT: 14.1 % (ref 11–15)
GFR SERPLBLD BASED ON 1.73 SQ M-ARVRAT: 92 ML/MIN/1.73M2 (ref 60–?)
GLOBULIN PLAS-MCNC: 3.7 G/DL (ref 2.8–4.4)
GLUCOSE BLD-MCNC: 99 MG/DL (ref 70–99)
HCT VFR BLD AUTO: 37.8 %
HGB BLD-MCNC: 12.3 G/DL
IMM GRANULOCYTES # BLD AUTO: 0 X10(3) UL (ref 0–1)
IMM GRANULOCYTES NFR BLD: 0 %
LYMPHOCYTES # BLD AUTO: 2.54 X10(3) UL (ref 1–4)
LYMPHOCYTES NFR BLD AUTO: 51 %
MAGNESIUM SERPL-MCNC: 2.3 MG/DL (ref 1.6–2.6)
MCH RBC QN AUTO: 34.9 PG (ref 26–34)
MCHC RBC AUTO-ENTMCNC: 32.5 G/DL (ref 31–37)
MCV RBC AUTO: 107.4 FL
MONOCYTES # BLD AUTO: 0.65 X10(3) UL (ref 0.1–1)
MONOCYTES NFR BLD AUTO: 13.1 %
NEUTROPHILS # BLD AUTO: 1.52 X10 (3) UL (ref 1.5–7.7)
NEUTROPHILS # BLD AUTO: 1.52 X10(3) UL (ref 1.5–7.7)
NEUTROPHILS NFR BLD AUTO: 30.5 %
OSMOLALITY SERPL CALC.SUM OF ELEC: 290 MOSM/KG (ref 275–295)
PLATELET # BLD AUTO: 146 10(3)UL (ref 150–450)
POTASSIUM SERPL-SCNC: 4.1 MMOL/L (ref 3.5–5.1)
PROT SERPL-MCNC: 7.1 G/DL (ref 6.4–8.2)
RBC # BLD AUTO: 3.52 X10(6)UL
SODIUM SERPL-SCNC: 140 MMOL/L (ref 136–145)
WBC # BLD AUTO: 5 X10(3) UL (ref 4–11)

## 2023-01-04 PROCEDURE — 96367 TX/PROPH/DG ADDL SEQ IV INF: CPT

## 2023-01-04 PROCEDURE — 96417 CHEMO IV INFUS EACH ADDL SEQ: CPT

## 2023-01-04 PROCEDURE — 96415 CHEMO IV INFUSION ADDL HR: CPT

## 2023-01-04 PROCEDURE — 96375 TX/PRO/DX INJ NEW DRUG ADDON: CPT

## 2023-01-04 PROCEDURE — 82378 CARCINOEMBRYONIC ANTIGEN: CPT

## 2023-01-04 PROCEDURE — 96413 CHEMO IV INFUSION 1 HR: CPT

## 2023-01-04 PROCEDURE — 96377 APPLICATON ON-BODY INJECTOR: CPT

## 2023-01-04 PROCEDURE — 99215 OFFICE O/P EST HI 40 MIN: CPT | Performed by: PHYSICIAN ASSISTANT

## 2023-01-04 PROCEDURE — 85025 COMPLETE CBC W/AUTO DIFF WBC: CPT

## 2023-01-04 PROCEDURE — 80053 COMPREHEN METABOLIC PANEL: CPT

## 2023-01-04 PROCEDURE — 83735 ASSAY OF MAGNESIUM: CPT

## 2023-01-04 RX ORDER — ACETAMINOPHEN 325 MG/1
TABLET ORAL
Status: COMPLETED
Start: 2023-01-04 | End: 2023-01-04

## 2023-01-04 RX ORDER — ACETAMINOPHEN 325 MG/1
650 TABLET ORAL ONCE
Status: COMPLETED | OUTPATIENT
Start: 2023-01-04 | End: 2023-01-04

## 2023-01-04 RX ORDER — DIPHENHYDRAMINE HCL 50 MG
50 CAPSULE ORAL ONCE
Status: COMPLETED | OUTPATIENT
Start: 2023-01-04 | End: 2023-01-04

## 2023-01-04 RX ORDER — DIPHENHYDRAMINE HCL 50 MG
CAPSULE ORAL
Status: COMPLETED
Start: 2023-01-04 | End: 2023-01-04

## 2023-01-04 RX ORDER — HEPARIN SODIUM (PORCINE) LOCK FLUSH IV SOLN 100 UNIT/ML 100 UNIT/ML
SOLUTION INTRAVENOUS
Status: COMPLETED
Start: 2023-01-04 | End: 2023-01-04

## 2023-01-04 RX ORDER — ATROPINE SULFATE 0.4 MG/ML
0.2 AMPUL (ML) INJECTION ONCE
Status: CANCELLED | OUTPATIENT
Start: 2023-01-04

## 2023-01-04 RX ORDER — ATROPINE SULFATE 0.4 MG/ML
AMPUL (ML) INJECTION
Status: COMPLETED
Start: 2023-01-04 | End: 2023-01-04

## 2023-01-04 RX ORDER — HEPARIN SODIUM (PORCINE) LOCK FLUSH IV SOLN 100 UNIT/ML 100 UNIT/ML
5 SOLUTION INTRAVENOUS ONCE
Status: COMPLETED | OUTPATIENT
Start: 2023-01-04 | End: 2023-01-04

## 2023-01-04 RX ORDER — MAGNESIUM SULFATE HEPTAHYDRATE 40 MG/ML
2 INJECTION, SOLUTION INTRAVENOUS ONCE
Status: CANCELLED | OUTPATIENT
Start: 2023-01-04

## 2023-01-04 RX ORDER — SODIUM CHLORIDE 9 MG/ML
10 INJECTION INTRAVENOUS ONCE
OUTPATIENT
Start: 2023-01-04

## 2023-01-04 RX ORDER — ACETAMINOPHEN 325 MG/1
650 TABLET ORAL ONCE
Status: CANCELLED | OUTPATIENT
Start: 2023-01-04

## 2023-01-04 RX ORDER — DIPHENHYDRAMINE HCL 50 MG
50 CAPSULE ORAL ONCE
Status: CANCELLED | OUTPATIENT
Start: 2023-01-04

## 2023-01-04 RX ORDER — ATROPINE SULFATE 0.4 MG/ML
0.2 AMPUL (ML) INJECTION ONCE
Status: COMPLETED | OUTPATIENT
Start: 2023-01-04 | End: 2023-01-04

## 2023-01-04 RX ORDER — HEPARIN SODIUM (PORCINE) LOCK FLUSH IV SOLN 100 UNIT/ML 100 UNIT/ML
5 SOLUTION INTRAVENOUS ONCE
OUTPATIENT
Start: 2023-01-04

## 2023-01-04 RX ADMIN — ATROPINE SULFATE 0.2 MG: 0.4 MG/ML AMPUL (ML) INJECTION at 15:44:00

## 2023-01-04 RX ADMIN — HEPARIN SODIUM (PORCINE) LOCK FLUSH IV SOLN 100 UNIT/ML 500 UNITS: 100 SOLUTION INTRAVENOUS at 17:25:00

## 2023-01-04 RX ADMIN — ACETAMINOPHEN 650 MG: 325 TABLET ORAL at 12:04:00

## 2023-01-04 RX ADMIN — DIPHENHYDRAMINE HCL 50 MG: 50 MG CAPSULE ORAL at 12:04:00

## 2023-01-04 NOTE — PROGRESS NOTES
Pt here for C1D1 Erbitux, Irinotecan, Neulasta Onpro. Arrives Ambulating independently, accompanied by Self           Pregnancy screening: Not applicable    Modifications in dose or schedule: No    Drugs/infusions dual verified for appearance and physical integrity. IV pump settings were dual verified: yes Neulasta Onpro applied to left lower abdomen. Education provided. Frequency of blood return and site check throughout administration: Prior to administration, Prior to each drug and At completion of therapy   Discharged to Home, Ambulating independently, accompanied by:Self     Printed AVS for patient, reviewed upcoming appointments.     Outpatient Oncology Care Plan  Problem list:  fatigue  knowledge deficit  Problems related to:  chemotherapy  Interventions:  maintain safe environment  patient/family supported  maintain skin integrity  Expected outcomes:  adequate sleep/rest  understands plan of care  verbalize how to care for self  Progress towards outcome:  making progress    Education Record    Learner:  Patient  Barriers / Limitations:  None  Method:  Discussion  Outcome:  Shows understanding  Comments:

## 2023-01-04 NOTE — PATIENT INSTRUCTIONS
On-body Injector for Neulasta Patient Instructions       Your On-Body Injection device was applied on this day:  1/4/23 at this time 4:30pm    Your dose of medication will start on this day: 1/5/23 at this time 7:30pm    Safe time to remove this device will be on this day: 1/5/23 at this time 9:30 pm      Important information to remember about the Neulasta Injector:     1. The On-Body Neulasta Injector begins to deliver the dose of Neulasta 27 hours after it is activated at the cancer center. Beeping at that time indicates that the dose will be delivered in 2 minutes. It takes 45 minutes for the dose to be completely delivered. DO NOT REMOVE during this time. 2. Check the light periodically for a slow flashing GREEN light, this is normal.     3. If the light is flashing RED or comes off prior to the delivery time, during regular business hours 8:30-4, please call  and ask for the charge nurse. If this is after hours or a holiday, please contact the [employer] @ [phone], a support person is available 24/7.      4. Please keep the device at least 4 inches away from electrical equipment, such as CELL PHONES, microwaves, cordless phones. Do NOT have Xrays, MRI, CT without informing the technician. 5. If you are traveling, needing to go through airport security, please notify the TSA. You may still travel, just avoid the xray security. 6. Avoid bumping or sleeping directly on the injector. 7. Avoid hot tubs, saunas and direct sunlight. Keep the injector dry 3 hours prior to the dose delivery time. 8. Remove the injector device at the directed time above and place in a hard container for disposal and place in trash.

## 2023-01-05 ENCOUNTER — TELEPHONE (OUTPATIENT)
Dept: HEMATOLOGY/ONCOLOGY | Facility: HOSPITAL | Age: 73
End: 2023-01-05

## 2023-01-05 NOTE — TELEPHONE ENCOUNTER
Toxicities: C1 D1 Cetuximab/Irinotecan 1/4/2023    Caleketurah Najeraingrsi said she feels \"good. \" No side effects at this time. I encouraged her to please call the office if she is not feeling well or has any questions or concerns. She agreed and thanked me for checking on her.

## 2023-01-11 ENCOUNTER — APPOINTMENT (OUTPATIENT)
Dept: HEMATOLOGY/ONCOLOGY | Facility: HOSPITAL | Age: 73
End: 2023-01-11
Attending: PHYSICIAN ASSISTANT
Payer: MEDICARE

## 2023-01-11 ENCOUNTER — APPOINTMENT (OUTPATIENT)
Dept: HEMATOLOGY/ONCOLOGY | Facility: HOSPITAL | Age: 73
End: 2023-01-11
Attending: INTERNAL MEDICINE
Payer: MEDICARE

## 2023-01-12 NOTE — PROGRESS NOTES
Patient presented with CADD pump connected. [place]sp[place] 5.2. Disconnected CADD pump, flushed port with 0.9% Normal Saline and established blood return in port. Flushed with 500 units of Heparin and de-accessed port. Gauze and paper tape applied to port site. Patient unable to complete

## 2023-01-18 ENCOUNTER — OFFICE VISIT (OUTPATIENT)
Dept: HEMATOLOGY/ONCOLOGY | Facility: HOSPITAL | Age: 73
End: 2023-01-18
Attending: INTERNAL MEDICINE
Payer: MEDICARE

## 2023-01-18 ENCOUNTER — NURSE ONLY (OUTPATIENT)
Dept: HEMATOLOGY/ONCOLOGY | Facility: HOSPITAL | Age: 73
End: 2023-01-18
Attending: PHYSICIAN ASSISTANT
Payer: MEDICARE

## 2023-01-18 VITALS
RESPIRATION RATE: 18 BRPM | HEIGHT: 62 IN | SYSTOLIC BLOOD PRESSURE: 134 MMHG | DIASTOLIC BLOOD PRESSURE: 71 MMHG | HEART RATE: 88 BPM | TEMPERATURE: 98 F | BODY MASS INDEX: 26.75 KG/M2 | OXYGEN SATURATION: 97 % | WEIGHT: 145.38 LBS

## 2023-01-18 DIAGNOSIS — C78.02 MALIGNANT NEOPLASM METASTATIC TO BOTH LUNGS (HCC): Primary | ICD-10-CM

## 2023-01-18 DIAGNOSIS — C78.6 PERITONEAL CARCINOMATOSIS (HCC): ICD-10-CM

## 2023-01-18 DIAGNOSIS — C78.01 MALIGNANT NEOPLASM METASTATIC TO BOTH LUNGS (HCC): Primary | ICD-10-CM

## 2023-01-18 DIAGNOSIS — C18.7 CANCER OF SIGMOID COLON (HCC): ICD-10-CM

## 2023-01-18 DIAGNOSIS — Z51.11 CHEMOTHERAPY MANAGEMENT, ENCOUNTER FOR: ICD-10-CM

## 2023-01-18 DIAGNOSIS — Z45.2 ENCOUNTER FOR CENTRAL LINE CARE: ICD-10-CM

## 2023-01-18 DIAGNOSIS — Z79.899 ENCOUNTER FOR MEDICATION MANAGEMENT: ICD-10-CM

## 2023-01-18 LAB
BASOPHILS # BLD AUTO: 0.06 X10(3) UL (ref 0–0.2)
BASOPHILS NFR BLD AUTO: 1.3 %
DEPRECATED RDW RBC AUTO: 58.4 FL (ref 35.1–46.3)
EOSINOPHIL # BLD AUTO: 0.17 X10(3) UL (ref 0–0.7)
EOSINOPHIL NFR BLD AUTO: 3.7 %
ERYTHROCYTE [DISTWIDTH] IN BLOOD BY AUTOMATED COUNT: 14.7 % (ref 11–15)
HCT VFR BLD AUTO: 38.3 %
HGB BLD-MCNC: 12.2 G/DL
IMM GRANULOCYTES # BLD AUTO: 0.01 X10(3) UL (ref 0–1)
IMM GRANULOCYTES NFR BLD: 0.2 %
LYMPHOCYTES # BLD AUTO: 2.46 X10(3) UL (ref 1–4)
LYMPHOCYTES NFR BLD AUTO: 53.2 %
MAGNESIUM SERPL-MCNC: 1.9 MG/DL (ref 1.6–2.6)
MCH RBC QN AUTO: 34.2 PG (ref 26–34)
MCHC RBC AUTO-ENTMCNC: 31.9 G/DL (ref 31–37)
MCV RBC AUTO: 107.3 FL
MONOCYTES # BLD AUTO: 0.38 X10(3) UL (ref 0.1–1)
MONOCYTES NFR BLD AUTO: 8.2 %
NEUTROPHILS # BLD AUTO: 1.54 X10 (3) UL (ref 1.5–7.7)
NEUTROPHILS # BLD AUTO: 1.54 X10(3) UL (ref 1.5–7.7)
NEUTROPHILS NFR BLD AUTO: 33.4 %
PLATELET # BLD AUTO: 190 10(3)UL (ref 150–450)
RBC # BLD AUTO: 3.57 X10(6)UL
WBC # BLD AUTO: 4.6 X10(3) UL (ref 4–11)

## 2023-01-18 PROCEDURE — 96413 CHEMO IV INFUSION 1 HR: CPT

## 2023-01-18 PROCEDURE — 85025 COMPLETE CBC W/AUTO DIFF WBC: CPT

## 2023-01-18 PROCEDURE — 96417 CHEMO IV INFUS EACH ADDL SEQ: CPT

## 2023-01-18 PROCEDURE — 96377 APPLICATON ON-BODY INJECTOR: CPT

## 2023-01-18 PROCEDURE — 96375 TX/PRO/DX INJ NEW DRUG ADDON: CPT

## 2023-01-18 PROCEDURE — 96415 CHEMO IV INFUSION ADDL HR: CPT

## 2023-01-18 PROCEDURE — 99215 OFFICE O/P EST HI 40 MIN: CPT | Performed by: INTERNAL MEDICINE

## 2023-01-18 PROCEDURE — 83735 ASSAY OF MAGNESIUM: CPT

## 2023-01-18 RX ORDER — ACETAMINOPHEN 325 MG/1
TABLET ORAL
Status: COMPLETED
Start: 2023-01-18 | End: 2023-01-18

## 2023-01-18 RX ORDER — HEPARIN SODIUM (PORCINE) LOCK FLUSH IV SOLN 100 UNIT/ML 100 UNIT/ML
SOLUTION INTRAVENOUS
Status: COMPLETED
Start: 2023-01-18 | End: 2023-01-18

## 2023-01-18 RX ORDER — ATROPINE SULFATE 0.4 MG/ML
0.2 AMPUL (ML) INJECTION ONCE
Status: CANCELLED | OUTPATIENT
Start: 2023-01-18

## 2023-01-18 RX ORDER — HEPARIN SODIUM (PORCINE) LOCK FLUSH IV SOLN 100 UNIT/ML 100 UNIT/ML
5 SOLUTION INTRAVENOUS ONCE
OUTPATIENT
Start: 2023-01-18

## 2023-01-18 RX ORDER — ATROPINE SULFATE 0.4 MG/ML
0.2 AMPUL (ML) INJECTION ONCE
Status: COMPLETED | OUTPATIENT
Start: 2023-01-18 | End: 2023-01-18

## 2023-01-18 RX ORDER — MAGNESIUM SULFATE HEPTAHYDRATE 40 MG/ML
2 INJECTION, SOLUTION INTRAVENOUS ONCE
Status: CANCELLED | OUTPATIENT
Start: 2023-01-18

## 2023-01-18 RX ORDER — ACETAMINOPHEN 325 MG/1
650 TABLET ORAL ONCE
Status: COMPLETED | OUTPATIENT
Start: 2023-01-18 | End: 2023-01-18

## 2023-01-18 RX ORDER — ATROPINE SULFATE 0.4 MG/ML
AMPUL (ML) INJECTION
Status: COMPLETED
Start: 2023-01-18 | End: 2023-01-18

## 2023-01-18 RX ORDER — HEPARIN SODIUM (PORCINE) LOCK FLUSH IV SOLN 100 UNIT/ML 100 UNIT/ML
5 SOLUTION INTRAVENOUS ONCE
Status: COMPLETED | OUTPATIENT
Start: 2023-01-18 | End: 2023-01-18

## 2023-01-18 RX ORDER — DIPHENHYDRAMINE HCL 50 MG
CAPSULE ORAL
Status: COMPLETED
Start: 2023-01-18 | End: 2023-01-18

## 2023-01-18 RX ORDER — DIPHENHYDRAMINE HCL 50 MG
50 CAPSULE ORAL ONCE
Status: CANCELLED | OUTPATIENT
Start: 2023-01-18

## 2023-01-18 RX ORDER — ACETAMINOPHEN 325 MG/1
650 TABLET ORAL ONCE
Status: CANCELLED | OUTPATIENT
Start: 2023-01-18

## 2023-01-18 RX ORDER — SODIUM CHLORIDE 9 MG/ML
10 INJECTION INTRAVENOUS ONCE
OUTPATIENT
Start: 2023-01-18

## 2023-01-18 RX ORDER — DIPHENHYDRAMINE HCL 50 MG
50 CAPSULE ORAL ONCE
Status: COMPLETED | OUTPATIENT
Start: 2023-01-18 | End: 2023-01-18

## 2023-01-18 RX ADMIN — ACETAMINOPHEN 650 MG: 325 TABLET ORAL at 12:01:00

## 2023-01-18 RX ADMIN — HEPARIN SODIUM (PORCINE) LOCK FLUSH IV SOLN 100 UNIT/ML 500 UNITS: 100 SOLUTION INTRAVENOUS at 17:10:00

## 2023-01-18 RX ADMIN — DIPHENHYDRAMINE HCL 50 MG: 50 MG CAPSULE ORAL at 12:01:00

## 2023-01-18 RX ADMIN — ATROPINE SULFATE 0.2 MG: 0.4 MG/ML AMPUL (ML) INJECTION at 15:30:00

## 2023-01-18 NOTE — PROGRESS NOTES
Pt here for C1D15 ERBITUX AND IRINOTECAN     Arrives Ambulating independently, accompanied by Self from MD visit. Pregnancy screening: Not applicable    Modifications in dose or schedule: no    Drugs/infusions dual verified for appearance and physical integrity. IV pump settings were dual verified: yes    Port accessed previously in lab - present blood return. Frequency of blood return and site check throughout administration: Prior to administration, Prior to each drug and At completion of therapy      Appeared to tolerate infusion, no signs and symptoms of adverse reaction noted. Discharged to Home, Ambulating independently with future appointments scheduled. Copy of AVS provided.     Outpatient Oncology Care Plan  Problem list:  fatigue  knowledge deficit  Problems related to:    self care  side effect of treatment  Interventions:  encourage activity as tolerated  promoted rest  provided general teaching  Expected outcomes:  adequate sleep/rest  symptoms relieved/minimized  understands plan of care  verbalize how to care for self  Progress towards outcome:  making progress    Education Record    Learner:  Patient  Barriers / Limitations:  None  Method:  Reinforcement  Outcome:  Shows understanding

## 2023-01-18 NOTE — PATIENT INSTRUCTIONS
On-body Injector for Neulasta Patient Instructions       Your On-Body Injection device was applied on this day:  1/18/23 at this time 5:30pm    Your dose of medication will start on this day: 1/19/23 8:30pm    Safe time to remove this device will be on this day: 1/19/23 at this time 10:30 pm      Important information to remember about the Neulasta Injector:     1. The On-Body Neulasta Injector begins to deliver the dose of Neulasta 27 hours after it is activated at the cancer center. Beeping at that time indicates that the dose will be delivered in 2 minutes. It takes 45 minutes for the dose to be completely delivered. DO NOT REMOVE during this time. 2. Check the light periodically for a slow flashing GREEN light, this is normal.     3. If the light is flashing RED or comes off prior to the delivery time, during regular business hours 8:30-4, please call  and ask for the charge nurse. If this is after hours or a holiday, please contact the [employer] @ [phone], a support person is available 24/7.      4. Please keep the device at least 4 inches away from electrical equipment, such as CELL PHONES, microwaves, cordless phones. Do NOT have Xrays, MRI, CT without informing the technician. 5. If you are traveling, needing to go through airport security, please notify the TSA. You may still travel, just avoid the xray security. 6. Avoid bumping or sleeping directly on the injector. 7. Avoid hot tubs, saunas and direct sunlight. Keep the injector dry 3 hours prior to the dose delivery time. 8. Remove the injector device at the directed time above and place in a hard container for disposal and place in trash.

## 2023-02-01 ENCOUNTER — OFFICE VISIT (OUTPATIENT)
Dept: HEMATOLOGY/ONCOLOGY | Facility: HOSPITAL | Age: 73
End: 2023-02-01
Attending: PHYSICIAN ASSISTANT
Payer: MEDICARE

## 2023-02-01 ENCOUNTER — OFFICE VISIT (OUTPATIENT)
Dept: HEMATOLOGY/ONCOLOGY | Facility: HOSPITAL | Age: 73
End: 2023-02-01
Attending: INTERNAL MEDICINE
Payer: MEDICARE

## 2023-02-01 VITALS
HEART RATE: 79 BPM | OXYGEN SATURATION: 98 % | TEMPERATURE: 98 F | HEIGHT: 62 IN | RESPIRATION RATE: 16 BRPM | BODY MASS INDEX: 26.57 KG/M2 | SYSTOLIC BLOOD PRESSURE: 134 MMHG | DIASTOLIC BLOOD PRESSURE: 72 MMHG | WEIGHT: 144.38 LBS

## 2023-02-01 DIAGNOSIS — L30.9 DERMATITIS: Primary | ICD-10-CM

## 2023-02-01 DIAGNOSIS — G62.0 NEUROPATHY DUE TO CHEMOTHERAPEUTIC DRUG (HCC): ICD-10-CM

## 2023-02-01 DIAGNOSIS — C78.6 PERITONEAL CARCINOMATOSIS (HCC): ICD-10-CM

## 2023-02-01 DIAGNOSIS — C78.01 MALIGNANT NEOPLASM METASTATIC TO BOTH LUNGS (HCC): Primary | ICD-10-CM

## 2023-02-01 DIAGNOSIS — C18.7 CANCER OF SIGMOID COLON (HCC): ICD-10-CM

## 2023-02-01 DIAGNOSIS — Z51.11 CHEMOTHERAPY MANAGEMENT, ENCOUNTER FOR: ICD-10-CM

## 2023-02-01 DIAGNOSIS — T45.1X5A NEUROPATHY DUE TO CHEMOTHERAPEUTIC DRUG (HCC): ICD-10-CM

## 2023-02-01 DIAGNOSIS — Z45.2 ENCOUNTER FOR CENTRAL LINE CARE: ICD-10-CM

## 2023-02-01 DIAGNOSIS — C78.02 MALIGNANT NEOPLASM METASTATIC TO BOTH LUNGS (HCC): Primary | ICD-10-CM

## 2023-02-01 DIAGNOSIS — Z79.899 ENCOUNTER FOR MEDICATION MANAGEMENT: ICD-10-CM

## 2023-02-01 LAB
ALBUMIN SERPL-MCNC: 3.4 G/DL (ref 3.4–5)
ALBUMIN/GLOB SERPL: 1 {RATIO} (ref 1–2)
ALP LIVER SERPL-CCNC: 125 U/L
ALT SERPL-CCNC: 16 U/L
ANION GAP SERPL CALC-SCNC: 5 MMOL/L (ref 0–18)
AST SERPL-CCNC: 20 U/L (ref 15–37)
BASOPHILS # BLD AUTO: 0.07 X10(3) UL (ref 0–0.2)
BASOPHILS NFR BLD AUTO: 1.5 %
BILIRUB SERPL-MCNC: 0.3 MG/DL (ref 0.1–2)
BUN BLD-MCNC: 9 MG/DL (ref 7–18)
BUN/CREAT SERPL: 14.8 (ref 10–20)
CALCIUM BLD-MCNC: 9.2 MG/DL (ref 8.5–10.1)
CEA SERPL-MCNC: 17.6 NG/ML (ref ?–5)
CHLORIDE SERPL-SCNC: 109 MMOL/L (ref 98–112)
CO2 SERPL-SCNC: 28 MMOL/L (ref 21–32)
CREAT BLD-MCNC: 0.61 MG/DL
DEPRECATED RDW RBC AUTO: 58.5 FL (ref 35.1–46.3)
EOSINOPHIL # BLD AUTO: 0.27 X10(3) UL (ref 0–0.7)
EOSINOPHIL NFR BLD AUTO: 5.6 %
ERYTHROCYTE [DISTWIDTH] IN BLOOD BY AUTOMATED COUNT: 15 % (ref 11–15)
GFR SERPLBLD BASED ON 1.73 SQ M-ARVRAT: 95 ML/MIN/1.73M2 (ref 60–?)
GLOBULIN PLAS-MCNC: 3.5 G/DL (ref 2.8–4.4)
GLUCOSE BLD-MCNC: 95 MG/DL (ref 70–99)
HCT VFR BLD AUTO: 36.3 %
HGB BLD-MCNC: 11.7 G/DL
IMM GRANULOCYTES # BLD AUTO: 0.01 X10(3) UL (ref 0–1)
IMM GRANULOCYTES NFR BLD: 0.2 %
LYMPHOCYTES # BLD AUTO: 2.3 X10(3) UL (ref 1–4)
LYMPHOCYTES NFR BLD AUTO: 48 %
MAGNESIUM SERPL-MCNC: 2.1 MG/DL (ref 1.6–2.6)
MCH RBC QN AUTO: 34.2 PG (ref 26–34)
MCHC RBC AUTO-ENTMCNC: 32.2 G/DL (ref 31–37)
MCV RBC AUTO: 106.1 FL
MONOCYTES # BLD AUTO: 0.6 X10(3) UL (ref 0.1–1)
MONOCYTES NFR BLD AUTO: 12.5 %
NEUTROPHILS # BLD AUTO: 1.54 X10 (3) UL (ref 1.5–7.7)
NEUTROPHILS # BLD AUTO: 1.54 X10(3) UL (ref 1.5–7.7)
NEUTROPHILS NFR BLD AUTO: 32.2 %
OSMOLALITY SERPL CALC.SUM OF ELEC: 292 MOSM/KG (ref 275–295)
PLATELET # BLD AUTO: 234 10(3)UL (ref 150–450)
POTASSIUM SERPL-SCNC: 3.9 MMOL/L (ref 3.5–5.1)
PROT SERPL-MCNC: 6.9 G/DL (ref 6.4–8.2)
RBC # BLD AUTO: 3.42 X10(6)UL
SODIUM SERPL-SCNC: 142 MMOL/L (ref 136–145)
WBC # BLD AUTO: 4.8 X10(3) UL (ref 4–11)

## 2023-02-01 PROCEDURE — 83735 ASSAY OF MAGNESIUM: CPT

## 2023-02-01 PROCEDURE — 99215 OFFICE O/P EST HI 40 MIN: CPT | Performed by: PHYSICIAN ASSISTANT

## 2023-02-01 PROCEDURE — 96417 CHEMO IV INFUS EACH ADDL SEQ: CPT

## 2023-02-01 PROCEDURE — 96415 CHEMO IV INFUSION ADDL HR: CPT

## 2023-02-01 PROCEDURE — 80053 COMPREHEN METABOLIC PANEL: CPT

## 2023-02-01 PROCEDURE — 96413 CHEMO IV INFUSION 1 HR: CPT

## 2023-02-01 PROCEDURE — 82378 CARCINOEMBRYONIC ANTIGEN: CPT

## 2023-02-01 PROCEDURE — 96377 APPLICATON ON-BODY INJECTOR: CPT

## 2023-02-01 PROCEDURE — 85025 COMPLETE CBC W/AUTO DIFF WBC: CPT

## 2023-02-01 PROCEDURE — 96375 TX/PRO/DX INJ NEW DRUG ADDON: CPT

## 2023-02-01 RX ORDER — ACETAMINOPHEN 325 MG/1
650 TABLET ORAL ONCE
Status: COMPLETED | OUTPATIENT
Start: 2023-02-01 | End: 2023-02-01

## 2023-02-01 RX ORDER — ATROPINE SULFATE 0.4 MG/ML
0.2 AMPUL (ML) INJECTION ONCE
Status: COMPLETED | OUTPATIENT
Start: 2023-02-01 | End: 2023-02-01

## 2023-02-01 RX ORDER — HEPARIN SODIUM (PORCINE) LOCK FLUSH IV SOLN 100 UNIT/ML 100 UNIT/ML
5 SOLUTION INTRAVENOUS ONCE
Status: COMPLETED | OUTPATIENT
Start: 2023-02-01 | End: 2023-02-01

## 2023-02-01 RX ORDER — SODIUM CHLORIDE 9 MG/ML
10 INJECTION INTRAVENOUS ONCE
OUTPATIENT
Start: 2023-02-01

## 2023-02-01 RX ORDER — DIPHENHYDRAMINE HCL 50 MG
50 CAPSULE ORAL ONCE
Status: COMPLETED | OUTPATIENT
Start: 2023-02-01 | End: 2023-02-01

## 2023-02-01 RX ORDER — ACETAMINOPHEN 325 MG/1
650 TABLET ORAL ONCE
Status: CANCELLED | OUTPATIENT
Start: 2023-02-01

## 2023-02-01 RX ORDER — CLINDAMYCIN PHOSPHATE 10 MG/G
GEL TOPICAL
Qty: 60 G | Refills: 1 | Status: SHIPPED | OUTPATIENT
Start: 2023-02-01

## 2023-02-01 RX ORDER — ATROPINE SULFATE 0.4 MG/ML
0.2 AMPUL (ML) INJECTION ONCE
Status: CANCELLED | OUTPATIENT
Start: 2023-02-01

## 2023-02-01 RX ORDER — MAGNESIUM SULFATE HEPTAHYDRATE 40 MG/ML
2 INJECTION, SOLUTION INTRAVENOUS ONCE
Status: CANCELLED | OUTPATIENT
Start: 2023-02-01

## 2023-02-01 RX ORDER — ATROPINE SULFATE 0.4 MG/ML
AMPUL (ML) INJECTION
Status: COMPLETED
Start: 2023-02-01 | End: 2023-02-01

## 2023-02-01 RX ORDER — ACETAMINOPHEN 325 MG/1
TABLET ORAL
Status: COMPLETED
Start: 2023-02-01 | End: 2023-02-01

## 2023-02-01 RX ORDER — DIPHENHYDRAMINE HCL 50 MG
CAPSULE ORAL
Status: COMPLETED
Start: 2023-02-01 | End: 2023-02-01

## 2023-02-01 RX ORDER — DIPHENHYDRAMINE HCL 50 MG
50 CAPSULE ORAL ONCE
Status: CANCELLED | OUTPATIENT
Start: 2023-02-01

## 2023-02-01 RX ORDER — HEPARIN SODIUM (PORCINE) LOCK FLUSH IV SOLN 100 UNIT/ML 100 UNIT/ML
SOLUTION INTRAVENOUS
Status: COMPLETED
Start: 2023-02-01 | End: 2023-02-01

## 2023-02-01 RX ORDER — HEPARIN SODIUM (PORCINE) LOCK FLUSH IV SOLN 100 UNIT/ML 100 UNIT/ML
5 SOLUTION INTRAVENOUS ONCE
OUTPATIENT
Start: 2023-02-01

## 2023-02-01 RX ADMIN — ACETAMINOPHEN 650 MG: 325 TABLET ORAL at 09:39:00

## 2023-02-01 RX ADMIN — DIPHENHYDRAMINE HCL 50 MG: 50 MG CAPSULE ORAL at 09:39:00

## 2023-02-01 RX ADMIN — HEPARIN SODIUM (PORCINE) LOCK FLUSH IV SOLN 100 UNIT/ML 500 UNITS: 100 SOLUTION INTRAVENOUS at 15:45:00

## 2023-02-01 RX ADMIN — ATROPINE SULFATE 0.2 MG: 0.4 MG/ML AMPUL (ML) INJECTION at 13:49:00

## 2023-02-01 NOTE — PROGRESS NOTES
Pt here for C2 erbitux + irinotecan +  onpro         Current magnesium level 2.1    Modifications in dose or schedule: No. Acneiform to face especially nose-will start using cream as directed. Drugs/infusions dual verified for appearance and physical integrity.  IV pump settings were dual verified: yes     Frequency of blood return and site check throughout administration: Prior to administration, Prior to each drug and At completion of therapy   Discharged to Other stable from infusion, Ambulating independently, accompanied by:Self    Outpatient Oncology Care Plan  Problem list:  constipation  acneiform rash to face especially nose area  Problems related to:  side effect of treatment  Interventions:  monitor effect of therapy  monitor lab values  Expected outcomes:  optimal lab values  symptoms relieved/minimized  verbalize how to care for self  Progress towards outcome:  making progress    Education Record    Learner:  Patient  Barriers / Limitations:  None  Method:  Discussion  Outcome:  Shows understanding  Comments: dose reduced for zofran

## 2023-02-01 NOTE — PATIENT INSTRUCTIONS
Dermatitis- triamcinolone acetonide 0.1% (ok to use on the face) levocetirizine 5mg daily upto QID if needed, (or zyrtec BID). Possible I/O related    I/O pruritis  Ultrapotent steroid: clobetasol 0.05% vit B6 400mg/day  Doxepin very sedating. 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 Start pyridoxine 400mg/day, 10% urea cream TID proph on day 0-3,and start frozen socks during treatment. Then add the following on day 4-7: celebrex and nightly clobetasol 0.05  Next option 99% DMSO? Best is dose reduction and treatment delay. Can also consider starting oral steroids. Alternative would be switching to paclitaxel to complete the course (Oncol Rev. 2020 Feb 18; 14(1): 442. 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 She is to continue using warm compresses twice a day along with full dose aspirin, topical corticosteroids to decrease the inflammation and emollients. Her2-targeted acneiform dermatitis  - doxycycline 100 mg daily (MRSA coverage)  -cleoncin T lotion apply BID to face    Taxol related nail changes- likely paronychia of the 2nd toe nail, currently G1: supportive care with warm soaks (ideally 2% povidone-iodine or chlorhexidine) 10-15 min each, then mupirocin abx topically and trail tea tree oil. At night, can also use topical steroid to decrease inflammation. If there is worsening or drainage/abscess: start keflex 500mg  Use ultrapotent steroid for palms, soles (thick stratum): once daily is good enough, cream rubbed until disappear, no need for thick layer, hand is about one fingertip utine (0.5g).  occlusive dressing increases potency 10 fold, therefore ointment more potent than cream/lotions   Eyelids/gential/intertrigenous: low potency for no more than 2 weeks to avoid steroid induced atrophy, telangiectasia, and acneiform eruption     Super potent: betametasone augmented ointment 0.05% clobetasol 0.05% different vehicles, lotion generic; fluocinomide 0.1%  Potent: betamethasone ointment; clobetasol cream 0.025% fluocinomide cream 0.05% (Lidex)  Med: hydrocortisone ointment 0.2% triamcinolone cream (kenelog) 0.1%  Low: hydrocortisone cream/lotion 0.1% triamcinolone lotion 0.025%

## 2023-02-15 ENCOUNTER — NURSE ONLY (OUTPATIENT)
Dept: HEMATOLOGY/ONCOLOGY | Facility: HOSPITAL | Age: 73
End: 2023-02-15
Attending: INTERNAL MEDICINE
Payer: MEDICARE

## 2023-02-15 VITALS
DIASTOLIC BLOOD PRESSURE: 65 MMHG | TEMPERATURE: 98 F | HEIGHT: 62 IN | BODY MASS INDEX: 25.86 KG/M2 | RESPIRATION RATE: 20 BRPM | SYSTOLIC BLOOD PRESSURE: 114 MMHG | HEART RATE: 90 BPM | WEIGHT: 140.5 LBS | OXYGEN SATURATION: 97 %

## 2023-02-15 DIAGNOSIS — C78.02 MALIGNANT NEOPLASM METASTATIC TO BOTH LUNGS (HCC): Primary | ICD-10-CM

## 2023-02-15 DIAGNOSIS — C78.6 PERITONEAL CARCINOMATOSIS (HCC): ICD-10-CM

## 2023-02-15 DIAGNOSIS — Z51.11 CHEMOTHERAPY MANAGEMENT, ENCOUNTER FOR: ICD-10-CM

## 2023-02-15 DIAGNOSIS — G62.0 NEUROPATHY DUE TO CHEMOTHERAPEUTIC DRUG (HCC): ICD-10-CM

## 2023-02-15 DIAGNOSIS — C78.01 MALIGNANT NEOPLASM METASTATIC TO BOTH LUNGS (HCC): Primary | ICD-10-CM

## 2023-02-15 DIAGNOSIS — C18.7 CANCER OF SIGMOID COLON (HCC): ICD-10-CM

## 2023-02-15 DIAGNOSIS — T45.1X5A NEUROPATHY DUE TO CHEMOTHERAPEUTIC DRUG (HCC): ICD-10-CM

## 2023-02-15 LAB
ALBUMIN SERPL-MCNC: 3.4 G/DL (ref 3.4–5)
ALBUMIN/GLOB SERPL: 0.9 {RATIO} (ref 1–2)
ALP LIVER SERPL-CCNC: 129 U/L
ALT SERPL-CCNC: 16 U/L
ANION GAP SERPL CALC-SCNC: 6 MMOL/L (ref 0–18)
AST SERPL-CCNC: 22 U/L (ref 15–37)
BASOPHILS # BLD AUTO: 0.08 X10(3) UL (ref 0–0.2)
BASOPHILS NFR BLD AUTO: 1.1 %
BILIRUB SERPL-MCNC: 0.3 MG/DL (ref 0.1–2)
BUN BLD-MCNC: 11 MG/DL (ref 7–18)
BUN/CREAT SERPL: 18.6 (ref 10–20)
CALCIUM BLD-MCNC: 9.3 MG/DL (ref 8.5–10.1)
CHLORIDE SERPL-SCNC: 110 MMOL/L (ref 98–112)
CO2 SERPL-SCNC: 27 MMOL/L (ref 21–32)
CREAT BLD-MCNC: 0.59 MG/DL
DEPRECATED RDW RBC AUTO: 56.5 FL (ref 35.1–46.3)
EOSINOPHIL # BLD AUTO: 0.52 X10(3) UL (ref 0–0.7)
EOSINOPHIL NFR BLD AUTO: 6.9 %
ERYTHROCYTE [DISTWIDTH] IN BLOOD BY AUTOMATED COUNT: 14.6 % (ref 11–15)
GFR SERPLBLD BASED ON 1.73 SQ M-ARVRAT: 96 ML/MIN/1.73M2 (ref 60–?)
GLOBULIN PLAS-MCNC: 3.6 G/DL (ref 2.8–4.4)
GLUCOSE BLD-MCNC: 104 MG/DL (ref 70–99)
HCT VFR BLD AUTO: 38 %
HGB BLD-MCNC: 12.4 G/DL
IMM GRANULOCYTES # BLD AUTO: 0.02 X10(3) UL (ref 0–1)
IMM GRANULOCYTES NFR BLD: 0.3 %
LYMPHOCYTES # BLD AUTO: 2.77 X10(3) UL (ref 1–4)
LYMPHOCYTES NFR BLD AUTO: 36.9 %
MAGNESIUM SERPL-MCNC: 1.9 MG/DL (ref 1.6–2.6)
MCH RBC QN AUTO: 34.3 PG (ref 26–34)
MCHC RBC AUTO-ENTMCNC: 32.6 G/DL (ref 31–37)
MCV RBC AUTO: 105.3 FL
MONOCYTES # BLD AUTO: 1.05 X10(3) UL (ref 0.1–1)
MONOCYTES NFR BLD AUTO: 14 %
NEUTROPHILS # BLD AUTO: 3.06 X10 (3) UL (ref 1.5–7.7)
NEUTROPHILS # BLD AUTO: 3.06 X10(3) UL (ref 1.5–7.7)
NEUTROPHILS NFR BLD AUTO: 40.8 %
OSMOLALITY SERPL CALC.SUM OF ELEC: 296 MOSM/KG (ref 275–295)
PLATELET # BLD AUTO: 298 10(3)UL (ref 150–450)
POTASSIUM SERPL-SCNC: 4.1 MMOL/L (ref 3.5–5.1)
PROT SERPL-MCNC: 7 G/DL (ref 6.4–8.2)
RBC # BLD AUTO: 3.61 X10(6)UL
SODIUM SERPL-SCNC: 143 MMOL/L (ref 136–145)
WBC # BLD AUTO: 7.5 X10(3) UL (ref 4–11)

## 2023-02-15 PROCEDURE — 80053 COMPREHEN METABOLIC PANEL: CPT

## 2023-02-15 PROCEDURE — 99215 OFFICE O/P EST HI 40 MIN: CPT | Performed by: INTERNAL MEDICINE

## 2023-02-15 PROCEDURE — 96377 APPLICATON ON-BODY INJECTOR: CPT

## 2023-02-15 PROCEDURE — 96375 TX/PRO/DX INJ NEW DRUG ADDON: CPT

## 2023-02-15 PROCEDURE — 96417 CHEMO IV INFUS EACH ADDL SEQ: CPT

## 2023-02-15 PROCEDURE — 96413 CHEMO IV INFUSION 1 HR: CPT

## 2023-02-15 PROCEDURE — 85025 COMPLETE CBC W/AUTO DIFF WBC: CPT

## 2023-02-15 PROCEDURE — 83735 ASSAY OF MAGNESIUM: CPT

## 2023-02-15 RX ORDER — DIPHENHYDRAMINE HCL 50 MG
50 CAPSULE ORAL ONCE
Status: CANCELLED | OUTPATIENT
Start: 2023-02-15

## 2023-02-15 RX ORDER — ATROPINE SULFATE 0.4 MG/ML
0.2 AMPUL (ML) INJECTION ONCE
Status: CANCELLED | OUTPATIENT
Start: 2023-02-15

## 2023-02-15 RX ORDER — ACETAMINOPHEN 325 MG/1
TABLET ORAL
Status: DISPENSED
Start: 2023-02-15 | End: 2023-02-16

## 2023-02-15 RX ORDER — ACETAMINOPHEN 325 MG/1
650 TABLET ORAL ONCE
Status: CANCELLED | OUTPATIENT
Start: 2023-02-15

## 2023-02-15 RX ORDER — HEPARIN SODIUM (PORCINE) LOCK FLUSH IV SOLN 100 UNIT/ML 100 UNIT/ML
SOLUTION INTRAVENOUS
Status: DISPENSED
Start: 2023-02-15 | End: 2023-02-16

## 2023-02-15 RX ORDER — ATROPINE SULFATE 0.4 MG/ML
0.2 AMPUL (ML) INJECTION ONCE
Status: COMPLETED | OUTPATIENT
Start: 2023-02-15 | End: 2023-02-15

## 2023-02-15 RX ORDER — DIPHENHYDRAMINE HCL 25 MG
CAPSULE ORAL
Status: DISCONTINUED
Start: 2023-02-15 | End: 2023-02-15 | Stop reason: WASHOUT

## 2023-02-15 RX ORDER — DIPHENHYDRAMINE HCL 50 MG
CAPSULE ORAL
Status: DISPENSED
Start: 2023-02-15 | End: 2023-02-16

## 2023-02-15 RX ORDER — MAGNESIUM SULFATE HEPTAHYDRATE 40 MG/ML
2 INJECTION, SOLUTION INTRAVENOUS ONCE
OUTPATIENT
Start: 2023-02-15

## 2023-02-15 RX ORDER — ACETAMINOPHEN 325 MG/1
650 TABLET ORAL ONCE
Status: COMPLETED | OUTPATIENT
Start: 2023-02-15 | End: 2023-02-15

## 2023-02-15 RX ORDER — ATROPINE SULFATE 0.4 MG/ML
AMPUL (ML) INJECTION
Status: DISPENSED
Start: 2023-02-15 | End: 2023-02-16

## 2023-02-15 RX ORDER — DIPHENHYDRAMINE HCL 50 MG
50 CAPSULE ORAL ONCE
Status: COMPLETED | OUTPATIENT
Start: 2023-02-15 | End: 2023-02-15

## 2023-02-15 RX ADMIN — ATROPINE SULFATE 0.2 MG: 0.4 MG/ML AMPUL (ML) INJECTION at 15:57:00

## 2023-02-15 RX ADMIN — ACETAMINOPHEN 650 MG: 325 TABLET ORAL at 13:35:00

## 2023-02-15 RX ADMIN — DIPHENHYDRAMINE HCL 50 MG: 50 MG CAPSULE ORAL at 13:44:00

## 2023-02-15 NOTE — PROGRESS NOTES
Pt here for C 2 D 15  erbitux + irinotecan Neulasta on pro      Modifications in dose or schedule: No.       Mag 1.9 no replacement today       Drugs/infusions dual verified for appearance and physical integrity.  IV pump settings were dual verified: yes     Frequency of blood return and site check throughout administration: Prior to administration, Prior to each drug and At completion of therapy   Discharged to Other stable from infusion, Ambulating independently, accompanied by:Self    Outpatient Oncology Care Plan  Problem list:  constipation  acneiform rash to face especially nose area  Problems related to:  side effect of treatment  Interventions:  monitor effect of therapy  monitor lab values  Expected outcomes:  optimal lab values  symptoms relieved/minimized  verbalize how to care for self  Progress towards outcome:  making progress    Education Record    Learner:  Patient  Barriers / Limitations:  None  Method:  Discussion  Outcome:  Shows understanding  Comments:

## 2023-03-01 ENCOUNTER — OFFICE VISIT (OUTPATIENT)
Dept: HEMATOLOGY/ONCOLOGY | Facility: HOSPITAL | Age: 73
End: 2023-03-01
Attending: INTERNAL MEDICINE
Payer: MEDICARE

## 2023-03-01 ENCOUNTER — APPOINTMENT (OUTPATIENT)
Dept: HEMATOLOGY/ONCOLOGY | Facility: HOSPITAL | Age: 73
End: 2023-03-01
Attending: PHYSICIAN ASSISTANT
Payer: MEDICARE

## 2023-03-01 VITALS
TEMPERATURE: 98 F | WEIGHT: 137.81 LBS | HEIGHT: 62 IN | HEART RATE: 94 BPM | OXYGEN SATURATION: 93 % | BODY MASS INDEX: 25.36 KG/M2 | SYSTOLIC BLOOD PRESSURE: 116 MMHG | RESPIRATION RATE: 16 BRPM | DIASTOLIC BLOOD PRESSURE: 68 MMHG

## 2023-03-01 DIAGNOSIS — Z51.11 CHEMOTHERAPY MANAGEMENT, ENCOUNTER FOR: ICD-10-CM

## 2023-03-01 DIAGNOSIS — C18.7 CANCER OF SIGMOID COLON (HCC): Primary | ICD-10-CM

## 2023-03-01 DIAGNOSIS — C78.02 MALIGNANT NEOPLASM METASTATIC TO BOTH LUNGS (HCC): Primary | ICD-10-CM

## 2023-03-01 DIAGNOSIS — C18.7 CANCER OF SIGMOID COLON (HCC): ICD-10-CM

## 2023-03-01 DIAGNOSIS — C78.6 PERITONEAL CARCINOMATOSIS (HCC): ICD-10-CM

## 2023-03-01 DIAGNOSIS — C78.02 MALIGNANT NEOPLASM METASTATIC TO BOTH LUNGS (HCC): ICD-10-CM

## 2023-03-01 DIAGNOSIS — Z45.2 ENCOUNTER FOR CENTRAL LINE CARE: ICD-10-CM

## 2023-03-01 DIAGNOSIS — C78.01 MALIGNANT NEOPLASM METASTATIC TO BOTH LUNGS (HCC): ICD-10-CM

## 2023-03-01 DIAGNOSIS — T45.1X5A CHEMOTHERAPY INDUCED NEUTROPENIA (HCC): ICD-10-CM

## 2023-03-01 DIAGNOSIS — C78.01 MALIGNANT NEOPLASM METASTATIC TO BOTH LUNGS (HCC): Primary | ICD-10-CM

## 2023-03-01 DIAGNOSIS — D70.1 CHEMOTHERAPY INDUCED NEUTROPENIA (HCC): ICD-10-CM

## 2023-03-01 DIAGNOSIS — Z79.899 ENCOUNTER FOR MEDICATION MANAGEMENT: ICD-10-CM

## 2023-03-01 LAB
ALBUMIN SERPL-MCNC: 3.4 G/DL (ref 3.4–5)
ALBUMIN/GLOB SERPL: 1 {RATIO} (ref 1–2)
ALP LIVER SERPL-CCNC: 111 U/L
ALT SERPL-CCNC: 17 U/L
ANION GAP SERPL CALC-SCNC: 5 MMOL/L (ref 0–18)
AST SERPL-CCNC: 24 U/L (ref 15–37)
BASOPHILS # BLD AUTO: 0.07 X10(3) UL (ref 0–0.2)
BASOPHILS NFR BLD AUTO: 1.3 %
BILIRUB SERPL-MCNC: 0.6 MG/DL (ref 0.1–2)
BUN BLD-MCNC: 9 MG/DL (ref 7–18)
BUN/CREAT SERPL: 17 (ref 10–20)
CALCIUM BLD-MCNC: 9.1 MG/DL (ref 8.5–10.1)
CEA SERPL-MCNC: 9.4 NG/ML (ref ?–5)
CHLORIDE SERPL-SCNC: 111 MMOL/L (ref 98–112)
CO2 SERPL-SCNC: 27 MMOL/L (ref 21–32)
CREAT BLD-MCNC: 0.53 MG/DL
DEPRECATED RDW RBC AUTO: 57.6 FL (ref 35.1–46.3)
EOSINOPHIL # BLD AUTO: 0.45 X10(3) UL (ref 0–0.7)
EOSINOPHIL NFR BLD AUTO: 8.5 %
ERYTHROCYTE [DISTWIDTH] IN BLOOD BY AUTOMATED COUNT: 14.6 % (ref 11–15)
GFR SERPLBLD BASED ON 1.73 SQ M-ARVRAT: 98 ML/MIN/1.73M2 (ref 60–?)
GLOBULIN PLAS-MCNC: 3.4 G/DL (ref 2.8–4.4)
GLUCOSE BLD-MCNC: 96 MG/DL (ref 70–99)
HCT VFR BLD AUTO: 36.2 %
HGB BLD-MCNC: 11.8 G/DL
IMM GRANULOCYTES # BLD AUTO: 0.01 X10(3) UL (ref 0–1)
IMM GRANULOCYTES NFR BLD: 0.2 %
LYMPHOCYTES # BLD AUTO: 2.51 X10(3) UL (ref 1–4)
LYMPHOCYTES NFR BLD AUTO: 47.4 %
MAGNESIUM SERPL-MCNC: 1.9 MG/DL (ref 1.6–2.6)
MCH RBC QN AUTO: 34.4 PG (ref 26–34)
MCHC RBC AUTO-ENTMCNC: 32.6 G/DL (ref 31–37)
MCV RBC AUTO: 105.5 FL
MONOCYTES # BLD AUTO: 0.59 X10(3) UL (ref 0.1–1)
MONOCYTES NFR BLD AUTO: 11.1 %
NEUTROPHILS # BLD AUTO: 1.67 X10 (3) UL (ref 1.5–7.7)
NEUTROPHILS # BLD AUTO: 1.67 X10(3) UL (ref 1.5–7.7)
NEUTROPHILS NFR BLD AUTO: 31.5 %
OSMOLALITY SERPL CALC.SUM OF ELEC: 295 MOSM/KG (ref 275–295)
PLATELET # BLD AUTO: 259 10(3)UL (ref 150–450)
POTASSIUM SERPL-SCNC: 3.8 MMOL/L (ref 3.5–5.1)
PROT SERPL-MCNC: 6.8 G/DL (ref 6.4–8.2)
RBC # BLD AUTO: 3.43 X10(6)UL
SODIUM SERPL-SCNC: 143 MMOL/L (ref 136–145)
WBC # BLD AUTO: 5.3 X10(3) UL (ref 4–11)

## 2023-03-01 PROCEDURE — 99215 OFFICE O/P EST HI 40 MIN: CPT | Performed by: INTERNAL MEDICINE

## 2023-03-01 PROCEDURE — 82378 CARCINOEMBRYONIC ANTIGEN: CPT

## 2023-03-01 PROCEDURE — 83735 ASSAY OF MAGNESIUM: CPT

## 2023-03-01 PROCEDURE — 85025 COMPLETE CBC W/AUTO DIFF WBC: CPT

## 2023-03-01 PROCEDURE — 96417 CHEMO IV INFUS EACH ADDL SEQ: CPT

## 2023-03-01 PROCEDURE — 80053 COMPREHEN METABOLIC PANEL: CPT

## 2023-03-01 PROCEDURE — 96375 TX/PRO/DX INJ NEW DRUG ADDON: CPT

## 2023-03-01 PROCEDURE — 96377 APPLICATON ON-BODY INJECTOR: CPT

## 2023-03-01 PROCEDURE — 96413 CHEMO IV INFUSION 1 HR: CPT

## 2023-03-01 PROCEDURE — 96415 CHEMO IV INFUSION ADDL HR: CPT

## 2023-03-01 RX ORDER — ATROPINE SULFATE 0.4 MG/ML
0.2 AMPUL (ML) INJECTION ONCE
Status: COMPLETED | OUTPATIENT
Start: 2023-03-01 | End: 2023-03-01

## 2023-03-01 RX ORDER — MAGNESIUM SULFATE HEPTAHYDRATE 40 MG/ML
2 INJECTION, SOLUTION INTRAVENOUS ONCE
Status: CANCELLED | OUTPATIENT
Start: 2023-03-01

## 2023-03-01 RX ORDER — ATROPINE SULFATE 0.4 MG/ML
0.2 AMPUL (ML) INJECTION ONCE
Status: CANCELLED | OUTPATIENT
Start: 2023-03-01

## 2023-03-01 RX ORDER — HEPARIN SODIUM (PORCINE) LOCK FLUSH IV SOLN 100 UNIT/ML 100 UNIT/ML
5 SOLUTION INTRAVENOUS ONCE
OUTPATIENT
Start: 2023-03-01

## 2023-03-01 RX ORDER — HEPARIN SODIUM (PORCINE) LOCK FLUSH IV SOLN 100 UNIT/ML 100 UNIT/ML
5 SOLUTION INTRAVENOUS ONCE
Status: COMPLETED | OUTPATIENT
Start: 2023-03-01 | End: 2023-03-01

## 2023-03-01 RX ORDER — DULOXETIN HYDROCHLORIDE 20 MG/1
20 CAPSULE, DELAYED RELEASE ORAL DAILY
Qty: 30 CAPSULE | Refills: 1 | Status: SHIPPED | OUTPATIENT
Start: 2023-03-01 | End: 2023-03-31

## 2023-03-01 RX ORDER — DIPHENHYDRAMINE HCL 25 MG
25 CAPSULE ORAL ONCE
Status: CANCELLED | OUTPATIENT
Start: 2023-03-01

## 2023-03-01 RX ORDER — ACETAMINOPHEN 325 MG/1
650 TABLET ORAL ONCE
Status: COMPLETED | OUTPATIENT
Start: 2023-03-01 | End: 2023-03-01

## 2023-03-01 RX ORDER — ACETAMINOPHEN 325 MG/1
TABLET ORAL
Status: COMPLETED
Start: 2023-03-01 | End: 2023-03-01

## 2023-03-01 RX ORDER — DIPHENHYDRAMINE HCL 25 MG
25 CAPSULE ORAL ONCE
Status: COMPLETED | OUTPATIENT
Start: 2023-03-01 | End: 2023-03-01

## 2023-03-01 RX ORDER — SODIUM CHLORIDE 9 MG/ML
10 INJECTION INTRAVENOUS ONCE
OUTPATIENT
Start: 2023-03-01

## 2023-03-01 RX ORDER — ATROPINE SULFATE 0.4 MG/ML
AMPUL (ML) INJECTION
Status: COMPLETED
Start: 2023-03-01 | End: 2023-03-01

## 2023-03-01 RX ORDER — HEPARIN SODIUM (PORCINE) LOCK FLUSH IV SOLN 100 UNIT/ML 100 UNIT/ML
SOLUTION INTRAVENOUS
Status: DISPENSED
Start: 2023-03-01 | End: 2023-03-02

## 2023-03-01 RX ORDER — ACETAMINOPHEN 325 MG/1
650 TABLET ORAL ONCE
Status: CANCELLED | OUTPATIENT
Start: 2023-03-01

## 2023-03-01 RX ORDER — DIPHENHYDRAMINE HCL 25 MG
CAPSULE ORAL
Status: COMPLETED
Start: 2023-03-01 | End: 2023-03-01

## 2023-03-01 RX ADMIN — HEPARIN SODIUM (PORCINE) LOCK FLUSH IV SOLN 100 UNIT/ML 500 UNITS: 100 SOLUTION INTRAVENOUS at 18:20:00

## 2023-03-01 RX ADMIN — DIPHENHYDRAMINE HCL 25 MG: 25 MG CAPSULE ORAL at 14:09:00

## 2023-03-01 RX ADMIN — ATROPINE SULFATE 0.2 MG: 0.4 MG/ML AMPUL (ML) INJECTION at 16:40:00

## 2023-03-01 RX ADMIN — ACETAMINOPHEN 650 MG: 325 TABLET ORAL at 14:11:00

## 2023-03-01 NOTE — PATIENT INSTRUCTIONS
On-body Injector for Neulasta Patient Instructions       Your On-Body Injection device was applied on this day:  3/1/23 at this time 6:00 pm    Your dose of medication will start on this day: 3/2/23 9:00 pm    Safe time to remove this device will be on this day: 3/2/23 at this time 10:30 pm      Important information to remember about the Neulasta Injector:     1. The On-Body Neulasta Injector begins to deliver the dose of Neulasta 27 hours after it is activated at the cancer center. Beeping at that time indicates that the dose will be delivered in 2 minutes. It takes 45 minutes for the dose to be completely delivered. DO NOT REMOVE during this time. 2. Check the light periodically for a slow flashing GREEN light, this is normal.     3. If the light is flashing RED or comes off prior to the delivery time, during regular business hours 8:30-4, please call  and ask for the charge nurse. If this is after hours or a holiday, please contact the [employer] @ [phone], a support person is available 24/7.      4. Please keep the device at least 4 inches away from electrical equipment, such as CELL PHONES, microwaves, cordless phones. Do NOT have Xrays, MRI, CT without informing the technician. 5. If you are traveling, needing to go through airport security, please notify the TSA. You may still travel, just avoid the xray security. 6. Avoid bumping or sleeping directly on the injector. 7. Avoid hot tubs, saunas and direct sunlight. Keep the injector dry 3 hours prior to the dose delivery time. 8. Remove the injector device at the directed time above and place in a hard container for disposal and place in trash.

## 2023-03-01 NOTE — PROGRESS NOTES
Patient arrives to infusion for C3D1 Erbitux/Irinotecan/Neulasta Onpro. Arrives Ambulating independently, accompanied by Self        Reports feeling well, denies complaints or concerns today. Mag 1.9, no replacement needed. Pregnancy screening: Denies possibility of pregnancy    Modifications in dose or schedule: No    Drugs/infusions dual verified for appearance and physical integrity. IV pump settings were dual verified: yes     Neulasta Onpro applied to patient left upper abdomen.     Frequency of blood return and site check throughout administration: Prior to administration, Prior to each drug and At completion of therapy     Discharged to Home, Ambulating independently, accompanied by:Self    Outpatient Oncology Care Plan  Problem list:  fatigue  Problems related to:  chemotherapy  Interventions:  maintain safe environment  monitor for signs/symptoms of infection  chemotherapy teaching  educate regarding self care  encourage activity as tolerated  monitor lab values  promoted rest  provided general teaching  Expected outcomes:  free of infection  optimal lab values  patient supported/coping well  safe in environment  understands plan of care  verbalize how to care for self  Progress towards outcome:  making progress    Education Record    Learner:  Patient  Barriers / Limitations:  None  Method:  Discussion and Reinforcement  Outcome:  Shows understanding  Comments:

## 2023-03-15 ENCOUNTER — OFFICE VISIT (OUTPATIENT)
Dept: HEMATOLOGY/ONCOLOGY | Facility: HOSPITAL | Age: 73
End: 2023-03-15
Attending: INTERNAL MEDICINE
Payer: MEDICARE

## 2023-03-15 VITALS
HEART RATE: 99 BPM | DIASTOLIC BLOOD PRESSURE: 68 MMHG | HEIGHT: 62 IN | TEMPERATURE: 98 F | BODY MASS INDEX: 24.66 KG/M2 | SYSTOLIC BLOOD PRESSURE: 142 MMHG | RESPIRATION RATE: 18 BRPM | OXYGEN SATURATION: 93 % | WEIGHT: 134 LBS

## 2023-03-15 VITALS
RESPIRATION RATE: 16 BRPM | TEMPERATURE: 98 F | DIASTOLIC BLOOD PRESSURE: 58 MMHG | OXYGEN SATURATION: 97 % | SYSTOLIC BLOOD PRESSURE: 109 MMHG | HEART RATE: 89 BPM

## 2023-03-15 DIAGNOSIS — C78.6 PERITONEAL CARCINOMATOSIS (HCC): ICD-10-CM

## 2023-03-15 DIAGNOSIS — C18.7 CANCER OF SIGMOID COLON (HCC): ICD-10-CM

## 2023-03-15 DIAGNOSIS — C18.7 CANCER OF SIGMOID COLON (HCC): Primary | ICD-10-CM

## 2023-03-15 DIAGNOSIS — C78.02 MALIGNANT NEOPLASM METASTATIC TO BOTH LUNGS (HCC): Primary | ICD-10-CM

## 2023-03-15 DIAGNOSIS — Z51.11 CHEMOTHERAPY MANAGEMENT, ENCOUNTER FOR: ICD-10-CM

## 2023-03-15 DIAGNOSIS — C78.01 MALIGNANT NEOPLASM METASTATIC TO BOTH LUNGS (HCC): Primary | ICD-10-CM

## 2023-03-15 LAB
BASOPHILS # BLD AUTO: 0.06 X10(3) UL (ref 0–0.2)
BASOPHILS NFR BLD AUTO: 0.9 %
DEPRECATED RDW RBC AUTO: 57.9 FL (ref 35.1–46.3)
EOSINOPHIL # BLD AUTO: 0.31 X10(3) UL (ref 0–0.7)
EOSINOPHIL NFR BLD AUTO: 4.9 %
ERYTHROCYTE [DISTWIDTH] IN BLOOD BY AUTOMATED COUNT: 14.7 % (ref 11–15)
HCT VFR BLD AUTO: 38.2 %
HGB BLD-MCNC: 12.3 G/DL
IMM GRANULOCYTES # BLD AUTO: 0.02 X10(3) UL (ref 0–1)
IMM GRANULOCYTES NFR BLD: 0.3 %
LYMPHOCYTES # BLD AUTO: 2.6 X10(3) UL (ref 1–4)
LYMPHOCYTES NFR BLD AUTO: 41.1 %
MAGNESIUM SERPL-MCNC: 1.8 MG/DL (ref 1.6–2.6)
MCH RBC QN AUTO: 34.1 PG (ref 26–34)
MCHC RBC AUTO-ENTMCNC: 32.2 G/DL (ref 31–37)
MCV RBC AUTO: 105.8 FL
MONOCYTES # BLD AUTO: 0.57 X10(3) UL (ref 0.1–1)
MONOCYTES NFR BLD AUTO: 9 %
NEUTROPHILS # BLD AUTO: 2.77 X10 (3) UL (ref 1.5–7.7)
NEUTROPHILS # BLD AUTO: 2.77 X10(3) UL (ref 1.5–7.7)
NEUTROPHILS NFR BLD AUTO: 43.8 %
PLATELET # BLD AUTO: 261 10(3)UL (ref 150–450)
RBC # BLD AUTO: 3.61 X10(6)UL
WBC # BLD AUTO: 6.3 X10(3) UL (ref 4–11)

## 2023-03-15 PROCEDURE — 96413 CHEMO IV INFUSION 1 HR: CPT

## 2023-03-15 PROCEDURE — 85025 COMPLETE CBC W/AUTO DIFF WBC: CPT

## 2023-03-15 PROCEDURE — 96417 CHEMO IV INFUS EACH ADDL SEQ: CPT

## 2023-03-15 PROCEDURE — 96367 TX/PROPH/DG ADDL SEQ IV INF: CPT

## 2023-03-15 PROCEDURE — 99215 OFFICE O/P EST HI 40 MIN: CPT | Performed by: INTERNAL MEDICINE

## 2023-03-15 PROCEDURE — 83735 ASSAY OF MAGNESIUM: CPT

## 2023-03-15 PROCEDURE — 96375 TX/PRO/DX INJ NEW DRUG ADDON: CPT

## 2023-03-15 PROCEDURE — 96377 APPLICATON ON-BODY INJECTOR: CPT

## 2023-03-15 RX ORDER — DIPHENHYDRAMINE HCL 25 MG
CAPSULE ORAL
Status: COMPLETED
Start: 2023-03-15 | End: 2023-03-15

## 2023-03-15 RX ORDER — MAGNESIUM SULFATE HEPTAHYDRATE 40 MG/ML
2 INJECTION, SOLUTION INTRAVENOUS ONCE
Status: CANCELLED | OUTPATIENT
Start: 2023-03-15

## 2023-03-15 RX ORDER — ACETAMINOPHEN 325 MG/1
650 TABLET ORAL ONCE
Status: CANCELLED | OUTPATIENT
Start: 2023-03-15

## 2023-03-15 RX ORDER — HEPARIN SODIUM (PORCINE) LOCK FLUSH IV SOLN 100 UNIT/ML 100 UNIT/ML
SOLUTION INTRAVENOUS
Status: COMPLETED
Start: 2023-03-15 | End: 2023-03-15

## 2023-03-15 RX ORDER — ACETAMINOPHEN 325 MG/1
650 TABLET ORAL ONCE
Status: COMPLETED | OUTPATIENT
Start: 2023-03-15 | End: 2023-03-15

## 2023-03-15 RX ORDER — DIPHENHYDRAMINE HCL 25 MG
25 CAPSULE ORAL ONCE
Status: CANCELLED | OUTPATIENT
Start: 2023-03-15

## 2023-03-15 RX ORDER — ATROPINE SULFATE 0.4 MG/ML
AMPUL (ML) INJECTION
Status: COMPLETED
Start: 2023-03-15 | End: 2023-03-15

## 2023-03-15 RX ORDER — ATROPINE SULFATE 0.4 MG/ML
0.2 AMPUL (ML) INJECTION ONCE
Status: COMPLETED | OUTPATIENT
Start: 2023-03-15 | End: 2023-03-15

## 2023-03-15 RX ORDER — ACETAMINOPHEN 325 MG/1
TABLET ORAL
Status: COMPLETED
Start: 2023-03-15 | End: 2023-03-15

## 2023-03-15 RX ORDER — DIPHENHYDRAMINE HCL 25 MG
25 CAPSULE ORAL ONCE
Status: COMPLETED | OUTPATIENT
Start: 2023-03-15 | End: 2023-03-15

## 2023-03-15 RX ORDER — ATROPINE SULFATE 0.4 MG/ML
0.2 AMPUL (ML) INJECTION ONCE
Status: CANCELLED | OUTPATIENT
Start: 2023-03-15

## 2023-03-15 RX ADMIN — ATROPINE SULFATE 0.2 MG: 0.4 MG/ML AMPUL (ML) INJECTION at 16:27:00

## 2023-03-15 RX ADMIN — HEPARIN SODIUM (PORCINE) LOCK FLUSH IV SOLN 100 UNIT/ML: 100 SOLUTION INTRAVENOUS at 18:02:00

## 2023-03-15 RX ADMIN — ACETAMINOPHEN 650 MG: 325 TABLET ORAL at 13:05:00

## 2023-03-15 RX ADMIN — DIPHENHYDRAMINE HCL 25 MG: 25 MG CAPSULE ORAL at 13:05:00

## 2023-03-15 NOTE — PROGRESS NOTES
Patient here for C3 D15 Erbitux/Irinotecan. Arrives Ambulating independently, accompanied by Self. Denies complaints or concerns today. Pregnancy screening: Denies possibility of pregnancy    Modifications in dose or schedule: No    Drugs/infusions dual verified for appearance and physical integrity. IV pump settings were dual verified: yes     Frequency of blood return and site check throughout administration: Prior to administration, Prior to each drug and At completion of therapy     Neulasta Onpro applied to left lower abdomen.     Discharged to Home, Ambulating independently, accompanied by: Self    Outpatient Oncology Care Plan  Problem list:  fatigue  Problems related to:  chemotherapy  Interventions:  chemotherapy teaching  encourage activity as tolerated  monitor lab values  promoted rest  provided general teaching  Expected outcomes:  adequate sleep/rest  optimal lab values  safe in environment  understands plan of care  verbalize how to care for self  Progress towards outcome:  making progress    Education Record    Learner:  Patient  Barriers / Limitations:  None  Method:  Discussion  Outcome:  Shows understanding  Comments:

## 2023-03-28 ENCOUNTER — HOSPITAL ENCOUNTER (OUTPATIENT)
Dept: CT IMAGING | Facility: HOSPITAL | Age: 73
Discharge: HOME | End: 2023-03-28
Attending: INTERNAL MEDICINE
Payer: MEDICARE

## 2023-03-28 DIAGNOSIS — C18.7 CANCER OF SIGMOID COLON (HCC): ICD-10-CM

## 2023-03-28 PROCEDURE — 71260 CT THORAX DX C+: CPT | Performed by: INTERNAL MEDICINE

## 2023-03-28 PROCEDURE — 74177 CT ABD & PELVIS W/CONTRAST: CPT | Performed by: INTERNAL MEDICINE

## 2023-03-29 ENCOUNTER — OFFICE VISIT (OUTPATIENT)
Dept: HEMATOLOGY/ONCOLOGY | Facility: HOSPITAL | Age: 73
End: 2023-03-29
Attending: PHYSICIAN ASSISTANT
Payer: MEDICARE

## 2023-03-29 ENCOUNTER — NURSE ONLY (OUTPATIENT)
Dept: HEMATOLOGY/ONCOLOGY | Facility: HOSPITAL | Age: 73
End: 2023-03-29
Attending: INTERNAL MEDICINE
Payer: MEDICARE

## 2023-03-29 VITALS
BODY MASS INDEX: 24.66 KG/M2 | WEIGHT: 134 LBS | TEMPERATURE: 98 F | HEART RATE: 87 BPM | OXYGEN SATURATION: 96 % | HEIGHT: 62 IN | DIASTOLIC BLOOD PRESSURE: 75 MMHG | RESPIRATION RATE: 16 BRPM | SYSTOLIC BLOOD PRESSURE: 135 MMHG

## 2023-03-29 DIAGNOSIS — C78.01 MALIGNANT NEOPLASM METASTATIC TO BOTH LUNGS (HCC): Primary | ICD-10-CM

## 2023-03-29 DIAGNOSIS — C78.02 MALIGNANT NEOPLASM METASTATIC TO BOTH LUNGS (HCC): Primary | ICD-10-CM

## 2023-03-29 DIAGNOSIS — C18.7 CANCER OF SIGMOID COLON (HCC): ICD-10-CM

## 2023-03-29 DIAGNOSIS — C78.6 PERITONEAL CARCINOMATOSIS (HCC): ICD-10-CM

## 2023-03-29 DIAGNOSIS — Z51.11 CHEMOTHERAPY MANAGEMENT, ENCOUNTER FOR: ICD-10-CM

## 2023-03-29 DIAGNOSIS — R91.8 GROUND GLASS OPACITY PRESENT ON IMAGING OF LUNG: ICD-10-CM

## 2023-03-29 LAB
ALBUMIN SERPL-MCNC: 3.4 G/DL (ref 3.4–5)
ALBUMIN/GLOB SERPL: 1.1 {RATIO} (ref 1–2)
ALP LIVER SERPL-CCNC: 127 U/L
ALT SERPL-CCNC: 22 U/L
ANION GAP SERPL CALC-SCNC: 6 MMOL/L (ref 0–18)
AST SERPL-CCNC: 21 U/L (ref 15–37)
BASOPHILS # BLD AUTO: 0.07 X10(3) UL (ref 0–0.2)
BASOPHILS NFR BLD AUTO: 0.9 %
BILIRUB SERPL-MCNC: 0.5 MG/DL (ref 0.1–2)
BUN BLD-MCNC: 8 MG/DL (ref 7–18)
BUN/CREAT SERPL: 16 (ref 10–20)
CALCIUM BLD-MCNC: 9.2 MG/DL (ref 8.5–10.1)
CEA SERPL-MCNC: 7.9 NG/ML (ref ?–5)
CHLORIDE SERPL-SCNC: 108 MMOL/L (ref 98–112)
CO2 SERPL-SCNC: 28 MMOL/L (ref 21–32)
CREAT BLD-MCNC: 0.5 MG/DL
DEPRECATED RDW RBC AUTO: 56.7 FL (ref 35.1–46.3)
EOSINOPHIL # BLD AUTO: 0.27 X10(3) UL (ref 0–0.7)
EOSINOPHIL NFR BLD AUTO: 3.6 %
ERYTHROCYTE [DISTWIDTH] IN BLOOD BY AUTOMATED COUNT: 14.6 % (ref 11–15)
GFR SERPLBLD BASED ON 1.73 SQ M-ARVRAT: 100 ML/MIN/1.73M2 (ref 60–?)
GLOBULIN PLAS-MCNC: 3.2 G/DL (ref 2.8–4.4)
GLUCOSE BLD-MCNC: 91 MG/DL (ref 70–99)
HCT VFR BLD AUTO: 37.1 %
HGB BLD-MCNC: 12.1 G/DL
IMM GRANULOCYTES # BLD AUTO: 0.02 X10(3) UL (ref 0–1)
IMM GRANULOCYTES NFR BLD: 0.3 %
LYMPHOCYTES # BLD AUTO: 2.48 X10(3) UL (ref 1–4)
LYMPHOCYTES NFR BLD AUTO: 32.9 %
MAGNESIUM SERPL-MCNC: 1.9 MG/DL (ref 1.6–2.6)
MCH RBC QN AUTO: 34.6 PG (ref 26–34)
MCHC RBC AUTO-ENTMCNC: 32.6 G/DL (ref 31–37)
MCV RBC AUTO: 106 FL
MONOCYTES # BLD AUTO: 0.56 X10(3) UL (ref 0.1–1)
MONOCYTES NFR BLD AUTO: 7.4 %
NEUTROPHILS # BLD AUTO: 4.13 X10 (3) UL (ref 1.5–7.7)
NEUTROPHILS # BLD AUTO: 4.13 X10(3) UL (ref 1.5–7.7)
NEUTROPHILS NFR BLD AUTO: 54.9 %
OSMOLALITY SERPL CALC.SUM OF ELEC: 292 MOSM/KG (ref 275–295)
PLATELET # BLD AUTO: 222 10(3)UL (ref 150–450)
POTASSIUM SERPL-SCNC: 3.9 MMOL/L (ref 3.5–5.1)
PROT SERPL-MCNC: 6.6 G/DL (ref 6.4–8.2)
RBC # BLD AUTO: 3.5 X10(6)UL
SODIUM SERPL-SCNC: 142 MMOL/L (ref 136–145)
WBC # BLD AUTO: 7.5 X10(3) UL (ref 4–11)

## 2023-03-29 PROCEDURE — 83735 ASSAY OF MAGNESIUM: CPT

## 2023-03-29 PROCEDURE — 96375 TX/PRO/DX INJ NEW DRUG ADDON: CPT

## 2023-03-29 PROCEDURE — 96413 CHEMO IV INFUSION 1 HR: CPT

## 2023-03-29 PROCEDURE — 82378 CARCINOEMBRYONIC ANTIGEN: CPT

## 2023-03-29 PROCEDURE — 96377 APPLICATON ON-BODY INJECTOR: CPT

## 2023-03-29 PROCEDURE — 99215 OFFICE O/P EST HI 40 MIN: CPT | Performed by: PHYSICIAN ASSISTANT

## 2023-03-29 PROCEDURE — 96417 CHEMO IV INFUS EACH ADDL SEQ: CPT

## 2023-03-29 PROCEDURE — 80053 COMPREHEN METABOLIC PANEL: CPT

## 2023-03-29 PROCEDURE — 85025 COMPLETE CBC W/AUTO DIFF WBC: CPT

## 2023-03-29 PROCEDURE — 96415 CHEMO IV INFUSION ADDL HR: CPT

## 2023-03-29 RX ORDER — ATROPINE SULFATE 0.4 MG/ML
0.2 AMPUL (ML) INJECTION ONCE
Status: CANCELLED | OUTPATIENT
Start: 2023-03-29

## 2023-03-29 RX ORDER — ACETAMINOPHEN 325 MG/1
650 TABLET ORAL ONCE
OUTPATIENT
Start: 2023-04-12

## 2023-03-29 RX ORDER — ACETAMINOPHEN 325 MG/1
650 TABLET ORAL ONCE
Status: CANCELLED | OUTPATIENT
Start: 2023-03-29

## 2023-03-29 RX ORDER — ACETAMINOPHEN 325 MG/1
650 TABLET ORAL ONCE
Status: COMPLETED | OUTPATIENT
Start: 2023-03-29 | End: 2023-03-29

## 2023-03-29 RX ORDER — ACETAMINOPHEN 325 MG/1
TABLET ORAL
Status: DISPENSED
Start: 2023-03-29 | End: 2023-03-30

## 2023-03-29 RX ORDER — DIPHENHYDRAMINE HCL 25 MG
25 CAPSULE ORAL ONCE
Status: CANCELLED | OUTPATIENT
Start: 2023-03-29

## 2023-03-29 RX ORDER — ATROPINE SULFATE 0.4 MG/ML
0.2 AMPUL (ML) INJECTION ONCE
OUTPATIENT
Start: 2023-04-12

## 2023-03-29 RX ORDER — ATROPINE SULFATE 0.4 MG/ML
AMPUL (ML) INJECTION
Status: DISPENSED
Start: 2023-03-29 | End: 2023-03-30

## 2023-03-29 RX ORDER — DIPHENHYDRAMINE HCL 25 MG
25 CAPSULE ORAL ONCE
Status: COMPLETED | OUTPATIENT
Start: 2023-03-29 | End: 2023-03-29

## 2023-03-29 RX ORDER — MAGNESIUM SULFATE HEPTAHYDRATE 40 MG/ML
2 INJECTION, SOLUTION INTRAVENOUS ONCE
OUTPATIENT
Start: 2023-04-12

## 2023-03-29 RX ORDER — DIPHENHYDRAMINE HCL 25 MG
25 CAPSULE ORAL ONCE
OUTPATIENT
Start: 2023-04-12

## 2023-03-29 RX ORDER — DIPHENHYDRAMINE HCL 25 MG
CAPSULE ORAL
Status: DISPENSED
Start: 2023-03-29 | End: 2023-03-30

## 2023-03-29 RX ORDER — MAGNESIUM SULFATE HEPTAHYDRATE 40 MG/ML
2 INJECTION, SOLUTION INTRAVENOUS ONCE
OUTPATIENT
Start: 2023-03-29

## 2023-03-29 RX ORDER — ATROPINE SULFATE 0.4 MG/ML
0.2 AMPUL (ML) INJECTION ONCE
Status: COMPLETED | OUTPATIENT
Start: 2023-03-29 | End: 2023-03-29

## 2023-03-29 RX ORDER — HEPARIN SODIUM (PORCINE) LOCK FLUSH IV SOLN 100 UNIT/ML 100 UNIT/ML
SOLUTION INTRAVENOUS
Status: DISPENSED
Start: 2023-03-29 | End: 2023-03-30

## 2023-03-29 RX ADMIN — ACETAMINOPHEN 650 MG: 325 TABLET ORAL at 14:23:00

## 2023-03-29 RX ADMIN — ATROPINE SULFATE 0.2 MG: 0.4 MG/ML AMPUL (ML) INJECTION at 17:03:00

## 2023-03-29 RX ADMIN — DIPHENHYDRAMINE HCL 25 MG: 25 MG CAPSULE ORAL at 14:23:00

## 2023-03-29 NOTE — PROGRESS NOTES
Patient arrives to infusion for C4D1 Erbitux/Irinotecan/Neulasta Onpro. Arrives Ambulating independently, accompanied by Self        Reports feeling well, denies complaints or concerns today. Mag 1.9, no replacement needed. Pregnancy screening: Denies possibility of pregnancy    Modifications in dose or schedule: No    Drugs/infusions dual verified for appearance and physical integrity. IV pump settings were dual verified: yes     Neulasta Onpro applied to patient left upper abdomen.     Frequency of blood return and site check throughout administration: Prior to administration, Prior to each drug and At completion of therapy     Discharged to Home, Ambulating independently, accompanied by:Self    Outpatient Oncology Care Plan  Problem list:  fatigue  Problems related to:  chemotherapy  Interventions:  maintain safe environment  monitor for signs/symptoms of infection  chemotherapy teaching  educate regarding self care  encourage activity as tolerated  monitor lab values  promoted rest  provided general teaching  Expected outcomes:  free of infection  optimal lab values  patient supported/coping well  safe in environment  understands plan of care  verbalize how to care for self  Progress towards outcome:  making progress    Education Record    Learner:  Patient  Barriers / Limitations:  None  Method:  Discussion and Reinforcement  Outcome:  Shows understanding  Comments:

## 2023-04-12 ENCOUNTER — NURSE ONLY (OUTPATIENT)
Dept: HEMATOLOGY/ONCOLOGY | Facility: HOSPITAL | Age: 73
End: 2023-04-12
Attending: INTERNAL MEDICINE
Payer: MEDICARE

## 2023-04-12 VITALS
HEART RATE: 95 BPM | TEMPERATURE: 98 F | OXYGEN SATURATION: 96 % | BODY MASS INDEX: 24.44 KG/M2 | WEIGHT: 132.81 LBS | RESPIRATION RATE: 16 BRPM | SYSTOLIC BLOOD PRESSURE: 120 MMHG | DIASTOLIC BLOOD PRESSURE: 61 MMHG | HEIGHT: 62 IN

## 2023-04-12 DIAGNOSIS — C78.6 PERITONEAL CARCINOMATOSIS (HCC): ICD-10-CM

## 2023-04-12 DIAGNOSIS — C18.7 CANCER OF SIGMOID COLON (HCC): ICD-10-CM

## 2023-04-12 DIAGNOSIS — C78.01 MALIGNANT NEOPLASM METASTATIC TO BOTH LUNGS (HCC): Primary | ICD-10-CM

## 2023-04-12 DIAGNOSIS — Z51.11 CHEMOTHERAPY MANAGEMENT, ENCOUNTER FOR: Primary | ICD-10-CM

## 2023-04-12 DIAGNOSIS — C78.02 MALIGNANT NEOPLASM METASTATIC TO BOTH LUNGS (HCC): Primary | ICD-10-CM

## 2023-04-12 LAB
BASOPHILS # BLD AUTO: 0.07 X10(3) UL (ref 0–0.2)
BASOPHILS NFR BLD AUTO: 1.1 %
DEPRECATED RDW RBC AUTO: 59.9 FL (ref 35.1–46.3)
EOSINOPHIL # BLD AUTO: 0.3 X10(3) UL (ref 0–0.7)
EOSINOPHIL NFR BLD AUTO: 4.6 %
ERYTHROCYTE [DISTWIDTH] IN BLOOD BY AUTOMATED COUNT: 15.4 % (ref 11–15)
HCT VFR BLD AUTO: 36.8 %
HGB BLD-MCNC: 11.6 G/DL
IMM GRANULOCYTES # BLD AUTO: 0.02 X10(3) UL (ref 0–1)
IMM GRANULOCYTES NFR BLD: 0.3 %
LYMPHOCYTES # BLD AUTO: 2.52 X10(3) UL (ref 1–4)
LYMPHOCYTES NFR BLD AUTO: 38.8 %
MAGNESIUM SERPL-MCNC: 1.7 MG/DL (ref 1.6–2.6)
MCH RBC QN AUTO: 33.4 PG (ref 26–34)
MCHC RBC AUTO-ENTMCNC: 31.5 G/DL (ref 31–37)
MCV RBC AUTO: 106.1 FL
MONOCYTES # BLD AUTO: 0.56 X10(3) UL (ref 0.1–1)
MONOCYTES NFR BLD AUTO: 8.6 %
NEUTROPHILS # BLD AUTO: 3.02 X10 (3) UL (ref 1.5–7.7)
NEUTROPHILS # BLD AUTO: 3.02 X10(3) UL (ref 1.5–7.7)
NEUTROPHILS NFR BLD AUTO: 46.6 %
PLATELET # BLD AUTO: 241 10(3)UL (ref 150–450)
RBC # BLD AUTO: 3.47 X10(6)UL
WBC # BLD AUTO: 6.5 X10(3) UL (ref 4–11)

## 2023-04-12 PROCEDURE — 96367 TX/PROPH/DG ADDL SEQ IV INF: CPT

## 2023-04-12 PROCEDURE — 99215 OFFICE O/P EST HI 40 MIN: CPT | Performed by: INTERNAL MEDICINE

## 2023-04-12 PROCEDURE — 96413 CHEMO IV INFUSION 1 HR: CPT

## 2023-04-12 PROCEDURE — 85025 COMPLETE CBC W/AUTO DIFF WBC: CPT

## 2023-04-12 PROCEDURE — 96377 APPLICATON ON-BODY INJECTOR: CPT

## 2023-04-12 PROCEDURE — 96417 CHEMO IV INFUS EACH ADDL SEQ: CPT

## 2023-04-12 PROCEDURE — 83735 ASSAY OF MAGNESIUM: CPT

## 2023-04-12 PROCEDURE — 96375 TX/PRO/DX INJ NEW DRUG ADDON: CPT

## 2023-04-12 RX ORDER — DIPHENHYDRAMINE HCL 25 MG
CAPSULE ORAL
Status: DISPENSED
Start: 2023-04-12 | End: 2023-04-13

## 2023-04-12 RX ORDER — HEPARIN SODIUM (PORCINE) LOCK FLUSH IV SOLN 100 UNIT/ML 100 UNIT/ML
SOLUTION INTRAVENOUS
Status: DISPENSED
Start: 2023-04-12 | End: 2023-04-13

## 2023-04-12 RX ORDER — ATROPINE SULFATE 0.4 MG/ML
AMPUL (ML) INJECTION
Status: DISPENSED
Start: 2023-04-12 | End: 2023-04-13

## 2023-04-12 RX ORDER — ACETAMINOPHEN 325 MG/1
TABLET ORAL
Status: DISPENSED
Start: 2023-04-12 | End: 2023-04-13

## 2023-04-12 RX ORDER — MAGNESIUM SULFATE HEPTAHYDRATE 40 MG/ML
INJECTION, SOLUTION INTRAVENOUS
Status: DISPENSED
Start: 2023-04-12 | End: 2023-04-13

## 2023-04-12 RX ORDER — ACETAMINOPHEN 325 MG/1
650 TABLET ORAL ONCE
Status: COMPLETED | OUTPATIENT
Start: 2023-04-12 | End: 2023-04-12

## 2023-04-12 RX ORDER — DIPHENHYDRAMINE HCL 50 MG
CAPSULE ORAL
Status: DISCONTINUED
Start: 2023-04-12 | End: 2023-04-12 | Stop reason: WASHOUT

## 2023-04-12 RX ORDER — DIPHENHYDRAMINE HCL 25 MG
25 CAPSULE ORAL ONCE
Status: COMPLETED | OUTPATIENT
Start: 2023-04-12 | End: 2023-04-12

## 2023-04-12 RX ORDER — MAGNESIUM SULFATE HEPTAHYDRATE 40 MG/ML
2 INJECTION, SOLUTION INTRAVENOUS ONCE
Status: COMPLETED | OUTPATIENT
Start: 2023-04-12 | End: 2023-04-12

## 2023-04-12 RX ORDER — ATROPINE SULFATE 0.4 MG/ML
0.2 AMPUL (ML) INJECTION ONCE
Status: COMPLETED | OUTPATIENT
Start: 2023-04-12 | End: 2023-04-12

## 2023-04-12 RX ADMIN — ATROPINE SULFATE 0.2 MG: 0.4 MG/ML AMPUL (ML) INJECTION at 16:25:00

## 2023-04-12 RX ADMIN — DIPHENHYDRAMINE HCL 25 MG: 25 MG CAPSULE ORAL at 13:44:00

## 2023-04-12 RX ADMIN — ACETAMINOPHEN 650 MG: 325 TABLET ORAL at 13:40:00

## 2023-04-12 RX ADMIN — MAGNESIUM SULFATE HEPTAHYDRATE 2 G: 40 INJECTION, SOLUTION INTRAVENOUS at 15:25:00

## 2023-04-12 NOTE — PROGRESS NOTES
Patient arrives to infusion for C4D15 Erbitux/Irinotecan/Neulasta Onpro. Arrives Ambulating independently, accompanied by Self        Reports feeling well, denies complaints or concerns today. Mag 1.7  2 gm mag rider given ,. Pregnancy screening: Denies possibility of pregnancy    Modifications in dose or schedule: No    Drugs/infusions dual verified for appearance and physical integrity. IV pump settings were dual verified: yes     Neulasta Onpro applied to patient left upper abdomen.     Frequency of blood return and site check throughout administration: Prior to administration, Prior to each drug and At completion of therapy     Discharged to Home, Ambulating independently, accompanied by:Self    Outpatient Oncology Care Plan  Problem list:  fatigue  Problems related to:  chemotherapy  Interventions:  maintain safe environment  monitor for signs/symptoms of infection  chemotherapy teaching  educate regarding self care  encourage activity as tolerated  monitor lab values  promoted rest  provided general teaching  Expected outcomes:  free of infection  optimal lab values  patient supported/coping well  safe in environment  understands plan of care  verbalize how to care for self  Progress towards outcome:  making progress    Education Record    Learner:  Patient  Barriers / Limitations:  None  Method:  Discussion and Reinforcement  Outcome:  Shows understanding  Comments:

## 2023-04-26 ENCOUNTER — OFFICE VISIT (OUTPATIENT)
Dept: HEMATOLOGY/ONCOLOGY | Facility: HOSPITAL | Age: 73
End: 2023-04-26
Attending: INTERNAL MEDICINE
Payer: MEDICARE

## 2023-04-26 VITALS
HEIGHT: 62 IN | RESPIRATION RATE: 16 BRPM | WEIGHT: 130.38 LBS | SYSTOLIC BLOOD PRESSURE: 114 MMHG | HEART RATE: 94 BPM | OXYGEN SATURATION: 95 % | DIASTOLIC BLOOD PRESSURE: 69 MMHG | TEMPERATURE: 98 F | BODY MASS INDEX: 23.99 KG/M2

## 2023-04-26 DIAGNOSIS — C78.02 MALIGNANT NEOPLASM METASTATIC TO BOTH LUNGS (HCC): Primary | ICD-10-CM

## 2023-04-26 DIAGNOSIS — C18.7 CANCER OF SIGMOID COLON (HCC): ICD-10-CM

## 2023-04-26 DIAGNOSIS — Z51.11 CHEMOTHERAPY MANAGEMENT, ENCOUNTER FOR: ICD-10-CM

## 2023-04-26 DIAGNOSIS — C78.6 PERITONEAL CARCINOMATOSIS (HCC): ICD-10-CM

## 2023-04-26 DIAGNOSIS — C78.01 MALIGNANT NEOPLASM METASTATIC TO BOTH LUNGS (HCC): Primary | ICD-10-CM

## 2023-04-26 LAB
ALBUMIN SERPL-MCNC: 3.3 G/DL (ref 3.4–5)
ALBUMIN/GLOB SERPL: 0.9 {RATIO} (ref 1–2)
ALP LIVER SERPL-CCNC: 169 U/L
ALT SERPL-CCNC: 19 U/L
ANION GAP SERPL CALC-SCNC: 7 MMOL/L (ref 0–18)
AST SERPL-CCNC: 20 U/L (ref 15–37)
BASOPHILS # BLD AUTO: 0.08 X10(3) UL (ref 0–0.2)
BASOPHILS NFR BLD AUTO: 1 %
BILIRUB SERPL-MCNC: 0.5 MG/DL (ref 0.1–2)
BUN BLD-MCNC: 10 MG/DL (ref 7–18)
BUN/CREAT SERPL: 20.4 (ref 10–20)
CALCIUM BLD-MCNC: 8.9 MG/DL (ref 8.5–10.1)
CEA SERPL-MCNC: 10.7 NG/ML (ref ?–5)
CHLORIDE SERPL-SCNC: 110 MMOL/L (ref 98–112)
CO2 SERPL-SCNC: 27 MMOL/L (ref 21–32)
CREAT BLD-MCNC: 0.49 MG/DL
DEPRECATED RDW RBC AUTO: 59.3 FL (ref 35.1–46.3)
EOSINOPHIL # BLD AUTO: 0.35 X10(3) UL (ref 0–0.7)
EOSINOPHIL NFR BLD AUTO: 4.5 %
ERYTHROCYTE [DISTWIDTH] IN BLOOD BY AUTOMATED COUNT: 15.3 % (ref 11–15)
GFR SERPLBLD BASED ON 1.73 SQ M-ARVRAT: 100 ML/MIN/1.73M2 (ref 60–?)
GLOBULIN PLAS-MCNC: 3.5 G/DL (ref 2.8–4.4)
GLUCOSE BLD-MCNC: 94 MG/DL (ref 70–99)
HCT VFR BLD AUTO: 37.9 %
HGB BLD-MCNC: 12.1 G/DL
IMM GRANULOCYTES # BLD AUTO: 0.03 X10(3) UL (ref 0–1)
IMM GRANULOCYTES NFR BLD: 0.4 %
LYMPHOCYTES # BLD AUTO: 2.7 X10(3) UL (ref 1–4)
LYMPHOCYTES NFR BLD AUTO: 34.5 %
MAGNESIUM SERPL-MCNC: 1.7 MG/DL (ref 1.6–2.6)
MCH RBC QN AUTO: 33.6 PG (ref 26–34)
MCHC RBC AUTO-ENTMCNC: 31.9 G/DL (ref 31–37)
MCV RBC AUTO: 105.3 FL
MONOCYTES # BLD AUTO: 0.71 X10(3) UL (ref 0.1–1)
MONOCYTES NFR BLD AUTO: 9.1 %
NEUTROPHILS # BLD AUTO: 3.96 X10 (3) UL (ref 1.5–7.7)
NEUTROPHILS # BLD AUTO: 3.96 X10(3) UL (ref 1.5–7.7)
NEUTROPHILS NFR BLD AUTO: 50.5 %
OSMOLALITY SERPL CALC.SUM OF ELEC: 297 MOSM/KG (ref 275–295)
PLATELET # BLD AUTO: 262 10(3)UL (ref 150–450)
POTASSIUM SERPL-SCNC: 4.1 MMOL/L (ref 3.5–5.1)
PROT SERPL-MCNC: 6.8 G/DL (ref 6.4–8.2)
RBC # BLD AUTO: 3.6 X10(6)UL
SODIUM SERPL-SCNC: 144 MMOL/L (ref 136–145)
WBC # BLD AUTO: 7.8 X10(3) UL (ref 4–11)

## 2023-04-26 PROCEDURE — 83735 ASSAY OF MAGNESIUM: CPT

## 2023-04-26 PROCEDURE — 99215 OFFICE O/P EST HI 40 MIN: CPT | Performed by: INTERNAL MEDICINE

## 2023-04-26 PROCEDURE — 82378 CARCINOEMBRYONIC ANTIGEN: CPT

## 2023-04-26 PROCEDURE — 96377 APPLICATON ON-BODY INJECTOR: CPT

## 2023-04-26 PROCEDURE — 96367 TX/PROPH/DG ADDL SEQ IV INF: CPT

## 2023-04-26 PROCEDURE — 96375 TX/PRO/DX INJ NEW DRUG ADDON: CPT

## 2023-04-26 PROCEDURE — 96415 CHEMO IV INFUSION ADDL HR: CPT

## 2023-04-26 PROCEDURE — 96417 CHEMO IV INFUS EACH ADDL SEQ: CPT

## 2023-04-26 PROCEDURE — 80053 COMPREHEN METABOLIC PANEL: CPT

## 2023-04-26 PROCEDURE — 85025 COMPLETE CBC W/AUTO DIFF WBC: CPT

## 2023-04-26 PROCEDURE — 96413 CHEMO IV INFUSION 1 HR: CPT

## 2023-04-26 RX ORDER — ACETAMINOPHEN 325 MG/1
650 TABLET ORAL ONCE
Status: CANCELLED | OUTPATIENT
Start: 2023-04-26

## 2023-04-26 RX ORDER — MAGNESIUM SULFATE HEPTAHYDRATE 40 MG/ML
INJECTION, SOLUTION INTRAVENOUS
Status: COMPLETED
Start: 2023-04-26 | End: 2023-04-26

## 2023-04-26 RX ORDER — DIPHENHYDRAMINE HCL 25 MG
25 CAPSULE ORAL ONCE
Status: COMPLETED | OUTPATIENT
Start: 2023-04-26 | End: 2023-04-26

## 2023-04-26 RX ORDER — ATROPINE SULFATE 0.4 MG/ML
0.2 AMPUL (ML) INJECTION ONCE
OUTPATIENT
Start: 2023-05-10

## 2023-04-26 RX ORDER — HEPARIN SODIUM (PORCINE) LOCK FLUSH IV SOLN 100 UNIT/ML 100 UNIT/ML
SOLUTION INTRAVENOUS
Status: DISCONTINUED
Start: 2023-04-26 | End: 2023-04-26

## 2023-04-26 RX ORDER — MAGNESIUM SULFATE HEPTAHYDRATE 40 MG/ML
2 INJECTION, SOLUTION INTRAVENOUS ONCE
OUTPATIENT
Start: 2023-05-10

## 2023-04-26 RX ORDER — MAGNESIUM SULFATE HEPTAHYDRATE 40 MG/ML
2 INJECTION, SOLUTION INTRAVENOUS ONCE
Status: COMPLETED | OUTPATIENT
Start: 2023-04-26 | End: 2023-04-26

## 2023-04-26 RX ORDER — ACETAMINOPHEN 325 MG/1
650 TABLET ORAL ONCE
OUTPATIENT
Start: 2023-05-10

## 2023-04-26 RX ORDER — ATROPINE SULFATE 0.4 MG/ML
0.2 AMPUL (ML) INJECTION ONCE
Status: CANCELLED | OUTPATIENT
Start: 2023-04-26

## 2023-04-26 RX ORDER — MAGNESIUM SULFATE HEPTAHYDRATE 40 MG/ML
2 INJECTION, SOLUTION INTRAVENOUS ONCE
Status: CANCELLED | OUTPATIENT
Start: 2023-04-26

## 2023-04-26 RX ORDER — ACETAMINOPHEN 325 MG/1
650 TABLET ORAL ONCE
Status: COMPLETED | OUTPATIENT
Start: 2023-04-26 | End: 2023-04-26

## 2023-04-26 RX ORDER — ACETAMINOPHEN 325 MG/1
TABLET ORAL
Status: COMPLETED
Start: 2023-04-26 | End: 2023-04-26

## 2023-04-26 RX ORDER — ATROPINE SULFATE 0.4 MG/ML
AMPUL (ML) INJECTION
Status: COMPLETED
Start: 2023-04-26 | End: 2023-04-26

## 2023-04-26 RX ORDER — DIPHENHYDRAMINE HCL 25 MG
25 CAPSULE ORAL ONCE
Status: CANCELLED | OUTPATIENT
Start: 2023-04-26

## 2023-04-26 RX ORDER — DIPHENHYDRAMINE HCL 25 MG
25 CAPSULE ORAL ONCE
OUTPATIENT
Start: 2023-05-10

## 2023-04-26 RX ORDER — ATROPINE SULFATE 0.4 MG/ML
0.2 AMPUL (ML) INJECTION ONCE
Status: COMPLETED | OUTPATIENT
Start: 2023-04-26 | End: 2023-04-26

## 2023-04-26 RX ORDER — DIPHENHYDRAMINE HCL 25 MG
CAPSULE ORAL
Status: COMPLETED
Start: 2023-04-26 | End: 2023-04-26

## 2023-04-26 RX ADMIN — DIPHENHYDRAMINE HCL 25 MG: 25 MG CAPSULE ORAL at 13:31:00

## 2023-04-26 RX ADMIN — MAGNESIUM SULFATE HEPTAHYDRATE 2 G: 40 INJECTION, SOLUTION INTRAVENOUS at 15:16:00

## 2023-04-26 RX ADMIN — ACETAMINOPHEN 650 MG: 325 TABLET ORAL at 13:31:00

## 2023-04-26 RX ADMIN — ATROPINE SULFATE 0.2 MG: 0.4 MG/ML AMPUL (ML) INJECTION at 16:09:00

## 2023-04-26 NOTE — PROGRESS NOTES
Patient arrives to infusion for C5D1 Erbitux/Irinotecan/Neulasta Onpro. Arrives Ambulating independently, accompanied by Self        Reports feeling well, denies complaints or concerns today. Mag 1.7,   2 gm mag replacement given     Pregnancy screening: Denies possibility of pregnancy    Modifications in dose or schedule: No    Drugs/infusions dual verified for appearance and physical integrity. IV pump settings were dual verified: yes     Neulasta Onpro applied to patient left upper abdomen.     Frequency of blood return and site check throughout administration: Prior to administration, Prior to each drug and At completion of therapy     Discharged to Home, Ambulating independently, accompanied by:Self    Outpatient Oncology Care Plan  Problem list:  fatigue  Problems related to:  chemotherapy  Interventions:  maintain safe environment  monitor for signs/symptoms of infection  chemotherapy teaching  educate regarding self care  encourage activity as tolerated  monitor lab values  promoted rest  provided general teaching  Expected outcomes:  free of infection  optimal lab values  patient supported/coping well  safe in environment  understands plan of care  verbalize how to care for self  Progress towards outcome:  making progress    Education Record    Learner:  Patient  Barriers / Limitations:  None  Method:  Discussion and Reinforcement  Outcome:  Shows understanding  Comments:

## 2023-05-01 RX ORDER — DULOXETIN HYDROCHLORIDE 20 MG/1
20 CAPSULE, DELAYED RELEASE ORAL DAILY
Qty: 90 CAPSULE | Refills: 3 | Status: SHIPPED | OUTPATIENT
Start: 2023-05-01

## 2023-05-08 ENCOUNTER — HOSPITAL ENCOUNTER (OUTPATIENT)
Dept: CT IMAGING | Facility: HOSPITAL | Age: 73
Discharge: HOME | End: 2023-05-08
Attending: PHYSICIAN ASSISTANT
Payer: MEDICARE

## 2023-05-08 DIAGNOSIS — C18.7 CANCER OF SIGMOID COLON (HCC): ICD-10-CM

## 2023-05-08 DIAGNOSIS — C78.02 MALIGNANT NEOPLASM METASTATIC TO BOTH LUNGS (HCC): ICD-10-CM

## 2023-05-08 DIAGNOSIS — C78.01 MALIGNANT NEOPLASM METASTATIC TO BOTH LUNGS (HCC): ICD-10-CM

## 2023-05-08 DIAGNOSIS — R91.8 GROUND GLASS OPACITY PRESENT ON IMAGING OF LUNG: ICD-10-CM

## 2023-05-08 PROCEDURE — 71250 CT THORAX DX C-: CPT | Performed by: PHYSICIAN ASSISTANT

## 2023-05-10 ENCOUNTER — OFFICE VISIT (OUTPATIENT)
Dept: HEMATOLOGY/ONCOLOGY | Facility: HOSPITAL | Age: 73
End: 2023-05-10
Attending: INTERNAL MEDICINE
Payer: MEDICARE

## 2023-05-10 ENCOUNTER — TELEPHONE (OUTPATIENT)
Dept: PULMONOLOGY | Facility: CLINIC | Age: 73
End: 2023-05-10

## 2023-05-10 VITALS
DIASTOLIC BLOOD PRESSURE: 66 MMHG | OXYGEN SATURATION: 94 % | BODY MASS INDEX: 23.89 KG/M2 | HEART RATE: 111 BPM | RESPIRATION RATE: 16 BRPM | TEMPERATURE: 98 F | HEIGHT: 62 IN | WEIGHT: 129.81 LBS | SYSTOLIC BLOOD PRESSURE: 117 MMHG

## 2023-05-10 DIAGNOSIS — C18.7 CANCER OF SIGMOID COLON (HCC): ICD-10-CM

## 2023-05-10 DIAGNOSIS — C78.6 PERITONEAL CARCINOMATOSIS (HCC): ICD-10-CM

## 2023-05-10 DIAGNOSIS — C78.6 PERITONEAL CARCINOMATOSIS (HCC): Primary | ICD-10-CM

## 2023-05-10 DIAGNOSIS — T45.1X5A CHEMOTHERAPY INDUCED NEUTROPENIA (HCC): ICD-10-CM

## 2023-05-10 DIAGNOSIS — J90 PLEURAL EFFUSION: ICD-10-CM

## 2023-05-10 DIAGNOSIS — Z51.11 CHEMOTHERAPY MANAGEMENT, ENCOUNTER FOR: ICD-10-CM

## 2023-05-10 DIAGNOSIS — D70.1 CHEMOTHERAPY INDUCED NEUTROPENIA (HCC): ICD-10-CM

## 2023-05-10 DIAGNOSIS — C78.02 MALIGNANT NEOPLASM METASTATIC TO BOTH LUNGS (HCC): Primary | ICD-10-CM

## 2023-05-10 DIAGNOSIS — C78.01 MALIGNANT NEOPLASM METASTATIC TO BOTH LUNGS (HCC): Primary | ICD-10-CM

## 2023-05-10 LAB
BASOPHILS # BLD AUTO: 0.09 X10(3) UL (ref 0–0.2)
BASOPHILS NFR BLD AUTO: 0.9 %
DEPRECATED RDW RBC AUTO: 55.8 FL (ref 35.1–46.3)
EOSINOPHIL # BLD AUTO: 0.24 X10(3) UL (ref 0–0.7)
EOSINOPHIL NFR BLD AUTO: 2.3 %
ERYTHROCYTE [DISTWIDTH] IN BLOOD BY AUTOMATED COUNT: 14.6 % (ref 11–15)
HCT VFR BLD AUTO: 37 %
HGB BLD-MCNC: 11.9 G/DL
IMM GRANULOCYTES # BLD AUTO: 0.05 X10(3) UL (ref 0–1)
IMM GRANULOCYTES NFR BLD: 0.5 %
LYMPHOCYTES # BLD AUTO: 2.89 X10(3) UL (ref 1–4)
LYMPHOCYTES NFR BLD AUTO: 28.1 %
MAGNESIUM SERPL-MCNC: 1.6 MG/DL (ref 1.6–2.6)
MCH RBC QN AUTO: 33.4 PG (ref 26–34)
MCHC RBC AUTO-ENTMCNC: 32.2 G/DL (ref 31–37)
MCV RBC AUTO: 103.9 FL
MONOCYTES # BLD AUTO: 0.85 X10(3) UL (ref 0.1–1)
MONOCYTES NFR BLD AUTO: 8.3 %
NEUTROPHILS # BLD AUTO: 6.16 X10 (3) UL (ref 1.5–7.7)
NEUTROPHILS # BLD AUTO: 6.16 X10(3) UL (ref 1.5–7.7)
NEUTROPHILS NFR BLD AUTO: 59.9 %
PLATELET # BLD AUTO: 306 10(3)UL (ref 150–450)
RBC # BLD AUTO: 3.56 X10(6)UL
WBC # BLD AUTO: 10.3 X10(3) UL (ref 4–11)

## 2023-05-10 PROCEDURE — 96375 TX/PRO/DX INJ NEW DRUG ADDON: CPT

## 2023-05-10 PROCEDURE — 96367 TX/PROPH/DG ADDL SEQ IV INF: CPT

## 2023-05-10 PROCEDURE — 96417 CHEMO IV INFUS EACH ADDL SEQ: CPT

## 2023-05-10 PROCEDURE — 96377 APPLICATON ON-BODY INJECTOR: CPT

## 2023-05-10 PROCEDURE — 85025 COMPLETE CBC W/AUTO DIFF WBC: CPT

## 2023-05-10 PROCEDURE — 83735 ASSAY OF MAGNESIUM: CPT

## 2023-05-10 PROCEDURE — 96413 CHEMO IV INFUSION 1 HR: CPT

## 2023-05-10 PROCEDURE — 99215 OFFICE O/P EST HI 40 MIN: CPT | Performed by: INTERNAL MEDICINE

## 2023-05-10 PROCEDURE — 96415 CHEMO IV INFUSION ADDL HR: CPT

## 2023-05-10 RX ORDER — ATROPINE SULFATE 0.4 MG/ML
0.2 AMPUL (ML) INJECTION ONCE
Status: COMPLETED | OUTPATIENT
Start: 2023-05-10 | End: 2023-05-10

## 2023-05-10 RX ORDER — DIPHENHYDRAMINE HCL 25 MG
CAPSULE ORAL
Status: DISPENSED
Start: 2023-05-10 | End: 2023-05-11

## 2023-05-10 RX ORDER — ATROPINE SULFATE 0.4 MG/ML
AMPUL (ML) INJECTION
Status: DISPENSED
Start: 2023-05-10 | End: 2023-05-11

## 2023-05-10 RX ORDER — MAGNESIUM SULFATE HEPTAHYDRATE 40 MG/ML
2 INJECTION, SOLUTION INTRAVENOUS ONCE
Status: COMPLETED | OUTPATIENT
Start: 2023-05-10 | End: 2023-05-10

## 2023-05-10 RX ORDER — HEPARIN SODIUM (PORCINE) LOCK FLUSH IV SOLN 100 UNIT/ML 100 UNIT/ML
SOLUTION INTRAVENOUS
Status: DISPENSED
Start: 2023-05-10 | End: 2023-05-11

## 2023-05-10 RX ORDER — ACETAMINOPHEN 325 MG/1
650 TABLET ORAL ONCE
Status: COMPLETED | OUTPATIENT
Start: 2023-05-10 | End: 2023-05-10

## 2023-05-10 RX ORDER — DIPHENHYDRAMINE HCL 25 MG
25 CAPSULE ORAL ONCE
Status: COMPLETED | OUTPATIENT
Start: 2023-05-10 | End: 2023-05-10

## 2023-05-10 RX ORDER — MAGNESIUM SULFATE HEPTAHYDRATE 40 MG/ML
INJECTION, SOLUTION INTRAVENOUS
Status: DISPENSED
Start: 2023-05-10 | End: 2023-05-11

## 2023-05-10 RX ORDER — ACETAMINOPHEN 325 MG/1
TABLET ORAL
Status: DISPENSED
Start: 2023-05-10 | End: 2023-05-11

## 2023-05-10 RX ORDER — FUROSEMIDE 20 MG/1
20 TABLET ORAL DAILY
Qty: 30 TABLET | Refills: 0 | Status: SHIPPED | OUTPATIENT
Start: 2023-05-10

## 2023-05-10 RX ADMIN — MAGNESIUM SULFATE HEPTAHYDRATE 2 G: 40 INJECTION, SOLUTION INTRAVENOUS at 15:14:00

## 2023-05-10 RX ADMIN — ACETAMINOPHEN 650 MG: 325 TABLET ORAL at 14:11:00

## 2023-05-10 RX ADMIN — ATROPINE SULFATE 0.2 MG: 0.4 MG/ML AMPUL (ML) INJECTION at 16:06:00

## 2023-05-10 RX ADMIN — DIPHENHYDRAMINE HCL 25 MG: 25 MG CAPSULE ORAL at 14:11:00

## 2023-05-10 NOTE — TELEPHONE ENCOUNTER
Patient returned call and confirmed tomorrow's , 5/11/2023, visit at [facility] [place]. Thank you.

## 2023-05-10 NOTE — TELEPHONE ENCOUNTER
Per Dr. Cruz Schaumburg: Add patient to schedule tomorrow (5/11) at 1:15PM for consult left pleural effusion. Attempted to contact patient, left message to call back.

## 2023-05-10 NOTE — PROGRESS NOTES
Patient arrives to infusion for C5D15 Erbitux/Irinotecan/Neulasta Onpro. Arrives Ambulating independently, accompanied by Self        Reports feeling well, denies complaints or concerns today. Mag 1.6   2 gm mag replacement given     Pregnancy screening: Denies possibility of pregnancy    Modifications in dose or schedule: No    Drugs/infusions dual verified for appearance and physical integrity. IV pump settings were dual verified: yes     Neulasta Onpro applied to patient left upper abdomen.     Frequency of blood return and site check throughout administration: Prior to administration, Prior to each drug and At completion of therapy     Discharged to Home, Ambulating independently, accompanied by:Self    Outpatient Oncology Care Plan  Problem list:  fatigue  Problems related to:  chemotherapy  Interventions:  maintain safe environment  monitor for signs/symptoms of infection  chemotherapy teaching  educate regarding self care  encourage activity as tolerated  monitor lab values  promoted rest  provided general teaching  Expected outcomes:  free of infection  optimal lab values  patient supported/coping well  safe in environment  understands plan of care  verbalize how to care for self  Progress towards outcome:  making progress    Education Record    Learner:  Patient  Barriers / Limitations:  None  Method:  Discussion and Reinforcement  Outcome:  Shows understanding  Comments:

## 2023-05-11 ENCOUNTER — OFFICE VISIT (OUTPATIENT)
Dept: PULMONOLOGY | Facility: CLINIC | Age: 73
End: 2023-05-11

## 2023-05-11 VITALS
HEART RATE: 89 BPM | WEIGHT: 131 LBS | HEIGHT: 62 IN | RESPIRATION RATE: 20 BRPM | OXYGEN SATURATION: 95 % | SYSTOLIC BLOOD PRESSURE: 95 MMHG | DIASTOLIC BLOOD PRESSURE: 60 MMHG | BODY MASS INDEX: 24.11 KG/M2

## 2023-05-11 DIAGNOSIS — J90 PLEURAL EFFUSION: Primary | ICD-10-CM

## 2023-05-11 PROCEDURE — 99204 OFFICE O/P NEW MOD 45 MIN: CPT | Performed by: INTERNAL MEDICINE

## 2023-05-11 PROCEDURE — 1159F MED LIST DOCD IN RCRD: CPT | Performed by: INTERNAL MEDICINE

## 2023-05-11 PROCEDURE — 1126F AMNT PAIN NOTED NONE PRSNT: CPT | Performed by: INTERNAL MEDICINE

## 2023-05-11 PROCEDURE — 3074F SYST BP LT 130 MM HG: CPT | Performed by: INTERNAL MEDICINE

## 2023-05-11 PROCEDURE — 3078F DIAST BP <80 MM HG: CPT | Performed by: INTERNAL MEDICINE

## 2023-05-11 PROCEDURE — 3008F BODY MASS INDEX DOCD: CPT | Performed by: INTERNAL MEDICINE

## 2023-05-11 PROCEDURE — 1160F RVW MEDS BY RX/DR IN RCRD: CPT | Performed by: INTERNAL MEDICINE

## 2023-05-12 ENCOUNTER — TELEPHONE (OUTPATIENT)
Dept: PULMONOLOGY | Facility: CLINIC | Age: 73
End: 2023-05-12

## 2023-05-12 NOTE — TELEPHONE ENCOUNTER
Spoke with Adan Sullivan in Radiology. Left -side thoracentesis scheduled on Tuesday 5/16/23 at 3pm.  Patient to arrive at 2:30. Spoke with patient, provided date, time, and location instructions. Patient verbalized understanding.     Dr. Asiya Woo

## 2023-05-16 ENCOUNTER — HOSPITAL ENCOUNTER (OUTPATIENT)
Dept: ULTRASOUND IMAGING | Facility: HOSPITAL | Age: 73
Discharge: HOME | End: 2023-05-16
Attending: INTERNAL MEDICINE
Payer: MEDICARE

## 2023-05-16 ENCOUNTER — HOSPITAL ENCOUNTER (OUTPATIENT)
Dept: GENERAL RADIOLOGY | Facility: HOSPITAL | Age: 73
Discharge: HOME | End: 2023-05-16
Attending: INTERNAL MEDICINE
Payer: MEDICARE

## 2023-05-16 DIAGNOSIS — J90 PLEURAL EFFUSION: ICD-10-CM

## 2023-05-16 LAB
BASOPHILS NFR PLR: 0 %
COLOR FLD: YELLOW
EOSINOPHIL NFR PLR: 3 %
GLUCOSE PLR-MCNC: 124 MG/DL
LDH FLD L TO P-CCNC: 675 U/L
LYMPHOCYTES NFR PLR: 20 %
MESOTHL CELL NFR PLR: 1 %
MONOS+MACROS NFR PLR: 29 %
NEUTROPHILS NFR PLR: 46 %
PROT PLR-MCNC: 4.4 G/DL
RBC # FLD: ABNORMAL /CUMM (ref ?–1)
TOTAL CELLS COUNTED FLD: 100
TOTAL CELLS COUNTED PLR: 678 /CUMM (ref ?–1)
WBC # PLR: 664 /CUMM
WBC OTHER NFR PLR: 1 %

## 2023-05-16 PROCEDURE — 32555 ASPIRATE PLEURA W/ IMAGING: CPT | Performed by: INTERNAL MEDICINE

## 2023-05-16 NOTE — IMAGING NOTE
1443: PT TO RADIOLOGY HOLDING BAY H    1447: VS;  /66 O2SATS: 94% ON RA    HX TAKEN PROCEDURE EXPLAINED QUESTIONS ANSWERED. PT CONSENTED AT 1448      1452: DR. Nereyda Mata  HERE, SCANNING COMPLETED. PLATELETS  FROM 2/45- 306     TIME OUT TAKEN AT 1454    1458: CHLORO PREP  AS SKIN PREP STERILE DRAPE APPLIED;  LIDOCAINE 1% 10 MILLIGRAMS PER ML GIVEN FROM KIT  FOR ANESTHETIC AFFECT 5 ML. GIVEN. INCISION MADE WITH SCALPEL. Thoracentesis:  1502: CATHETER PLACED TO LEFT lung   FLUID ASPIRATED FOR LABS:YES  CATHETER HOOKED TO BAG DRAINING PINK/TEA COLORED FLUID.    1517: PROCEDURE COMPLETE. DRAIN DC'D TOTAL AMOUNT DRAINED 1100 ML. PRESSURE TO SITE X 5 MIN  BAND AID PLACED TO SITE. 1519: HR 95 /61 O2 SATS 93% ON RA    1543: PCXR DONE WHILE PT ON CART. 1550: HR 95 BP 93/58 O2 SATS 92% ON RA    1615: HR 94 BP 91/57 O2 SATS: 95% ON RA    1620: CXR SHOWS NO PNEUMOTHORAX.      1627: POST INSTRUCTIONS GIVEN VERBAL ET WRITTEN  AVS SUMMARY SHEET PROVIDED TO PT. PT DISCHARGED.

## 2023-05-16 NOTE — PROCEDURES
Left ultrasound guided thoracentesis    Ultrasound used to visualized moderate sized left pleural effusion with no evidence of loculations seen and area of entry marked. Patient prepped and draped in sterile fashion. 10 cc of 1% lidocaine used to anesthesize area advancing syringe while aspirating tea colored pleural fluid. Scalpel used to make fine incision. Thoracentesis catheter advanced while aspirating with eventual return of above mentioned pleural fluid. Thoracentesis catheter advanced further while removing introducer needle and attached to tubing system and 1100 cc of pleural fluid removed. Fluid sent for analysis. Gauze applied applied after catheter removed to achieve hemostasis and bandage placed over site. CXR ordered.     Vito Langley DO  Pulmonary [address] [place] [address]

## 2023-05-19 DIAGNOSIS — C78.01 MALIGNANT NEOPLASM METASTATIC TO BOTH LUNGS (HCC): Primary | ICD-10-CM

## 2023-05-19 DIAGNOSIS — C78.02 MALIGNANT NEOPLASM METASTATIC TO BOTH LUNGS (HCC): Primary | ICD-10-CM

## 2023-05-23 ENCOUNTER — HOSPITAL ENCOUNTER (OUTPATIENT)
Dept: GENERAL RADIOLOGY | Facility: HOSPITAL | Age: 73
Discharge: HOME | End: 2023-05-23
Attending: INTERNAL MEDICINE
Payer: MEDICARE

## 2023-05-23 DIAGNOSIS — J90 PLEURAL EFFUSION: ICD-10-CM

## 2023-05-23 PROCEDURE — 71046 X-RAY EXAM CHEST 2 VIEWS: CPT | Performed by: INTERNAL MEDICINE

## 2023-05-24 ENCOUNTER — NURSE ONLY (OUTPATIENT)
Dept: HEMATOLOGY/ONCOLOGY | Facility: HOSPITAL | Age: 73
End: 2023-05-24
Attending: INTERNAL MEDICINE
Payer: MEDICARE

## 2023-05-24 VITALS
OXYGEN SATURATION: 91 % | HEIGHT: 63.5 IN | BODY MASS INDEX: 21.77 KG/M2 | DIASTOLIC BLOOD PRESSURE: 67 MMHG | SYSTOLIC BLOOD PRESSURE: 102 MMHG | WEIGHT: 124.38 LBS | HEART RATE: 110 BPM | TEMPERATURE: 98 F | RESPIRATION RATE: 16 BRPM

## 2023-05-24 DIAGNOSIS — C78.01 MALIGNANT NEOPLASM METASTATIC TO BOTH LUNGS (HCC): Primary | ICD-10-CM

## 2023-05-24 DIAGNOSIS — C78.6 PERITONEAL CARCINOMATOSIS (HCC): ICD-10-CM

## 2023-05-24 DIAGNOSIS — C18.7 CANCER OF SIGMOID COLON (HCC): ICD-10-CM

## 2023-05-24 DIAGNOSIS — C78.02 MALIGNANT NEOPLASM METASTATIC TO BOTH LUNGS (HCC): Primary | ICD-10-CM

## 2023-05-24 DIAGNOSIS — B02.8 HERPES ZOSTER WITH OTHER COMPLICATION: ICD-10-CM

## 2023-05-24 DIAGNOSIS — Z51.11 CHEMOTHERAPY MANAGEMENT, ENCOUNTER FOR: ICD-10-CM

## 2023-05-24 LAB
ALBUMIN SERPL-MCNC: 2.8 G/DL (ref 3.4–5)
ALBUMIN/GLOB SERPL: 0.7 {RATIO} (ref 1–2)
ALP LIVER SERPL-CCNC: 132 U/L
ALT SERPL-CCNC: 20 U/L
ANION GAP SERPL CALC-SCNC: 5 MMOL/L (ref 0–18)
AST SERPL-CCNC: 24 U/L (ref 15–37)
BASOPHILS # BLD AUTO: 0.11 X10(3) UL (ref 0–0.2)
BASOPHILS NFR BLD AUTO: 1.1 %
BILIRUB SERPL-MCNC: 0.3 MG/DL (ref 0.1–2)
BUN BLD-MCNC: 8 MG/DL (ref 7–18)
BUN/CREAT SERPL: 14.8 (ref 10–20)
CALCIUM BLD-MCNC: 8.9 MG/DL (ref 8.5–10.1)
CEA SERPL-MCNC: 18.8 NG/ML (ref ?–5)
CHLORIDE SERPL-SCNC: 105 MMOL/L (ref 98–112)
CO2 SERPL-SCNC: 29 MMOL/L (ref 21–32)
CREAT BLD-MCNC: 0.54 MG/DL
DEPRECATED RDW RBC AUTO: 54.4 FL (ref 35.1–46.3)
EOSINOPHIL # BLD AUTO: 0.17 X10(3) UL (ref 0–0.7)
EOSINOPHIL NFR BLD AUTO: 1.7 %
ERYTHROCYTE [DISTWIDTH] IN BLOOD BY AUTOMATED COUNT: 14.3 % (ref 11–15)
GFR SERPLBLD BASED ON 1.73 SQ M-ARVRAT: 98 ML/MIN/1.73M2 (ref 60–?)
GLOBULIN PLAS-MCNC: 4.1 G/DL (ref 2.8–4.4)
GLUCOSE BLD-MCNC: 116 MG/DL (ref 70–99)
HCT VFR BLD AUTO: 38.4 %
HGB BLD-MCNC: 12.1 G/DL
IMM GRANULOCYTES # BLD AUTO: 0.03 X10(3) UL (ref 0–1)
IMM GRANULOCYTES NFR BLD: 0.3 %
LYMPHOCYTES # BLD AUTO: 2.92 X10(3) UL (ref 1–4)
LYMPHOCYTES NFR BLD AUTO: 29 %
MAGNESIUM SERPL-MCNC: 1.9 MG/DL (ref 1.6–2.6)
MCH RBC QN AUTO: 32.4 PG (ref 26–34)
MCHC RBC AUTO-ENTMCNC: 31.5 G/DL (ref 31–37)
MCV RBC AUTO: 102.9 FL
MONOCYTES # BLD AUTO: 0.97 X10(3) UL (ref 0.1–1)
MONOCYTES NFR BLD AUTO: 9.6 %
NEUTROPHILS # BLD AUTO: 5.87 X10 (3) UL (ref 1.5–7.7)
NEUTROPHILS # BLD AUTO: 5.87 X10(3) UL (ref 1.5–7.7)
NEUTROPHILS NFR BLD AUTO: 58.3 %
OSMOLALITY SERPL CALC.SUM OF ELEC: 287 MOSM/KG (ref 275–295)
PLATELET # BLD AUTO: 370 10(3)UL (ref 150–450)
POTASSIUM SERPL-SCNC: 4.3 MMOL/L (ref 3.5–5.1)
PROT SERPL-MCNC: 6.9 G/DL (ref 6.4–8.2)
RBC # BLD AUTO: 3.73 X10(6)UL
SODIUM SERPL-SCNC: 139 MMOL/L (ref 136–145)
WBC # BLD AUTO: 10.1 X10(3) UL (ref 4–11)

## 2023-05-24 PROCEDURE — 80053 COMPREHEN METABOLIC PANEL: CPT

## 2023-05-24 PROCEDURE — 96413 CHEMO IV INFUSION 1 HR: CPT

## 2023-05-24 PROCEDURE — 96375 TX/PRO/DX INJ NEW DRUG ADDON: CPT

## 2023-05-24 PROCEDURE — 83735 ASSAY OF MAGNESIUM: CPT

## 2023-05-24 PROCEDURE — 85025 COMPLETE CBC W/AUTO DIFF WBC: CPT

## 2023-05-24 PROCEDURE — 99215 OFFICE O/P EST HI 40 MIN: CPT | Performed by: INTERNAL MEDICINE

## 2023-05-24 PROCEDURE — 82378 CARCINOEMBRYONIC ANTIGEN: CPT

## 2023-05-24 RX ORDER — FLUOROURACIL 50 MG/ML
2400 INJECTION, SOLUTION INTRAVENOUS CONTINUOUS
Status: DISCONTINUED | OUTPATIENT
Start: 2023-05-24 | End: 2023-05-24

## 2023-05-24 RX ORDER — GABAPENTIN 300 MG/1
300 CAPSULE ORAL NIGHTLY
Qty: 30 CAPSULE | Refills: 0 | Status: SHIPPED | OUTPATIENT
Start: 2023-05-24

## 2023-05-24 RX ORDER — FLUOROURACIL 50 MG/ML
2400 INJECTION, SOLUTION INTRAVENOUS CONTINUOUS
Status: CANCELLED | OUTPATIENT
Start: 2023-05-24

## 2023-05-24 RX ORDER — ACYCLOVIR 400 MG/1
TABLET ORAL
Qty: 70 TABLET | Refills: 0 | Status: SHIPPED | OUTPATIENT
Start: 2023-05-24 | End: 2023-05-26

## 2023-05-24 RX ADMIN — FLUOROURACIL 3800 MG: 50 INJECTION, SOLUTION INTRAVENOUS at 15:22:00

## 2023-05-24 NOTE — PROGRESS NOTES
Pt here for C   1   D 1  Folfox       . Arrives Ambulating independently, accompanied by Self       Oral medications included in this regimen:  no    Patient confirms comprehension of cancer treatment schedule:  yes    Pregnancy screening:  Not applicable    Modifications in dose or schedule:  Yes    Only Nolvia and 5 FU today     Medications appearance and physical integrity checked by RN.  Chemotherapy IV pump settings verified by 2 RNs:  yes     Frequency of blood return and site check throughout administration: Prior to administration, Prior to each drug and At completion of therapy     Infusion/treatment outcome:  patient tolerated treatment without incident    Education Record    Learner:  Patient  Barriers / Limitations:  None  Method:  Discussion  Education / instructions given:    Outcome:  Shows understanding    Discharged Home, Ambulating independently, accompanied by:Self    Patient/family verbalized understanding of future appointments: by printed AVS

## 2023-05-25 ENCOUNTER — TELEPHONE (OUTPATIENT)
Dept: HEMATOLOGY/ONCOLOGY | Facility: HOSPITAL | Age: 73
End: 2023-05-25

## 2023-05-25 NOTE — TELEPHONE ENCOUNTER
Toxicities: C1 D1 Bevacizumab-bvzr/Trifluridine-tipiracil on 5/24/2023    Cholo García said she feels \"good. \" No side effects at this time. I encouraged her to please call the office if she is not feeling well or she has any questions or concerns. She agreed and thanked me for checking on her.

## 2023-05-26 ENCOUNTER — NURSE ONLY (OUTPATIENT)
Dept: HEMATOLOGY/ONCOLOGY | Facility: HOSPITAL | Age: 73
End: 2023-05-26
Attending: INTERNAL MEDICINE
Payer: MEDICARE

## 2023-05-26 DIAGNOSIS — C18.7 CANCER OF SIGMOID COLON (HCC): ICD-10-CM

## 2023-05-26 DIAGNOSIS — Z45.2 ENCOUNTER FOR CENTRAL LINE CARE: ICD-10-CM

## 2023-05-26 DIAGNOSIS — Z79.899 ENCOUNTER FOR MEDICATION MANAGEMENT: ICD-10-CM

## 2023-05-26 DIAGNOSIS — B02.8 HERPES ZOSTER WITH OTHER COMPLICATION: Primary | ICD-10-CM

## 2023-05-26 DIAGNOSIS — Z51.11 CHEMOTHERAPY MANAGEMENT, ENCOUNTER FOR: Primary | ICD-10-CM

## 2023-05-26 PROCEDURE — 96523 IRRIG DRUG DELIVERY DEVICE: CPT

## 2023-05-26 RX ORDER — HEPARIN SODIUM (PORCINE) LOCK FLUSH IV SOLN 100 UNIT/ML 100 UNIT/ML
5 SOLUTION INTRAVENOUS ONCE
Status: COMPLETED | OUTPATIENT
Start: 2023-05-26 | End: 2023-05-26

## 2023-05-26 RX ORDER — HEPARIN SODIUM (PORCINE) LOCK FLUSH IV SOLN 100 UNIT/ML 100 UNIT/ML
5 SOLUTION INTRAVENOUS ONCE
OUTPATIENT
Start: 2023-05-26

## 2023-05-26 RX ORDER — VALACYCLOVIR HYDROCHLORIDE 1 G/1
1000 TABLET, FILM COATED ORAL 3 TIMES DAILY
Qty: 21 TABLET | Refills: 0 | Status: SHIPPED | OUTPATIENT
Start: 2023-05-26 | End: 2023-06-02

## 2023-05-26 RX ORDER — SODIUM CHLORIDE 9 MG/ML
10 INJECTION INTRAVENOUS ONCE
OUTPATIENT
Start: 2023-05-26

## 2023-05-26 RX ADMIN — HEPARIN SODIUM (PORCINE) LOCK FLUSH IV SOLN 100 UNIT/ML 500 UNITS: 100 SOLUTION INTRAVENOUS at 12:33:00

## 2023-05-26 NOTE — PROGRESS NOTES
Patient presented with CADD pump connected. Reservoir empty. Disconnected CADD pump, flushed port with 0.9% Normal Saline and established blood return in port. Flushed with 500 units of Heparin and de-accessed port. Gauze and paper tape applied to port site. Denies n/v/c/d or fever/chills or mouth sores. States her shingles is improving/ healing - lesions appear dry, denies pain or itching. Difficulty swallowing pill 5x/day for shingles - providers calling in something else for her. Pt wondered about whether she should receive a pegfilgrastim injection or not. Noted that Dr Akila Angeles did hold her oxaliplatin d/t shingles this cycle; pegfilgrastim is in next cycle with oxaliplatin. Informed Chinyere LIMA of pt's question and visit info 5/24. Chinyere replied that no, pt should not receive pegfilgrastim, no oxali was given. Informed pt - she voiced understanding.     Reviewed general symptom mgmt and when to call MD/ triage RN - she agreed    Discharged stable to home with future appts  Gait steady, indep

## 2023-05-30 ENCOUNTER — HOSPITAL ENCOUNTER (OUTPATIENT)
Dept: GENERAL RADIOLOGY | Facility: HOSPITAL | Age: 73
Discharge: HOME | End: 2023-05-30
Attending: INTERNAL MEDICINE
Payer: MEDICARE

## 2023-05-30 ENCOUNTER — HOSPITAL ENCOUNTER (OUTPATIENT)
Dept: ULTRASOUND IMAGING | Facility: HOSPITAL | Age: 73
Discharge: HOME | End: 2023-05-30
Attending: INTERNAL MEDICINE
Payer: MEDICARE

## 2023-05-30 DIAGNOSIS — J90 PLEURAL EFFUSION: ICD-10-CM

## 2023-05-30 PROCEDURE — 32555 ASPIRATE PLEURA W/ IMAGING: CPT | Performed by: INTERNAL MEDICINE

## 2023-05-30 NOTE — PROCEDURES
Left ultrasound-guided thoracentesis    Ultrasound used to visualized moderate sized left  pleural effusion with no evidence of loculations seen and area of entry marked. Patient prepped and draped in sterile fashion. 10 cc of 1% lidocaine used to anesthesize area advancing syringe while aspirating bloody pleural fluid. Scalpel used to make fine incision. Thoracentesis catheter advanced while aspirating with eventual return of above mentioned pleural fluid. Thoracentesis catheter advanced further while removing introducer needle and attached to tubing system and 1100 cc of pleural fluid removed. Gauze applied applied after catheter removed to achieve hemostasis and bandage placed over site. CXR ordered.     Mitesh Phan DO  Pulmonary [address] [place] [address]

## 2023-05-31 ENCOUNTER — HOSPITAL ENCOUNTER (OUTPATIENT)
Dept: CT IMAGING | Age: 73
Discharge: HOME | End: 2023-05-31
Attending: INTERNAL MEDICINE
Payer: MEDICARE

## 2023-05-31 ENCOUNTER — OFFICE VISIT (OUTPATIENT)
Dept: HEMATOLOGY/ONCOLOGY | Facility: HOSPITAL | Age: 73
End: 2023-05-31
Attending: PHYSICIAN ASSISTANT
Payer: MEDICARE

## 2023-05-31 VITALS
TEMPERATURE: 98 F | RESPIRATION RATE: 18 BRPM | HEART RATE: 112 BPM | BODY MASS INDEX: 21.15 KG/M2 | SYSTOLIC BLOOD PRESSURE: 131 MMHG | OXYGEN SATURATION: 93 % | WEIGHT: 120.88 LBS | DIASTOLIC BLOOD PRESSURE: 70 MMHG | HEIGHT: 63.5 IN

## 2023-05-31 DIAGNOSIS — C78.02 MALIGNANT NEOPLASM METASTATIC TO BOTH LUNGS (HCC): ICD-10-CM

## 2023-05-31 DIAGNOSIS — Z51.11 CHEMOTHERAPY MANAGEMENT, ENCOUNTER FOR: ICD-10-CM

## 2023-05-31 DIAGNOSIS — C78.01 MALIGNANT NEOPLASM METASTATIC TO BOTH LUNGS (HCC): ICD-10-CM

## 2023-05-31 DIAGNOSIS — C18.7 CANCER OF SIGMOID COLON (HCC): Primary | ICD-10-CM

## 2023-05-31 DIAGNOSIS — C78.6 PERITONEAL CARCINOMATOSIS (HCC): ICD-10-CM

## 2023-05-31 PROCEDURE — 74177 CT ABD & PELVIS W/CONTRAST: CPT | Performed by: INTERNAL MEDICINE

## 2023-05-31 PROCEDURE — 99214 OFFICE O/P EST MOD 30 MIN: CPT | Performed by: PHYSICIAN ASSISTANT

## 2023-06-05 ENCOUNTER — TELEPHONE (OUTPATIENT)
Dept: GENETICS | Facility: HOSPITAL | Age: 73
End: 2023-06-05

## 2023-06-05 ENCOUNTER — TELEPHONE (OUTPATIENT)
Dept: HEMATOLOGY/ONCOLOGY | Facility: HOSPITAL | Age: 73
End: 2023-06-05

## 2023-06-05 NOTE — TELEPHONE ENCOUNTER
LVM. Asked pt to call back to discuss [facility] oncology patient support for the [facility]

## 2023-06-06 NOTE — TELEPHONE ENCOUNTER
Returned call to Kong bravo. She spoke with Javi Garnica yesterday at [facility] oncology patient support and the lonsurf has been approved for free. She stated that he will be [facility]. [facility]. Will see pt tomorrow.

## 2023-06-07 ENCOUNTER — NURSE ONLY (OUTPATIENT)
Dept: HEMATOLOGY/ONCOLOGY | Facility: HOSPITAL | Age: 73
End: 2023-06-07
Attending: INTERNAL MEDICINE
Payer: MEDICARE

## 2023-06-07 ENCOUNTER — OFFICE VISIT (OUTPATIENT)
Dept: HEMATOLOGY/ONCOLOGY | Facility: HOSPITAL | Age: 73
End: 2023-06-07
Attending: PHYSICIAN ASSISTANT
Payer: MEDICARE

## 2023-06-07 VITALS
WEIGHT: 122.19 LBS | RESPIRATION RATE: 18 BRPM | HEART RATE: 101 BPM | TEMPERATURE: 98 F | HEIGHT: 63.5 IN | OXYGEN SATURATION: 94 % | DIASTOLIC BLOOD PRESSURE: 74 MMHG | SYSTOLIC BLOOD PRESSURE: 121 MMHG | BODY MASS INDEX: 21.38 KG/M2

## 2023-06-07 DIAGNOSIS — C78.01 MALIGNANT NEOPLASM METASTATIC TO BOTH LUNGS (HCC): Primary | ICD-10-CM

## 2023-06-07 DIAGNOSIS — C78.02 MALIGNANT NEOPLASM METASTATIC TO BOTH LUNGS (HCC): Primary | ICD-10-CM

## 2023-06-07 DIAGNOSIS — C18.7 CANCER OF SIGMOID COLON (HCC): ICD-10-CM

## 2023-06-07 DIAGNOSIS — C78.6 PERITONEAL CARCINOMATOSIS (HCC): ICD-10-CM

## 2023-06-07 DIAGNOSIS — Z45.2 ENCOUNTER FOR CENTRAL LINE CARE: ICD-10-CM

## 2023-06-07 DIAGNOSIS — J90 PLEURAL EFFUSION: ICD-10-CM

## 2023-06-07 DIAGNOSIS — C41.9 MALIGNANT NEOPLASM OF BONE WITH METASTASES (HCC): ICD-10-CM

## 2023-06-07 DIAGNOSIS — Z51.11 CHEMOTHERAPY MANAGEMENT, ENCOUNTER FOR: ICD-10-CM

## 2023-06-07 DIAGNOSIS — Z79.899 ENCOUNTER FOR MEDICATION MANAGEMENT: ICD-10-CM

## 2023-06-07 LAB
ALBUMIN SERPL-MCNC: 2.8 G/DL (ref 3.4–5)
ALBUMIN/GLOB SERPL: 0.7 {RATIO} (ref 1–2)
ALP LIVER SERPL-CCNC: 110 U/L
ALT SERPL-CCNC: 17 U/L
ANION GAP SERPL CALC-SCNC: 4 MMOL/L (ref 0–18)
AST SERPL-CCNC: 24 U/L (ref 15–37)
BASOPHILS # BLD AUTO: 0.12 X10(3) UL (ref 0–0.2)
BASOPHILS NFR BLD AUTO: 2 %
BILIRUB SERPL-MCNC: 0.4 MG/DL (ref 0.1–2)
BILIRUB UR QL: NEGATIVE
BUN BLD-MCNC: 8 MG/DL (ref 7–18)
BUN/CREAT SERPL: 19 (ref 10–20)
CALCIUM BLD-MCNC: 9 MG/DL (ref 8.5–10.1)
CHLORIDE SERPL-SCNC: 106 MMOL/L (ref 98–112)
CLARITY UR: CLEAR
CO2 SERPL-SCNC: 32 MMOL/L (ref 21–32)
CREAT BLD-MCNC: 0.42 MG/DL
DEPRECATED RDW RBC AUTO: 57.9 FL (ref 35.1–46.3)
EOSINOPHIL # BLD AUTO: 0.3 X10(3) UL (ref 0–0.7)
EOSINOPHIL NFR BLD AUTO: 5.1 %
ERYTHROCYTE [DISTWIDTH] IN BLOOD BY AUTOMATED COUNT: 15.4 % (ref 11–15)
GFR SERPLBLD BASED ON 1.73 SQ M-ARVRAT: 104 ML/MIN/1.73M2 (ref 60–?)
GLOBULIN PLAS-MCNC: 3.9 G/DL (ref 2.8–4.4)
GLUCOSE BLD-MCNC: 92 MG/DL (ref 70–99)
GLUCOSE UR-MCNC: NORMAL MG/DL
HCT VFR BLD AUTO: 36.2 %
HGB BLD-MCNC: 11.3 G/DL
HGB UR QL STRIP.AUTO: NEGATIVE
IMM GRANULOCYTES # BLD AUTO: 0.01 X10(3) UL (ref 0–1)
IMM GRANULOCYTES NFR BLD: 0.2 %
KETONES UR-MCNC: NEGATIVE MG/DL
LEUKOCYTE ESTERASE UR QL STRIP.AUTO: 250
LYMPHOCYTES # BLD AUTO: 2.52 X10(3) UL (ref 1–4)
LYMPHOCYTES NFR BLD AUTO: 42.4 %
MCH RBC QN AUTO: 32.2 PG (ref 26–34)
MCHC RBC AUTO-ENTMCNC: 31.2 G/DL (ref 31–37)
MCV RBC AUTO: 103.1 FL
MONOCYTES # BLD AUTO: 0.6 X10(3) UL (ref 0.1–1)
MONOCYTES NFR BLD AUTO: 10.1 %
NEUTROPHILS # BLD AUTO: 2.39 X10 (3) UL (ref 1.5–7.7)
NEUTROPHILS # BLD AUTO: 2.39 X10(3) UL (ref 1.5–7.7)
NEUTROPHILS NFR BLD AUTO: 40.2 %
NITRITE UR QL STRIP.AUTO: NEGATIVE
OSMOLALITY SERPL CALC.SUM OF ELEC: 292 MOSM/KG (ref 275–295)
PH UR: 6.5 [PH] (ref 5–8)
PLATELET # BLD AUTO: 269 10(3)UL (ref 150–450)
POTASSIUM SERPL-SCNC: 4.1 MMOL/L (ref 3.5–5.1)
PROT SERPL-MCNC: 6.7 G/DL (ref 6.4–8.2)
PROT UR-MCNC: NEGATIVE MG/DL
RBC # BLD AUTO: 3.51 X10(6)UL
SODIUM SERPL-SCNC: 142 MMOL/L (ref 136–145)
SP GR UR STRIP: 1.02 (ref 1–1.03)
UROBILINOGEN UR STRIP-ACNC: NORMAL
WBC # BLD AUTO: 5.9 X10(3) UL (ref 4–11)

## 2023-06-07 PROCEDURE — 85025 COMPLETE CBC W/AUTO DIFF WBC: CPT

## 2023-06-07 PROCEDURE — 80053 COMPREHEN METABOLIC PANEL: CPT

## 2023-06-07 PROCEDURE — 96413 CHEMO IV INFUSION 1 HR: CPT

## 2023-06-07 PROCEDURE — 99215 OFFICE O/P EST HI 40 MIN: CPT | Performed by: PHYSICIAN ASSISTANT

## 2023-06-07 PROCEDURE — 81001 URINALYSIS AUTO W/SCOPE: CPT

## 2023-06-07 RX ORDER — HEPARIN SODIUM (PORCINE) LOCK FLUSH IV SOLN 100 UNIT/ML 100 UNIT/ML
5 SOLUTION INTRAVENOUS ONCE
Status: COMPLETED | OUTPATIENT
Start: 2023-06-07 | End: 2023-06-07

## 2023-06-07 RX ORDER — SODIUM CHLORIDE 9 MG/ML
10 INJECTION INTRAVENOUS ONCE
OUTPATIENT
Start: 2023-07-01

## 2023-06-07 RX ORDER — HEPARIN SODIUM (PORCINE) LOCK FLUSH IV SOLN 100 UNIT/ML 100 UNIT/ML
5 SOLUTION INTRAVENOUS ONCE
OUTPATIENT
Start: 2023-07-01

## 2023-06-07 RX ORDER — ACYCLOVIR 400 MG/1
400 TABLET ORAL 2 TIMES DAILY
Qty: 60 TABLET | Refills: 3 | Status: SHIPPED | OUTPATIENT
Start: 2023-06-07

## 2023-06-07 RX ADMIN — HEPARIN SODIUM (PORCINE) LOCK FLUSH IV SOLN 100 UNIT/ML 500 UNITS: 100 SOLUTION INTRAVENOUS at 15:06:00

## 2023-06-07 NOTE — PROGRESS NOTES
Pt here for C1D1 Zirabev. Arrives Ambulating independently, accompanied by Self. Denies new issues or concerns today. Oral medications included in this regimen:  Yes - Lonsurf    Patient confirms comprehension of cancer treatment schedule:  yes    Pregnancy screening:  Not applicable    Modifications in dose or schedule:  Yes, first day of Lonsurf (PO chemo)/Avastin protocol     Medications appearance and physical integrity checked by RN.  Yes Chemotherapy IV pump settings verified by 2 RNs:  no, dual verification not necessary for Zirabev    Frequency of blood return and site check throughout administration: Prior to administration and At completion of therapy     Infusion/treatment outcome:  patient tolerated treatment without incident    Education Record    Learner:  Patient  Barriers / Limitations:  None  Method:  Reinforcement  Education / instructions given:  Reviewed plan of care  Outcome:  Shows understanding    Discharged Home, Ambulating independently, accompanied by:Self    Patient/family verbalized understanding of future appointments: by printed AVS

## 2023-06-08 ENCOUNTER — TELEPHONE (OUTPATIENT)
Dept: HEMATOLOGY/ONCOLOGY | Facility: HOSPITAL | Age: 73
End: 2023-06-08

## 2023-06-08 NOTE — TELEPHONE ENCOUNTER
Toxicities: C1 D1 Bevacizumab-bvzr/Trifluridine-tipiracil on 6/7/2023    Teola Aase reports that she feels \"good. \" No side effects at this time. I encouraged her to please call the office if she is not feeling well or has any questions or concerns. She agreed and thanked me for checking on her.                                                                                                f

## 2023-06-09 ENCOUNTER — APPOINTMENT (OUTPATIENT)
Dept: HEMATOLOGY/ONCOLOGY | Facility: HOSPITAL | Age: 73
End: 2023-06-09
Attending: INTERNAL MEDICINE
Payer: MEDICARE

## 2023-06-14 ENCOUNTER — OFFICE VISIT (OUTPATIENT)
Dept: HEMATOLOGY/ONCOLOGY | Facility: HOSPITAL | Age: 73
End: 2023-06-14
Attending: PHYSICIAN ASSISTANT
Payer: MEDICARE

## 2023-06-14 ENCOUNTER — TELEPHONE (OUTPATIENT)
Dept: HEMATOLOGY/ONCOLOGY | Facility: HOSPITAL | Age: 73
End: 2023-06-14

## 2023-06-14 ENCOUNTER — NURSE ONLY (OUTPATIENT)
Dept: HEMATOLOGY/ONCOLOGY | Facility: HOSPITAL | Age: 73
End: 2023-06-14
Attending: INTERNAL MEDICINE
Payer: MEDICARE

## 2023-06-14 VITALS
TEMPERATURE: 98 F | DIASTOLIC BLOOD PRESSURE: 77 MMHG | BODY MASS INDEX: 20.82 KG/M2 | WEIGHT: 119 LBS | HEART RATE: 90 BPM | HEIGHT: 63.5 IN | RESPIRATION RATE: 18 BRPM | SYSTOLIC BLOOD PRESSURE: 120 MMHG | OXYGEN SATURATION: 98 %

## 2023-06-14 DIAGNOSIS — C18.7 CANCER OF SIGMOID COLON (HCC): Primary | ICD-10-CM

## 2023-06-14 DIAGNOSIS — T45.1X5A CHEMOTHERAPY INDUCED NEUTROPENIA (HCC): ICD-10-CM

## 2023-06-14 DIAGNOSIS — C78.6 PERITONEAL CARCINOMATOSIS (HCC): ICD-10-CM

## 2023-06-14 DIAGNOSIS — Z51.11 CHEMOTHERAPY MANAGEMENT, ENCOUNTER FOR: ICD-10-CM

## 2023-06-14 DIAGNOSIS — C78.02 MALIGNANT NEOPLASM METASTATIC TO BOTH LUNGS (HCC): ICD-10-CM

## 2023-06-14 DIAGNOSIS — D70.1 CHEMOTHERAPY INDUCED NEUTROPENIA (HCC): ICD-10-CM

## 2023-06-14 DIAGNOSIS — C78.01 MALIGNANT NEOPLASM METASTATIC TO BOTH LUNGS (HCC): ICD-10-CM

## 2023-06-14 LAB
ALBUMIN SERPL-MCNC: 3.1 G/DL (ref 3.4–5)
ALBUMIN/GLOB SERPL: 0.8 {RATIO} (ref 1–2)
ALP LIVER SERPL-CCNC: 122 U/L
ALT SERPL-CCNC: 15 U/L
ANION GAP SERPL CALC-SCNC: 3 MMOL/L (ref 0–18)
AST SERPL-CCNC: 21 U/L (ref 15–37)
BASOPHILS # BLD AUTO: 0.08 X10(3) UL (ref 0–0.2)
BASOPHILS NFR BLD AUTO: 1.6 %
BILIRUB SERPL-MCNC: 0.5 MG/DL (ref 0.1–2)
BUN BLD-MCNC: 14 MG/DL (ref 7–18)
BUN/CREAT SERPL: 25 (ref 10–20)
CALCIUM BLD-MCNC: 9.3 MG/DL (ref 8.5–10.1)
CHLORIDE SERPL-SCNC: 107 MMOL/L (ref 98–112)
CO2 SERPL-SCNC: 34 MMOL/L (ref 21–32)
CREAT BLD-MCNC: 0.56 MG/DL
DEPRECATED RDW RBC AUTO: 61.9 FL (ref 35.1–46.3)
EOSINOPHIL # BLD AUTO: 0.21 X10(3) UL (ref 0–0.7)
EOSINOPHIL NFR BLD AUTO: 4.3 %
ERYTHROCYTE [DISTWIDTH] IN BLOOD BY AUTOMATED COUNT: 16.2 % (ref 11–15)
GFR SERPLBLD BASED ON 1.73 SQ M-ARVRAT: 97 ML/MIN/1.73M2 (ref 60–?)
GLOBULIN PLAS-MCNC: 3.8 G/DL (ref 2.8–4.4)
GLUCOSE BLD-MCNC: 125 MG/DL (ref 70–99)
HCT VFR BLD AUTO: 38.8 %
HGB BLD-MCNC: 12.1 G/DL
IMM GRANULOCYTES # BLD AUTO: 0.01 X10(3) UL (ref 0–1)
IMM GRANULOCYTES NFR BLD: 0.2 %
LYMPHOCYTES # BLD AUTO: 2.46 X10(3) UL (ref 1–4)
LYMPHOCYTES NFR BLD AUTO: 49.8 %
MCH RBC QN AUTO: 32.6 PG (ref 26–34)
MCHC RBC AUTO-ENTMCNC: 31.2 G/DL (ref 31–37)
MCV RBC AUTO: 104.6 FL
MONOCYTES # BLD AUTO: 0.57 X10(3) UL (ref 0.1–1)
MONOCYTES NFR BLD AUTO: 11.5 %
NEUTROPHILS # BLD AUTO: 1.61 X10 (3) UL (ref 1.5–7.7)
NEUTROPHILS # BLD AUTO: 1.61 X10(3) UL (ref 1.5–7.7)
NEUTROPHILS NFR BLD AUTO: 32.6 %
OSMOLALITY SERPL CALC.SUM OF ELEC: 300 MOSM/KG (ref 275–295)
PLATELET # BLD AUTO: 229 10(3)UL (ref 150–450)
POTASSIUM SERPL-SCNC: 4.3 MMOL/L (ref 3.5–5.1)
PROT SERPL-MCNC: 6.9 G/DL (ref 6.4–8.2)
RBC # BLD AUTO: 3.71 X10(6)UL
SODIUM SERPL-SCNC: 144 MMOL/L (ref 136–145)
WBC # BLD AUTO: 4.9 X10(3) UL (ref 4–11)

## 2023-06-14 PROCEDURE — 36415 COLL VENOUS BLD VENIPUNCTURE: CPT

## 2023-06-14 PROCEDURE — 85025 COMPLETE CBC W/AUTO DIFF WBC: CPT

## 2023-06-14 PROCEDURE — 99215 OFFICE O/P EST HI 40 MIN: CPT | Performed by: PHYSICIAN ASSISTANT

## 2023-06-14 PROCEDURE — 80053 COMPREHEN METABOLIC PANEL: CPT

## 2023-06-14 RX ORDER — FUROSEMIDE 20 MG/1
TABLET ORAL
Qty: 30 TABLET | Refills: 0 | Status: SHIPPED | OUTPATIENT
Start: 2023-06-14

## 2023-06-14 NOTE — PATIENT INSTRUCTIONS
D1-D5 (6/8-6/12), D8-D12 ( 6/15-6/19) twice daily every 28 days  Start C2 on July 6  Acyclovir 400mg twice daily  Zometa every 3 months for bone   Radiation oncology.

## 2023-06-14 NOTE — TELEPHONE ENCOUNTER
Called patient regarding her missed appointment. Patient did not answer message was left on voicemail.

## 2023-06-20 ENCOUNTER — OFFICE VISIT (OUTPATIENT)
Dept: RADIATION ONCOLOGY | Facility: HOSPITAL | Age: 73
End: 2023-06-20
Attending: RADIOLOGY
Payer: MEDICARE

## 2023-06-20 VITALS
OXYGEN SATURATION: 95 % | HEART RATE: 103 BPM | RESPIRATION RATE: 16 BRPM | BODY MASS INDEX: 21 KG/M2 | DIASTOLIC BLOOD PRESSURE: 66 MMHG | WEIGHT: 122 LBS | SYSTOLIC BLOOD PRESSURE: 118 MMHG | TEMPERATURE: 98 F

## 2023-06-20 PROCEDURE — 99212 OFFICE O/P EST SF 10 MIN: CPT

## 2023-06-21 ENCOUNTER — APPOINTMENT (OUTPATIENT)
Dept: HEMATOLOGY/ONCOLOGY | Facility: HOSPITAL | Age: 73
End: 2023-06-21
Attending: INTERNAL MEDICINE
Payer: MEDICARE

## 2023-06-21 ENCOUNTER — OFFICE VISIT (OUTPATIENT)
Dept: HEMATOLOGY/ONCOLOGY | Facility: HOSPITAL | Age: 73
End: 2023-06-21
Attending: PHYSICIAN ASSISTANT
Payer: MEDICARE

## 2023-06-21 ENCOUNTER — APPOINTMENT (OUTPATIENT)
Dept: RADIATION ONCOLOGY | Facility: HOSPITAL | Age: 73
End: 2023-06-21
Attending: RADIOLOGY
Payer: MEDICARE

## 2023-06-21 VITALS
OXYGEN SATURATION: 96 % | DIASTOLIC BLOOD PRESSURE: 72 MMHG | WEIGHT: 122 LBS | RESPIRATION RATE: 16 BRPM | HEIGHT: 63.5 IN | SYSTOLIC BLOOD PRESSURE: 122 MMHG | TEMPERATURE: 98 F | BODY MASS INDEX: 21.35 KG/M2 | HEART RATE: 89 BPM

## 2023-06-21 DIAGNOSIS — C18.7 CANCER OF SIGMOID COLON (HCC): Primary | ICD-10-CM

## 2023-06-21 DIAGNOSIS — D70.1 CHEMOTHERAPY INDUCED NEUTROPENIA (HCC): ICD-10-CM

## 2023-06-21 DIAGNOSIS — C78.01 MALIGNANT NEOPLASM METASTATIC TO BOTH LUNGS (HCC): ICD-10-CM

## 2023-06-21 DIAGNOSIS — C78.01 MALIGNANT NEOPLASM METASTATIC TO BOTH LUNGS (HCC): Primary | ICD-10-CM

## 2023-06-21 DIAGNOSIS — C78.02 MALIGNANT NEOPLASM METASTATIC TO BOTH LUNGS (HCC): ICD-10-CM

## 2023-06-21 DIAGNOSIS — C78.6 PERITONEAL CARCINOMATOSIS (HCC): ICD-10-CM

## 2023-06-21 DIAGNOSIS — T45.1X5A CHEMOTHERAPY INDUCED NEUTROPENIA (HCC): ICD-10-CM

## 2023-06-21 DIAGNOSIS — Z79.899 ENCOUNTER FOR MEDICATION MANAGEMENT: ICD-10-CM

## 2023-06-21 DIAGNOSIS — Z51.11 CHEMOTHERAPY MANAGEMENT, ENCOUNTER FOR: ICD-10-CM

## 2023-06-21 DIAGNOSIS — C18.7 CANCER OF SIGMOID COLON (HCC): ICD-10-CM

## 2023-06-21 DIAGNOSIS — Z45.2 ENCOUNTER FOR CENTRAL LINE CARE: ICD-10-CM

## 2023-06-21 DIAGNOSIS — C78.02 MALIGNANT NEOPLASM METASTATIC TO BOTH LUNGS (HCC): Primary | ICD-10-CM

## 2023-06-21 DIAGNOSIS — C41.9 MALIGNANT NEOPLASM OF BONE WITH METASTASES (HCC): ICD-10-CM

## 2023-06-21 LAB
ALBUMIN SERPL-MCNC: 3.1 G/DL (ref 3.4–5)
ALBUMIN/GLOB SERPL: 0.9 {RATIO} (ref 1–2)
ALP LIVER SERPL-CCNC: 115 U/L
ALT SERPL-CCNC: 15 U/L
ANION GAP SERPL CALC-SCNC: 5 MMOL/L (ref 0–18)
AST SERPL-CCNC: 21 U/L (ref 15–37)
BASOPHILS # BLD AUTO: 0.06 X10(3) UL (ref 0–0.2)
BASOPHILS NFR BLD AUTO: 1.3 %
BILIRUB SERPL-MCNC: 0.6 MG/DL (ref 0.1–2)
BUN BLD-MCNC: 10 MG/DL (ref 7–18)
BUN/CREAT SERPL: 21.7 (ref 10–20)
CALCIUM BLD-MCNC: 8.6 MG/DL (ref 8.5–10.1)
CHLORIDE SERPL-SCNC: 107 MMOL/L (ref 98–112)
CO2 SERPL-SCNC: 31 MMOL/L (ref 21–32)
CREAT BLD-MCNC: 0.46 MG/DL
DEPRECATED RDW RBC AUTO: 61.2 FL (ref 35.1–46.3)
EOSINOPHIL # BLD AUTO: 0.32 X10(3) UL (ref 0–0.7)
EOSINOPHIL NFR BLD AUTO: 7 %
ERYTHROCYTE [DISTWIDTH] IN BLOOD BY AUTOMATED COUNT: 16.4 % (ref 11–15)
GFR SERPLBLD BASED ON 1.73 SQ M-ARVRAT: 102 ML/MIN/1.73M2 (ref 60–?)
GLOBULIN PLAS-MCNC: 3.6 G/DL (ref 2.8–4.4)
GLUCOSE BLD-MCNC: 95 MG/DL (ref 70–99)
HCT VFR BLD AUTO: 35.3 %
HGB BLD-MCNC: 11.3 G/DL
IMM GRANULOCYTES # BLD AUTO: 0.01 X10(3) UL (ref 0–1)
IMM GRANULOCYTES NFR BLD: 0.2 %
LYMPHOCYTES # BLD AUTO: 2.24 X10(3) UL (ref 1–4)
LYMPHOCYTES NFR BLD AUTO: 48.8 %
MCH RBC QN AUTO: 33.1 PG (ref 26–34)
MCHC RBC AUTO-ENTMCNC: 32 G/DL (ref 31–37)
MCV RBC AUTO: 103.5 FL
MONOCYTES # BLD AUTO: 0.3 X10(3) UL (ref 0.1–1)
MONOCYTES NFR BLD AUTO: 6.5 %
NEUTROPHILS # BLD AUTO: 1.66 X10 (3) UL (ref 1.5–7.7)
NEUTROPHILS # BLD AUTO: 1.66 X10(3) UL (ref 1.5–7.7)
NEUTROPHILS NFR BLD AUTO: 36.2 %
OSMOLALITY SERPL CALC.SUM OF ELEC: 295 MOSM/KG (ref 275–295)
PLATELET # BLD AUTO: 163 10(3)UL (ref 150–450)
POTASSIUM SERPL-SCNC: 4.5 MMOL/L (ref 3.5–5.1)
PROT SERPL-MCNC: 6.7 G/DL (ref 6.4–8.2)
RBC # BLD AUTO: 3.41 X10(6)UL
SODIUM SERPL-SCNC: 143 MMOL/L (ref 136–145)
WBC # BLD AUTO: 4.6 X10(3) UL (ref 4–11)

## 2023-06-21 PROCEDURE — 77334 RADIATION TREATMENT AID(S): CPT | Performed by: RADIOLOGY

## 2023-06-21 PROCEDURE — 80053 COMPREHEN METABOLIC PANEL: CPT

## 2023-06-21 PROCEDURE — 77290 THER RAD SIMULAJ FIELD CPLX: CPT | Performed by: RADIOLOGY

## 2023-06-21 PROCEDURE — 96413 CHEMO IV INFUSION 1 HR: CPT

## 2023-06-21 PROCEDURE — 85025 COMPLETE CBC W/AUTO DIFF WBC: CPT

## 2023-06-21 PROCEDURE — 99215 OFFICE O/P EST HI 40 MIN: CPT | Performed by: STUDENT IN AN ORGANIZED HEALTH CARE EDUCATION/TRAINING PROGRAM

## 2023-06-21 PROCEDURE — 96375 TX/PRO/DX INJ NEW DRUG ADDON: CPT

## 2023-06-21 RX ORDER — SODIUM CHLORIDE 9 MG/ML
10 INJECTION INTRAVENOUS ONCE
OUTPATIENT
Start: 2023-07-01

## 2023-06-21 RX ORDER — HEPARIN SODIUM (PORCINE) LOCK FLUSH IV SOLN 100 UNIT/ML 100 UNIT/ML
5 SOLUTION INTRAVENOUS ONCE
OUTPATIENT
Start: 2023-07-01

## 2023-06-21 RX ORDER — HEPARIN SODIUM (PORCINE) LOCK FLUSH IV SOLN 100 UNIT/ML 100 UNIT/ML
5 SOLUTION INTRAVENOUS ONCE
Status: COMPLETED | OUTPATIENT
Start: 2023-06-21 | End: 2023-06-21

## 2023-06-21 RX ADMIN — HEPARIN SODIUM (PORCINE) LOCK FLUSH IV SOLN 100 UNIT/ML 500 UNITS: 100 SOLUTION INTRAVENOUS at 15:05:00

## 2023-06-21 NOTE — PROGRESS NOTES
Pt here for C1D15 Bevacizumab and Zometa. Arrives Ambulating independently, accompanied by Self       Oral medications included in this regimen:  no    Patient confirms comprehension of cancer treatment schedule:  yes    Pregnancy screening:  Not applicable    Modifications in dose or schedule: Yes Zometa Q12 weeks started    Medications appearance and physical integrity checked by RN.     Frequency of blood return and site check throughout administration: Prior to administration, Prior to each drug and At completion of therapy     Infusion/treatment outcome:  patient tolerated treatment without incident    Education Record    Learner:  Patient  Barriers / Limitations:  None  Method:  Discussion  Education / instructions given:  Discussed Zometa  Outcome:  Shows understanding    Discharged Home, Ambulating independently, accompanied by:Self    Patient/family verbalized understanding of future appointments: by printed AVS

## 2023-06-23 RX ORDER — GABAPENTIN 300 MG/1
CAPSULE ORAL
Qty: 30 CAPSULE | Refills: 0 | Status: SHIPPED | OUTPATIENT
Start: 2023-06-23

## 2023-06-27 ENCOUNTER — APPOINTMENT (OUTPATIENT)
Dept: RADIATION ONCOLOGY | Facility: HOSPITAL | Age: 73
End: 2023-06-27
Attending: RADIOLOGY
Payer: MEDICARE

## 2023-07-01 ENCOUNTER — NURSE ONLY (OUTPATIENT)
Dept: RADIATION ONCOLOGY | Facility: HOSPITAL | Age: 73
End: 2023-07-01
Attending: RADIOLOGY
Payer: MEDICARE

## 2023-07-03 ENCOUNTER — OFFICE VISIT (OUTPATIENT)
Dept: RADIATION ONCOLOGY | Facility: HOSPITAL | Age: 73
End: 2023-07-03
Attending: RADIOLOGY
Payer: MEDICARE

## 2023-07-03 VITALS
OXYGEN SATURATION: 94 % | RESPIRATION RATE: 16 BRPM | TEMPERATURE: 98 F | WEIGHT: 121 LBS | HEART RATE: 92 BPM | SYSTOLIC BLOOD PRESSURE: 137 MMHG | DIASTOLIC BLOOD PRESSURE: 77 MMHG | BODY MASS INDEX: 21 KG/M2

## 2023-07-03 PROCEDURE — 77336 RADIATION PHYSICS CONSULT: CPT | Performed by: RADIOLOGY

## 2023-07-03 PROCEDURE — 77412 RADIATION TX DELIVERY LVL 3: CPT | Performed by: RADIOLOGY

## 2023-07-03 PROCEDURE — 77387 GUIDANCE FOR RADJ TX DLVR: CPT | Performed by: RADIOLOGY

## 2023-07-05 ENCOUNTER — APPOINTMENT (OUTPATIENT)
Dept: HEMATOLOGY/ONCOLOGY | Facility: HOSPITAL | Age: 73
End: 2023-07-05
Attending: PHYSICIAN ASSISTANT
Payer: MEDICARE

## 2023-07-05 ENCOUNTER — NURSE ONLY (OUTPATIENT)
Dept: HEMATOLOGY/ONCOLOGY | Facility: HOSPITAL | Age: 73
End: 2023-07-05
Attending: INTERNAL MEDICINE
Payer: MEDICARE

## 2023-07-05 VITALS
OXYGEN SATURATION: 96 % | SYSTOLIC BLOOD PRESSURE: 126 MMHG | TEMPERATURE: 98 F | HEART RATE: 89 BPM | DIASTOLIC BLOOD PRESSURE: 72 MMHG | WEIGHT: 120.38 LBS | RESPIRATION RATE: 16 BRPM | BODY MASS INDEX: 21 KG/M2

## 2023-07-05 DIAGNOSIS — C78.6 PERITONEAL CARCINOMATOSIS (HCC): ICD-10-CM

## 2023-07-05 DIAGNOSIS — C18.7 CANCER OF SIGMOID COLON (HCC): Primary | ICD-10-CM

## 2023-07-05 DIAGNOSIS — Z51.11 CHEMOTHERAPY MANAGEMENT, ENCOUNTER FOR: ICD-10-CM

## 2023-07-05 DIAGNOSIS — C78.02 MALIGNANT NEOPLASM METASTATIC TO BOTH LUNGS (HCC): ICD-10-CM

## 2023-07-05 DIAGNOSIS — C41.9 MALIGNANT NEOPLASM OF BONE WITH METASTASES (HCC): ICD-10-CM

## 2023-07-05 DIAGNOSIS — D70.1 CHEMOTHERAPY INDUCED NEUTROPENIA: ICD-10-CM

## 2023-07-05 DIAGNOSIS — Z45.2 ENCOUNTER FOR CENTRAL LINE CARE: ICD-10-CM

## 2023-07-05 DIAGNOSIS — Z79.899 ENCOUNTER FOR MEDICATION MANAGEMENT: ICD-10-CM

## 2023-07-05 DIAGNOSIS — T45.1X5A CHEMOTHERAPY INDUCED NEUTROPENIA: ICD-10-CM

## 2023-07-05 DIAGNOSIS — C78.01 MALIGNANT NEOPLASM METASTATIC TO BOTH LUNGS (HCC): ICD-10-CM

## 2023-07-05 LAB
ALBUMIN SERPL-MCNC: 3.4 G/DL (ref 3.4–5)
ALBUMIN/GLOB SERPL: 0.9 {RATIO} (ref 1–2)
ALP LIVER SERPL-CCNC: 120 U/L
ALT SERPL-CCNC: 13 U/L
ANION GAP SERPL CALC-SCNC: 3 MMOL/L (ref 0–18)
AST SERPL-CCNC: 20 U/L (ref 15–37)
BASOPHILS # BLD AUTO: 0.03 X10(3) UL (ref 0–0.2)
BASOPHILS NFR BLD AUTO: 0.9 %
BILIRUB SERPL-MCNC: 0.5 MG/DL (ref 0.1–2)
BUN BLD-MCNC: 9 MG/DL (ref 7–18)
BUN/CREAT SERPL: 17.6 (ref 10–20)
CALCIUM BLD-MCNC: 8.5 MG/DL (ref 8.5–10.1)
CEA SERPL-MCNC: 84 NG/ML (ref ?–5)
CHLORIDE SERPL-SCNC: 110 MMOL/L (ref 98–112)
CO2 SERPL-SCNC: 30 MMOL/L (ref 21–32)
CREAT BLD-MCNC: 0.51 MG/DL
DEPRECATED RDW RBC AUTO: 68.5 FL (ref 35.1–46.3)
EOSINOPHIL # BLD AUTO: 0.22 X10(3) UL (ref 0–0.7)
EOSINOPHIL NFR BLD AUTO: 6.4 %
ERYTHROCYTE [DISTWIDTH] IN BLOOD BY AUTOMATED COUNT: 18 % (ref 11–15)
GFR SERPLBLD BASED ON 1.73 SQ M-ARVRAT: 99 ML/MIN/1.73M2 (ref 60–?)
GLOBULIN PLAS-MCNC: 3.8 G/DL (ref 2.8–4.4)
GLUCOSE BLD-MCNC: 101 MG/DL (ref 70–99)
HCT VFR BLD AUTO: 35.7 %
HELMET CELLS BLD QL SMEAR: PRESENT
HGB BLD-MCNC: 11.7 G/DL
IMM GRANULOCYTES # BLD AUTO: 0.01 X10(3) UL (ref 0–1)
IMM GRANULOCYTES NFR BLD: 0.3 %
LYMPHOCYTES # BLD AUTO: 1.83 X10(3) UL (ref 1–4)
LYMPHOCYTES NFR BLD AUTO: 53.2 %
MCH RBC QN AUTO: 34.1 PG (ref 26–34)
MCHC RBC AUTO-ENTMCNC: 32.8 G/DL (ref 31–37)
MCV RBC AUTO: 104.1 FL
MONOCYTES # BLD AUTO: 0.32 X10(3) UL (ref 0.1–1)
MONOCYTES NFR BLD AUTO: 9.3 %
NEUTROPHILS # BLD AUTO: 1.03 X10 (3) UL (ref 1.5–7.7)
NEUTROPHILS # BLD AUTO: 1.03 X10(3) UL (ref 1.5–7.7)
NEUTROPHILS NFR BLD AUTO: 29.9 %
OSMOLALITY SERPL CALC.SUM OF ELEC: 295 MOSM/KG (ref 275–295)
PLATELET # BLD AUTO: 187 10(3)UL (ref 150–450)
PLATELET MORPHOLOGY: NORMAL
POTASSIUM SERPL-SCNC: 4.3 MMOL/L (ref 3.5–5.1)
PROT SERPL-MCNC: 7.2 G/DL (ref 6.4–8.2)
RBC # BLD AUTO: 3.43 X10(6)UL
SODIUM SERPL-SCNC: 143 MMOL/L (ref 136–145)
WBC # BLD AUTO: 3.4 X10(3) UL (ref 4–11)

## 2023-07-05 PROCEDURE — 82378 CARCINOEMBRYONIC ANTIGEN: CPT

## 2023-07-05 PROCEDURE — 80053 COMPREHEN METABOLIC PANEL: CPT

## 2023-07-05 PROCEDURE — 85025 COMPLETE CBC W/AUTO DIFF WBC: CPT

## 2023-07-05 PROCEDURE — 99215 OFFICE O/P EST HI 40 MIN: CPT | Performed by: STUDENT IN AN ORGANIZED HEALTH CARE EDUCATION/TRAINING PROGRAM

## 2023-07-05 PROCEDURE — 96413 CHEMO IV INFUSION 1 HR: CPT

## 2023-07-05 RX ORDER — HEPARIN SODIUM (PORCINE) LOCK FLUSH IV SOLN 100 UNIT/ML 100 UNIT/ML
SOLUTION INTRAVENOUS
Status: COMPLETED
Start: 2023-07-05 | End: 2023-07-05

## 2023-07-05 RX ORDER — HEPARIN SODIUM (PORCINE) LOCK FLUSH IV SOLN 100 UNIT/ML 100 UNIT/ML
5 SOLUTION INTRAVENOUS ONCE
Status: CANCELLED | OUTPATIENT
Start: 2023-08-02 | Stop reason: HOSPADM

## 2023-07-05 RX ORDER — SODIUM CHLORIDE 9 MG/ML
10 INJECTION INTRAVENOUS ONCE
Status: CANCELLED | OUTPATIENT
Start: 2023-08-02 | Stop reason: HOSPADM

## 2023-07-05 RX ORDER — HEPARIN SODIUM (PORCINE) LOCK FLUSH IV SOLN 100 UNIT/ML 100 UNIT/ML
5 SOLUTION INTRAVENOUS ONCE
Status: COMPLETED | OUTPATIENT
Start: 2023-07-05 | End: 2023-07-05

## 2023-07-05 RX ADMIN — HEPARIN SODIUM (PORCINE) LOCK FLUSH IV SOLN 100 UNIT/ML 500 UNITS: 100 SOLUTION INTRAVENOUS at 14:44:00

## 2023-07-05 NOTE — PROGRESS NOTES
Pt here for C2D1 Avastin. Arrives Ambulating independently, accompanied by Self       Oral medications included in this regimen:  no    Patient confirms comprehension of cancer treatment schedule:  yes    Pregnancy screening:  Denies possibility of pregnancy    Modifications in dose or schedule:  No    Medications appearance and physical integrity checked by RN. Chemotherapy IV pump settings verified by 2 RNs:  yes     PORT previously accessed in the lab- good blood return noted.    Frequency of blood return and site check throughout administration: Prior to administration, Prior to each drug, and At completion of therapy     Infusion/treatment outcome:  patient tolerated treatment without incident    Education Record    Learner:  Patient  Barriers / Limitations:  None  Method:  Discussion  Education / instructions given:  N/A  Outcome:  Shows understanding    Discharged Home, Ambulating independently, accompanied by:Self    Patient/family verbalized understanding of future appointments: by printed AVS

## 2023-07-19 ENCOUNTER — OFFICE VISIT (OUTPATIENT)
Dept: HEMATOLOGY/ONCOLOGY | Facility: HOSPITAL | Age: 73
End: 2023-07-19
Attending: PHYSICIAN ASSISTANT
Payer: MEDICARE

## 2023-07-19 ENCOUNTER — OFFICE VISIT (OUTPATIENT)
Dept: HEMATOLOGY/ONCOLOGY | Facility: HOSPITAL | Age: 73
End: 2023-07-19
Attending: INTERNAL MEDICINE
Payer: MEDICARE

## 2023-07-19 VITALS
TEMPERATURE: 98 F | BODY MASS INDEX: 21 KG/M2 | SYSTOLIC BLOOD PRESSURE: 124 MMHG | HEART RATE: 87 BPM | RESPIRATION RATE: 16 BRPM | WEIGHT: 121.63 LBS | DIASTOLIC BLOOD PRESSURE: 77 MMHG | OXYGEN SATURATION: 97 %

## 2023-07-19 DIAGNOSIS — C78.02 MALIGNANT NEOPLASM METASTATIC TO BOTH LUNGS (HCC): ICD-10-CM

## 2023-07-19 DIAGNOSIS — C78.6 PERITONEAL CARCINOMATOSIS (HCC): ICD-10-CM

## 2023-07-19 DIAGNOSIS — C78.02 MALIGNANT NEOPLASM METASTATIC TO BOTH LUNGS (HCC): Primary | ICD-10-CM

## 2023-07-19 DIAGNOSIS — Z45.2 ENCOUNTER FOR CENTRAL LINE CARE: ICD-10-CM

## 2023-07-19 DIAGNOSIS — C18.7 CANCER OF SIGMOID COLON (HCC): Primary | ICD-10-CM

## 2023-07-19 DIAGNOSIS — Z51.11 CHEMOTHERAPY MANAGEMENT, ENCOUNTER FOR: ICD-10-CM

## 2023-07-19 DIAGNOSIS — C41.9 MALIGNANT NEOPLASM OF BONE WITH METASTASES (HCC): ICD-10-CM

## 2023-07-19 DIAGNOSIS — D64.81 ANEMIA ASSOCIATED WITH CHEMOTHERAPY: ICD-10-CM

## 2023-07-19 DIAGNOSIS — C78.01 MALIGNANT NEOPLASM METASTATIC TO BOTH LUNGS (HCC): ICD-10-CM

## 2023-07-19 DIAGNOSIS — J90 PLEURAL EFFUSION: ICD-10-CM

## 2023-07-19 DIAGNOSIS — T45.1X5A ANEMIA ASSOCIATED WITH CHEMOTHERAPY: ICD-10-CM

## 2023-07-19 DIAGNOSIS — C78.01 MALIGNANT NEOPLASM METASTATIC TO BOTH LUNGS (HCC): Primary | ICD-10-CM

## 2023-07-19 DIAGNOSIS — Z79.899 ENCOUNTER FOR MEDICATION MANAGEMENT: ICD-10-CM

## 2023-07-19 LAB
ALBUMIN SERPL-MCNC: 3.2 G/DL (ref 3.4–5)
ALBUMIN/GLOB SERPL: 0.9 {RATIO} (ref 1–2)
ALP LIVER SERPL-CCNC: 111 U/L
ALT SERPL-CCNC: 12 U/L
ANION GAP SERPL CALC-SCNC: 3 MMOL/L (ref 0–18)
AST SERPL-CCNC: 17 U/L (ref 15–37)
BASOPHILS # BLD AUTO: 0.02 X10(3) UL (ref 0–0.2)
BASOPHILS NFR BLD AUTO: 0.7 %
BILIRUB SERPL-MCNC: 0.7 MG/DL (ref 0.1–2)
BUN BLD-MCNC: 11 MG/DL (ref 7–18)
BUN/CREAT SERPL: 21.6 (ref 10–20)
CALCIUM BLD-MCNC: 8 MG/DL (ref 8.5–10.1)
CHLORIDE SERPL-SCNC: 111 MMOL/L (ref 98–112)
CO2 SERPL-SCNC: 28 MMOL/L (ref 21–32)
CREAT BLD-MCNC: 0.51 MG/DL
DEPRECATED HBV CORE AB SER IA-ACNC: 174.4 NG/ML
DEPRECATED RDW RBC AUTO: 66.5 FL (ref 35.1–46.3)
EOSINOPHIL # BLD AUTO: 0.03 X10(3) UL (ref 0–0.7)
EOSINOPHIL NFR BLD AUTO: 1 %
ERYTHROCYTE [DISTWIDTH] IN BLOOD BY AUTOMATED COUNT: 17.2 % (ref 11–15)
GFR SERPLBLD BASED ON 1.73 SQ M-ARVRAT: 99 ML/MIN/1.73M2 (ref 60–?)
GLOBULIN PLAS-MCNC: 3.6 G/DL (ref 2.8–4.4)
GLUCOSE BLD-MCNC: 96 MG/DL (ref 70–99)
HCT VFR BLD AUTO: 33 %
HELMET CELLS BLD QL SMEAR: PRESENT
HGB BLD-MCNC: 10.4 G/DL
IMM GRANULOCYTES # BLD AUTO: 0.01 X10(3) UL (ref 0–1)
IMM GRANULOCYTES NFR BLD: 0.3 %
IRON SATN MFR SERPL: 94 %
IRON SERPL-MCNC: 256 UG/DL
LYMPHOCYTES # BLD AUTO: 1.41 X10(3) UL (ref 1–4)
LYMPHOCYTES NFR BLD AUTO: 48.5 %
MCH RBC QN AUTO: 33.5 PG (ref 26–34)
MCHC RBC AUTO-ENTMCNC: 31.5 G/DL (ref 31–37)
MCV RBC AUTO: 106.5 FL
MONOCYTES # BLD AUTO: 0.06 X10(3) UL (ref 0.1–1)
MONOCYTES NFR BLD AUTO: 2.1 %
NEUTROPHILS # BLD AUTO: 1.38 X10 (3) UL (ref 1.5–7.7)
NEUTROPHILS # BLD AUTO: 1.38 X10(3) UL (ref 1.5–7.7)
NEUTROPHILS NFR BLD AUTO: 47.4 %
OSMOLALITY SERPL CALC.SUM OF ELEC: 293 MOSM/KG (ref 275–295)
PLATELET # BLD AUTO: 92 10(3)UL (ref 150–450)
PLATELET MORPHOLOGY: NORMAL
POTASSIUM SERPL-SCNC: 4.4 MMOL/L (ref 3.5–5.1)
PROT SERPL-MCNC: 6.8 G/DL (ref 6.4–8.2)
RBC # BLD AUTO: 3.1 X10(6)UL
SODIUM SERPL-SCNC: 142 MMOL/L (ref 136–145)
TIBC SERPL-MCNC: 273 UG/DL (ref 240–450)
TRANSFERRIN SERPL-MCNC: 183 MG/DL (ref 200–360)
VIT B12 SERPL-MCNC: 434 PG/ML (ref 193–986)
WBC # BLD AUTO: 2.9 X10(3) UL (ref 4–11)

## 2023-07-19 PROCEDURE — 99215 OFFICE O/P EST HI 40 MIN: CPT | Performed by: PHYSICIAN ASSISTANT

## 2023-07-19 PROCEDURE — 82728 ASSAY OF FERRITIN: CPT

## 2023-07-19 PROCEDURE — 82607 VITAMIN B-12: CPT

## 2023-07-19 PROCEDURE — 96413 CHEMO IV INFUSION 1 HR: CPT

## 2023-07-19 PROCEDURE — 84466 ASSAY OF TRANSFERRIN: CPT

## 2023-07-19 PROCEDURE — 85025 COMPLETE CBC W/AUTO DIFF WBC: CPT

## 2023-07-19 PROCEDURE — 83540 ASSAY OF IRON: CPT

## 2023-07-19 PROCEDURE — 80053 COMPREHEN METABOLIC PANEL: CPT

## 2023-07-19 RX ORDER — HEPARIN SODIUM (PORCINE) LOCK FLUSH IV SOLN 100 UNIT/ML 100 UNIT/ML
5 SOLUTION INTRAVENOUS ONCE
OUTPATIENT
Start: 2023-08-02

## 2023-07-19 RX ORDER — HEPARIN SODIUM (PORCINE) LOCK FLUSH IV SOLN 100 UNIT/ML 100 UNIT/ML
5 SOLUTION INTRAVENOUS ONCE
Status: COMPLETED | OUTPATIENT
Start: 2023-07-19 | End: 2023-07-19

## 2023-07-19 RX ADMIN — HEPARIN SODIUM (PORCINE) LOCK FLUSH IV SOLN 100 UNIT/ML 500 UNITS: 100 SOLUTION INTRAVENOUS at 15:40:00

## 2023-07-19 NOTE — PROGRESS NOTES
Pt here for C2D15 Avastin. Arrives Ambulating independently, accompanied by Self       Oral medications included in this regimen:  yes - Trifluridine-Tirpiracil    Patient confirms comprehension of cancer treatment schedule:  yes    Pregnancy screening:  Denies possibility of pregnancy    Modifications in dose or schedule:  No - OK to treat with ANC 1.38, platelets 92 per MD order    Medications appearance and physical integrity checked by RN.  Chemotherapy IV pump settings verified by 2 RNs:  yes     Frequency of blood return and site check throughout administration: Prior to administration, Prior to each drug, and At completion of therapy     Infusion/treatment outcome:  patient tolerated treatment without incident    Education Record    Learner:  Patient  Barriers / Limitations:  None  Method:  Discussion and Reinforcement  Education / instructions given:  Reviewed treatment plan, medications given  Outcome:  Shows understanding    Discharged Home, Ambulating independently, accompanied by:Self    Patient/family verbalized understanding of future appointments: by printed AVS

## 2023-08-02 ENCOUNTER — NURSE ONLY (OUTPATIENT)
Dept: HEMATOLOGY/ONCOLOGY | Facility: HOSPITAL | Age: 73
End: 2023-08-02
Attending: INTERNAL MEDICINE
Payer: MEDICARE

## 2023-08-02 ENCOUNTER — OFFICE VISIT (OUTPATIENT)
Dept: HEMATOLOGY/ONCOLOGY | Facility: HOSPITAL | Age: 73
End: 2023-08-02
Attending: PHYSICIAN ASSISTANT
Payer: MEDICARE

## 2023-08-02 VITALS
HEART RATE: 79 BPM | HEIGHT: 63.5 IN | WEIGHT: 121.38 LBS | BODY MASS INDEX: 21.24 KG/M2 | RESPIRATION RATE: 16 BRPM | TEMPERATURE: 98 F | SYSTOLIC BLOOD PRESSURE: 138 MMHG | DIASTOLIC BLOOD PRESSURE: 73 MMHG | OXYGEN SATURATION: 98 %

## 2023-08-02 DIAGNOSIS — C18.7 CANCER OF SIGMOID COLON (HCC): Primary | ICD-10-CM

## 2023-08-02 DIAGNOSIS — C78.02 MALIGNANT NEOPLASM METASTATIC TO BOTH LUNGS (HCC): ICD-10-CM

## 2023-08-02 DIAGNOSIS — Z79.899 ENCOUNTER FOR MEDICATION MANAGEMENT: ICD-10-CM

## 2023-08-02 DIAGNOSIS — C78.6 PERITONEAL CARCINOMATOSIS (HCC): ICD-10-CM

## 2023-08-02 DIAGNOSIS — C78.01 MALIGNANT NEOPLASM METASTATIC TO BOTH LUNGS (HCC): ICD-10-CM

## 2023-08-02 DIAGNOSIS — Z51.11 CHEMOTHERAPY MANAGEMENT, ENCOUNTER FOR: ICD-10-CM

## 2023-08-02 LAB
ALBUMIN SERPL-MCNC: 3.1 G/DL (ref 3.4–5)
ALBUMIN/GLOB SERPL: 0.9 {RATIO} (ref 1–2)
ALP LIVER SERPL-CCNC: 94 U/L
ALT SERPL-CCNC: 16 U/L
ANION GAP SERPL CALC-SCNC: 5 MMOL/L (ref 0–18)
AST SERPL-CCNC: 18 U/L (ref 15–37)
BASOPHILS # BLD AUTO: 0 X10(3) UL (ref 0–0.2)
BASOPHILS NFR BLD AUTO: 0 %
BILIRUB SERPL-MCNC: 0.4 MG/DL (ref 0.1–2)
BUN BLD-MCNC: 10 MG/DL (ref 7–18)
BUN/CREAT SERPL: 19.2 (ref 10–20)
CALCIUM BLD-MCNC: 8.4 MG/DL (ref 8.5–10.1)
CEA SERPL-MCNC: 104.4 NG/ML (ref ?–5)
CHLORIDE SERPL-SCNC: 107 MMOL/L (ref 98–112)
CO2 SERPL-SCNC: 29 MMOL/L (ref 21–32)
CREAT BLD-MCNC: 0.52 MG/DL
DEPRECATED RDW RBC AUTO: 67.1 FL (ref 35.1–46.3)
EGFRCR SERPLBLD CKD-EPI 2021: 98 ML/MIN/1.73M2 (ref 60–?)
EOSINOPHIL # BLD AUTO: 0 X10(3) UL (ref 0–0.7)
EOSINOPHIL NFR BLD AUTO: 0 %
ERYTHROCYTE [DISTWIDTH] IN BLOOD BY AUTOMATED COUNT: 18 % (ref 11–15)
GLOBULIN PLAS-MCNC: 3.5 G/DL (ref 2.8–4.4)
GLUCOSE BLD-MCNC: 86 MG/DL (ref 70–99)
HCT VFR BLD AUTO: 29 %
HELMET CELLS BLD QL SMEAR: PRESENT
HGB BLD-MCNC: 9.4 G/DL
IMM GRANULOCYTES # BLD AUTO: 0 X10(3) UL (ref 0–1)
IMM GRANULOCYTES NFR BLD: 0 %
LYMPHOCYTES # BLD AUTO: 1.54 X10(3) UL (ref 1–4)
LYMPHOCYTES NFR BLD AUTO: 85.6 %
MCH RBC QN AUTO: 33.7 PG (ref 26–34)
MCHC RBC AUTO-ENTMCNC: 32.4 G/DL (ref 31–37)
MCV RBC AUTO: 103.9 FL
MONOCYTES # BLD AUTO: 0.19 X10(3) UL (ref 0.1–1)
MONOCYTES NFR BLD AUTO: 10.6 %
NEUTROPHILS # BLD AUTO: 0.07 X10 (3) UL (ref 1.5–7.7)
NEUTROPHILS # BLD AUTO: 0.07 X10(3) UL (ref 1.5–7.7)
NEUTROPHILS NFR BLD AUTO: 3.8 %
OSMOLALITY SERPL CALC.SUM OF ELEC: 290 MOSM/KG (ref 275–295)
PLATELET # BLD AUTO: 80 10(3)UL (ref 150–450)
PLATELET MORPHOLOGY: NORMAL
POTASSIUM SERPL-SCNC: 4.2 MMOL/L (ref 3.5–5.1)
PROT SERPL-MCNC: 6.6 G/DL (ref 6.4–8.2)
RBC # BLD AUTO: 2.79 X10(6)UL
SODIUM SERPL-SCNC: 141 MMOL/L (ref 136–145)
WBC # BLD AUTO: 1.8 X10(3) UL (ref 4–11)

## 2023-08-02 PROCEDURE — 85060 BLOOD SMEAR INTERPRETATION: CPT

## 2023-08-02 PROCEDURE — 85025 COMPLETE CBC W/AUTO DIFF WBC: CPT

## 2023-08-02 PROCEDURE — 36591 DRAW BLOOD OFF VENOUS DEVICE: CPT

## 2023-08-02 PROCEDURE — 80053 COMPREHEN METABOLIC PANEL: CPT

## 2023-08-02 PROCEDURE — 99214 OFFICE O/P EST MOD 30 MIN: CPT | Performed by: STUDENT IN AN ORGANIZED HEALTH CARE EDUCATION/TRAINING PROGRAM

## 2023-08-02 PROCEDURE — 82378 CARCINOEMBRYONIC ANTIGEN: CPT

## 2023-08-02 RX ORDER — HEPARIN SODIUM (PORCINE) LOCK FLUSH IV SOLN 100 UNIT/ML 100 UNIT/ML
SOLUTION INTRAVENOUS
Status: DISPENSED
Start: 2023-08-02 | End: 2023-08-03

## 2023-08-02 NOTE — PROGRESS NOTES
Cancer Center Progress Note    Patient Name: Denise Ramirez   YOB: 1950   Medical Record Number: [id number]     Chief Complaint:  Colon cancer  Chemotherapy    Oncology History:   3/6/2020: CLN with sigmoid mass s/p sigmoid resection 3/2020: stage IIA ([place]) INNA, KITA wt  11/2020: Recurrent in peritoneal, liver s/p FOLFIRI/avastin 11/2020-5/19/21, followed by HIPEC mitomycin 6/29/21 and 6 months of cetuximab 7/28/21-12/29/2021. 4/2022: imaging and CEA consistent with recurrence, started on FU/Demetria and oxali added 5/19/22      Treatment  4/12/22 C1 5FU/ Demetria . G360: BRAF amplification 2.3%  5/19/22 FOLFOX dose reduced with neulasta support d/t delays with neutropenia  9/7/2022 FOLFOX/demetria.  txt break due to PUD perforation. Demetria held, restarted 11/2022 1/4/23: Started Iri/Cetux  5/2023: pleural fluid positive for malignancy; imaging consistent w/ progression of disease  S/p 1C 5FU/demetria while awaiting for lonsurf (oxali held 2/2 shingles)   6/2023: lonsurf/demetria     Interval History:  8/2/23  Here for follow up. Overall doing okay, tolerating Lonsurf well but is becoming more fatigued. PMH Colon cacer s/p resection and HIPEC, THR   Family History: neg for cancer  Social History: [place] descent. She lives close to her daughter (NP-Ob), other children are farther away. Current Outpatient Medications:     furosemide 20 MG Oral Tab, Take 1 tablet (20 mg total) by mouth daily. , Disp: 30 tablet, Rfl: 0    GABAPENTIN 300 MG Oral Cap, TAKE 1 CAPSULE(300 MG) BY MOUTH EVERY NIGHT, Disp: 30 capsule, Rfl: 0    acyclovir 400 MG Oral Tab, Take 1 tablet (400 mg total) by mouth 2 (two) times daily. , Disp: 60 tablet, Rfl: 3    trifluridine-tipiracil 15-6.14 MG Oral tab, Take 4 tablets (60 mg total) by mouth 2 (two) times daily. Take twice a day on days 1-5, and then days 8-12 in a 28-day cycle., Disp: 20 tablet, Rfl: 11    DULoxetine 20 MG Oral Cap DR Particles, Take 1 capsule (20 mg total) by mouth daily. , Disp: 90 capsule, Rfl: 3    Clindamycin Phosphate (CLEOCIN-T) 1 % External Gel, Apply to affected area bid, Disp: 60 g, Rfl: 1    pantoprazole 40 MG Oral Tab EC, Take 1 tablet (40 mg total) by mouth every morning before breakfast., Disp: 90 tablet, Rfl: 3    Lactobacillus Rhamnosus, GG, (CULTURELLE OR), Take by mouth daily. , Disp: , Rfl:     Multiple Vitamins-Minerals (MULTIVITAMIN WOMEN OR), Take by mouth., Disp: , Rfl:     Probiotic Product (PROBIOTIC DAILY OR), Take by mouth., Disp: , Rfl:     docusate sodium 100 MG Oral Cap, Take 100 mg by mouth daily. , Disp: , Rfl:     Lidocaine Viscous HCl 2 % Mouth/Throat Solution, Take 5 mL by mouth 4 (four) times daily as needed for Pain (oral/throat discomfort from chemotherapy). Combine with 5ml Maalox and 5ml liquid Benadryl. Swish and spit/swallow. , Disp: 100 mL, Rfl: 1    loratadine 10 MG Oral Tab, Take 1 tablet (10 mg total) by mouth daily. , Disp: 30 tablet, Rfl: 0    ondansetron (ZOFRAN) 8 MG tablet, Take 1 tablet (8 mg total) by mouth every 8 (eight) hours as needed for Nausea., Disp: 30 tablet, Rfl: 3    prochlorperazine (COMPAZINE) 10 mg tablet, Take 1 tablet (10 mg total) by mouth every 8 (eight) hours as needed for Nausea., Disp: 60 tablet, Rfl: 3    lidocaine-prilocaine 2.5-2.5 % External Cream, Apply to site 1 hour prior to port a cath needle insertion, Disp: 30 g, Rfl: 2    Vitamin D3 50 MCG (2000 UT) Oral Cap, Take 1 capsule (2,000 Units total) by mouth in the morning and 1 capsule (2,000 Units total) before bedtime. Per pt. Takes 4000 units daily(at one time). , Disp: , Rfl:     nystatin [id number] UNIT/GM External Cream, aply peasize to affected area twice (Patient taking differently: as needed.  aply peasize to affected area twice), Disp: 15 g, Rfl: 0    Acetaminophen 500 MG Oral Cap, Take by mouth every 6 (six) hours as needed for Pain., Disp: , Rfl:     No Known Allergies     Review of Systems: Onc ROS neg except HPI    There were no vitals taken for this visit. Wt Readings from Last 6 Encounters:  07/19/23 : 55.2 kg (121 lb 9.6 oz)  07/05/23 : 54.6 kg (120 lb 6.4 oz)  07/03/23 : 54.9 kg (121 lb)  06/21/23 : 55.3 kg (122 lb)  06/20/23 : 55.3 kg (122 lb)  06/14/23 : 54 kg (119 lb)  General: Patient is alert and oriented x 3,  HEENT: No icterus  Chest: basilar bs bl   Abd: Soft, non tender  Derm: rash from previous shingles healing nicely to the L flank region   Extremity: trace non pitting edema     Labs reviewed. Component      Latest Ref Rng 12/14/2022 12/28/2022 1/4/2023 2/1/2023 3/1/2023 3/29/2023   CEA      <=5.0 ng/mL 29.6 (H)  33.6 (H)  32.0 (H)  17.6 (H)  9.4 (H)  7.9 (H)      Component      Latest Ref Rng 4/26/2023 5/24/2023   CEA      <=5.0 ng/mL 10.7 (H)  18.8 (H)        Impression and Plan:   Cancer Staging   Cancer of sigmoid colon [facility])  Staging form: Colon and Rectum, AJCC 8th Edition  - Clinical: Stage Unknown (cTX, cNX, cM1) - Signed by Carlton Snowden MD on 11/5/2020  - Clinical stage from 11/12/2020: Stage Unknown (cTX, cNX, cM1) - Signed by Carlton Snowden MD on 11/12/2020  68year old with stage IIA sigmoid adenoca (3/2020) with pertioneal recurrence, MSI-S KITA WT s/p FOLFIRI nolvia,  HIPEC 6/29/21, cetuximab maintenance 12/29/21,  FOLFOX/nolvia with txt break of Nolvia d/t PUD, irinotecan and cetuximab. Now on lonsurf+Bevacizumab.     -Here for clearance of cycle 3 Lonsurf plus bevacizumab, unfortunately, ANC is extremely low at 0.07. We will hold therapy today. We will see her back next week to assess improvement in [address] and clearance of cycle 3 of Lonsurf plus bevacizumab. The patient was previously receiving Neulasta injections with prior therapies, so we could consider this on day 15 of therapy. We will also plan for dose reduction of Lonsurf to 3 tablets instead of 4 tablets. -CEA increasing. She is due for restaging prior to cycle 4 which will tentatively be early September and we can schedule this at her next follow-up.     Pancytopenia 2/2 Lonsurf. Monitor for now. Consider dose modification. L femur lytic lesion-completed palliative RT 6/27 - 7/3    Shingles - ppx acyclovir 400mg bid     SOB- likely due to effusion- thora as needed (2 x thus far). Monitor for now. Consider pluerX with recurrent episodes. 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 H/o PUD perforation- PPI, no further issues. ok to hold for now given above  Macrocytosis- recent b12 nl.      MDM: Moderate    Inis Dryer, DO

## 2023-08-02 NOTE — PROGRESS NOTES
Message received from MD  Will hold tx today.  ANC 0.07 Plts 80      Reinforce neutropenic and bleeding     adjusted next visit   Patient aware AVS given    Port flushed per protocol and de accessed    Discharged stable

## 2023-08-07 ENCOUNTER — TELEPHONE (OUTPATIENT)
Dept: HEMATOLOGY/ONCOLOGY | Facility: HOSPITAL | Age: 73
End: 2023-08-07

## 2023-08-07 DIAGNOSIS — C18.7 CANCER OF SIGMOID COLON (HCC): ICD-10-CM

## 2023-08-07 DIAGNOSIS — Z51.11 CHEMOTHERAPY MANAGEMENT, ENCOUNTER FOR: Primary | ICD-10-CM

## 2023-08-08 ENCOUNTER — OFFICE VISIT (OUTPATIENT)
Dept: HEMATOLOGY/ONCOLOGY | Facility: HOSPITAL | Age: 73
End: 2023-08-08
Attending: STUDENT IN AN ORGANIZED HEALTH CARE EDUCATION/TRAINING PROGRAM
Payer: MEDICARE

## 2023-08-08 ENCOUNTER — OFFICE VISIT (OUTPATIENT)
Dept: HEMATOLOGY/ONCOLOGY | Facility: HOSPITAL | Age: 73
End: 2023-08-08
Attending: INTERNAL MEDICINE
Payer: MEDICARE

## 2023-08-08 VITALS
WEIGHT: 119.63 LBS | HEART RATE: 78 BPM | DIASTOLIC BLOOD PRESSURE: 80 MMHG | BODY MASS INDEX: 20.93 KG/M2 | HEIGHT: 63.5 IN | TEMPERATURE: 98 F | RESPIRATION RATE: 16 BRPM | OXYGEN SATURATION: 96 % | SYSTOLIC BLOOD PRESSURE: 145 MMHG

## 2023-08-08 DIAGNOSIS — Z51.11 CHEMOTHERAPY MANAGEMENT, ENCOUNTER FOR: ICD-10-CM

## 2023-08-08 DIAGNOSIS — Z45.2 ENCOUNTER FOR CENTRAL LINE CARE: ICD-10-CM

## 2023-08-08 DIAGNOSIS — C18.7 CANCER OF SIGMOID COLON (HCC): Primary | ICD-10-CM

## 2023-08-08 DIAGNOSIS — C18.7 CANCER OF SIGMOID COLON (HCC): ICD-10-CM

## 2023-08-08 DIAGNOSIS — Z79.899 ENCOUNTER FOR MEDICATION MANAGEMENT: ICD-10-CM

## 2023-08-08 DIAGNOSIS — J90 PLEURAL EFFUSION: ICD-10-CM

## 2023-08-08 DIAGNOSIS — C78.6 PERITONEAL CARCINOMATOSIS (HCC): ICD-10-CM

## 2023-08-08 DIAGNOSIS — C41.9 MALIGNANT NEOPLASM OF BONE WITH METASTASES (HCC): ICD-10-CM

## 2023-08-08 LAB
ALBUMIN SERPL-MCNC: 3.2 G/DL (ref 3.4–5)
ALBUMIN/GLOB SERPL: 0.8 {RATIO} (ref 1–2)
ALP LIVER SERPL-CCNC: 87 U/L
ALT SERPL-CCNC: 17 U/L
ANION GAP SERPL CALC-SCNC: 3 MMOL/L (ref 0–18)
AST SERPL-CCNC: 22 U/L (ref 15–37)
BASOPHILS # BLD AUTO: 0 X10(3) UL (ref 0–0.2)
BASOPHILS NFR BLD AUTO: 0 %
BILIRUB SERPL-MCNC: 0.3 MG/DL (ref 0.1–2)
BUN BLD-MCNC: 6 MG/DL (ref 7–18)
BUN/CREAT SERPL: 11.8 (ref 10–20)
CALCIUM BLD-MCNC: 8.5 MG/DL (ref 8.5–10.1)
CHLORIDE SERPL-SCNC: 110 MMOL/L (ref 98–112)
CO2 SERPL-SCNC: 31 MMOL/L (ref 21–32)
CREAT BLD-MCNC: 0.51 MG/DL
DEPRECATED RDW RBC AUTO: 74.4 FL (ref 35.1–46.3)
EGFRCR SERPLBLD CKD-EPI 2021: 99 ML/MIN/1.73M2 (ref 60–?)
EOSINOPHIL # BLD AUTO: 0 X10(3) UL (ref 0–0.7)
EOSINOPHIL NFR BLD AUTO: 0 %
ERYTHROCYTE [DISTWIDTH] IN BLOOD BY AUTOMATED COUNT: 18.6 % (ref 11–15)
GLOBULIN PLAS-MCNC: 3.9 G/DL (ref 2.8–4.4)
GLUCOSE BLD-MCNC: 91 MG/DL (ref 70–99)
HCT VFR BLD AUTO: 30.9 %
HELMET CELLS BLD QL SMEAR: PRESENT
HGB BLD-MCNC: 9.9 G/DL
IMM GRANULOCYTES # BLD AUTO: 0.01 X10(3) UL (ref 0–1)
IMM GRANULOCYTES NFR BLD: 0.3 %
LYMPHOCYTES # BLD AUTO: 2.35 X10(3) UL (ref 1–4)
LYMPHOCYTES NFR BLD AUTO: 69.3 %
MCH RBC QN AUTO: 34.4 PG (ref 26–34)
MCHC RBC AUTO-ENTMCNC: 32 G/DL (ref 31–37)
MCV RBC AUTO: 107.3 FL
MONOCYTES # BLD AUTO: 0.4 X10(3) UL (ref 0.1–1)
MONOCYTES NFR BLD AUTO: 11.8 %
NEUTROPHILS # BLD AUTO: 0.63 X10 (3) UL (ref 1.5–7.7)
NEUTROPHILS # BLD AUTO: 0.63 X10(3) UL (ref 1.5–7.7)
NEUTROPHILS NFR BLD AUTO: 18.6 %
OSMOLALITY SERPL CALC.SUM OF ELEC: 295 MOSM/KG (ref 275–295)
PLATELET # BLD AUTO: 163 10(3)UL (ref 150–450)
PLATELET MORPHOLOGY: NORMAL
POTASSIUM SERPL-SCNC: 4 MMOL/L (ref 3.5–5.1)
PROT SERPL-MCNC: 7.1 G/DL (ref 6.4–8.2)
RBC # BLD AUTO: 2.88 X10(6)UL
SODIUM SERPL-SCNC: 144 MMOL/L (ref 136–145)
WBC # BLD AUTO: 3.4 X10(3) UL (ref 4–11)

## 2023-08-08 PROCEDURE — 85025 COMPLETE CBC W/AUTO DIFF WBC: CPT

## 2023-08-08 PROCEDURE — 99214 OFFICE O/P EST MOD 30 MIN: CPT | Performed by: PHYSICIAN ASSISTANT

## 2023-08-08 PROCEDURE — 36591 DRAW BLOOD OFF VENOUS DEVICE: CPT

## 2023-08-08 PROCEDURE — 85060 BLOOD SMEAR INTERPRETATION: CPT

## 2023-08-08 PROCEDURE — 80053 COMPREHEN METABOLIC PANEL: CPT

## 2023-08-08 RX ORDER — HEPARIN SODIUM (PORCINE) LOCK FLUSH IV SOLN 100 UNIT/ML 100 UNIT/ML
SOLUTION INTRAVENOUS
Status: COMPLETED
Start: 2023-08-08 | End: 2023-08-08

## 2023-08-08 RX ORDER — HEPARIN SODIUM (PORCINE) LOCK FLUSH IV SOLN 100 UNIT/ML 100 UNIT/ML
5 SOLUTION INTRAVENOUS ONCE
OUTPATIENT
Start: 2023-08-29

## 2023-08-08 RX ORDER — HEPARIN SODIUM (PORCINE) LOCK FLUSH IV SOLN 100 UNIT/ML 100 UNIT/ML
5 SOLUTION INTRAVENOUS ONCE
Status: COMPLETED | OUTPATIENT
Start: 2023-08-08 | End: 2023-08-08

## 2023-08-08 RX ADMIN — HEPARIN SODIUM (PORCINE) LOCK FLUSH IV SOLN 100 UNIT/ML 500 UNITS: 100 SOLUTION INTRAVENOUS at 10:59:00

## 2023-08-08 NOTE — PROGRESS NOTES
Patient here for C3D1 Avastin, labs not within parameters. Will be delayed one week. Discharged ambulating independently with future appointments scheduled.

## 2023-08-16 ENCOUNTER — APPOINTMENT (OUTPATIENT)
Dept: HEMATOLOGY/ONCOLOGY | Facility: HOSPITAL | Age: 73
End: 2023-08-16
Attending: INTERNAL MEDICINE
Payer: MEDICARE

## 2023-08-16 ENCOUNTER — APPOINTMENT (OUTPATIENT)
Dept: HEMATOLOGY/ONCOLOGY | Facility: HOSPITAL | Age: 73
End: 2023-08-16
Attending: PHYSICIAN ASSISTANT
Payer: MEDICARE

## 2023-08-17 ENCOUNTER — OFFICE VISIT (OUTPATIENT)
Dept: HEMATOLOGY/ONCOLOGY | Facility: HOSPITAL | Age: 73
End: 2023-08-17
Attending: STUDENT IN AN ORGANIZED HEALTH CARE EDUCATION/TRAINING PROGRAM
Payer: MEDICARE

## 2023-08-17 ENCOUNTER — NURSE ONLY (OUTPATIENT)
Dept: HEMATOLOGY/ONCOLOGY | Facility: HOSPITAL | Age: 73
End: 2023-08-17
Attending: INTERNAL MEDICINE
Payer: MEDICARE

## 2023-08-17 ENCOUNTER — OFFICE VISIT (OUTPATIENT)
Dept: HEMATOLOGY/ONCOLOGY | Facility: HOSPITAL | Age: 73
End: 2023-08-17
Attending: PHYSICIAN ASSISTANT
Payer: MEDICARE

## 2023-08-17 VITALS
RESPIRATION RATE: 18 BRPM | TEMPERATURE: 98 F | DIASTOLIC BLOOD PRESSURE: 72 MMHG | HEART RATE: 81 BPM | HEIGHT: 63.5 IN | SYSTOLIC BLOOD PRESSURE: 142 MMHG | OXYGEN SATURATION: 97 % | BODY MASS INDEX: 21 KG/M2 | WEIGHT: 120 LBS

## 2023-08-17 DIAGNOSIS — C78.02 MALIGNANT NEOPLASM METASTATIC TO BOTH LUNGS (HCC): ICD-10-CM

## 2023-08-17 DIAGNOSIS — D64.81 ANEMIA ASSOCIATED WITH CHEMOTHERAPY: ICD-10-CM

## 2023-08-17 DIAGNOSIS — T45.1X5A ANEMIA ASSOCIATED WITH CHEMOTHERAPY: ICD-10-CM

## 2023-08-17 DIAGNOSIS — C78.01 MALIGNANT NEOPLASM METASTATIC TO BOTH LUNGS (HCC): ICD-10-CM

## 2023-08-17 DIAGNOSIS — C18.7 CANCER OF SIGMOID COLON (HCC): Primary | ICD-10-CM

## 2023-08-17 DIAGNOSIS — C78.6 PERITONEAL CARCINOMATOSIS (HCC): ICD-10-CM

## 2023-08-17 DIAGNOSIS — Z45.2 ENCOUNTER FOR CENTRAL LINE CARE: ICD-10-CM

## 2023-08-17 DIAGNOSIS — J90 PLEURAL EFFUSION: ICD-10-CM

## 2023-08-17 DIAGNOSIS — Z79.899 ENCOUNTER FOR MEDICATION MANAGEMENT: ICD-10-CM

## 2023-08-17 DIAGNOSIS — Z51.11 CHEMOTHERAPY MANAGEMENT, ENCOUNTER FOR: ICD-10-CM

## 2023-08-17 DIAGNOSIS — C41.9 MALIGNANT NEOPLASM OF BONE WITH METASTASES (HCC): ICD-10-CM

## 2023-08-17 LAB
ALBUMIN SERPL-MCNC: 3 G/DL (ref 3.4–5)
ALBUMIN/GLOB SERPL: 0.8 {RATIO} (ref 1–2)
ALP LIVER SERPL-CCNC: 69 U/L
ALT SERPL-CCNC: 8 U/L
ANION GAP SERPL CALC-SCNC: 5 MMOL/L (ref 0–18)
AST SERPL-CCNC: 18 U/L (ref 15–37)
BASOPHILS # BLD AUTO: 0.02 X10(3) UL (ref 0–0.2)
BASOPHILS NFR BLD AUTO: 0.5 %
BILIRUB SERPL-MCNC: 0.4 MG/DL (ref 0.1–2)
BUN BLD-MCNC: 7 MG/DL (ref 7–18)
BUN/CREAT SERPL: 13 (ref 10–20)
CALCIUM BLD-MCNC: 8.2 MG/DL (ref 8.5–10.1)
CEA SERPL-MCNC: 90.2 NG/ML (ref ?–5)
CHLORIDE SERPL-SCNC: 109 MMOL/L (ref 98–112)
CO2 SERPL-SCNC: 28 MMOL/L (ref 21–32)
CREAT BLD-MCNC: 0.54 MG/DL
DEPRECATED RDW RBC AUTO: 77 FL (ref 35.1–46.3)
EGFRCR SERPLBLD CKD-EPI 2021: 97 ML/MIN/1.73M2 (ref 60–?)
EOSINOPHIL # BLD AUTO: 0 X10(3) UL (ref 0–0.7)
EOSINOPHIL NFR BLD AUTO: 0 %
ERYTHROCYTE [DISTWIDTH] IN BLOOD BY AUTOMATED COUNT: 19.5 % (ref 11–15)
GLOBULIN PLAS-MCNC: 3.7 G/DL (ref 2.8–4.4)
GLUCOSE BLD-MCNC: 83 MG/DL (ref 70–99)
HCT VFR BLD AUTO: 31.4 %
HELMET CELLS BLD QL SMEAR: PRESENT
HGB BLD-MCNC: 10.1 G/DL
IMM GRANULOCYTES # BLD AUTO: 0.01 X10(3) UL (ref 0–1)
IMM GRANULOCYTES NFR BLD: 0.3 %
LYMPHOCYTES # BLD AUTO: 1.32 X10(3) UL (ref 1–4)
LYMPHOCYTES NFR BLD AUTO: 33.8 %
MCH RBC QN AUTO: 34.7 PG (ref 26–34)
MCHC RBC AUTO-ENTMCNC: 32.2 G/DL (ref 31–37)
MCV RBC AUTO: 107.9 FL
MONOCYTES # BLD AUTO: 0.87 X10(3) UL (ref 0.1–1)
MONOCYTES NFR BLD AUTO: 22.3 %
NEUTROPHILS # BLD AUTO: 1.69 X10 (3) UL (ref 1.5–7.7)
NEUTROPHILS # BLD AUTO: 1.69 X10(3) UL (ref 1.5–7.7)
NEUTROPHILS NFR BLD AUTO: 43.1 %
OSMOLALITY SERPL CALC.SUM OF ELEC: 291 MOSM/KG (ref 275–295)
PLATELET # BLD AUTO: 219 10(3)UL (ref 150–450)
PLATELET MORPHOLOGY: NORMAL
POTASSIUM SERPL-SCNC: 4 MMOL/L (ref 3.5–5.1)
PROT SERPL-MCNC: 6.7 G/DL (ref 6.4–8.2)
RBC # BLD AUTO: 2.91 X10(6)UL
SODIUM SERPL-SCNC: 142 MMOL/L (ref 136–145)
WBC # BLD AUTO: 3.9 X10(3) UL (ref 4–11)

## 2023-08-17 PROCEDURE — 82378 CARCINOEMBRYONIC ANTIGEN: CPT

## 2023-08-17 PROCEDURE — 85025 COMPLETE CBC W/AUTO DIFF WBC: CPT

## 2023-08-17 PROCEDURE — 99215 OFFICE O/P EST HI 40 MIN: CPT | Performed by: PHYSICIAN ASSISTANT

## 2023-08-17 PROCEDURE — 96413 CHEMO IV INFUSION 1 HR: CPT

## 2023-08-17 PROCEDURE — 80053 COMPREHEN METABOLIC PANEL: CPT

## 2023-08-17 RX ORDER — HEPARIN SODIUM (PORCINE) LOCK FLUSH IV SOLN 100 UNIT/ML 100 UNIT/ML
5 SOLUTION INTRAVENOUS ONCE
OUTPATIENT
Start: 2023-08-29

## 2023-08-17 RX ORDER — HEPARIN SODIUM (PORCINE) LOCK FLUSH IV SOLN 100 UNIT/ML 100 UNIT/ML
5 SOLUTION INTRAVENOUS ONCE
Status: COMPLETED | OUTPATIENT
Start: 2023-08-17 | End: 2023-08-17

## 2023-08-17 RX ADMIN — HEPARIN SODIUM (PORCINE) LOCK FLUSH IV SOLN 100 UNIT/ML 500 UNITS: 100 SOLUTION INTRAVENOUS at 11:25:00

## 2023-08-17 NOTE — PROGRESS NOTES
Pt here for C3D1 Bevacizumab. Arrives Ambulating independently, accompanied by Self . PAC previously accessed in lab. Oral medications included in this regimen:  yes - trifluridine-tipiracil    Patient confirms comprehension of cancer treatment schedule:  yes    Pregnancy screening:  Not applicable    Modifications in dose or schedule:  No    Medications appearance and physical integrity checked by RN. yes     Frequency of blood return and site check throughout administration: Prior to administration and At completion of therapy     Infusion/treatment outcome:  patient tolerated treatment without incident    Education Record    Learner:  Patient  Barriers / Limitations:  None  Method:  Reinforcement  Education / instructions given:  Reviewed plan for day and reinforced treatment regimen.   Outcome:  Shows understanding    Discharged Home, Ambulating independently, accompanied by:Self    Patient/family verbalized understanding of future appointments: by printed AVS

## 2023-08-23 ENCOUNTER — APPOINTMENT (OUTPATIENT)
Dept: HEMATOLOGY/ONCOLOGY | Facility: HOSPITAL | Age: 73
End: 2023-08-23
Attending: STUDENT IN AN ORGANIZED HEALTH CARE EDUCATION/TRAINING PROGRAM
Payer: MEDICARE

## 2023-08-23 ENCOUNTER — APPOINTMENT (OUTPATIENT)
Dept: HEMATOLOGY/ONCOLOGY | Facility: HOSPITAL | Age: 73
End: 2023-08-23
Attending: INTERNAL MEDICINE
Payer: MEDICARE

## 2023-08-31 ENCOUNTER — NURSE ONLY (OUTPATIENT)
Dept: HEMATOLOGY/ONCOLOGY | Facility: HOSPITAL | Age: 73
End: 2023-08-31
Attending: INTERNAL MEDICINE
Payer: MEDICARE

## 2023-08-31 ENCOUNTER — OFFICE VISIT (OUTPATIENT)
Dept: HEMATOLOGY/ONCOLOGY | Facility: HOSPITAL | Age: 73
End: 2023-08-31
Attending: PHYSICIAN ASSISTANT
Payer: MEDICARE

## 2023-08-31 VITALS
WEIGHT: 115.38 LBS | OXYGEN SATURATION: 96 % | BODY MASS INDEX: 20.19 KG/M2 | HEIGHT: 63.5 IN | HEART RATE: 86 BPM | TEMPERATURE: 98 F | RESPIRATION RATE: 16 BRPM | DIASTOLIC BLOOD PRESSURE: 73 MMHG | SYSTOLIC BLOOD PRESSURE: 125 MMHG

## 2023-08-31 DIAGNOSIS — C18.7 CANCER OF SIGMOID COLON (HCC): Primary | ICD-10-CM

## 2023-08-31 DIAGNOSIS — C78.6 PERITONEAL CARCINOMATOSIS (HCC): ICD-10-CM

## 2023-08-31 DIAGNOSIS — C78.01 MALIGNANT NEOPLASM METASTATIC TO BOTH LUNGS (HCC): ICD-10-CM

## 2023-08-31 DIAGNOSIS — C78.02 MALIGNANT NEOPLASM METASTATIC TO BOTH LUNGS (HCC): ICD-10-CM

## 2023-08-31 DIAGNOSIS — Z51.11 CHEMOTHERAPY MANAGEMENT, ENCOUNTER FOR: ICD-10-CM

## 2023-08-31 LAB
ALBUMIN SERPL-MCNC: 3.1 G/DL (ref 3.4–5)
ALBUMIN/GLOB SERPL: 0.9 {RATIO} (ref 1–2)
ALP LIVER SERPL-CCNC: 57 U/L
ALT SERPL-CCNC: 11 U/L
ANION GAP SERPL CALC-SCNC: 4 MMOL/L (ref 0–18)
AST SERPL-CCNC: 25 U/L (ref 15–37)
BASOPHILS # BLD AUTO: 0.02 X10(3) UL (ref 0–0.2)
BASOPHILS NFR BLD AUTO: 0.6 %
BILIRUB SERPL-MCNC: 0.5 MG/DL (ref 0.1–2)
BUN BLD-MCNC: 12 MG/DL (ref 7–18)
BUN/CREAT SERPL: 29.3 (ref 10–20)
CALCIUM BLD-MCNC: 8.3 MG/DL (ref 8.5–10.1)
CHLORIDE SERPL-SCNC: 109 MMOL/L (ref 98–112)
CO2 SERPL-SCNC: 28 MMOL/L (ref 21–32)
CREAT BLD-MCNC: 0.41 MG/DL
DEPRECATED RDW RBC AUTO: 67.7 FL (ref 35.1–46.3)
EGFRCR SERPLBLD CKD-EPI 2021: 104 ML/MIN/1.73M2 (ref 60–?)
EOSINOPHIL # BLD AUTO: 0.07 X10(3) UL (ref 0–0.7)
EOSINOPHIL NFR BLD AUTO: 2.1 %
ERYTHROCYTE [DISTWIDTH] IN BLOOD BY AUTOMATED COUNT: 17.6 % (ref 11–15)
GLOBULIN PLAS-MCNC: 3.5 G/DL (ref 2.8–4.4)
GLUCOSE BLD-MCNC: 79 MG/DL (ref 70–99)
HCT VFR BLD AUTO: 29.8 %
HGB BLD-MCNC: 9.7 G/DL
IMM GRANULOCYTES # BLD AUTO: 0.01 X10(3) UL (ref 0–1)
IMM GRANULOCYTES NFR BLD: 0.3 %
LYMPHOCYTES # BLD AUTO: 1.64 X10(3) UL (ref 1–4)
LYMPHOCYTES NFR BLD AUTO: 49.4 %
MCH RBC QN AUTO: 34.4 PG (ref 26–34)
MCHC RBC AUTO-ENTMCNC: 32.6 G/DL (ref 31–37)
MCV RBC AUTO: 105.7 FL
MONOCYTES # BLD AUTO: 0.26 X10(3) UL (ref 0.1–1)
MONOCYTES NFR BLD AUTO: 7.8 %
NEUTROPHILS # BLD AUTO: 1.32 X10 (3) UL (ref 1.5–7.7)
NEUTROPHILS # BLD AUTO: 1.32 X10(3) UL (ref 1.5–7.7)
NEUTROPHILS NFR BLD AUTO: 39.8 %
OSMOLALITY SERPL CALC.SUM OF ELEC: 291 MOSM/KG (ref 275–295)
PLATELET # BLD AUTO: 117 10(3)UL (ref 150–450)
PLATELET MORPHOLOGY: NORMAL
POTASSIUM SERPL-SCNC: 4 MMOL/L (ref 3.5–5.1)
PROT SERPL-MCNC: 6.6 G/DL (ref 6.4–8.2)
RBC # BLD AUTO: 2.82 X10(6)UL
SODIUM SERPL-SCNC: 141 MMOL/L (ref 136–145)
WBC # BLD AUTO: 3.3 X10(3) UL (ref 4–11)

## 2023-08-31 PROCEDURE — 80053 COMPREHEN METABOLIC PANEL: CPT

## 2023-08-31 PROCEDURE — 99215 OFFICE O/P EST HI 40 MIN: CPT | Performed by: STUDENT IN AN ORGANIZED HEALTH CARE EDUCATION/TRAINING PROGRAM

## 2023-08-31 PROCEDURE — 85025 COMPLETE CBC W/AUTO DIFF WBC: CPT

## 2023-08-31 PROCEDURE — 96372 THER/PROPH/DIAG INJ SC/IM: CPT

## 2023-08-31 PROCEDURE — 96413 CHEMO IV INFUSION 1 HR: CPT

## 2023-08-31 RX ORDER — HEPARIN SODIUM (PORCINE) LOCK FLUSH IV SOLN 100 UNIT/ML 100 UNIT/ML
SOLUTION INTRAVENOUS
Status: COMPLETED
Start: 2023-08-31 | End: 2023-08-31

## 2023-08-31 RX ADMIN — HEPARIN SODIUM (PORCINE) LOCK FLUSH IV SOLN 100 UNIT/ML 500 UNITS: 100 SOLUTION INTRAVENOUS at 16:44:00

## 2023-09-05 ENCOUNTER — TELEPHONE (OUTPATIENT)
Dept: HEMATOLOGY/ONCOLOGY | Facility: HOSPITAL | Age: 73
End: 2023-09-05

## 2023-09-06 ENCOUNTER — TELEPHONE (OUTPATIENT)
Dept: HEMATOLOGY/ONCOLOGY | Facility: HOSPITAL | Age: 73
End: 2023-09-06

## 2023-09-06 NOTE — TELEPHONE ENCOUNTER
Phone call received from Emmy at [facility]. Per pharmacist patient informed them of dose change to Lonsurf from 4 tabs to 3 tabs. She stated they will need two prescription one prescription for partial fill of 20 tabs as patient as 40 remaining and one prescription for new order of 3tabs. Note to be present on both prescriptions that state dose change.

## 2023-09-07 DIAGNOSIS — C78.01 MALIGNANT NEOPLASM METASTATIC TO BOTH LUNGS (HCC): ICD-10-CM

## 2023-09-07 DIAGNOSIS — C78.02 MALIGNANT NEOPLASM METASTATIC TO BOTH LUNGS (HCC): ICD-10-CM

## 2023-09-08 RX ORDER — FUROSEMIDE 20 MG/1
20 TABLET ORAL DAILY
Qty: 90 TABLET | Refills: 0 | Status: SHIPPED | OUTPATIENT
Start: 2023-09-08

## 2023-09-08 RX ORDER — FUROSEMIDE 20 MG/1
20 TABLET ORAL DAILY
Qty: 30 TABLET | Refills: 0 | Status: SHIPPED | OUTPATIENT
Start: 2023-09-08 | End: 2023-09-08

## 2023-09-08 NOTE — TELEPHONE ENCOUNTER
Writer received a voicemail from [email or web address] requesting a call back for prescription verification. Pharmacy was called back. Spoke with rep Booth, in which she stated that annotation needed on prescription that this is a dose change. Call was then transferred to the pharmacist. Spoke with pharmacist [facility]. Provided clarification that prescription adjustment is needed for med dose change.

## 2023-09-12 ENCOUNTER — HOSPITAL ENCOUNTER (OUTPATIENT)
Dept: CT IMAGING | Age: 73
Discharge: HOME | End: 2023-09-12
Attending: STUDENT IN AN ORGANIZED HEALTH CARE EDUCATION/TRAINING PROGRAM
Payer: MEDICARE

## 2023-09-12 DIAGNOSIS — C18.7 CANCER OF SIGMOID COLON (HCC): ICD-10-CM

## 2023-09-12 DIAGNOSIS — Z51.11 CHEMOTHERAPY MANAGEMENT, ENCOUNTER FOR: ICD-10-CM

## 2023-09-12 DIAGNOSIS — C78.6 PERITONEAL CARCINOMATOSIS (HCC): ICD-10-CM

## 2023-09-12 PROCEDURE — 74177 CT ABD & PELVIS W/CONTRAST: CPT | Performed by: STUDENT IN AN ORGANIZED HEALTH CARE EDUCATION/TRAINING PROGRAM

## 2023-09-12 PROCEDURE — 71260 CT THORAX DX C+: CPT | Performed by: STUDENT IN AN ORGANIZED HEALTH CARE EDUCATION/TRAINING PROGRAM

## 2023-09-13 ENCOUNTER — APPOINTMENT (OUTPATIENT)
Dept: HEMATOLOGY/ONCOLOGY | Facility: HOSPITAL | Age: 73
End: 2023-09-13
Attending: INTERNAL MEDICINE
Payer: MEDICARE

## 2023-09-13 ENCOUNTER — APPOINTMENT (OUTPATIENT)
Dept: HEMATOLOGY/ONCOLOGY | Facility: HOSPITAL | Age: 73
End: 2023-09-13
Attending: PHYSICIAN ASSISTANT
Payer: MEDICARE

## 2023-09-14 ENCOUNTER — APPOINTMENT (OUTPATIENT)
Dept: HEMATOLOGY/ONCOLOGY | Facility: HOSPITAL | Age: 73
End: 2023-09-14
Attending: PHYSICIAN ASSISTANT
Payer: MEDICARE

## 2023-09-14 ENCOUNTER — OFFICE VISIT (OUTPATIENT)
Dept: HEMATOLOGY/ONCOLOGY | Facility: HOSPITAL | Age: 73
End: 2023-09-14
Attending: INTERNAL MEDICINE
Payer: MEDICARE

## 2023-09-14 ENCOUNTER — OFFICE VISIT (OUTPATIENT)
Dept: HEMATOLOGY/ONCOLOGY | Facility: HOSPITAL | Age: 73
End: 2023-09-14
Attending: STUDENT IN AN ORGANIZED HEALTH CARE EDUCATION/TRAINING PROGRAM
Payer: MEDICARE

## 2023-09-14 VITALS
RESPIRATION RATE: 20 BRPM | TEMPERATURE: 98 F | DIASTOLIC BLOOD PRESSURE: 76 MMHG | HEIGHT: 63.5 IN | HEART RATE: 86 BPM | BODY MASS INDEX: 20.51 KG/M2 | SYSTOLIC BLOOD PRESSURE: 126 MMHG | WEIGHT: 117.19 LBS | OXYGEN SATURATION: 94 %

## 2023-09-14 DIAGNOSIS — Z45.2 ENCOUNTER FOR CENTRAL LINE CARE: ICD-10-CM

## 2023-09-14 DIAGNOSIS — C78.02 MALIGNANT NEOPLASM METASTATIC TO BOTH LUNGS (HCC): ICD-10-CM

## 2023-09-14 DIAGNOSIS — C78.6 PERITONEAL CARCINOMATOSIS (HCC): ICD-10-CM

## 2023-09-14 DIAGNOSIS — C18.7 CANCER OF SIGMOID COLON (HCC): Primary | ICD-10-CM

## 2023-09-14 DIAGNOSIS — Z51.11 CHEMOTHERAPY MANAGEMENT, ENCOUNTER FOR: ICD-10-CM

## 2023-09-14 DIAGNOSIS — C41.9 MALIGNANT NEOPLASM OF BONE WITH METASTASES (HCC): ICD-10-CM

## 2023-09-14 DIAGNOSIS — C78.01 MALIGNANT NEOPLASM METASTATIC TO BOTH LUNGS (HCC): ICD-10-CM

## 2023-09-14 DIAGNOSIS — Z79.899 ENCOUNTER FOR MEDICATION MANAGEMENT: ICD-10-CM

## 2023-09-14 LAB
ALBUMIN SERPL-MCNC: 2.9 G/DL (ref 3.4–5)
ALBUMIN/GLOB SERPL: 0.7 {RATIO} (ref 1–2)
ALP LIVER SERPL-CCNC: 82 U/L
ALT SERPL-CCNC: 12 U/L
ANION GAP SERPL CALC-SCNC: 2 MMOL/L (ref 0–18)
AST SERPL-CCNC: 20 U/L (ref 15–37)
BASOPHILS # BLD AUTO: 0.02 X10(3) UL (ref 0–0.2)
BASOPHILS NFR BLD AUTO: 0.3 %
BILIRUB SERPL-MCNC: 0.5 MG/DL (ref 0.1–2)
BILIRUB UR QL: NEGATIVE
BUN BLD-MCNC: 8 MG/DL (ref 7–18)
BUN/CREAT SERPL: 15.1 (ref 10–20)
CALCIUM BLD-MCNC: 8.7 MG/DL (ref 8.5–10.1)
CEA SERPL-MCNC: 68.3 NG/ML (ref ?–5)
CHLORIDE SERPL-SCNC: 108 MMOL/L (ref 98–112)
CLARITY UR: CLEAR
CO2 SERPL-SCNC: 31 MMOL/L (ref 21–32)
CREAT BLD-MCNC: 0.53 MG/DL
DEPRECATED RDW RBC AUTO: 80.2 FL (ref 35.1–46.3)
EGFRCR SERPLBLD CKD-EPI 2021: 98 ML/MIN/1.73M2 (ref 60–?)
EOSINOPHIL # BLD AUTO: 0.16 X10(3) UL (ref 0–0.7)
EOSINOPHIL NFR BLD AUTO: 2.1 %
ERYTHROCYTE [DISTWIDTH] IN BLOOD BY AUTOMATED COUNT: 19.9 % (ref 11–15)
GLOBULIN PLAS-MCNC: 3.9 G/DL (ref 2.8–4.4)
GLUCOSE BLD-MCNC: 89 MG/DL (ref 70–99)
GLUCOSE UR-MCNC: NORMAL MG/DL
HCT VFR BLD AUTO: 30.3 %
HGB BLD-MCNC: 9.6 G/DL
HGB UR QL STRIP.AUTO: NEGATIVE
IMM GRANULOCYTES # BLD AUTO: 0.04 X10(3) UL (ref 0–1)
IMM GRANULOCYTES NFR BLD: 0.5 %
KETONES UR-MCNC: NEGATIVE MG/DL
LEUKOCYTE ESTERASE UR QL STRIP.AUTO: NEGATIVE
LYMPHOCYTES # BLD AUTO: 2.46 X10(3) UL (ref 1–4)
LYMPHOCYTES NFR BLD AUTO: 31.7 %
MCH RBC QN AUTO: 34.9 PG (ref 26–34)
MCHC RBC AUTO-ENTMCNC: 31.7 G/DL (ref 31–37)
MCV RBC AUTO: 110.2 FL
MONOCYTES # BLD AUTO: 0.71 X10(3) UL (ref 0.1–1)
MONOCYTES NFR BLD AUTO: 9.1 %
NEUTROPHILS # BLD AUTO: 4.38 X10 (3) UL (ref 1.5–7.7)
NEUTROPHILS # BLD AUTO: 4.38 X10(3) UL (ref 1.5–7.7)
NEUTROPHILS NFR BLD AUTO: 56.3 %
NITRITE UR QL STRIP.AUTO: NEGATIVE
OSMOLALITY SERPL CALC.SUM OF ELEC: 290 MOSM/KG (ref 275–295)
PH UR: 7.5 [PH] (ref 5–8)
PLATELET # BLD AUTO: 164 10(3)UL (ref 150–450)
PLATELET MORPHOLOGY: NORMAL
POTASSIUM SERPL-SCNC: 4.1 MMOL/L (ref 3.5–5.1)
PROT SERPL-MCNC: 6.8 G/DL (ref 6.4–8.2)
PROT UR-MCNC: NEGATIVE MG/DL
RBC # BLD AUTO: 2.75 X10(6)UL
SODIUM SERPL-SCNC: 141 MMOL/L (ref 136–145)
SP GR UR STRIP: 1.01 (ref 1–1.03)
UROBILINOGEN UR STRIP-ACNC: NORMAL
WBC # BLD AUTO: 7.8 X10(3) UL (ref 4–11)

## 2023-09-14 PROCEDURE — 85060 BLOOD SMEAR INTERPRETATION: CPT

## 2023-09-14 PROCEDURE — 81003 URINALYSIS AUTO W/O SCOPE: CPT

## 2023-09-14 PROCEDURE — 99215 OFFICE O/P EST HI 40 MIN: CPT | Performed by: STUDENT IN AN ORGANIZED HEALTH CARE EDUCATION/TRAINING PROGRAM

## 2023-09-14 PROCEDURE — 80053 COMPREHEN METABOLIC PANEL: CPT

## 2023-09-14 PROCEDURE — 85025 COMPLETE CBC W/AUTO DIFF WBC: CPT

## 2023-09-14 PROCEDURE — 36591 DRAW BLOOD OFF VENOUS DEVICE: CPT

## 2023-09-14 PROCEDURE — 82378 CARCINOEMBRYONIC ANTIGEN: CPT

## 2023-09-14 RX ORDER — HEPARIN SODIUM (PORCINE) LOCK FLUSH IV SOLN 100 UNIT/ML 100 UNIT/ML
5 SOLUTION INTRAVENOUS ONCE
Status: DISCONTINUED | OUTPATIENT
Start: 2023-09-14 | End: 2023-09-14

## 2023-09-14 RX ORDER — HEPARIN SODIUM (PORCINE) LOCK FLUSH IV SOLN 100 UNIT/ML 100 UNIT/ML
5 SOLUTION INTRAVENOUS ONCE
OUTPATIENT
Start: 2023-09-28

## 2023-09-14 RX ORDER — HEPARIN SODIUM (PORCINE) LOCK FLUSH IV SOLN 100 UNIT/ML 100 UNIT/ML
SOLUTION INTRAVENOUS
Status: DISPENSED
Start: 2023-09-14 | End: 2023-09-15

## 2023-09-25 ENCOUNTER — TELEPHONE (OUTPATIENT)
Dept: HEMATOLOGY/ONCOLOGY | Facility: HOSPITAL | Age: 73
End: 2023-09-25

## 2023-09-25 NOTE — TELEPHONE ENCOUNTER
Writer left voicemail for patient informing her that her question regarding further radiation treatments was discussed with Dr. Negro Phillips. At this time Dr. Negro Phillips does not feel that additional radiation would be of any benefit to her current situation. She was informed in message that she may call and schedule a virtual or in-person visit with Dr. Negro Phillips to discuss her current treatment regimen if needed. She was informed to call the clinic when shipment of oral chemo Regorafenib has been confirmed.

## 2023-09-25 NOTE — TELEPHONE ENCOUNTER
Writer spoke with pharmacy rep Na Mayer, in which she informed writer that they have been unable to reach patient regarding her Regorafenib. Writer then called patient and provided her with pharmacy number. Patient requesting that writer speak with Dr. Lester Alarcon regarding whether or not he thinks patient should receive any additional radiation.

## 2023-09-26 ENCOUNTER — TELEPHONE (OUTPATIENT)
Dept: HEMATOLOGY/ONCOLOGY | Facility: HOSPITAL | Age: 73
End: 2023-09-26

## 2023-09-26 NOTE — TELEPHONE ENCOUNTER
Writer called [facility] and spoke with rep Marietta Caruso. Inquiry made regarding 1st free fill program for Regoraneb/Stivarga. She stated that there are no free fill programs for medication at this time. Provider and patient to fill out [facility] application.

## 2023-09-26 NOTE — TELEPHONE ENCOUNTER
Voicemail left for patient informing her that there is no free-fill program available through [employer]. [employer] has been started. Patient informed to come to the office and fill out her portion of the application or to call and provide an email for forms to be sent to.

## 2023-09-26 NOTE — TELEPHONE ENCOUNTER
Writer received call from patient/daughter.  They have requested that [employer] application be filled out for free month fill while waiting for financial assistance program to process her request.

## 2023-09-28 ENCOUNTER — APPOINTMENT (OUTPATIENT)
Dept: HEMATOLOGY/ONCOLOGY | Facility: HOSPITAL | Age: 73
End: 2023-09-28
Attending: INTERNAL MEDICINE
Payer: MEDICARE

## 2023-10-02 ENCOUNTER — OFFICE VISIT (OUTPATIENT)
Dept: INTERNAL MEDICINE CLINIC | Facility: CLINIC | Age: 73
End: 2023-10-02
Payer: MEDICARE

## 2023-10-02 VITALS
HEART RATE: 101 BPM | SYSTOLIC BLOOD PRESSURE: 120 MMHG | WEIGHT: 112 LBS | OXYGEN SATURATION: 98 % | DIASTOLIC BLOOD PRESSURE: 60 MMHG | BODY MASS INDEX: 19.6 KG/M2 | HEIGHT: 63.5 IN

## 2023-10-02 DIAGNOSIS — C18.9 ADENOCARCINOMA OF COLON (HCC): ICD-10-CM

## 2023-10-02 DIAGNOSIS — E46 PROTEIN-CALORIE MALNUTRITION, UNSPECIFIED SEVERITY (HCC): ICD-10-CM

## 2023-10-02 DIAGNOSIS — Z00.00 MEDICARE ANNUAL WELLNESS VISIT, SUBSEQUENT: Primary | ICD-10-CM

## 2023-10-03 ENCOUNTER — TELEPHONE (OUTPATIENT)
Dept: HEMATOLOGY/ONCOLOGY | Facility: HOSPITAL | Age: 73
End: 2023-10-03

## 2023-10-03 NOTE — TELEPHONE ENCOUNTER
Writer called patient and informed her that fax was received from [address] [place], [place] [address] [facility] of her acceptance into their program. Patient was provided with phone number for  and her case number to set up delivery. Lab and chemo education scheduled for Thursday 10/5 at 11am. Patient aware that follow up with Dr. Jennyfer Valdez needed for two weeks after the medication is started. Patient will call the clinic to schedule when start date of medication is confirmed. Patient thanked writer for calling.

## 2023-10-05 ENCOUNTER — OFFICE VISIT (OUTPATIENT)
Dept: HEMATOLOGY/ONCOLOGY | Facility: HOSPITAL | Age: 73
End: 2023-10-05
Attending: STUDENT IN AN ORGANIZED HEALTH CARE EDUCATION/TRAINING PROGRAM
Payer: MEDICARE

## 2023-10-05 ENCOUNTER — NURSE ONLY (OUTPATIENT)
Dept: HEMATOLOGY/ONCOLOGY | Facility: HOSPITAL | Age: 73
End: 2023-10-05
Attending: INTERNAL MEDICINE
Payer: MEDICARE

## 2023-10-05 VITALS
SYSTOLIC BLOOD PRESSURE: 135 MMHG | HEIGHT: 63 IN | DIASTOLIC BLOOD PRESSURE: 75 MMHG | WEIGHT: 113.5 LBS | OXYGEN SATURATION: 96 % | BODY MASS INDEX: 20.11 KG/M2 | TEMPERATURE: 98 F | RESPIRATION RATE: 20 BRPM | HEART RATE: 88 BPM

## 2023-10-05 DIAGNOSIS — C78.01 MALIGNANT NEOPLASM METASTATIC TO BOTH LUNGS (HCC): ICD-10-CM

## 2023-10-05 DIAGNOSIS — C78.02 MALIGNANT NEOPLASM METASTATIC TO BOTH LUNGS (HCC): ICD-10-CM

## 2023-10-05 DIAGNOSIS — C18.7 CANCER OF SIGMOID COLON (HCC): Primary | ICD-10-CM

## 2023-10-05 DIAGNOSIS — C78.6 PERITONEAL CARCINOMATOSIS (HCC): ICD-10-CM

## 2023-10-05 DIAGNOSIS — C41.9 MALIGNANT NEOPLASM OF BONE WITH METASTASES (HCC): ICD-10-CM

## 2023-10-05 LAB
ALBUMIN SERPL-MCNC: 3.2 G/DL (ref 3.4–5)
ALBUMIN/GLOB SERPL: 0.7 {RATIO} (ref 1–2)
ALP LIVER SERPL-CCNC: 68 U/L
ALT SERPL-CCNC: 12 U/L
ANION GAP SERPL CALC-SCNC: 4 MMOL/L (ref 0–18)
AST SERPL-CCNC: 20 U/L (ref 15–37)
BASOPHILS # BLD AUTO: 0.04 X10(3) UL (ref 0–0.2)
BASOPHILS NFR BLD AUTO: 1 %
BILIRUB SERPL-MCNC: 0.4 MG/DL (ref 0.1–2)
BUN BLD-MCNC: 11 MG/DL (ref 7–18)
BUN/CREAT SERPL: 12.9 (ref 10–20)
CALCIUM BLD-MCNC: 9.4 MG/DL (ref 8.5–10.1)
CHLORIDE SERPL-SCNC: 108 MMOL/L (ref 98–112)
CO2 SERPL-SCNC: 30 MMOL/L (ref 21–32)
CREAT BLD-MCNC: 0.85 MG/DL
DEPRECATED RDW RBC AUTO: 76.1 FL (ref 35.1–46.3)
EGFRCR SERPLBLD CKD-EPI 2021: 72 ML/MIN/1.73M2 (ref 60–?)
EOSINOPHIL # BLD AUTO: 0.14 X10(3) UL (ref 0–0.7)
EOSINOPHIL NFR BLD AUTO: 3.5 %
ERYTHROCYTE [DISTWIDTH] IN BLOOD BY AUTOMATED COUNT: 18.1 % (ref 11–15)
GLOBULIN PLAS-MCNC: 4.8 G/DL (ref 2.8–4.4)
GLUCOSE BLD-MCNC: 114 MG/DL (ref 70–99)
HCT VFR BLD AUTO: 29.9 %
HELMET CELLS BLD QL SMEAR: PRESENT
HGB BLD-MCNC: 9.4 G/DL
IMM GRANULOCYTES # BLD AUTO: 0.01 X10(3) UL (ref 0–1)
IMM GRANULOCYTES NFR BLD: 0.3 %
LYMPHOCYTES # BLD AUTO: 1.49 X10(3) UL (ref 1–4)
LYMPHOCYTES NFR BLD AUTO: 37.6 %
MCH RBC QN AUTO: 36.2 PG (ref 26–34)
MCHC RBC AUTO-ENTMCNC: 31.4 G/DL (ref 31–37)
MCV RBC AUTO: 115 FL
MONOCYTES # BLD AUTO: 0.57 X10(3) UL (ref 0.1–1)
MONOCYTES NFR BLD AUTO: 14.4 %
NEUTROPHILS # BLD AUTO: 1.71 X10 (3) UL (ref 1.5–7.7)
NEUTROPHILS # BLD AUTO: 1.71 X10(3) UL (ref 1.5–7.7)
NEUTROPHILS NFR BLD AUTO: 43.2 %
OSMOLALITY SERPL CALC.SUM OF ELEC: 294 MOSM/KG (ref 275–295)
PLATELET # BLD AUTO: 225 10(3)UL (ref 150–450)
PLATELET MORPHOLOGY: NORMAL
POTASSIUM SERPL-SCNC: 4.7 MMOL/L (ref 3.5–5.1)
PROT SERPL-MCNC: 8 G/DL (ref 6.4–8.2)
RBC # BLD AUTO: 2.6 X10(6)UL
SODIUM SERPL-SCNC: 142 MMOL/L (ref 136–145)
WBC # BLD AUTO: 4 X10(3) UL (ref 4–11)

## 2023-10-05 PROCEDURE — 85025 COMPLETE CBC W/AUTO DIFF WBC: CPT

## 2023-10-05 PROCEDURE — 99214 OFFICE O/P EST MOD 30 MIN: CPT | Performed by: PHYSICIAN ASSISTANT

## 2023-10-05 PROCEDURE — 80053 COMPREHEN METABOLIC PANEL: CPT

## 2023-10-05 PROCEDURE — 36415 COLL VENOUS BLD VENIPUNCTURE: CPT

## 2023-10-09 ENCOUNTER — TELEPHONE (OUTPATIENT)
Dept: HEMATOLOGY/ONCOLOGY | Facility: HOSPITAL | Age: 73
End: 2023-10-09

## 2023-10-09 DIAGNOSIS — Z51.11 CHEMOTHERAPY MANAGEMENT, ENCOUNTER FOR: ICD-10-CM

## 2023-10-09 DIAGNOSIS — C18.7 CANCER OF SIGMOID COLON (HCC): Primary | ICD-10-CM

## 2023-10-09 NOTE — TELEPHONE ENCOUNTER
Call received from patient informing writer that she started her Regorafenib on Saturday 10/7. Two week follow up with labs scheduled for 10/23 with Dr. Gisele Segura.

## 2023-10-14 ENCOUNTER — TELEPHONE (OUTPATIENT)
Dept: HEMATOLOGY/ONCOLOGY | Facility: HOSPITAL | Age: 73
End: 2023-10-14

## 2023-10-14 NOTE — TELEPHONE ENCOUNTER
Patient called complaining of worsening tingling and numbness in both feet in the last 2 days. She stopped Cymbalta on her own last week, when started regorafenib. Advised to resume Cymbalta at previous dose 20 mg daily and reassess her symptoms in the next few days. She is concerned about her neuropathy progressing while on regorafenib and wants to know if ok to dose reduce it going forward. She is currently taking 120 mg (3 tabs) day 1-21 q28 days. She will discuss this further with Dr. Tino Whitt next week.

## 2023-10-16 NOTE — TELEPHONE ENCOUNTER
Toxicities: Regorafenib    Peripheral Neuropathy: (Leslee reports she has gone from having a little numbness and tingling in her finger tips and  toes to now having \"terrible\" burning pain on the bottoms of both feet making it hard to walk. She had stopped taking cymbalta on her own. When she spoke with Dr Colindres over the weekend, she asked her to resume taking her 20mg dose of cymbalta daily. She is now taking it daily.)    Leslee wants to know if Dr Crenshaw would decrease her dose of regorafenib? She is currently taking 120mg daily. She also wants to know if she should start taking gabapentin with or instead of the cymbalta?    I told her I would forward this to Dr Crenshaw and ENA Fuller now. Leslee is requesting a call back.

## 2023-10-16 NOTE — TELEPHONE ENCOUNTER
Patient called in Saturday sick call spoke with , hasn't heard anything from . is following up on what she should do?

## 2023-10-16 NOTE — TELEPHONE ENCOUNTER
I called Leslee and told her [facility] Fuller is sending a prescription to her pharmacy for dicolfenac 1% gel. She needs to apply this twice a day to her feet. She also needs to continue to take the duloxetine, not the gabapentin. She should also limit the amount of walking she does, since pressure on the feet can make it worse. She needs to call with an update by the end of the week. Leslee verbalized understanding. No other questions at this time.

## 2023-10-16 NOTE — TELEPHONE ENCOUNTER
Leslee said the gabapentin . She has been taking duloxetine, but it is not helping. She also reports some redness and warmth along with the burning to the bottom on her feet. She has been applying Burn Relief cream with lidocaine. No blisters or peeling of the skin of her feet.

## 2023-10-23 ENCOUNTER — OFFICE VISIT (OUTPATIENT)
Dept: HEMATOLOGY/ONCOLOGY | Facility: HOSPITAL | Age: 73
End: 2023-10-23
Attending: STUDENT IN AN ORGANIZED HEALTH CARE EDUCATION/TRAINING PROGRAM
Payer: MEDICARE

## 2023-10-23 VITALS
RESPIRATION RATE: 20 BRPM | WEIGHT: 109.88 LBS | OXYGEN SATURATION: 94 % | TEMPERATURE: 97 F | SYSTOLIC BLOOD PRESSURE: 178 MMHG | HEIGHT: 63 IN | DIASTOLIC BLOOD PRESSURE: 114 MMHG | BODY MASS INDEX: 19.47 KG/M2 | HEART RATE: 103 BPM

## 2023-10-23 DIAGNOSIS — C18.7 CANCER OF SIGMOID COLON (HCC): Primary | ICD-10-CM

## 2023-10-23 DIAGNOSIS — C18.7 CANCER OF SIGMOID COLON (HCC): ICD-10-CM

## 2023-10-23 DIAGNOSIS — Z51.11 CHEMOTHERAPY MANAGEMENT, ENCOUNTER FOR: ICD-10-CM

## 2023-10-23 DIAGNOSIS — C78.6 PERITONEAL CARCINOMATOSIS (HCC): ICD-10-CM

## 2023-10-23 DIAGNOSIS — Z51.11 CHEMOTHERAPY MANAGEMENT, ENCOUNTER FOR: Primary | ICD-10-CM

## 2023-10-23 LAB
ALBUMIN SERPL-MCNC: 3 G/DL (ref 3.4–5)
ALBUMIN/GLOB SERPL: 0.6 {RATIO} (ref 1–2)
ALP LIVER SERPL-CCNC: 76 U/L
ALT SERPL-CCNC: 12 U/L
ANION GAP SERPL CALC-SCNC: 3 MMOL/L (ref 0–18)
AST SERPL-CCNC: 28 U/L (ref 15–37)
BASOPHILS # BLD AUTO: 0.06 X10(3) UL (ref 0–0.2)
BASOPHILS NFR BLD AUTO: 0.8 %
BILIRUB SERPL-MCNC: 1.1 MG/DL (ref 0.1–2)
BUN BLD-MCNC: 15 MG/DL (ref 7–18)
BUN/CREAT SERPL: 13.9 (ref 10–20)
CALCIUM BLD-MCNC: 8.5 MG/DL (ref 8.5–10.1)
CEA SERPL-MCNC: 135 NG/ML (ref ?–5)
CHLORIDE SERPL-SCNC: 104 MMOL/L (ref 98–112)
CO2 SERPL-SCNC: 32 MMOL/L (ref 21–32)
CREAT BLD-MCNC: 1.08 MG/DL
DEPRECATED RDW RBC AUTO: 67.7 FL (ref 35.1–46.3)
EGFRCR SERPLBLD CKD-EPI 2021: 54 ML/MIN/1.73M2 (ref 60–?)
EOSINOPHIL # BLD AUTO: 0.17 X10(3) UL (ref 0–0.7)
EOSINOPHIL NFR BLD AUTO: 2.3 %
ERYTHROCYTE [DISTWIDTH] IN BLOOD BY AUTOMATED COUNT: 16.3 % (ref 11–15)
FASTING STATUS PATIENT QL REPORTED: NO
GLOBULIN PLAS-MCNC: 5 G/DL (ref 2.8–4.4)
GLUCOSE BLD-MCNC: 129 MG/DL (ref 70–99)
HCT VFR BLD AUTO: 37.9 %
HELMET CELLS BLD QL SMEAR: PRESENT
HGB BLD-MCNC: 11.9 G/DL
IMM GRANULOCYTES # BLD AUTO: 0.02 X10(3) UL (ref 0–1)
IMM GRANULOCYTES NFR BLD: 0.3 %
LYMPHOCYTES # BLD AUTO: 1.46 X10(3) UL (ref 1–4)
LYMPHOCYTES NFR BLD AUTO: 19.4 %
MCH RBC QN AUTO: 35.3 PG (ref 26–34)
MCHC RBC AUTO-ENTMCNC: 31.4 G/DL (ref 31–37)
MCV RBC AUTO: 112.5 FL
MONOCYTES # BLD AUTO: 0.86 X10(3) UL (ref 0.1–1)
MONOCYTES NFR BLD AUTO: 11.4 %
NEUTROPHILS # BLD AUTO: 4.97 X10 (3) UL (ref 1.5–7.7)
NEUTROPHILS # BLD AUTO: 4.97 X10(3) UL (ref 1.5–7.7)
NEUTROPHILS NFR BLD AUTO: 65.8 %
OSMOLALITY SERPL CALC.SUM OF ELEC: 291 MOSM/KG (ref 275–295)
PLATELET # BLD AUTO: 80 10(3)UL (ref 150–450)
PLATELET MORPHOLOGY: NORMAL
POTASSIUM SERPL-SCNC: 4.5 MMOL/L (ref 3.5–5.1)
PROT SERPL-MCNC: 8 G/DL (ref 6.4–8.2)
RBC # BLD AUTO: 3.37 X10(6)UL
SODIUM SERPL-SCNC: 139 MMOL/L (ref 136–145)
WBC # BLD AUTO: 7.5 X10(3) UL (ref 4–11)

## 2023-10-23 PROCEDURE — 36415 COLL VENOUS BLD VENIPUNCTURE: CPT

## 2023-10-23 PROCEDURE — 85025 COMPLETE CBC W/AUTO DIFF WBC: CPT

## 2023-10-23 PROCEDURE — 99215 OFFICE O/P EST HI 40 MIN: CPT | Performed by: STUDENT IN AN ORGANIZED HEALTH CARE EDUCATION/TRAINING PROGRAM

## 2023-10-23 PROCEDURE — 80053 COMPREHEN METABOLIC PANEL: CPT

## 2023-10-23 PROCEDURE — 82378 CARCINOEMBRYONIC ANTIGEN: CPT

## 2023-10-25 ENCOUNTER — TELEPHONE (OUTPATIENT)
Dept: HEMATOLOGY/ONCOLOGY | Facility: HOSPITAL | Age: 73
End: 2023-10-25

## 2023-10-25 RX ORDER — LISINOPRIL 10 MG/1
10 TABLET ORAL DAILY
Qty: 30 TABLET | Refills: 1 | Status: SHIPPED | OUTPATIENT
Start: 2023-10-25 | End: 2023-10-25

## 2023-10-25 RX ORDER — LISINOPRIL 10 MG/1
10 TABLET ORAL DAILY
Qty: 30 TABLET | Refills: 1 | Status: SHIPPED | OUTPATIENT
Start: 2023-10-25

## 2023-10-25 NOTE — TELEPHONE ENCOUNTER
Patient's daughter called regarding a refill of Lisinopril. Joelle advised her to call her doctor regarding approval.  Please call back. Thank you.

## 2023-10-25 NOTE — TELEPHONE ENCOUNTER
Patient's daughter Lynda Cox called the call center and reported the following patient blood pressure readings: 170/109 (this morning), 176/111 (last night), and 178/118 (yesterday afternoon). Writer informed Dmkyara Santiago that daughter will receive a call back after these readings are reviewed with Dr. Farideh Cano.

## 2023-10-25 NOTE — TELEPHONE ENCOUNTER
Writer called patient's daughter back and informed her that due to her mother's elevated blood pressures, Dr. Lindsey Davis will be starting Cleatis Showers on lisinopril. Pharmacy corrected to [facility] closer to her daughter's home.

## 2023-10-25 NOTE — TELEPHONE ENCOUNTER
Writer called patient's daughter back and informed her that prescription for 30 tabs of lisinopril was sent to pharmacy. Prescription for 90tabs was denied as this is a new medication. She verbalized understanding and writer will call Joelle to clarify.

## 2023-10-30 ENCOUNTER — TELEPHONE (OUTPATIENT)
Dept: HEMATOLOGY/ONCOLOGY | Facility: HOSPITAL | Age: 73
End: 2023-10-30

## 2023-10-30 NOTE — TELEPHONE ENCOUNTER
Patient's daughter is calling regarding the patient's Stivarga prescription. She needs a refill but has a question. Please call back. Thank you.

## 2023-10-30 NOTE — TELEPHONE ENCOUNTER
Writer called patient's daughter Emiliano White back. She stated patient has been doing well on the lowered dose of 80mg Rosaleen Quails). Patient is currently on her week off. Blood pressure yesterday was 140's/90's. Patient/ daughter aware that her dosing will stay the same, at 80mg when next cycle starts. If drug company does not call patient by 11/3, she will contact them to set up delivery.

## 2023-11-06 ENCOUNTER — NURSE ONLY (OUTPATIENT)
Dept: HEMATOLOGY/ONCOLOGY | Facility: HOSPITAL | Age: 73
End: 2023-11-06
Attending: INTERNAL MEDICINE
Payer: MEDICARE

## 2023-11-06 ENCOUNTER — OFFICE VISIT (OUTPATIENT)
Dept: HEMATOLOGY/ONCOLOGY | Facility: HOSPITAL | Age: 73
End: 2023-11-06
Attending: STUDENT IN AN ORGANIZED HEALTH CARE EDUCATION/TRAINING PROGRAM
Payer: MEDICARE

## 2023-11-06 VITALS
SYSTOLIC BLOOD PRESSURE: 172 MMHG | OXYGEN SATURATION: 94 % | TEMPERATURE: 98 F | DIASTOLIC BLOOD PRESSURE: 102 MMHG | HEIGHT: 63 IN | WEIGHT: 107 LBS | BODY MASS INDEX: 18.96 KG/M2 | RESPIRATION RATE: 18 BRPM | HEART RATE: 107 BPM

## 2023-11-06 DIAGNOSIS — D70.1 CHEMOTHERAPY INDUCED NEUTROPENIA: ICD-10-CM

## 2023-11-06 DIAGNOSIS — C41.9 MALIGNANT NEOPLASM OF BONE WITH METASTASES (HCC): ICD-10-CM

## 2023-11-06 DIAGNOSIS — T45.1X5A CHEMOTHERAPY INDUCED NEUTROPENIA: ICD-10-CM

## 2023-11-06 DIAGNOSIS — Z51.11 CHEMOTHERAPY MANAGEMENT, ENCOUNTER FOR: ICD-10-CM

## 2023-11-06 DIAGNOSIS — Z45.2 ENCOUNTER FOR CENTRAL LINE CARE: ICD-10-CM

## 2023-11-06 DIAGNOSIS — R53.83 FATIGUE, UNSPECIFIED TYPE: ICD-10-CM

## 2023-11-06 DIAGNOSIS — Z79.899 ENCOUNTER FOR MEDICATION MANAGEMENT: ICD-10-CM

## 2023-11-06 DIAGNOSIS — C18.7 CANCER OF SIGMOID COLON (HCC): Primary | ICD-10-CM

## 2023-11-06 DIAGNOSIS — C78.6 PERITONEAL CARCINOMATOSIS (HCC): ICD-10-CM

## 2023-11-06 LAB
ALBUMIN SERPL-MCNC: 3.2 G/DL (ref 3.2–4.8)
ALBUMIN/GLOB SERPL: 0.8 {RATIO} (ref 1–2)
ALP LIVER SERPL-CCNC: 69 U/L
ALT SERPL-CCNC: 8 U/L
ANION GAP SERPL CALC-SCNC: 3 MMOL/L (ref 0–18)
AST SERPL-CCNC: 27 U/L (ref ?–34)
BASOPHILS # BLD AUTO: 0.07 X10(3) UL (ref 0–0.2)
BASOPHILS NFR BLD AUTO: 1.2 %
BILIRUB SERPL-MCNC: 0.9 MG/DL (ref 0.2–1.1)
BUN BLD-MCNC: 8 MG/DL (ref 9–23)
BUN/CREAT SERPL: 9.9 (ref 10–20)
CALCIUM BLD-MCNC: 8.8 MG/DL (ref 8.7–10.4)
CHLORIDE SERPL-SCNC: 103 MMOL/L (ref 98–112)
CO2 SERPL-SCNC: 30 MMOL/L (ref 21–32)
CREAT BLD-MCNC: 0.81 MG/DL
DEPRECATED RDW RBC AUTO: 64.6 FL (ref 35.1–46.3)
EGFRCR SERPLBLD CKD-EPI 2021: 77 ML/MIN/1.73M2 (ref 60–?)
EOSINOPHIL # BLD AUTO: 0.17 X10(3) UL (ref 0–0.7)
EOSINOPHIL NFR BLD AUTO: 3 %
ERYTHROCYTE [DISTWIDTH] IN BLOOD BY AUTOMATED COUNT: 15.3 % (ref 11–15)
GLOBULIN PLAS-MCNC: 3.9 G/DL (ref 2.8–4.4)
GLUCOSE BLD-MCNC: 144 MG/DL (ref 70–99)
HCT VFR BLD AUTO: 36 %
HGB BLD-MCNC: 11.5 G/DL
IMM GRANULOCYTES # BLD AUTO: 0.01 X10(3) UL (ref 0–1)
IMM GRANULOCYTES NFR BLD: 0.2 %
LYMPHOCYTES # BLD AUTO: 1.23 X10(3) UL (ref 1–4)
LYMPHOCYTES NFR BLD AUTO: 21.8 %
MCH RBC QN AUTO: 36.2 PG (ref 26–34)
MCHC RBC AUTO-ENTMCNC: 31.9 G/DL (ref 31–37)
MCV RBC AUTO: 113.2 FL
MONOCYTES # BLD AUTO: 0.73 X10(3) UL (ref 0.1–1)
MONOCYTES NFR BLD AUTO: 12.9 %
NEUTROPHILS # BLD AUTO: 3.43 X10 (3) UL (ref 1.5–7.7)
NEUTROPHILS # BLD AUTO: 3.43 X10(3) UL (ref 1.5–7.7)
NEUTROPHILS NFR BLD AUTO: 60.9 %
OSMOLALITY SERPL CALC.SUM OF ELEC: 283 MOSM/KG (ref 275–295)
PLATELET # BLD AUTO: 111 10(3)UL (ref 150–450)
POTASSIUM SERPL-SCNC: 4.2 MMOL/L (ref 3.5–5.1)
PROT SERPL-MCNC: 7.1 G/DL (ref 5.7–8.2)
RBC # BLD AUTO: 3.18 X10(6)UL
SODIUM SERPL-SCNC: 136 MMOL/L (ref 136–145)
WBC # BLD AUTO: 5.6 X10(3) UL (ref 4–11)

## 2023-11-06 PROCEDURE — 99215 OFFICE O/P EST HI 40 MIN: CPT | Performed by: STUDENT IN AN ORGANIZED HEALTH CARE EDUCATION/TRAINING PROGRAM

## 2023-11-06 PROCEDURE — 85025 COMPLETE CBC W/AUTO DIFF WBC: CPT

## 2023-11-06 PROCEDURE — 36591 DRAW BLOOD OFF VENOUS DEVICE: CPT

## 2023-11-06 PROCEDURE — 80053 COMPREHEN METABOLIC PANEL: CPT

## 2023-11-06 RX ORDER — AMLODIPINE BESYLATE 5 MG/1
5 TABLET ORAL DAILY
Qty: 30 TABLET | Refills: 2 | Status: SHIPPED | OUTPATIENT
Start: 2023-11-06

## 2023-11-06 RX ORDER — HEPARIN SODIUM (PORCINE) LOCK FLUSH IV SOLN 100 UNIT/ML 100 UNIT/ML
5 SOLUTION INTRAVENOUS ONCE
OUTPATIENT
Start: 2023-11-20

## 2023-11-06 RX ORDER — HEPARIN SODIUM (PORCINE) LOCK FLUSH IV SOLN 100 UNIT/ML 100 UNIT/ML
5 SOLUTION INTRAVENOUS ONCE
Status: COMPLETED | OUTPATIENT
Start: 2023-11-06 | End: 2023-11-06

## 2023-11-06 RX ADMIN — HEPARIN SODIUM (PORCINE) LOCK FLUSH IV SOLN 100 UNIT/ML 500 UNITS: 100 SOLUTION INTRAVENOUS at 10:21:00

## 2023-11-27 ENCOUNTER — OFFICE VISIT (OUTPATIENT)
Dept: HEMATOLOGY/ONCOLOGY | Facility: HOSPITAL | Age: 73
End: 2023-11-27
Attending: STUDENT IN AN ORGANIZED HEALTH CARE EDUCATION/TRAINING PROGRAM
Payer: MEDICARE

## 2023-11-27 ENCOUNTER — HOSPITAL ENCOUNTER (OUTPATIENT)
Dept: GENERAL RADIOLOGY | Facility: HOSPITAL | Age: 73
Discharge: HOME | End: 2023-11-27
Attending: STUDENT IN AN ORGANIZED HEALTH CARE EDUCATION/TRAINING PROGRAM
Payer: MEDICARE

## 2023-11-27 VITALS
RESPIRATION RATE: 18 BRPM | DIASTOLIC BLOOD PRESSURE: 65 MMHG | BODY MASS INDEX: 18.07 KG/M2 | WEIGHT: 102 LBS | OXYGEN SATURATION: 96 % | SYSTOLIC BLOOD PRESSURE: 97 MMHG | TEMPERATURE: 98 F | HEART RATE: 106 BPM | HEIGHT: 63 IN

## 2023-11-27 DIAGNOSIS — C18.7 CANCER OF SIGMOID COLON (HCC): ICD-10-CM

## 2023-11-27 DIAGNOSIS — C18.7 CANCER OF SIGMOID COLON (HCC): Primary | ICD-10-CM

## 2023-11-27 DIAGNOSIS — C78.02 MALIGNANT NEOPLASM METASTATIC TO BOTH LUNGS (HCC): ICD-10-CM

## 2023-11-27 DIAGNOSIS — Z79.899 ENCOUNTER FOR MEDICATION MANAGEMENT: ICD-10-CM

## 2023-11-27 DIAGNOSIS — C41.9 MALIGNANT NEOPLASM OF BONE WITH METASTASES (HCC): ICD-10-CM

## 2023-11-27 DIAGNOSIS — J90 PLEURAL EFFUSION: ICD-10-CM

## 2023-11-27 DIAGNOSIS — C78.6 PERITONEAL CARCINOMATOSIS (HCC): ICD-10-CM

## 2023-11-27 DIAGNOSIS — C78.01 MALIGNANT NEOPLASM METASTATIC TO BOTH LUNGS (HCC): ICD-10-CM

## 2023-11-27 DIAGNOSIS — Z51.11 CHEMOTHERAPY MANAGEMENT, ENCOUNTER FOR: Primary | ICD-10-CM

## 2023-11-27 DIAGNOSIS — Z51.11 CHEMOTHERAPY MANAGEMENT, ENCOUNTER FOR: ICD-10-CM

## 2023-11-27 DIAGNOSIS — Z45.2 ENCOUNTER FOR CENTRAL LINE CARE: ICD-10-CM

## 2023-11-27 LAB
ALBUMIN SERPL-MCNC: 3.3 G/DL (ref 3.2–4.8)
ALBUMIN/GLOB SERPL: 0.7 {RATIO} (ref 1–2)
ALP LIVER SERPL-CCNC: 88 U/L
ALT SERPL-CCNC: 14 U/L
ANION GAP SERPL CALC-SCNC: 8 MMOL/L (ref 0–18)
AST SERPL-CCNC: 37 U/L (ref ?–34)
BASOPHILS # BLD AUTO: 0.03 X10(3) UL (ref 0–0.2)
BASOPHILS NFR BLD AUTO: 0.5 %
BILIRUB SERPL-MCNC: 0.7 MG/DL (ref 0.2–1.1)
BUN BLD-MCNC: 13 MG/DL (ref 9–23)
BUN/CREAT SERPL: 13.5 (ref 10–20)
CALCIUM BLD-MCNC: 8.7 MG/DL (ref 8.7–10.4)
CEA SERPL-MCNC: 96.7 NG/ML (ref ?–5)
CHLORIDE SERPL-SCNC: 99 MMOL/L (ref 98–112)
CO2 SERPL-SCNC: 27 MMOL/L (ref 21–32)
CREAT BLD-MCNC: 0.96 MG/DL
DEPRECATED RDW RBC AUTO: 58.6 FL (ref 35.1–46.3)
EGFRCR SERPLBLD CKD-EPI 2021: 62 ML/MIN/1.73M2 (ref 60–?)
EOSINOPHIL # BLD AUTO: 0.06 X10(3) UL (ref 0–0.7)
EOSINOPHIL NFR BLD AUTO: 1 %
ERYTHROCYTE [DISTWIDTH] IN BLOOD BY AUTOMATED COUNT: 14.4 % (ref 11–15)
GLOBULIN PLAS-MCNC: 4.5 G/DL (ref 2.8–4.4)
GLUCOSE BLD-MCNC: 139 MG/DL (ref 70–99)
HCT VFR BLD AUTO: 42.4 %
HGB BLD-MCNC: 13.7 G/DL
IMM GRANULOCYTES # BLD AUTO: 0.01 X10(3) UL (ref 0–1)
IMM GRANULOCYTES NFR BLD: 0.2 %
LYMPHOCYTES # BLD AUTO: 1.87 X10(3) UL (ref 1–4)
LYMPHOCYTES NFR BLD AUTO: 30.2 %
MCH RBC QN AUTO: 35.4 PG (ref 26–34)
MCHC RBC AUTO-ENTMCNC: 32.3 G/DL (ref 31–37)
MCV RBC AUTO: 109.6 FL
MONOCYTES # BLD AUTO: 0.63 X10(3) UL (ref 0.1–1)
MONOCYTES NFR BLD AUTO: 10.2 %
NEUTROPHILS # BLD AUTO: 3.59 X10 (3) UL (ref 1.5–7.7)
NEUTROPHILS # BLD AUTO: 3.59 X10(3) UL (ref 1.5–7.7)
NEUTROPHILS NFR BLD AUTO: 57.9 %
OSMOLALITY SERPL CALC.SUM OF ELEC: 280 MOSM/KG (ref 275–295)
PLATELET # BLD AUTO: 105 10(3)UL (ref 150–450)
POTASSIUM SERPL-SCNC: 4.1 MMOL/L (ref 3.5–5.1)
PROT SERPL-MCNC: 7.8 G/DL (ref 5.7–8.2)
RBC # BLD AUTO: 3.87 X10(6)UL
SODIUM SERPL-SCNC: 134 MMOL/L (ref 136–145)
WBC # BLD AUTO: 6.2 X10(3) UL (ref 4–11)

## 2023-11-27 PROCEDURE — 85025 COMPLETE CBC W/AUTO DIFF WBC: CPT

## 2023-11-27 PROCEDURE — 80053 COMPREHEN METABOLIC PANEL: CPT

## 2023-11-27 PROCEDURE — 36415 COLL VENOUS BLD VENIPUNCTURE: CPT

## 2023-11-27 PROCEDURE — 99215 OFFICE O/P EST HI 40 MIN: CPT | Performed by: STUDENT IN AN ORGANIZED HEALTH CARE EDUCATION/TRAINING PROGRAM

## 2023-11-27 PROCEDURE — 71046 X-RAY EXAM CHEST 2 VIEWS: CPT | Performed by: STUDENT IN AN ORGANIZED HEALTH CARE EDUCATION/TRAINING PROGRAM

## 2023-11-27 PROCEDURE — 82378 CARCINOEMBRYONIC ANTIGEN: CPT

## 2023-11-27 RX ORDER — HEPARIN SODIUM (PORCINE) LOCK FLUSH IV SOLN 100 UNIT/ML 100 UNIT/ML
5 SOLUTION INTRAVENOUS ONCE
OUTPATIENT
Start: 2023-12-18

## 2023-11-27 RX ORDER — HEPARIN SODIUM (PORCINE) LOCK FLUSH IV SOLN 100 UNIT/ML 100 UNIT/ML
5 SOLUTION INTRAVENOUS ONCE
Status: DISCONTINUED | OUTPATIENT
Start: 2023-11-27 | End: 2023-11-27

## 2023-11-28 ENCOUNTER — TELEPHONE (OUTPATIENT)
Dept: HEMATOLOGY/ONCOLOGY | Facility: HOSPITAL | Age: 73
End: 2023-11-28

## 2023-11-28 NOTE — TELEPHONE ENCOUNTER
Writer called patient as requested by Dr. Farideh Cano. She was made aware of pleural effusion findings on CXR yesterday. Dr. Farideh Cano has ordered a CT scan of the chest for better imaging.  Patient aware of appointment for CT tomorrow at 1pm. Dr. Farideh Cano to call patient after results are back to discuss future steps/possible fluid removal.

## 2023-11-29 ENCOUNTER — HOSPITAL ENCOUNTER (OUTPATIENT)
Dept: CT IMAGING | Facility: HOSPITAL | Age: 73
Discharge: HOME | End: 2023-11-29
Attending: STUDENT IN AN ORGANIZED HEALTH CARE EDUCATION/TRAINING PROGRAM
Payer: MEDICARE

## 2023-11-29 DIAGNOSIS — C78.02 MALIGNANT NEOPLASM METASTATIC TO BOTH LUNGS (HCC): ICD-10-CM

## 2023-11-29 DIAGNOSIS — C78.01 MALIGNANT NEOPLASM METASTATIC TO BOTH LUNGS (HCC): ICD-10-CM

## 2023-11-29 DIAGNOSIS — J90 PLEURAL EFFUSION: ICD-10-CM

## 2023-11-29 DIAGNOSIS — Z51.11 CHEMOTHERAPY MANAGEMENT, ENCOUNTER FOR: ICD-10-CM

## 2023-11-29 DIAGNOSIS — C18.7 CANCER OF SIGMOID COLON (HCC): ICD-10-CM

## 2023-11-29 PROCEDURE — 71260 CT THORAX DX C+: CPT | Performed by: STUDENT IN AN ORGANIZED HEALTH CARE EDUCATION/TRAINING PROGRAM

## 2023-11-30 ENCOUNTER — TELEPHONE (OUTPATIENT)
Dept: HEMATOLOGY/ONCOLOGY | Facility: HOSPITAL | Age: 73
End: 2023-11-30

## 2023-11-30 NOTE — TELEPHONE ENCOUNTER
Patient called back. CT results reviewed. Patient to have thoracentesis performed by pulmonology (Dr. Leonor Fournier). Patient verbalized understanding and has already been contacted by his office.

## 2023-12-01 ENCOUNTER — HOSPITAL ENCOUNTER (OUTPATIENT)
Dept: GENERAL RADIOLOGY | Facility: HOSPITAL | Age: 73
Discharge: HOME | End: 2023-12-01
Attending: INTERNAL MEDICINE
Payer: MEDICARE

## 2023-12-01 ENCOUNTER — TELEPHONE (OUTPATIENT)
Dept: PULMONOLOGY | Facility: CLINIC | Age: 73
End: 2023-12-01

## 2023-12-01 ENCOUNTER — OFFICE VISIT (OUTPATIENT)
Dept: PULMONOLOGY | Facility: CLINIC | Age: 73
End: 2023-12-01

## 2023-12-01 ENCOUNTER — HOSPITAL ENCOUNTER (OUTPATIENT)
Dept: ULTRASOUND IMAGING | Facility: HOSPITAL | Age: 73
Discharge: HOME | End: 2023-12-01
Attending: INTERNAL MEDICINE
Payer: MEDICARE

## 2023-12-01 VITALS
BODY MASS INDEX: 18.43 KG/M2 | WEIGHT: 104 LBS | SYSTOLIC BLOOD PRESSURE: 94 MMHG | OXYGEN SATURATION: 92 % | DIASTOLIC BLOOD PRESSURE: 72 MMHG | HEIGHT: 63 IN | HEART RATE: 112 BPM

## 2023-12-01 VITALS
SYSTOLIC BLOOD PRESSURE: 80 MMHG | DIASTOLIC BLOOD PRESSURE: 60 MMHG | OXYGEN SATURATION: 84 % | BODY MASS INDEX: 18.43 KG/M2 | HEIGHT: 63 IN | WEIGHT: 104 LBS | HEART RATE: 117 BPM

## 2023-12-01 DIAGNOSIS — J90 PLEURAL EFFUSION: ICD-10-CM

## 2023-12-01 DIAGNOSIS — J90 PLEURAL EFFUSION: Primary | ICD-10-CM

## 2023-12-01 LAB
BASOPHILS NFR PLR: 0 %
COLOR FLD: YELLOW
EOSINOPHIL NFR PLR: 0 %
GLUCOSE PLR-MCNC: 13 MG/DL
LDH FLD L TO P-CCNC: 2118 U/L
LYMPHOCYTES NFR PLR: 32 %
MONOS+MACROS NFR PLR: 9 %
NEUTROPHILS NFR PLR: 59 %
PROT PLR-MCNC: 5.4 G/DL
RBC # FLD: 185 /CUMM (ref ?–1)
TOTAL CELLS COUNTED FLD: 100
TOTAL CELLS COUNTED PLR: 183 /CUMM (ref ?–1)
TURBIDITY CSF QL: CLEAR
WBC # PLR: 183 /CUMM

## 2023-12-01 PROCEDURE — 84157 ASSAY OF PROTEIN OTHER: CPT | Performed by: INTERNAL MEDICINE

## 2023-12-01 PROCEDURE — 32555 ASPIRATE PLEURA W/ IMAGING: CPT | Performed by: INTERNAL MEDICINE

## 2023-12-01 PROCEDURE — 89051 BODY FLUID CELL COUNT: CPT | Performed by: INTERNAL MEDICINE

## 2023-12-01 PROCEDURE — 82945 GLUCOSE OTHER FLUID: CPT | Performed by: INTERNAL MEDICINE

## 2023-12-01 PROCEDURE — 83615 LACTATE (LD) (LDH) ENZYME: CPT | Performed by: INTERNAL MEDICINE

## 2023-12-01 PROCEDURE — 89050 BODY FLUID CELL COUNT: CPT | Performed by: INTERNAL MEDICINE

## 2023-12-01 PROCEDURE — 88112 CYTOPATH CELL ENHANCE TECH: CPT | Performed by: INTERNAL MEDICINE

## 2023-12-01 PROCEDURE — 87205 SMEAR GRAM STAIN: CPT | Performed by: INTERNAL MEDICINE

## 2023-12-01 PROCEDURE — 88305 TISSUE EXAM BY PATHOLOGIST: CPT | Performed by: INTERNAL MEDICINE

## 2023-12-01 PROCEDURE — 87070 CULTURE OTHR SPECIMN AEROBIC: CPT | Performed by: INTERNAL MEDICINE

## 2023-12-01 NOTE — PROGRESS NOTES
Referring Physician  Tiffany Clayton MD    History of Present Illness  Patient presents today for evaluation of worsening dyspnea over the course of last week. Chest x-ray and CT chest with evidence of increasing left pleural effusion. Oxygen saturation 84% on arrival to office. Denies significant cough. Denies fevers or chills. Admits to generalized fatigue    Medications  Current Outpatient Medications on File Prior to Visit   Medication Sig Dispense Refill    amLODIPine 5 MG Oral Tab Take 1 tablet (5 mg total) by mouth daily. 30 tablet 2    lisinopril 10 MG Oral Tab Take 1 tablet (10 mg total) by mouth daily. 30 tablet 1    diclofenac 1 % External Gel Apply to affected area twice daily 350 g 5    Regorafenib 40 MG Oral Tab Take 120 mg by mouth daily. Take three tablets (120 mg) daily for 3 weeks, then 1 week off. 63 tablet 11    furosemide 20 MG Oral Tab Take 1 tablet (20 mg total) by mouth daily. 90 tablet 0    GABAPENTIN 300 MG Oral Cap TAKE 1 CAPSULE(300 MG) BY MOUTH EVERY NIGHT 30 capsule 0    acyclovir 400 MG Oral Tab Take 1 tablet (400 mg total) by mouth 2 (two) times daily. 60 tablet 3    DULoxetine 20 MG Oral Cap DR Particles Take 1 capsule (20 mg total) by mouth daily. 90 capsule 3    Clindamycin Phosphate (CLEOCIN-T) 1 % External Gel Apply to affected area bid 60 g 1    pantoprazole 40 MG Oral Tab EC Take 1 tablet (40 mg total) by mouth every morning before breakfast. 90 tablet 3    Lactobacillus Rhamnosus, GG, (CULTURELLE OR) Take by mouth daily. Multiple Vitamins-Minerals (MULTIVITAMIN WOMEN OR) Take by mouth. Probiotic Product (PROBIOTIC DAILY OR) Take by mouth. docusate sodium 100 MG Oral Cap Take 100 mg by mouth daily. Lidocaine Viscous HCl 2 % Mouth/Throat Solution Take 5 mL by mouth 4 (four) times daily as needed for Pain (oral/throat discomfort from chemotherapy). Combine with 5ml Maalox and 5ml liquid Benadryl. Swish and spit/swallow.  100 mL 1    loratadine 10 MG Oral Tab Take 1 tablet (10 mg total) by mouth daily. 30 tablet 0    ondansetron (ZOFRAN) 8 MG tablet Take 1 tablet (8 mg total) by mouth every 8 (eight) hours as needed for Nausea. 30 tablet 3    prochlorperazine (COMPAZINE) 10 mg tablet Take 1 tablet (10 mg total) by mouth every 8 (eight) hours as needed for Nausea. 60 tablet 3    lidocaine-prilocaine 2.5-2.5 % External Cream Apply to site 1 hour prior to port a cath needle insertion 30 g 2    Vitamin D3 50 MCG (2000 UT) Oral Cap Take 1 capsule (2,000 Units total) by mouth in the morning and 1 capsule (2,000 Units total) before bedtime. Per pt. Takes 4000 units daily(at one time). nystatin [id number] UNIT/GM External Cream aply peasize to affected area twice (Patient taking differently: as needed. aply peasize to affected area twice) 15 g 0    Acetaminophen 500 MG Oral Cap Take by mouth every 6 (six) hours as needed for Pain. No current facility-administered medications on file prior to visit. Allergies  No Known Allergies    Physical Exam  Constitutional: no acute distress  HEENT: PERRL  Neck: supple, no JVD  Cardio: RRR, S1 S2  Respiratory: Diminished left basilar breath sounds  GI: abdomen soft, non tender, active bowel sounds, no organomegaly  Extremities: no clubbing, cyanosis, edema  Neurologic: no gross motor deficits  Skin: warm, dry  Lymphatic: no supraclavicular lymphadenopathy     Assessment  1. Metastatic colon cancer  2. Left pleural effusion  3. Chronic hypoxemic respiratory failure    Plan  -Presents today for evaluation of worsening dyspnea. I reviewed her chest imaging with patient's oncologist.  Evidence of worsening left pleural effusion seen. Scheduled outpatient thoracentesis today at 2 PM.  Oxygen saturation 84% on arrival to office on room air. We will set up with home oxygen. Oxygen saturation on 3 L 94%. We will place order for 3 L supplemental oxygen.     Ana Ibrahim DO  Pulmonary Medicine  [facility]  12/1/2023  12:01 PM

## 2023-12-01 NOTE — PROCEDURES
Left ultrasound-guided thoracentesis    Ultrasound used to visualized moderate sized left pleural effusion with no evidence of loculations seen and area of entry marked. Patient prepped and draped in sterile fashion. 10 cc of 1% lidocaine used to anesthesize area advancing syringe while aspirating straw colored pleural fluid. Scalpel used to make fine incision. Thoracentesis catheter advanced while aspirating with eventual return of above mentioned pleural fluid. Thoracentesis catheter advanced further while removing introducer needle and attached to tubing system and 1100 cc of pleural fluid removed. Fluid sent for analysis. Gauze applied applied after catheter removed to achieve hemostasis and bandage placed over site. CXR ordered.     Juan Finn, DO  Pulmonary [facility]

## 2023-12-01 NOTE — IMAGING NOTE
1340 PT TO ULTRASOUND ROOM  SCANS COMPLETED BY MIRLANDE PEREZ     1350 HX TAKEN PROCEDURE EXPLAINED QUESTIONS ANSWERED. PT CONSENTED   BP 94/72 . DR. Gabriel Cruz   HERE, SCANNING COMPLETED. PLATELETS = 696     TIME OUT TAKEN 1408     1409 CHLORO PREP  AS SKIN PREP STERILE DRAPE APPLIED;  LIDOCAINE 1% 10 MILLIGRAMS PER ML GIVEN FROM KIT  FOR ANESTHETIC AFFECT 10 ML. GIVEN INCISION MADE WITH SCALPEL. SATS 93%     Thoracentesis drained  CATHETER PLACED LEFT  lung  SATS 93%   FLUID ASPIRATED FOR LABS YES OR NO   SATS 92-95 %     CATHETER HOOKED TO bag DRAINING CLEAR ALEXX COLOR FLUID. SATS 93%     BOTTLE #1 DRAINED FOR 1100 ML    SEE VITAL SIGN - FLOW SHEET. PROCEDURE COMPLETE. PT RE SCANNED. DRAIN DC'D TOTAL AMOUNT DRAINED 1100   PRESSURE TO SITE X 5 MIN AREA CLEANED STERI STRIPS TO SITE  LARGE BAND AID TO SITE. SATS 92-95%     1503 SATS CONT. 93-94%, CXR DONE   NO PT C/O, RESTING APPEARS COMFORTABLE    1550 AWAITING CXR RESULTS, SATS 96%, PT RESTING. 1558 NO PNEUMO NOTED , VSS.  NO PT C/O. NO COUGH NOTED. RODAS.   A&O.     1605 POST INSTRUCTIONS GIVEN VERBAL ET WRITTEN  AVS SUMMARY SHEET PROVIDED TO PT. PT DISCHARGED IN STABLE CONDITION ACCOMPANIED BY FAMILY X 2

## 2023-12-04 ENCOUNTER — TELEPHONE (OUTPATIENT)
Dept: HEMATOLOGY/ONCOLOGY | Facility: HOSPITAL | Age: 73
End: 2023-12-04

## 2023-12-04 NOTE — TELEPHONE ENCOUNTER
Return phone call received from patient. She informed RN that her shortness of breath has returned with the resumption of her Regorafenib. Patient stated that anytime she does something, she needs to rest for 5 min. She has a pulse oximeter at home, but does not know where it is. She refuses to go to the emergency despite writer's recommendation. She was informed to stop the Regorafenib until otherwise instructed. Appointment for follow up with Dr. Negro Hernandez scheduled for 12/6 in the afternoon. She was made aware that Dr. Jordon Alston office with pulmonology has been trying to reach her regarding setting up home oxygen. She was provided with the number for [facility] and the number for pulmonologist's office. She verbalized understanding and thanked writer for calling.

## 2023-12-04 NOTE — TELEPHONE ENCOUNTER
Called patient but phone not ringing. Sent patient mychart that Dr Isaak Reich placed order for 3 L of oxygen and that order was sent to [facility].  Provided [facility] phone number

## 2023-12-04 NOTE — TELEPHONE ENCOUNTER
Order faxed to [address] [place] [address]. Attached ovn and o2 sat study.  Confirmation received

## 2023-12-04 NOTE — TELEPHONE ENCOUNTER
Writer called patient and left voicemail in attempt to see how she was feeling since having her thoracentesis. Requested a call back.

## 2023-12-04 NOTE — TELEPHONE ENCOUNTER
Called patient and reiterated [employer] message about o2 order.  Patient states she has E phone number

## 2023-12-06 ENCOUNTER — OFFICE VISIT (OUTPATIENT)
Dept: HEMATOLOGY/ONCOLOGY | Facility: HOSPITAL | Age: 73
End: 2023-12-06
Attending: STUDENT IN AN ORGANIZED HEALTH CARE EDUCATION/TRAINING PROGRAM
Payer: MEDICARE

## 2023-12-06 VITALS
SYSTOLIC BLOOD PRESSURE: 119 MMHG | HEIGHT: 63 IN | HEART RATE: 105 BPM | WEIGHT: 102.81 LBS | TEMPERATURE: 97 F | DIASTOLIC BLOOD PRESSURE: 95 MMHG | OXYGEN SATURATION: 97 % | RESPIRATION RATE: 20 BRPM | BODY MASS INDEX: 18.21 KG/M2

## 2023-12-06 DIAGNOSIS — Z79.899 ENCOUNTER FOR MEDICATION MANAGEMENT: ICD-10-CM

## 2023-12-06 DIAGNOSIS — C18.7 CANCER OF SIGMOID COLON (HCC): Primary | ICD-10-CM

## 2023-12-06 DIAGNOSIS — J90 PLEURAL EFFUSION: ICD-10-CM

## 2023-12-06 DIAGNOSIS — C78.6 PERITONEAL CARCINOMATOSIS (HCC): ICD-10-CM

## 2023-12-06 DIAGNOSIS — Z51.11 CHEMOTHERAPY MANAGEMENT, ENCOUNTER FOR: ICD-10-CM

## 2023-12-06 PROCEDURE — 99215 OFFICE O/P EST HI 40 MIN: CPT | Performed by: STUDENT IN AN ORGANIZED HEALTH CARE EDUCATION/TRAINING PROGRAM

## 2023-12-19 ENCOUNTER — APPOINTMENT (OUTPATIENT)
Dept: INTERVENTIONAL RADIOLOGY/VASCULAR | Facility: HOSPITAL | Age: 73
End: 2023-12-19
Attending: CLINICAL NURSE SPECIALIST
Payer: MEDICARE

## 2023-12-19 ENCOUNTER — HOSPITAL ENCOUNTER (EMERGENCY)
Facility: HOSPITAL | Age: 73
Discharge: HOME | End: 2023-12-19
Attending: EMERGENCY MEDICINE
Payer: MEDICARE

## 2023-12-19 ENCOUNTER — APPOINTMENT (OUTPATIENT)
Dept: GENERAL RADIOLOGY | Facility: HOSPITAL | Age: 73
End: 2023-12-19
Attending: EMERGENCY MEDICINE
Payer: MEDICARE

## 2023-12-19 ENCOUNTER — APPOINTMENT (OUTPATIENT)
Dept: HEMATOLOGY/ONCOLOGY | Facility: HOSPITAL | Age: 73
End: 2023-12-19
Attending: STUDENT IN AN ORGANIZED HEALTH CARE EDUCATION/TRAINING PROGRAM
Payer: MEDICARE

## 2023-12-19 ENCOUNTER — SOCIAL WORK SERVICES (OUTPATIENT)
Dept: HEMATOLOGY/ONCOLOGY | Facility: HOSPITAL | Age: 73
End: 2023-12-19

## 2023-12-19 VITALS
SYSTOLIC BLOOD PRESSURE: 118 MMHG | DIASTOLIC BLOOD PRESSURE: 83 MMHG | HEIGHT: 60 IN | BODY MASS INDEX: 19.63 KG/M2 | WEIGHT: 100 LBS | OXYGEN SATURATION: 99 % | RESPIRATION RATE: 21 BRPM | TEMPERATURE: 98 F | HEART RATE: 93 BPM

## 2023-12-19 DIAGNOSIS — J90 PLEURAL EFFUSION: Primary | ICD-10-CM

## 2023-12-19 DIAGNOSIS — C78.01 MALIGNANT NEOPLASM METASTATIC TO BOTH LUNGS (HCC): Primary | ICD-10-CM

## 2023-12-19 DIAGNOSIS — C78.02 MALIGNANT NEOPLASM METASTATIC TO BOTH LUNGS (HCC): Primary | ICD-10-CM

## 2023-12-19 LAB
ANION GAP SERPL CALC-SCNC: 5 MMOL/L (ref 0–18)
APTT PPP: 32.8 SECONDS (ref 23.3–35.6)
ATRIAL RATE: 108 BPM
BASOPHILS # BLD AUTO: 0.01 X10(3) UL (ref 0–0.2)
BASOPHILS NFR BLD AUTO: 0.2 %
BUN BLD-MCNC: 12 MG/DL (ref 9–23)
BUN/CREAT SERPL: 16.2 (ref 10–20)
CALCIUM BLD-MCNC: 8.4 MG/DL (ref 8.7–10.4)
CHLORIDE SERPL-SCNC: 98 MMOL/L (ref 98–112)
CO2 SERPL-SCNC: 27 MMOL/L (ref 21–32)
CREAT BLD-MCNC: 0.74 MG/DL
DEPRECATED RDW RBC AUTO: 55.4 FL (ref 35.1–46.3)
EGFRCR SERPLBLD CKD-EPI 2021: 85 ML/MIN/1.73M2 (ref 60–?)
EOSINOPHIL # BLD AUTO: 0 X10(3) UL (ref 0–0.7)
EOSINOPHIL NFR BLD AUTO: 0 %
ERYTHROCYTE [DISTWIDTH] IN BLOOD BY AUTOMATED COUNT: 14.5 % (ref 11–15)
GLUCOSE BLD-MCNC: 130 MG/DL (ref 70–99)
HCT VFR BLD AUTO: 35.6 %
HGB BLD-MCNC: 11.7 G/DL
IMM GRANULOCYTES # BLD AUTO: 0.02 X10(3) UL (ref 0–1)
IMM GRANULOCYTES NFR BLD: 0.4 %
INR BLD: 1.19 (ref 0.8–1.2)
LYMPHOCYTES # BLD AUTO: 0.4 X10(3) UL (ref 1–4)
LYMPHOCYTES NFR BLD AUTO: 7.4 %
MCH RBC QN AUTO: 34.1 PG (ref 26–34)
MCHC RBC AUTO-ENTMCNC: 32.9 G/DL (ref 31–37)
MCV RBC AUTO: 103.8 FL
MONOCYTES # BLD AUTO: 0.35 X10(3) UL (ref 0.1–1)
MONOCYTES NFR BLD AUTO: 6.4 %
NEUTROPHILS # BLD AUTO: 4.66 X10 (3) UL (ref 1.5–7.7)
NEUTROPHILS # BLD AUTO: 4.66 X10(3) UL (ref 1.5–7.7)
NEUTROPHILS NFR BLD AUTO: 85.6 %
OSMOLALITY SERPL CALC.SUM OF ELEC: 272 MOSM/KG (ref 275–295)
P AXIS: 67 DEGREES
P-R INTERVAL: 114 MS
PLATELET # BLD AUTO: 54 10(3)UL (ref 150–450)
POTASSIUM SERPL-SCNC: 4 MMOL/L (ref 3.5–5.1)
PROTHROMBIN TIME: 15.9 SECONDS (ref 11.6–14.8)
Q-T INTERVAL: 356 MS
QRS DURATION: 70 MS
QTC CALCULATION (BEZET): 477 MS
R AXIS: 30 DEGREES
RBC # BLD AUTO: 3.43 X10(6)UL
SODIUM SERPL-SCNC: 130 MMOL/L (ref 136–145)
T AXIS: 60 DEGREES
VENTRICULAR RATE: 108 BPM
WBC # BLD AUTO: 5.4 X10(3) UL (ref 4–11)

## 2023-12-19 PROCEDURE — 75989 ABSCESS DRAINAGE UNDER X-RAY: CPT | Performed by: RADIOLOGY

## 2023-12-19 PROCEDURE — 93005 ELECTROCARDIOGRAM TRACING: CPT

## 2023-12-19 PROCEDURE — 80048 BASIC METABOLIC PNL TOTAL CA: CPT | Performed by: EMERGENCY MEDICINE

## 2023-12-19 PROCEDURE — 71045 X-RAY EXAM CHEST 1 VIEW: CPT | Performed by: EMERGENCY MEDICINE

## 2023-12-19 PROCEDURE — 85025 COMPLETE CBC W/AUTO DIFF WBC: CPT | Performed by: EMERGENCY MEDICINE

## 2023-12-19 PROCEDURE — 85730 THROMBOPLASTIN TIME PARTIAL: CPT | Performed by: EMERGENCY MEDICINE

## 2023-12-19 PROCEDURE — 99152 MOD SED SAME PHYS/QHP 5/>YRS: CPT | Performed by: RADIOLOGY

## 2023-12-19 PROCEDURE — 99285 EMERGENCY DEPT VISIT HI MDM: CPT

## 2023-12-19 PROCEDURE — 93010 ELECTROCARDIOGRAM REPORT: CPT

## 2023-12-19 PROCEDURE — 85610 PROTHROMBIN TIME: CPT | Performed by: EMERGENCY MEDICINE

## 2023-12-19 PROCEDURE — 36415 COLL VENOUS BLD VENIPUNCTURE: CPT

## 2023-12-19 PROCEDURE — 32550 INSERT PLEURAL CATH: CPT | Performed by: RADIOLOGY

## 2023-12-19 RX ORDER — SODIUM CHLORIDE 9 MG/ML
10 INJECTION INTRAVENOUS ONCE
OUTPATIENT
Start: 2023-12-19

## 2023-12-19 RX ORDER — LIDOCAINE HYDROCHLORIDE 20 MG/ML
INJECTION, SOLUTION INFILTRATION; PERINEURAL
Status: COMPLETED
Start: 2023-12-19 | End: 2023-12-19

## 2023-12-19 RX ORDER — HEPARIN SODIUM (PORCINE) LOCK FLUSH IV SOLN 100 UNIT/ML 100 UNIT/ML
5 SOLUTION INTRAVENOUS ONCE
Status: COMPLETED | OUTPATIENT
Start: 2023-12-19 | End: 2023-12-19

## 2023-12-19 RX ORDER — HEPARIN SODIUM (PORCINE) LOCK FLUSH IV SOLN 100 UNIT/ML 100 UNIT/ML
5 SOLUTION INTRAVENOUS ONCE
OUTPATIENT
Start: 2023-12-19

## 2023-12-19 RX ORDER — MIDAZOLAM HYDROCHLORIDE 1 MG/ML
INJECTION INTRAMUSCULAR; INTRAVENOUS
Status: COMPLETED
Start: 2023-12-19 | End: 2023-12-19

## 2023-12-19 NOTE — BRIEF PROCEDURE NOTE
[facility]  Procedure Note    Lan Client Patient Status:  Emergency    1950 MRN [id number]   Location [address] Attending Brianne Ruiz MD   Hosp Day # 0 PCP Rachel Celeste MD     Procedure:  Tunnelled pleural Pleurx catheter    Pre-Procedure Diagnosis: Recurrent malignant pleural effusion    Post-Procedure Diagnosis: Same    Anesthesia:  Local and Sedation    Findings: Tunneled Pleurx pleural catheter placed, thoracentesis with aspiration of 1 L of rashida-colored fluid    Specimens: 0    Blood Loss:  0      Complications:  None      Kate Calix MD  2023

## 2023-12-19 NOTE — PROGRESS NOTES
Patient received Pleurx Drain today. JUN assisting with arranging [address] for RN Visit to assist with drain care. JUN faxed referral to [facility] [phone] for additional drain kits supplies and fax went through successfully. JUN faxed home health referral to [facility] at [phone] and awaiting response from [facility] on whether they can see patient on Thurday or Friday this week.

## 2023-12-20 ENCOUNTER — SOCIAL WORK SERVICES (OUTPATIENT)
Dept: HEMATOLOGY/ONCOLOGY | Facility: HOSPITAL | Age: 73
End: 2023-12-20

## 2023-12-20 ENCOUNTER — TELEPHONE (OUTPATIENT)
Dept: HEMATOLOGY/ONCOLOGY | Facility: HOSPITAL | Age: 73
End: 2023-12-20

## 2023-12-20 NOTE — PROGRESS NOTES
JUN received a call back [facility] with [facility] stating patient is staying with her daughter in [place] and they are not able to service this area. SW contacted patients daughter Gabrielle Trejo to confirm address. Gabrielle Trejo provided her address [facility] [address] [facility] [place]      SW sent Referral to [facility] in [address]  SW called [facility] to confirm service area and they are able to provide services in [place].  JUN will wait to hear back from [facility] regarding status of referral.

## 2023-12-20 NOTE — TELEPHONE ENCOUNTER
Writer called patient to check in as she was just in the ER yesterday. Patient stated that she is breathing better, just a little sore. When asked if home health has contacted her regarding visit and supplies, she stated they have called/scheduled visit for 12/22. She was unable to schedule her follow up with Dr. Lindsey Davis for next week as her daughter will be driving her. Writer called patient's daughter and scheduled follow up for 12/27.

## 2023-12-20 NOTE — ED QUICK NOTES
IR to speak to family and patient about follow-up and at home care. Patient is agreeable with ongoing plan of care.

## 2023-12-20 NOTE — PROGRESS NOTES
JUN spoke with Ford Caruso intake nurse with [facility] and received confirmation that order was received and accepted. Patient will receive her nurse visit tomorrow. JUN faxed [facility] order form for drain kits to [facility]  for them to follow up on order.

## 2023-12-21 ENCOUNTER — SOCIAL WORK SERVICES (OUTPATIENT)
Dept: HEMATOLOGY/ONCOLOGY | Facility: HOSPITAL | Age: 73
End: 2023-12-21

## 2023-12-21 ENCOUNTER — TELEPHONE (OUTPATIENT)
Dept: HEMATOLOGY/ONCOLOGY | Facility: HOSPITAL | Age: 73
End: 2023-12-21

## 2023-12-21 NOTE — PROGRESS NOTES
JUN spoke with Deangelo Gallo in intake with [facility] and updated that they found a nurse willing to go out to [facility] today. JUN called and cancelled the referral with [address].

## 2023-12-21 NOTE — TELEPHONE ENCOUNTER
Writer called patient's daughter and explained to her that appointment is to remain as inperson for now due to patient's declining health. Daughter will call back the clinic on 12/26 with patient update and determine if patient will be coming into clinic.

## 2023-12-21 NOTE — TELEPHONE ENCOUNTER
Patient's daughter called to see if the patient can do a phone visit instead in person/  Please call back.   Thank you

## 2023-12-22 ENCOUNTER — PATIENT OUTREACH (OUTPATIENT)
Dept: CASE MANAGEMENT | Age: 73
End: 2023-12-22

## 2023-12-22 NOTE — PROGRESS NOTES
1st attempt ER f/up apt request  No answer, LVMTCB to schedule apt  PCP -unable to contact  PULM -unable to contact  Closing encounter

## 2023-12-26 ENCOUNTER — TELEPHONE (OUTPATIENT)
Dept: HEMATOLOGY/ONCOLOGY | Facility: HOSPITAL | Age: 73
End: 2023-12-26

## 2023-12-26 NOTE — TELEPHONE ENCOUNTER
Returned phone call to Beti Fuentes and informed her that Dr. Reese Crawford would prefer to see Tahir Rodriguez in the office if she would like to continue Anticancer therapy. Informed her it is not safe for her to continue if she is too weak to be seen in the office. Informed Milka that Dr. Reese Crawford would be amenable to a telephone conversation if Haro [facility] is looking to stop therapy. Beti Fuentes informed me that she would reach out to patient's Daughter and if we did not hear back then they would be coming in for her appointment.

## 2023-12-26 NOTE — TELEPHONE ENCOUNTER
Humberto Johnson from [facility] is calling to speak to nurse to give report of pt care and also states the pt is unable to go out and would like to know if the appt for 12/27/23 can be a telehealth appt-NL

## 2023-12-27 ENCOUNTER — TELEPHONE (OUTPATIENT)
Dept: HEMATOLOGY/ONCOLOGY | Facility: HOSPITAL | Age: 73
End: 2023-12-27

## 2023-12-27 ENCOUNTER — SOCIAL WORK SERVICES (OUTPATIENT)
Dept: HEMATOLOGY/ONCOLOGY | Facility: HOSPITAL | Age: 73
End: 2023-12-27

## 2023-12-27 ENCOUNTER — VIRTUAL PHONE E/M (OUTPATIENT)
Dept: HEMATOLOGY/ONCOLOGY | Facility: HOSPITAL | Age: 73
End: 2023-12-27
Attending: STUDENT IN AN ORGANIZED HEALTH CARE EDUCATION/TRAINING PROGRAM
Payer: MEDICARE

## 2023-12-27 DIAGNOSIS — C78.6 PERITONEAL CARCINOMATOSIS (HCC): ICD-10-CM

## 2023-12-27 DIAGNOSIS — C18.7 CANCER OF SIGMOID COLON (HCC): Primary | ICD-10-CM

## 2023-12-27 DIAGNOSIS — J90 PLEURAL EFFUSION: ICD-10-CM

## 2023-12-27 RX ORDER — LORAZEPAM 1 MG/1
0.5 TABLET ORAL EVERY 6 HOURS PRN
Qty: 30 TABLET | Refills: 0 | Status: SHIPPED | OUTPATIENT
Start: 2023-12-27

## 2023-12-27 NOTE — PROGRESS NOTES
JUN received a request from Brittnee Canela RN with Dr Reese Crawford requesting a hospice referral be sent to [facility]. JUN faxed referral to [facility] attn Intake to [phone]. SW awaiting response from [facility] at this time.

## 2023-12-27 NOTE — TELEPHONE ENCOUNTER
Writer called patient's daughter back as requested. Patient requesting her last CEA lab value. This information was shared.

## 2023-12-27 NOTE — TELEPHONE ENCOUNTER
Writer called patient's daughter back and informed her that Dr. Lindsey Davis will be calling patient/daughter this morning to discuss plan of care moving forward.

## 2023-12-27 NOTE — PROGRESS NOTES
We had a scheduled clinic visit for the day but the patient is unable to come due to declining performance status. I discussed her condition with the patient and her daughter Colonel Quijano over the phone today. She remains quite short of breath, coughing more, increasing anxiety related to dyspnea. She is no longer fit for therapy and home hospice is appropriate. After our discussion the patient is agreeable. We will reach out to our  to coordinate a home hospice visit. Notably, they had a hospital bed coming through home health that they wish to still receive and we will ensure that that is delivered.     Mayelin Ng, [facility]

## 2023-12-27 NOTE — TELEPHONE ENCOUNTER
Patient's daughter called and said the patient is very weak today. She has decided she is done doing any treatment and would like to discuss hospice. Please call back. Thank you.

## 2023-12-28 ENCOUNTER — SOCIAL WORK SERVICES (OUTPATIENT)
Dept: HEMATOLOGY/ONCOLOGY | Facility: HOSPITAL | Age: 73
End: 2023-12-28

## 2023-12-28 ENCOUNTER — TELEPHONE (OUTPATIENT)
Dept: HEMATOLOGY/ONCOLOGY | Facility: HOSPITAL | Age: 73
End: 2023-12-28

## 2023-12-28 NOTE — TELEPHONE ENCOUNTER
Writer called back patient's daughter and went over the use and side effects of Ativan that was prescribed yesterday by Dr. Yvon Goins. Daughter Danie Corado state that patient has received two doses thus far and her anxiety has improved significantly.

## 2023-12-28 NOTE — PROGRESS NOTES
SW received a response from [facility] with updates on referral. Bhargavi Chin indicated that they are scheduled to meet at patient's daughter's home in [facility] on 12/30/23 at 1 pm.

## 2023-12-28 NOTE — TELEPHONE ENCOUNTER
Johnson Fitzpatrick is calling to ask the clinic RN some questions about her Mother's medications. She would like a call back to discuss this matter.

## 2024-01-01 ENCOUNTER — TELEPHONE (OUTPATIENT)
Dept: HEMATOLOGY/ONCOLOGY | Facility: HOSPITAL | Age: 74
End: 2024-01-01

## 2024-01-03 NOTE — TELEPHONE ENCOUNTER
Writer called patient's daughter to confirm that letter was received by . Penny stated that yes letter was received.    Discussed current POC for patient. She is currently enrolled in hospice with [place]. She is aware that team remains available if any questions/concerns arise.

## (undated) DEVICE — LEGGINGS SRG 48X31IN CUF STRL

## (undated) DEVICE — STERILE SURGICAL LUBRICANT, METAL TUBE: Brand: SURGILUBE

## (undated) DEVICE — 9534HP TRANSPARENT DRSG W/FRAME: Brand: 3M™ TEGADERM™

## (undated) DEVICE — SUTURE VICRYL 2-0 SH

## (undated) DEVICE — DRAIN RELIAVAC 100CC

## (undated) DEVICE — SUTURE SILK 2-0 SH

## (undated) DEVICE — SNARE CAPTIFLEX MICRO-OVL OLY

## (undated) DEVICE — PAD SACRAL SPAN AID

## (undated) DEVICE — TOWEL SURG OR 17X30IN BLUE

## (undated) DEVICE — KIT ENDO ORCAPOD 160/180/190

## (undated) DEVICE — GAMMEX® PI HYBRID SIZE 7, STERILE POWDER-FREE SURGICAL GLOVE, POLYISOPRENE AND NEOPRENE BLEND: Brand: GAMMEX

## (undated) DEVICE — DISPOSABLE SUCTION/IRRIGATOR TUBE SET: Brand: AHTO

## (undated) DEVICE — PROCEDURE KIT FOR HT-2000

## (undated) DEVICE — ENDOPATH ECHELON ENDOSCOPIC LINEAR CUTTER RELOADS, WHITE, 60MM: Brand: ECHELON ENDOPATH

## (undated) DEVICE — SOL  .9 1000ML BTL

## (undated) DEVICE — LAP CHOLE: Brand: MEDLINE INDUSTRIES, INC.

## (undated) DEVICE — SUTURE SILK 3-0 SH

## (undated) DEVICE — SUTURE PROLENE 1 CT-1

## (undated) DEVICE — FORCEP RADIAL JAW 4

## (undated) DEVICE — SUTURE PROLENE 2-0 MH

## (undated) DEVICE — SUTURE ETHILON 3-0 PS-2

## (undated) DEVICE — LAPAROTOMY SPONGE - RF AND X-RAY DETECTABLE PRE-WASHED: Brand: SITUATE

## (undated) DEVICE — 40580 - THE PINK PAD - ADVANCED TRENDELENBURG POSITIONING KIT: Brand: 40580 - THE PINK PAD - ADVANCED TRENDELENBURG POSITIONING KIT

## (undated) DEVICE — LINE MNTR ADLT SET O2 INTMD

## (undated) DEVICE — POOLE SUCTION HANDLE: Brand: CARDINAL HEALTH

## (undated) DEVICE — TROCAR: Brand: KII® SLEEVE

## (undated) DEVICE — SINGLE-USE CUT/COAGULATION HANDPIECE FOR OPEN SURGERY FOR THE PLASMAJET SYSTEM: Brand: PLASMAJET NEUTRAL PLASMA SURGERY SYSTEM

## (undated) DEVICE — TRAP MCS 40ML 5IN PLS SCR CAP

## (undated) DEVICE — STERILE SYNTHETIC POLYISOPRENE POWDER-FREE SURGICAL GLOVES WITH HYDROGEL COATING, SMOOTH FINISH, STRAIGHT FINGER: Brand: PROTEXIS

## (undated) DEVICE — DRAPE SHEET LG

## (undated) DEVICE — HARMONIC ACE +7 LAPAROSCOPIC SHEARS ADVANCED HEMOSTASIS 5MM DIAMETER 45CM SHAFT LENGTH  FOR USE WITH GRAY HAND PIECE ONLY: Brand: HARMONIC ACE

## (undated) DEVICE — Device: Brand: CUSTOM PROCEDURE KIT

## (undated) DEVICE — DRAIN PENROSE 12X1/4

## (undated) DEVICE — CIRCULAR MECH XL SEAL 25MM

## (undated) DEVICE — MEDI-VAC NON-CONDUCTIVE SUCTION TUBING 6MM X 1.8M (6FT.) L: Brand: CARDINAL HEALTH

## (undated) DEVICE — SOL  .9 3000ML

## (undated) DEVICE — TROCAR: Brand: KII FIOS FIRST ENTRY

## (undated) DEVICE — ACCESS PLATFORM FOR MINIMALLY INVASIVE SURGERY.: Brand: GELPORT® LAPAROSCOPIC  SYSTEM

## (undated) DEVICE — COVER,MAYO STAND,STERILE: Brand: MEDLINE

## (undated) DEVICE — Device

## (undated) DEVICE — NEEDLE CONTRAST INTERJECT 25G

## (undated) DEVICE — 35 ML SYRINGE REGULAR TIP: Brand: MONOJECT

## (undated) DEVICE — A P RESECTION: Brand: MEDLINE INDUSTRIES, INC.

## (undated) DEVICE — FLOTRAC SENSOR (84" / 213CM): Brand: FLOTRAC

## (undated) DEVICE — SUTURE SILK 0 FSL

## (undated) DEVICE — SUTURE PDS II 1 TP-1

## (undated) DEVICE — NON-ADHERENT PAD PREPACK: Brand: TELFA

## (undated) DEVICE — 3 ML SYRINGE LUER-LOCK TIP: Brand: MONOJECT

## (undated) DEVICE — STERILE LATEX POWDER-FREE SURGICAL GLOVESWITH NITRILE COATING: Brand: PROTEXIS

## (undated) DEVICE — SMOKE EVACUATION TUBING 7/8 IN (22 MM) X 10 FT (3.1 M) TUBE WITH 8 IN (20 CM) INTEGRAL WAND & SPONGE GUARD: Brand: CONMED BUFFALO FILTER

## (undated) DEVICE — [HIGH FLOW INSUFFLATOR,  DO NOT USE IF PACKAGE IS DAMAGED,  KEEP DRY,  KEEP AWAY FROM SUNLIGHT,  PROTECT FROM HEAT AND RADIOACTIVE SOURCES.]: Brand: PNEUMOSURE

## (undated) DEVICE — SUTURE PROLENE 3-0 SH

## (undated) DEVICE — ABC BEND-A-BEAM 3"(7.6CM) HANDCONTROL MALLEABLE HANDPIECE: Brand: ABC BEND-A-BEAM

## (undated) DEVICE — PROXIMATE SKIN STAPLERS (35 WIDE) CONTAINS 35 STAINLESS STEEL STAPLES (FIXED HEAD): Brand: PROXIMATE

## (undated) DEVICE — Device: Brand: DEFENDO AIR/WATER/SUCTION AND BIOPSY VALVE

## (undated) DEVICE — DRAIN ROUND HUBLESS 15FR

## (undated) DEVICE — SPONGE LAP 18X18 XRAY STRL

## (undated) DEVICE — PAD ALC 43.5X24IN PREP  LF

## (undated) DEVICE — 3M™ STERI-DRAPE™ INSTRUMENT POUCH 1018L: Brand: STERI-DRAPE™

## (undated) DEVICE — UNDYED BRAIDED (POLYGLACTIN 910), SYNTHETIC ABSORBABLE SUTURE: Brand: COATED VICRYL

## (undated) DEVICE — SUTURE PROLENE 5-0 RB-1

## (undated) DEVICE — PROXIMATE RELOADABLE LINEAR CUTTER WITH SAFETY LOCK-OUT, 75MM: Brand: PROXIMATE

## (undated) DEVICE — SUTURE VICRYL 3-0 SH

## (undated) DEVICE — HEXAGONAL CAPTIVATOR STIFF 13M

## (undated) DEVICE — GAMMEX® PI HYBRID SIZE 8, STERILE POWDER-FREE SURGICAL GLOVE, POLYISOPRENE AND NEOPRENE BLEND: Brand: GAMMEX

## (undated) DEVICE — HOLSTER ALL LENGTH

## (undated) DEVICE — SUTURE VICRYL 0 J906G

## (undated) DEVICE — GLOVE SURG NEOLON SZ 6-1/2

## (undated) DEVICE — SCD SLEEVE KNEE HI BLEND

## (undated) DEVICE — 3M™ IOBAN™ 2 ANTIMICROBIAL INCISE DRAPE 6640EZ: Brand: IOBAN™ 2

## (undated) DEVICE — SUTURE SILK 0

## (undated) DEVICE — GOWN SURG AERO BLUE PERF XLG

## (undated) DEVICE — KIT CLEAN ENDOKIT 1.1OZ GOWNX2

## (undated) DEVICE — REM POLYHESIVE ADULT PATIENT RETURN ELECTRODE: Brand: VALLEYLAB

## (undated) DEVICE — 3M™ STERI-STRIP™ REINFORCED ADHESIVE SKIN CLOSURES, R1547, 1/2 IN X 4 IN (12 MM X 100 MM), 6 STRIPS/ENVELOPE: Brand: 3M™ STERI-STRIP™

## (undated) DEVICE — ABSORBABLE HEMOSTAT (OXIDIZED REGENERATED CELLULOSE, U.S.P.): Brand: SURGICEL

## (undated) DEVICE — DRAPE EQUIPMENT INTRATEMP THER

## (undated) DEVICE — 3M™ IOBAN™ 2 ANTIMICROBIAL INCISE DRAPE 6650EZ: Brand: IOBAN™ 2

## (undated) DEVICE — CHEMOTHERAPY CONTAINER,SLIDE LID, YELLOW: Brand: SHARPSAFETY

## (undated) DEVICE — DRAPE SHEET LAPCHOLE 124X100X7

## (undated) DEVICE — GLOVE SURG NEOLON SZ 7-1/2

## (undated) DEVICE — GLOVE SURG NEOLON SZ 8

## (undated) DEVICE — GOWN,SIRUS,FABRIC-REINFORCED,X-LARGE: Brand: MEDLINE

## (undated) DEVICE — CONMED SCOPE SAVER BITE BLOCK, 20X27 MM: Brand: SCOPE SAVER

## (undated) DEVICE — ECHELON FLEX POWERED PLUS COMPACT ARTICULATING ENDOSCOPIC LINEAR CUTTER, 60MM: Brand: ECHELON FLEX

## (undated) DEVICE — DRAPE,UNDRBUT,WHT GRAD PCH,CAPPORT,20/CS: Brand: MEDLINE

## (undated) DEVICE — SUTURE PROLENE 4-0 RB-1

## (undated) DEVICE — CONTOUR CURVED CUTTER STAPLER: Brand: CONTOUR

## (undated) DEVICE — SUTURE MONOCRYL 4-0 PS-2

## (undated) DEVICE — LIGASURE IMPACT OPEN DEVICE

## (undated) DEVICE — GOWN,PREVENTION PLUS,LARGE,STERILE: Brand: MEDLINE

## (undated) DEVICE — DERMABOND LIQUID ADHESIVE

## (undated) DEVICE — SOLUTION ENDOSCOPIC ANTI-FOG NON-TOXIC NON-ABRASIVE 6 CUBIC CENTIMETER WITH RADIOPAQUE ADHESIVE-BACKED SPONGE STERILE NOT MADE WITH NATURAL RUBBER LATEX MEDICHOICE: Brand: MEDICHOICE

## (undated) DEVICE — HIPEC PACK: Brand: MEDLINE INDUSTRIES, INC.

## (undated) DEVICE — INTENDED TO BE USED TO OCCLUDE, RETRACT AND IDENTIFY ARTERIES, VEINS, TENDONS AND NERVES IN SURGICAL PROCEDURES: Brand: STERION®  VESSEL LOOP

## (undated) DEVICE — SURGICEL SNOW ABS 4X4

## (undated) DEVICE — MEDI-VAC NON-CONDUCTIVE SUCTION TUBING: Brand: CARDINAL HEALTH

## (undated) DEVICE — LIGHT HANDLE

## (undated) DEVICE — BLANKET HYPOTHERMIA ADULT

## (undated) DEVICE — 6 ML SYRINGE LUER-LOCK TIP: Brand: MONOJECT

## (undated) DEVICE — BLADE 11 SHRP BP SS SRG STRL

## (undated) DEVICE — GOWN,PREVENTION PLUS,XLARGE,STERILE: Brand: MEDLINE

## (undated) DEVICE — SYRINGE,TOOMEY,IRRIGATION,70CC,STERILE: Brand: MEDLINE

## (undated) DEVICE — GLOVE SURG NEOLON SZ 7

## (undated) NOTE — LETTER
[address]  Authorization for Invasive Procedure                                                                                           1. I hereby authorize Chele Escobar MD, my physician and his/her assistants (if applicable), which may include medical students, residents, and/or fellows, to perform the following surgical operation/ procedure and administer such anesthesia as may be determined necessary by my physician: Operation/Procedure name (s) ESOPHAGOGASTRODUODENOSCOPY (EGD) on Kike Franklin   2. I recognize that during the surgical operation/procedure, unforeseen conditions may necessitate additional or different procedures than those listed above. I, therefore, further authorize and request that the above-named surgeon, assistants, or designees perform such procedures as are, in their judgment, necessary and desirable. 3.   My surgeon/physician has discussed prior to my surgery the potential benefits, risks and side effects of this procedure; the likelihood of achieving goals; and potential problems that might occur during recuperation. They also discussed reasonable alternatives to the procedure, including risks, benefits, and side effects related to the alternatives and risks related to not receiving this procedure. I have had all my questions answered and I acknowledge that no guarantee has been made as to the result that may be obtained. 4.   Should the need arise during my operation/procedure, which includes change of level of care prior to discharge, I also consent to the administration of blood and/or blood products. Further, I understand that despite careful testing and screening of blood or blood products by collecting agencies, I may still be subject to ill effects as a result of receiving a blood transfusion and/or blood products.   The following are some, but not all, of the potential risks that can occur: fever and allergic reactions, hemolytic reactions, transmission of diseases such as Hepatitis, AIDS and Cytomegalovirus (CMV) and fluid overload. In the event that I wish to have an autologous transfusion of my own blood, or a directed donor transfusion, I will discuss this with my physician. Check only if Refusing Blood or Blood Products  I understand refusal of blood or blood products as deemed necessary by my physician may have serious consequences to my condition to include possible death. I hereby assume responsibility for my refusal and release the hospital, its personnel, and my physicians from any responsibility for the consequences of my refusal.    o  Refuse   5. I authorize the use of any specimen, organs, tissues, body parts or foreign objects that may be removed from my body during the operation/procedure for diagnosis, research or teaching purposes and their subsequent disposal by hospital authorities. I also authorize the release of specimen test results and/or written reports to my treating physician on the hospital medical staff or other referring or consulting physicians involved in my care, at the discretion of the Pathologist or my treating physician. 6.   I consent to the photographing or videotaping of the operations or procedures to be performed, including appropriate portions of my body for medical, scientific, or educational purposes, provided my identity is not revealed by the pictures or by descriptive texts accompanying them. If the procedure has been photographed/videotaped, the surgeon will obtain the original picture, image, videotape or CD. The hospital will not be responsible for storage, release or maintenance of the picture, image, tape or CD.    7.   I consent to the presence of a  or observers in the operating room as deemed necessary by my physician or their designees.     8.   I recognize that in the event my procedure results in extended X-Ray/fluoroscopy time, I may develop a skin reaction. 9. If I have a Do Not Attempt Resuscitation (DNAR) order in place, that status will be suspended while in the operating room, procedural suite, and during the recovery period unless otherwise explicitly stated by me (or a person authorized to consent on my behalf). The surgeon or my attending physician will determine when the applicable recovery period ends for purposes of reinstating the DNAR order. 10. Patients having a sterilization procedure: I understand that if the procedure is successful the results will be permanent and it will therefore be impossible for me to inseminate, conceive, or bear children. I also understand that the procedure is intended to result in sterility, although the result has not been guaranteed. 11. I acknowledge that my physician has explained sedation/analgesia administration to me including the risk and benefits I consent to the administration of sedation/analgesia as may be necessary or desirable in the judgment of my physician. I CERTIFY THAT I HAVE READ AND FULLY UNDERSTAND THE ABOVE CONSENT TO OPERATION and/or OTHER PROCEDURE.     _________________________________________ _________________________________     ___________________________________  Signature of Patient     Signature of Responsible Person                   Printed Name of Responsible Person                              _________________________________________ ______________________________        ___________________________________  Signature of Witness         Date  Time         Relationship to Patient    STATEMENT OF PHYSICIAN My signature below affirms that prior to the time of the procedure; I have explained to the patient and/or his/her legal representative, the risks and benefits involved in the proposed treatment and any reasonable alternative to the proposed treatment.  I have also explained the risks and benefits involved in refusal of the proposed treatment and alternatives to the proposed treatment and have answered the patient's questions.  If I have a significant financial interest in a co-management agreement or a significant financial interest in any product or implant, or other significant relationship used in this procedure/surgery, I have disclosed this and had a discussion with my patient.     _______________________________________________________________ _____________________________  Brendia Fat of Physician)                                                                                         (Date)                                   (Time)  Patient Name: Oliva Davison    : 1950   Printed: 2022      Medical Record #: [id number]                                              Page 1 of 1

## (undated) NOTE — LETTER
Hospital Discharge Documentation  Please phone to schedule a hospital follow up appointment.     From: [address] Hospitalist's Office  Phone: [phone]    Patient discharged time/date: 3/20/2020  4:54 PM  Patient discharge disposition:  Home or Self Cancer of sigmoid colon ([facility] [address]  S/P hand assisted laparoscopic sigmoid colon resection, PAIN CONTROL,   Soft diet for now   [address],   DVT PROPHYLAXIS,   PATHOLOGY reviewed- sigmoid adenocarcinoma    OUT PT CT ABD SHOWED NO EVIDENCE OF ME Take 500 mg by mouth daily. Refills:  0            Follow up: Follow-up Information     Lali Soto MD.    Specialty:  SURGERY, GENERAL  Contact information:  [address]  [phone]             Jory June

## (undated) NOTE — ED AVS SNAPSHOT
Jessica Stanley   MRN: [id number]    Department:  [facility] Emergency Department   Date of Visit:  2/6/2020           Disclosure     Insurance plans vary and the physician(s) referred by the ER may not be covered by your plan.  Please contact you within the next three months to obtain basic health screening including reassessment of your blood pressure.     IF THERE IS ANY CHANGE OR WORSENING OF YOUR CONDITION, CALL YOUR PRIMARY CARE PHYSICIAN AT ONCE OR RETURN IMMEDIATELY TO THE EMERGENCY DEPARTMEN

## (undated) NOTE — LETTER
[facility], [place]  Authorization for Invasive Procedures  1.  I hereby authorize  Dr. Hai Villanueva , my physician and whomever may be designated as the doctor's assistant, to perform the following operation and/or procedure:  Chest 4. Should the need arise during my operation or immediate post-operative period; I also consent to the administration of blood and/or blood products.  Further, I understand that despite careful testing and screening of blood and blood products, I may still 9. Patients having a sterilization procedure: I understand that if the procedure is successful the results will be permanent and it will therefore be impossible for me to inseminate, conceive or bear children.  I also understand that the procedure is intend

## (undated) NOTE — LETTER
[facility] [address]  [phone]  [phone]  Authorization for Imaging Procedure    I hereby authorize Dr. Matthew Vizcarra, my physician and his/her assistants (if applicable), which may include medical students, residents, and/or fellows, to perform the following procedure and administer such anesthesia as may be determined necessary by my physician: Carol Morrow  on [facility].   2.  I recognize that during the procedure, unforeseen conditions may necessitate additional or different procedures than those listed above. I, therefore, further authorize and request that the above-named physician, assistants, or designees perform such procedures as are, in their judgment, necessary and desirable. 3.  My physician has discussed prior to my procedure the potential benefits, risks and side effects of this procedure; the likelihood of achieving goals; and potential problems that might occur during recuperation. They also discussed reasonable alternatives to the procedure, including risks, benefits, and side effects related to the alternatives and risks related to not receiving this procedure. I have had all my questions answered and I acknowledge that no guarantee has been made as to the result that may be obtained. 4.  Should the need arise during my procedure, which includes change of level of care prior to discharge, I also consent to the administration of blood and/or blood products. Further, I understand that despite careful testing and screening of blood or blood products by collecting agencies, I may still be subject to ill effects as a result of receiving a blood transfusion and/or blood products. The following are some, but not all, of the potential risks that can occur: fever and allergic reactions, hemolytic reactions, transmission of diseases such as Hepatitis, AIDS and Cytomegalovirus (CMV) and fluid overload.  In the event that I wish to have an autologous transfusion of my own blood, or a directed donor transfusion, I will discuss this with my physician. Check only if Refusing Blood or Blood Products  I understand refusal of blood or blood products as deemed necessary by my physician may have serious consequences to my condition to include possible death. I hereby assume responsibility for my refusal and release the hospital, its personnel, and my physicians from any responsibility for the consequences of my refusal.   [  ] Patient Refuses Blood      5. I authorize the use of any specimen, organs, tissues, body parts or foreign objects that may be removed from my body during the procedure for diagnosis, research or teaching purposes and their subsequent disposal by hospital authorities. I also authorize the release of specimen test results and/or written reports to my treating physician on the hospital medical staff or other referring or consulting physicians involved in my care, at the discretion of the Pathologist or my treating physician. 6.  I consent to the photographing or videotaping of the procedures to be performed, including appropriate portions of my body for medical, scientific, or educational purposes, provided my identity is not revealed by the pictures or by descriptive texts accompanying them. If the procedure has been photographed/videotaped, the physician will obtain the original picture, image, videotape or CD. The hospital will not be responsible for storage, release or maintenance of the picture, image, tape or CD.   7.  I consent to the presence of a  or observers in the operating room as deemed necessary by my physician or their designees. 8.  I recognize that in the event my procedure results in extended X-Ray/fluoroscopy time, I may develop a skin reaction. 9.   If I have a Do Not Attempt Resuscitation (DNAR) order in place, that status will be suspended while in the operating room, procedural suite, and during the recovery period unless otherwise explicitly stated by me (or a person authorized to consent on my behalf). The performing physician or my attending physician will determine when the applicable recovery period ends for purposes of reinstating the DNAR order. 10.  I acknowledge that my physician has explained sedation/analgesia administration to me including the risk and benefits I consent to the administration of sedation/analgesia as may be necessary or desirable in the judgment of my physician. I CERTIFY THAT I HAVE READ AND FULLY UNDERSTAND THE ABOVE CONSENT FOR THE PROCEDURE. Signature of Patient: _____________________________________________________________  Responsible person in case of minor, unconscious: ____________________________________  Relationship to patient:  __________________________________________________________  Signature of Witness: _______________________________Date: _________Time: __________    Statement of Physician: My signature below affirms that prior to the time of the procedure, I have explained to the patient and/or her guardian, the risks and benefits involved in the proposed treatment and any reasonable alternative to the proposed treatment. I have also explained the risks and benefits involved in the refusal of the proposed treatment and have answered the patient's questions. If I have a significant financial interest in a co-management agreement or a significant financial interest in any product or implant, or other significant relationship used in the procedure/surgery, I have disclosed this and had a discussion with my patient.   Signature of Physician:   _________________________________Date:_____________Time:________    Patient Name: James Stefany : 1950  Printed: May 15, 2023   Medical Record #: [id number]

## (undated) NOTE — LETTER
[facility] Aly[place] Ray [address]  [phone]  Authorization for Imaging Procedure    I hereby authorize Dr. Cee Patricia, my physician and his/her assistants (if applicable), which may include medical students, residents, and/or fellows, to perform the following procedure and administer such anesthesia as may be determined necessary by my physician: Hernesto Simpson. on [address].   2.  I recognize that during the procedure, unforeseen conditions may necessitate additional or different procedures than those listed above. I, therefore, further authorize and request that the above-named physician, assistants, or designees perform such procedures as are, in their judgment, necessary and desirable. 3.  My physician has discussed prior to my procedure the potential benefits, risks and side effects of this procedure; the likelihood of achieving goals; and potential problems that might occur during recuperation. They also discussed reasonable alternatives to the procedure, including risks, benefits, and side effects related to the alternatives and risks related to not receiving this procedure. I have had all my questions answered and I acknowledge that no guarantee has been made as to the result that may be obtained. 4.  Should the need arise during my procedure, which includes change of level of care prior to discharge, I also consent to the administration of blood and/or blood products. Further, I understand that despite careful testing and screening of blood or blood products by collecting agencies, I may still be subject to ill effects as a result of receiving a blood transfusion and/or blood products. The following are some, but not all, of the potential risks that can occur: fever and allergic reactions, hemolytic reactions, transmission of diseases such as Hepatitis, AIDS and Cytomegalovirus (CMV) and fluid overload.  In the event that I wish to have an autologous transfusion of my own blood, or a directed donor transfusion, I will discuss this with my physician. Check only if Refusing Blood or Blood Products  I understand refusal of blood or blood products as deemed necessary by my physician may have serious consequences to my condition to include possible death. I hereby assume responsibility for my refusal and release the hospital, its personnel, and my physicians from any responsibility for the consequences of my refusal.   [  ] Patient Refuses Blood      5. I authorize the use of any specimen, organs, tissues, body parts or foreign objects that may be removed from my body during the procedure for diagnosis, research or teaching purposes and their subsequent disposal by hospital authorities. I also authorize the release of specimen test results and/or written reports to my treating physician on the hospital medical staff or other referring or consulting physicians involved in my care, at the discretion of the Pathologist or my treating physician. 6.  I consent to the photographing or videotaping of the procedures to be performed, including appropriate portions of my body for medical, scientific, or educational purposes, provided my identity is not revealed by the pictures or by descriptive texts accompanying them. If the procedure has been photographed/videotaped, the physician will obtain the original picture, image, videotape or CD. The hospital will not be responsible for storage, release or maintenance of the picture, image, tape or CD.   7.  I consent to the presence of a  or observers in the operating room as deemed necessary by my physician or their designees. 8.  I recognize that in the event my procedure results in extended X-Ray/fluoroscopy time, I may develop a skin reaction. 9.   If I have a Do Not Attempt Resuscitation (DNAR) order in place, that status will be suspended while in the operating room, procedural suite, and during the recovery period unless otherwise explicitly stated by me (or a person authorized to consent on my behalf). The performing physician or my attending physician will determine when the applicable recovery period ends for purposes of reinstating the DNAR order. 10.  I acknowledge that my physician has explained sedation/analgesia administration to me including the risk and benefits I consent to the administration of sedation/analgesia as may be necessary or desirable in the judgment of my physician. I CERTIFY THAT I HAVE READ AND FULLY UNDERSTAND THE ABOVE CONSENT FOR THE PROCEDURE. Signature of Patient: _____________________________________________________________  Responsible person in case of minor, unconscious: ____________________________________  Relationship to patient:  __________________________________________________________  Signature of Witness: _______________________________Date: _________Time: __________    Statement of Physician: My signature below affirms that prior to the time of the procedure, I have explained to the patient and/or her guardian, the risks and benefits involved in the proposed treatment and any reasonable alternative to the proposed treatment. I have also explained the risks and benefits involved in the refusal of the proposed treatment and have answered the patient's questions. If I have a significant financial interest in a co-management agreement or a significant financial interest in any product or implant, or other significant relationship used in the procedure/surgery, I have disclosed this and had a discussion with my patient.   Signature of Physician:   _________________________________Date:_____________Time:________    Patient Name: Joselin Liam : 1950  Printed: May 26, 2023   Medical Record #: [id number]

## (undated) NOTE — MR AVS SNAPSHOT
After Visit Summary   11/24/2020    Jose Ovalle    MRN: [id number]           Visit Information     Date & Time  11/24/2020  9:00 AM Provider   [address] - Infusion Dept.  Phone  [phone]      You 12/10/2020 11:30 AM EM [address] - Infusion    12/22/2020 9:15 AM EM CC [address] - Infusion    12/22/2020 10:00 AM [place] [address] [place] [address] Hematology Oncology    12/22/2020 10:30 AM EM CC INFRN Saturday – Sunday  8:00 am – 4:00 pm    WALK-IN CARE  Primary Care Providers  Treatment for acute illness   or injury that are   non-life-threatening.   Also available by appointment     Average cost  $70*       [facility]    Heriberto Birmingham

## (undated) NOTE — MR AVS SNAPSHOT
After Visit Summary   5/17/2022    Hi Garcia   MRN: [id number]           Visit Information     Date & Time  5/17/2022 10:30 AM Provider  EM CC INFRN 6 Department  [address] [place] [address] Infusion Dept. Phone  [phone]      Allergies as of 5/17/2022  Review status set to In Progress on 5/17/2022   No Known Allergies     Your Current Medications        Dosage    ondansetron (ZOFRAN) 8 MG tablet Take 1 tablet (8 mg total) by mouth every 8 (eight) hours as needed for Nausea. prochlorperazine (COMPAZINE) 10 mg tablet Take 1 tablet (10 mg total) by mouth every 8 (eight) hours as needed for Nausea.    lidocaine-prilocaine 2.5-2.5 % External Cream Apply to site 1 hour prior to port a cath needle insertion    Vitamin D3 50 MCG (2000 UT) Oral Cap Take 2,000 Units by mouth 2 (two) times a day. Per pt. Takes 4000 units daily(at one time)     nystatin 587890 UNIT/GM External Cream aply peasize to affected area twice    Acetaminophen 500 MG Oral Cap Take by mouth every 6 (six) hours as needed for Pain. Diagnoses for This Visit    Encounter for central line care   [080206]  -  Primary  Encounter for medication management   [705102]             Future Appointments        Provider Department    5/19/2022 8:15 AM EM [address] - Infusion    5/19/2022 9:00 AM EM CC INFRN [address] - Infusion    5/21/2022 9:00 AM EM CC INFRN [address]                Did you know that [facility] primary care physicians now offer Video Visits through [address] for adult patients for a variety of conditions such as allergies, back pain and cold symptoms? Skip the drive and waiting room and online chat with a doctor face-to-face using your web-cam enabled computer or mobile device wherever you are. Video Visits cost $50 and can be paid hassle-free using a credit, debit, or health savings card. Not active on [employer]?  Ask us how to get signed up today! If you receive a survey from [employer], please take a few minutes to complete it and provide feedback. We strive to deliver the best patient experience and are looking for ways to make improvements. Your feedback will help us do so. For more information on [employer], please visit [email or web address]           No text in SmartText           No text in SmartText

## (undated) NOTE — MR AVS SNAPSHOT
After Visit Summary   11/17/2021    Keeley Driscoll   MRN: [id number]           Visit Information     Date & Time  11/17/2021  1:30 PM Provider  EM CC [facility] [address]  Phone  [phone]      Your 12/29/2021 1:30 PM [facility] Hematology Oncology    12/29/2021 2:00 PM EM CC INFRN [address]             Did you know that [facility] primary care physicians now offer Video Visits through [address] for adult pa

## (undated) NOTE — LETTER
[address]  [facility] [address]  [phone]  [phone]  Authorization for Imaging Procedure    I hereby authorize Dr. Halie Martinez, my physician and his/her assistants (if applicable), which may include medical students, residents, and/or fellows, to perform the following procedure and administer such anesthesia as may be determined necessary by my physician: ULTRASOUND GUIDED LEFT  THORACENTESIS ([id number]=[id number]) on Kike Franklin.   2.  I recognize that during the procedure, unforeseen conditions may necessitate additional or different procedures than those listed above. I, therefore, further authorize and request that the above-named physician, assistants, or designees perform such procedures as are, in their judgment, necessary and desirable. 3.  My physician has discussed prior to my procedure the potential benefits, risks and side effects of this procedure; the likelihood of achieving goals; and potential problems that might occur during recuperation. They also discussed reasonable alternatives to the procedure, including risks, benefits, and side effects related to the alternatives and risks related to not receiving this procedure. I have had all my questions answered and I acknowledge that no guarantee has been made as to the result that may be obtained. 4.  Should the need arise during my procedure, which includes change of level of care prior to discharge, I also consent to the administration of blood and/or blood products. Further, I understand that despite careful testing and screening of blood or blood products by collecting agencies, I may still be subject to ill effects as a result of receiving a blood transfusion and/or blood products. The following are some, but not all, of the potential risks that can occur: fever and allergic reactions, hemolytic reactions, transmission of diseases such as Hepatitis, AIDS and Cytomegalovirus (CMV) and fluid overload.  In the event that I wish to have an autologous transfusion of my own blood, or a directed donor transfusion, I will discuss this with my physician. Check only if Refusing Blood or Blood Products  I understand refusal of blood or blood products as deemed necessary by my physician may have serious consequences to my condition to include possible death. I hereby assume responsibility for my refusal and release the hospital, its personnel, and my physicians from any responsibility for the consequences of my refusal.   [  ] Patient Refuses Blood      5. I authorize the use of any specimen, organs, tissues, body parts or foreign objects that may be removed from my body during the procedure for diagnosis, research or teaching purposes and their subsequent disposal by hospital authorities. I also authorize the release of specimen test results and/or written reports to my treating physician on the hospital medical staff or other referring or consulting physicians involved in my care, at the discretion of the Pathologist or my treating physician. 6.  I consent to the photographing or videotaping of the procedures to be performed, including appropriate portions of my body for medical, scientific, or educational purposes, provided my identity is not revealed by the pictures or by descriptive texts accompanying them. If the procedure has been photographed/videotaped, the physician will obtain the original picture, image, videotape or CD. The hospital will not be responsible for storage, release or maintenance of the picture, image, tape or CD.   7.  I consent to the presence of a  or observers in the operating room as deemed necessary by my physician or their designees. 8.  I recognize that in the event my procedure results in extended X-Ray/fluoroscopy time, I may develop a skin reaction. 9.   If I have a Do Not Attempt Resuscitation (DNAR) order in place, that status will be suspended while in the operating room, procedural suite, and during the recovery period unless otherwise explicitly stated by me (or a person authorized to consent on my behalf). The performing physician or my attending physician will determine when the applicable recovery period ends for purposes of reinstating the DNAR order. 10.  I acknowledge that my physician has explained sedation/analgesia administration to me including the risk and benefits I consent to the administration of sedation/analgesia as may be necessary or desirable in the judgment of my physician. I CERTIFY THAT I HAVE READ AND FULLY UNDERSTAND THE ABOVE CONSENT FOR THE PROCEDURE. Signature of Patient: _____________________________________________________________  Responsible person in case of minor, unconscious: ____________________________________  Relationship to patient:  __________________________________________________________  Signature of Witness: _______________________________Date: _________Time: __________  Statement of Physician: My signature below affirms that prior to the time of the procedure, I have explained to the patient and/or her guardian, the risks and benefits involved in the proposed treatment and any reasonable alternative to the proposed treatment. I have also explained the risks and benefits involved in the refusal of the proposed treatment and have answered the patient's questions. If I have a significant financial interest in a co-management agreement or a significant financial interest in any product or implant, or other significant relationship used in the procedure/surgery, I have disclosed this and had a discussion with my patient.   Signature of Physician:   _________________________________Date:_____________Time:________  Patient Name: Oliva Davison : 1950  Printed: 2023  Medical Record #: [id number]

## (undated) NOTE — Clinical Note
Pt was contacted for TCM. She is doing well. Pt declined an appt at this time. TE was sent to the office to FU. Medication list reviewed. Recommended HFU appt's were reviewed. Pt declines concerns at this time. Thank you.

## (undated) NOTE — MR AVS SNAPSHOT
After Visit Summary   1/5/2021    Katelynn Myles    MRN: [id number]           Visit Information     Date & Time  1/5/2021  9:30 AM Provider  EM KHRIS MENJIVAR [address] [facility] Infusion Dept.  Phone  [phone]      Your Vi 2/2/2021 8:45 AM Radha Ortiz [facility] Hematology Oncology    2/2/2021 9:30 AM EM CC INFRN [address] - Infusion    2/4/2021 10:30 AM EM CC LAB4 [address] - Infusion                [facility] now offers Video Vi Also available by appointment     Average cost  $70*       [address]   Monday – Friday  10:00 am – 10:00 pm   Saturday – Sunday  10:00 am – 4:00 pm     [address]   Monday –

## (undated) NOTE — MR AVS SNAPSHOT
After Visit Summary   8/25/2021    Chelsy Aguilar    MRN: [id number]           Visit Information     Date & Time  8/25/2021  2:00 PM Provider  EM KHRIS DURANTN [address] [facility] Dept.  Phone  [phone] 9/8/2021 1:00 PM EM CC INFRN [address] [facility] - Infusion    9/15/2021 1:15 PM EM CC LAB1 [address] [facility] - Infusion    9/15/2021 1:45 PM [facility] Hematology Oncology    9/16/2021 11:00 AM Zehra Swain or injury that does not require immediate attention VIDEO VISITS  Average cost  $35*    e-VISTS  Average cost  $35*     SAME DAY APPOINTMENTS   Available at primary care offices    [address] [place] [address]  OFFICE VISIT   Primary Care Providers  Treatment

## (undated) NOTE — LETTER
4/14/2020          To Whom It May Concern:    Giselle Gonzalez is currently under my medical care and may not return to work at this time. She may return to work on May 2, 2020. Activity is restricted as follows: None.   If you require additional informa

## (undated) NOTE — LETTER
04/21/20        Chinyere Montaño  [address] [facility]      Dear Cheryl Omalley,    [address] [facility] records indicate that you have outstanding lab work and or testing that was ordered for you and has not yet been completed:     CBC, PLATELET; NO DIFFERENT

## (undated) NOTE — LETTER
[facility]  \[facility]\"", [address] [facility] [address] [address]  [facility]: [phone]  FAX: [phone]    Medical Clearance Request    Dr. Kandace España,     The patient listed below is scheduled for surgery and needs the following pre-op